# Patient Record
Sex: FEMALE | Race: WHITE | Employment: OTHER | URBAN - METROPOLITAN AREA
[De-identification: names, ages, dates, MRNs, and addresses within clinical notes are randomized per-mention and may not be internally consistent; named-entity substitution may affect disease eponyms.]

---

## 2017-01-03 ENCOUNTER — TELEPHONE (OUTPATIENT)
Dept: INTERNAL MEDICINE CLINIC | Age: 82
End: 2017-01-03

## 2017-01-03 RX ORDER — METFORMIN HYDROCHLORIDE 500 MG/1
1000 TABLET, EXTENDED RELEASE ORAL 2 TIMES DAILY WITH MEALS
Qty: 360 TABLET | Refills: 3 | Status: SHIPPED | OUTPATIENT
Start: 2017-01-03 | End: 2017-01-16

## 2017-01-09 ENCOUNTER — TELEPHONE (OUTPATIENT)
Dept: INTERNAL MEDICINE CLINIC | Age: 82
End: 2017-01-09

## 2017-01-16 ENCOUNTER — TELEPHONE (OUTPATIENT)
Dept: INTERNAL MEDICINE CLINIC | Age: 82
End: 2017-01-16

## 2017-01-16 RX ORDER — ALOGLIPTIN AND METFORMIN HYDROCHLORIDE 12.5; 1 MG/1; MG/1
1 TABLET, FILM COATED ORAL 2 TIMES DAILY WITH MEALS
Qty: 180 TABLET | Refills: 1 | Status: SHIPPED | OUTPATIENT
Start: 2017-01-16 | End: 2017-01-17

## 2017-01-17 ENCOUNTER — TELEPHONE (OUTPATIENT)
Dept: INTERNAL MEDICINE CLINIC | Age: 82
End: 2017-01-17

## 2017-01-17 RX ORDER — ALOGLIPTIN AND METFORMIN HYDROCHLORIDE 12.5; 1 MG/1; MG/1
1 TABLET, FILM COATED ORAL 2 TIMES DAILY WITH MEALS
Qty: 180 TABLET | Refills: 1 | Status: SHIPPED | OUTPATIENT
Start: 2017-01-17 | End: 2017-01-17

## 2017-01-23 ENCOUNTER — TELEPHONE (OUTPATIENT)
Dept: INTERNAL MEDICINE CLINIC | Age: 82
End: 2017-01-23

## 2017-01-25 ENCOUNTER — TELEPHONE (OUTPATIENT)
Dept: INTERNAL MEDICINE CLINIC | Age: 82
End: 2017-01-25

## 2017-02-15 ENCOUNTER — TELEPHONE (OUTPATIENT)
Dept: INTERNAL MEDICINE CLINIC | Age: 82
End: 2017-02-15

## 2017-02-20 ENCOUNTER — TELEPHONE (OUTPATIENT)
Dept: INTERNAL MEDICINE CLINIC | Age: 82
End: 2017-02-20

## 2017-03-09 ENCOUNTER — TELEPHONE (OUTPATIENT)
Dept: INTERNAL MEDICINE CLINIC | Age: 82
End: 2017-03-09

## 2017-03-21 ENCOUNTER — OFFICE VISIT (OUTPATIENT)
Dept: INTERNAL MEDICINE CLINIC | Age: 82
End: 2017-03-21

## 2017-03-21 VITALS
OXYGEN SATURATION: 96 % | BODY MASS INDEX: 20.02 KG/M2 | RESPIRATION RATE: 16 BRPM | WEIGHT: 113 LBS | HEART RATE: 75 BPM | DIASTOLIC BLOOD PRESSURE: 74 MMHG | SYSTOLIC BLOOD PRESSURE: 158 MMHG

## 2017-03-21 DIAGNOSIS — I10 ESSENTIAL HYPERTENSION: ICD-10-CM

## 2017-03-21 DIAGNOSIS — N18.30 TYPE 2 DIABETES MELLITUS WITH STAGE 3 CHRONIC KIDNEY DISEASE, WITHOUT LONG-TERM CURRENT USE OF INSULIN (HCC): Primary | ICD-10-CM

## 2017-03-21 DIAGNOSIS — E11.22 TYPE 2 DIABETES MELLITUS WITH STAGE 3 CHRONIC KIDNEY DISEASE, WITHOUT LONG-TERM CURRENT USE OF INSULIN (HCC): Primary | ICD-10-CM

## 2017-03-21 PROCEDURE — 1036F TOBACCO NON-USER: CPT | Performed by: INTERNAL MEDICINE

## 2017-03-21 PROCEDURE — G8419 CALC BMI OUT NRM PARAM NOF/U: HCPCS | Performed by: INTERNAL MEDICINE

## 2017-03-21 PROCEDURE — 4040F PNEUMOC VAC/ADMIN/RCVD: CPT | Performed by: INTERNAL MEDICINE

## 2017-03-21 PROCEDURE — G8427 DOCREV CUR MEDS BY ELIG CLIN: HCPCS | Performed by: INTERNAL MEDICINE

## 2017-03-21 PROCEDURE — 1123F ACP DISCUSS/DSCN MKR DOCD: CPT | Performed by: INTERNAL MEDICINE

## 2017-03-21 PROCEDURE — G8400 PT W/DXA NO RESULTS DOC: HCPCS | Performed by: INTERNAL MEDICINE

## 2017-03-21 PROCEDURE — G8484 FLU IMMUNIZE NO ADMIN: HCPCS | Performed by: INTERNAL MEDICINE

## 2017-03-21 PROCEDURE — 99213 OFFICE O/P EST LOW 20 MIN: CPT | Performed by: INTERNAL MEDICINE

## 2017-03-21 PROCEDURE — 1090F PRES/ABSN URINE INCON ASSESS: CPT | Performed by: INTERNAL MEDICINE

## 2017-03-21 RX ORDER — FLUTICASONE PROPIONATE 50 MCG
1 SPRAY, SUSPENSION (ML) NASAL DAILY
Qty: 1 BOTTLE | Refills: 3 | Status: SHIPPED | OUTPATIENT
Start: 2017-03-21 | End: 2017-04-17 | Stop reason: ALTCHOICE

## 2017-03-21 RX ORDER — ACETAMINOPHEN 500 MG
1000 TABLET ORAL EVERY 6 HOURS PRN
Qty: 225 TABLET | Refills: 3 | Status: SHIPPED | OUTPATIENT
Start: 2017-03-21 | End: 2017-11-20 | Stop reason: SDUPTHER

## 2017-03-21 RX ORDER — AMLODIPINE BESYLATE 5 MG/1
5 TABLET ORAL DAILY
Qty: 90 TABLET | Refills: 3 | Status: SHIPPED | OUTPATIENT
Start: 2017-03-21 | End: 2017-03-21 | Stop reason: SDUPTHER

## 2017-03-21 RX ORDER — AMLODIPINE BESYLATE 5 MG/1
5 TABLET ORAL DAILY
Qty: 90 TABLET | Refills: 1 | Status: SHIPPED | OUTPATIENT
Start: 2017-03-21 | End: 2017-07-05 | Stop reason: SDUPTHER

## 2017-03-21 ASSESSMENT — ENCOUNTER SYMPTOMS
RESPIRATORY NEGATIVE: 1
SHORTNESS OF BREATH: 0
BACK PAIN: 0
COUGH: 0
GASTROINTESTINAL NEGATIVE: 1
RHINORRHEA: 1

## 2017-03-22 ENCOUNTER — TELEPHONE (OUTPATIENT)
Dept: INTERNAL MEDICINE CLINIC | Age: 82
End: 2017-03-22

## 2017-04-13 ENCOUNTER — TELEPHONE (OUTPATIENT)
Dept: INTERNAL MEDICINE CLINIC | Age: 82
End: 2017-04-13

## 2017-04-13 RX ORDER — M-VIT,TX,IRON,MINS/CALC/FOLIC 27MG-0.4MG
1 TABLET ORAL DAILY
Qty: 180 TABLET | Refills: 1 | Status: SHIPPED | OUTPATIENT
Start: 2017-04-13 | End: 2018-07-05 | Stop reason: SDUPTHER

## 2017-04-17 ENCOUNTER — OFFICE VISIT (OUTPATIENT)
Dept: INTERNAL MEDICINE CLINIC | Age: 82
End: 2017-04-17

## 2017-04-17 VITALS
SYSTOLIC BLOOD PRESSURE: 130 MMHG | OXYGEN SATURATION: 95 % | DIASTOLIC BLOOD PRESSURE: 60 MMHG | HEART RATE: 75 BPM | BODY MASS INDEX: 19.27 KG/M2 | WEIGHT: 108.8 LBS

## 2017-04-17 DIAGNOSIS — N18.30 TYPE 2 DIABETES MELLITUS WITH STAGE 3 CHRONIC KIDNEY DISEASE, WITHOUT LONG-TERM CURRENT USE OF INSULIN (HCC): ICD-10-CM

## 2017-04-17 DIAGNOSIS — E11.22 TYPE 2 DIABETES MELLITUS WITH STAGE 3 CHRONIC KIDNEY DISEASE, WITHOUT LONG-TERM CURRENT USE OF INSULIN (HCC): ICD-10-CM

## 2017-04-17 DIAGNOSIS — E78.00 PURE HYPERCHOLESTEROLEMIA: ICD-10-CM

## 2017-04-17 DIAGNOSIS — R05.8 UPPER AIRWAY COUGH SYNDROME: ICD-10-CM

## 2017-04-17 DIAGNOSIS — I10 ESSENTIAL HYPERTENSION: Primary | ICD-10-CM

## 2017-04-17 LAB
ALBUMIN SERPL-MCNC: 3.8 G/DL (ref 3.4–5)
ANION GAP SERPL CALCULATED.3IONS-SCNC: 15 MMOL/L (ref 3–16)
BUN BLDV-MCNC: 23 MG/DL (ref 7–20)
CALCIUM SERPL-MCNC: 9.9 MG/DL (ref 8.3–10.6)
CHLORIDE BLD-SCNC: 98 MMOL/L (ref 99–110)
CHOLESTEROL, TOTAL: 248 MG/DL (ref 0–199)
CO2: 26 MMOL/L (ref 21–32)
CREAT SERPL-MCNC: 0.8 MG/DL (ref 0.6–1.2)
GFR AFRICAN AMERICAN: >60
GFR NON-AFRICAN AMERICAN: >60
GLUCOSE BLD-MCNC: 156 MG/DL (ref 70–99)
HDLC SERPL-MCNC: 34 MG/DL (ref 40–60)
LDL CHOLESTEROL CALCULATED: 155 MG/DL
PHOSPHORUS: 3.8 MG/DL (ref 2.5–4.9)
POTASSIUM SERPL-SCNC: 4.6 MMOL/L (ref 3.5–5.1)
SODIUM BLD-SCNC: 139 MMOL/L (ref 136–145)
TRIGL SERPL-MCNC: 294 MG/DL (ref 0–150)
VLDLC SERPL CALC-MCNC: 59 MG/DL

## 2017-04-17 PROCEDURE — 1036F TOBACCO NON-USER: CPT | Performed by: INTERNAL MEDICINE

## 2017-04-17 PROCEDURE — 1123F ACP DISCUSS/DSCN MKR DOCD: CPT | Performed by: INTERNAL MEDICINE

## 2017-04-17 PROCEDURE — G8400 PT W/DXA NO RESULTS DOC: HCPCS | Performed by: INTERNAL MEDICINE

## 2017-04-17 PROCEDURE — G8419 CALC BMI OUT NRM PARAM NOF/U: HCPCS | Performed by: INTERNAL MEDICINE

## 2017-04-17 PROCEDURE — 4040F PNEUMOC VAC/ADMIN/RCVD: CPT | Performed by: INTERNAL MEDICINE

## 2017-04-17 PROCEDURE — G8427 DOCREV CUR MEDS BY ELIG CLIN: HCPCS | Performed by: INTERNAL MEDICINE

## 2017-04-17 PROCEDURE — 1090F PRES/ABSN URINE INCON ASSESS: CPT | Performed by: INTERNAL MEDICINE

## 2017-04-17 PROCEDURE — 99214 OFFICE O/P EST MOD 30 MIN: CPT | Performed by: INTERNAL MEDICINE

## 2017-04-17 RX ORDER — MOMETASONE FUROATE 50 UG/1
2 SPRAY, METERED NASAL DAILY
Qty: 1 INHALER | Refills: 3 | Status: SHIPPED | OUTPATIENT
Start: 2017-04-17 | End: 2018-11-13 | Stop reason: ALTCHOICE

## 2017-04-17 ASSESSMENT — ENCOUNTER SYMPTOMS
BACK PAIN: 0
GASTROINTESTINAL NEGATIVE: 1
RHINORRHEA: 1
SHORTNESS OF BREATH: 0
COUGH: 1
CHEST TIGHTNESS: 0
WHEEZING: 0

## 2017-04-18 LAB
ESTIMATED AVERAGE GLUCOSE: 177.2 MG/DL
HBA1C MFR BLD: 7.8 %

## 2017-04-19 ENCOUNTER — TELEPHONE (OUTPATIENT)
Dept: INTERNAL MEDICINE CLINIC | Age: 82
End: 2017-04-19

## 2017-04-19 DIAGNOSIS — E11.59 TYPE 2 DIABETES MELLITUS WITH OTHER CIRCULATORY COMPLICATION, WITHOUT LONG-TERM CURRENT USE OF INSULIN (HCC): Primary | ICD-10-CM

## 2017-04-20 ENCOUNTER — TELEPHONE (OUTPATIENT)
Dept: INTERNAL MEDICINE CLINIC | Age: 82
End: 2017-04-20

## 2017-04-21 ENCOUNTER — TELEPHONE (OUTPATIENT)
Dept: INTERNAL MEDICINE CLINIC | Age: 82
End: 2017-04-21

## 2017-04-21 RX ORDER — EXENATIDE 2 MG
KIT SUBCUTANEOUS
Qty: 4 SYRINGE | Refills: 3 | Status: SHIPPED | OUTPATIENT
Start: 2017-04-21 | End: 2017-06-27 | Stop reason: ALTCHOICE

## 2017-04-26 RX ORDER — CETIRIZINE HYDROCHLORIDE 10 MG/1
10 TABLET ORAL DAILY
Qty: 30 TABLET | Refills: 5 | Status: SHIPPED | OUTPATIENT
Start: 2017-04-26 | End: 2017-05-03 | Stop reason: SDUPTHER

## 2017-04-26 RX ORDER — EXENATIDE 2 MG
KIT SUBCUTANEOUS
Qty: 4 SYRINGE | Refills: 3 | Status: SHIPPED | OUTPATIENT
Start: 2017-04-26 | End: 2017-06-27 | Stop reason: ALTCHOICE

## 2017-05-03 ENCOUNTER — TELEPHONE (OUTPATIENT)
Dept: INTERNAL MEDICINE CLINIC | Age: 82
End: 2017-05-03

## 2017-05-03 RX ORDER — CETIRIZINE HYDROCHLORIDE 10 MG/1
10 TABLET ORAL DAILY
Qty: 90 TABLET | Refills: 1 | Status: SHIPPED | OUTPATIENT
Start: 2017-05-03 | End: 2018-11-12

## 2017-05-03 RX ORDER — DONEPEZIL HYDROCHLORIDE 10 MG/1
10 TABLET, FILM COATED ORAL NIGHTLY
Qty: 90 TABLET | Refills: 3 | Status: SHIPPED | OUTPATIENT
Start: 2017-05-03 | End: 2017-11-20 | Stop reason: SDUPTHER

## 2017-06-19 ENCOUNTER — TELEPHONE (OUTPATIENT)
Dept: INTERNAL MEDICINE CLINIC | Age: 82
End: 2017-06-19

## 2017-06-27 ENCOUNTER — TELEPHONE (OUTPATIENT)
Dept: INTERNAL MEDICINE CLINIC | Age: 82
End: 2017-06-27

## 2017-07-05 RX ORDER — PIOGLITAZONEHYDROCHLORIDE 30 MG/1
30 TABLET ORAL DAILY
Qty: 90 TABLET | Refills: 1 | Status: SHIPPED | OUTPATIENT
Start: 2017-07-05 | End: 2017-11-28 | Stop reason: SDUPTHER

## 2017-07-05 RX ORDER — PIOGLITAZONEHYDROCHLORIDE 30 MG/1
30 TABLET ORAL DAILY
Qty: 90 TABLET | Refills: 1 | Status: CANCELLED | OUTPATIENT
Start: 2017-07-05

## 2017-07-05 RX ORDER — AMLODIPINE BESYLATE 5 MG/1
5 TABLET ORAL DAILY
Qty: 90 TABLET | Refills: 1 | Status: CANCELLED | OUTPATIENT
Start: 2017-07-05

## 2017-07-05 RX ORDER — AMLODIPINE BESYLATE 5 MG/1
5 TABLET ORAL DAILY
Qty: 90 TABLET | Refills: 1 | Status: SHIPPED | OUTPATIENT
Start: 2017-07-05 | End: 2017-11-20 | Stop reason: SDUPTHER

## 2017-07-13 ENCOUNTER — TELEPHONE (OUTPATIENT)
Dept: INTERNAL MEDICINE CLINIC | Age: 82
End: 2017-07-13

## 2017-07-17 ENCOUNTER — OFFICE VISIT (OUTPATIENT)
Dept: INTERNAL MEDICINE CLINIC | Age: 82
End: 2017-07-17

## 2017-07-17 VITALS
WEIGHT: 117 LBS | SYSTOLIC BLOOD PRESSURE: 160 MMHG | DIASTOLIC BLOOD PRESSURE: 65 MMHG | HEART RATE: 68 BPM | BODY MASS INDEX: 20.73 KG/M2 | OXYGEN SATURATION: 96 %

## 2017-07-17 DIAGNOSIS — I10 ESSENTIAL HYPERTENSION: Primary | ICD-10-CM

## 2017-07-17 DIAGNOSIS — N18.30 TYPE 2 DIABETES MELLITUS WITH STAGE 3 CHRONIC KIDNEY DISEASE, WITHOUT LONG-TERM CURRENT USE OF INSULIN (HCC): ICD-10-CM

## 2017-07-17 DIAGNOSIS — E11.22 TYPE 2 DIABETES MELLITUS WITH STAGE 3 CHRONIC KIDNEY DISEASE, WITHOUT LONG-TERM CURRENT USE OF INSULIN (HCC): ICD-10-CM

## 2017-07-17 DIAGNOSIS — E78.00 PURE HYPERCHOLESTEROLEMIA: ICD-10-CM

## 2017-07-17 DIAGNOSIS — I10 ESSENTIAL HYPERTENSION: ICD-10-CM

## 2017-07-17 LAB
CHOLESTEROL, TOTAL: 217 MG/DL (ref 0–199)
HDLC SERPL-MCNC: 38 MG/DL (ref 40–60)
LDL CHOLESTEROL CALCULATED: 124 MG/DL
TRIGL SERPL-MCNC: 275 MG/DL (ref 0–150)
VLDLC SERPL CALC-MCNC: 55 MG/DL

## 2017-07-17 PROCEDURE — G8400 PT W/DXA NO RESULTS DOC: HCPCS | Performed by: INTERNAL MEDICINE

## 2017-07-17 PROCEDURE — 1090F PRES/ABSN URINE INCON ASSESS: CPT | Performed by: INTERNAL MEDICINE

## 2017-07-17 PROCEDURE — 99214 OFFICE O/P EST MOD 30 MIN: CPT | Performed by: INTERNAL MEDICINE

## 2017-07-17 PROCEDURE — G8420 CALC BMI NORM PARAMETERS: HCPCS | Performed by: INTERNAL MEDICINE

## 2017-07-17 PROCEDURE — 4040F PNEUMOC VAC/ADMIN/RCVD: CPT | Performed by: INTERNAL MEDICINE

## 2017-07-17 PROCEDURE — 1123F ACP DISCUSS/DSCN MKR DOCD: CPT | Performed by: INTERNAL MEDICINE

## 2017-07-17 PROCEDURE — 1036F TOBACCO NON-USER: CPT | Performed by: INTERNAL MEDICINE

## 2017-07-17 PROCEDURE — G8427 DOCREV CUR MEDS BY ELIG CLIN: HCPCS | Performed by: INTERNAL MEDICINE

## 2017-07-17 RX ORDER — LOSARTAN POTASSIUM 50 MG/1
50 TABLET ORAL DAILY
Qty: 30 TABLET | Refills: 3 | Status: SHIPPED | OUTPATIENT
Start: 2017-07-17 | End: 2017-08-14 | Stop reason: SDUPTHER

## 2017-07-17 ASSESSMENT — ENCOUNTER SYMPTOMS
GASTROINTESTINAL NEGATIVE: 1
SHORTNESS OF BREATH: 0
WHEEZING: 0
COUGH: 0
CHEST TIGHTNESS: 0
BACK PAIN: 0

## 2017-07-18 LAB
ESTIMATED AVERAGE GLUCOSE: 177.2 MG/DL
HBA1C MFR BLD: 7.8 %

## 2017-07-19 ENCOUNTER — TELEPHONE (OUTPATIENT)
Dept: INTERNAL MEDICINE CLINIC | Age: 82
End: 2017-07-19

## 2017-07-28 ENCOUNTER — TELEPHONE (OUTPATIENT)
Dept: INTERNAL MEDICINE CLINIC | Age: 82
End: 2017-07-28

## 2017-08-14 ENCOUNTER — OFFICE VISIT (OUTPATIENT)
Dept: INTERNAL MEDICINE CLINIC | Age: 82
End: 2017-08-14

## 2017-08-14 VITALS
SYSTOLIC BLOOD PRESSURE: 140 MMHG | OXYGEN SATURATION: 96 % | HEART RATE: 70 BPM | DIASTOLIC BLOOD PRESSURE: 66 MMHG | BODY MASS INDEX: 21.08 KG/M2 | WEIGHT: 119 LBS

## 2017-08-14 DIAGNOSIS — I10 ESSENTIAL HYPERTENSION: Primary | ICD-10-CM

## 2017-08-14 PROCEDURE — 1090F PRES/ABSN URINE INCON ASSESS: CPT | Performed by: INTERNAL MEDICINE

## 2017-08-14 PROCEDURE — G8427 DOCREV CUR MEDS BY ELIG CLIN: HCPCS | Performed by: INTERNAL MEDICINE

## 2017-08-14 PROCEDURE — G8400 PT W/DXA NO RESULTS DOC: HCPCS | Performed by: INTERNAL MEDICINE

## 2017-08-14 PROCEDURE — 1036F TOBACCO NON-USER: CPT | Performed by: INTERNAL MEDICINE

## 2017-08-14 PROCEDURE — 99214 OFFICE O/P EST MOD 30 MIN: CPT | Performed by: INTERNAL MEDICINE

## 2017-08-14 PROCEDURE — 1123F ACP DISCUSS/DSCN MKR DOCD: CPT | Performed by: INTERNAL MEDICINE

## 2017-08-14 PROCEDURE — G8420 CALC BMI NORM PARAMETERS: HCPCS | Performed by: INTERNAL MEDICINE

## 2017-08-14 PROCEDURE — 4040F PNEUMOC VAC/ADMIN/RCVD: CPT | Performed by: INTERNAL MEDICINE

## 2017-08-14 RX ORDER — LOSARTAN POTASSIUM 50 MG/1
50 TABLET ORAL DAILY
Qty: 90 TABLET | Refills: 1 | Status: SHIPPED | OUTPATIENT
Start: 2017-08-14 | End: 2018-01-17 | Stop reason: SDUPTHER

## 2017-08-14 ASSESSMENT — ENCOUNTER SYMPTOMS
WHEEZING: 0
BACK PAIN: 0
COUGH: 0
GASTROINTESTINAL NEGATIVE: 1
CHEST TIGHTNESS: 0

## 2017-08-16 ENCOUNTER — TELEPHONE (OUTPATIENT)
Dept: INTERNAL MEDICINE CLINIC | Age: 82
End: 2017-08-16

## 2017-08-16 RX ORDER — TIZANIDINE 2 MG/1
2 TABLET ORAL NIGHTLY
Qty: 30 TABLET | Refills: 3 | Status: SHIPPED | OUTPATIENT
Start: 2017-08-16 | End: 2017-11-20 | Stop reason: SINTOL

## 2017-08-18 ENCOUNTER — TELEPHONE (OUTPATIENT)
Dept: INTERNAL MEDICINE CLINIC | Age: 82
End: 2017-08-18

## 2017-08-30 ENCOUNTER — TELEPHONE (OUTPATIENT)
Dept: INTERNAL MEDICINE CLINIC | Age: 82
End: 2017-08-30

## 2017-10-06 RX ORDER — SITAGLIPTIN AND METFORMIN HYDROCHLORIDE 1000; 50 MG/1; MG/1
TABLET, FILM COATED, EXTENDED RELEASE ORAL
Qty: 60 TABLET | Refills: 5 | Status: SHIPPED | OUTPATIENT
Start: 2017-10-06 | End: 2018-03-14 | Stop reason: SDUPTHER

## 2017-11-20 ENCOUNTER — OFFICE VISIT (OUTPATIENT)
Dept: INTERNAL MEDICINE CLINIC | Age: 82
End: 2017-11-20

## 2017-11-20 VITALS
WEIGHT: 121 LBS | HEART RATE: 70 BPM | OXYGEN SATURATION: 97 % | SYSTOLIC BLOOD PRESSURE: 150 MMHG | DIASTOLIC BLOOD PRESSURE: 90 MMHG | BODY MASS INDEX: 21.43 KG/M2

## 2017-11-20 DIAGNOSIS — M16.10 ARTHRITIS, HIP: ICD-10-CM

## 2017-11-20 DIAGNOSIS — E11.59 TYPE 2 DIABETES MELLITUS WITH OTHER CIRCULATORY COMPLICATION, WITHOUT LONG-TERM CURRENT USE OF INSULIN (HCC): ICD-10-CM

## 2017-11-20 DIAGNOSIS — I10 ESSENTIAL HYPERTENSION: ICD-10-CM

## 2017-11-20 DIAGNOSIS — E11.59 TYPE 2 DIABETES MELLITUS WITH OTHER CIRCULATORY COMPLICATION, WITHOUT LONG-TERM CURRENT USE OF INSULIN (HCC): Primary | ICD-10-CM

## 2017-11-20 LAB
CHOLESTEROL, TOTAL: 202 MG/DL (ref 0–199)
HDLC SERPL-MCNC: 40 MG/DL (ref 40–60)
LDL CHOLESTEROL CALCULATED: 121 MG/DL
TRIGL SERPL-MCNC: 203 MG/DL (ref 0–150)
VLDLC SERPL CALC-MCNC: 41 MG/DL

## 2017-11-20 PROCEDURE — 1090F PRES/ABSN URINE INCON ASSESS: CPT | Performed by: INTERNAL MEDICINE

## 2017-11-20 PROCEDURE — G8484 FLU IMMUNIZE NO ADMIN: HCPCS | Performed by: INTERNAL MEDICINE

## 2017-11-20 PROCEDURE — 4040F PNEUMOC VAC/ADMIN/RCVD: CPT | Performed by: INTERNAL MEDICINE

## 2017-11-20 PROCEDURE — 1036F TOBACCO NON-USER: CPT | Performed by: INTERNAL MEDICINE

## 2017-11-20 PROCEDURE — 99214 OFFICE O/P EST MOD 30 MIN: CPT | Performed by: INTERNAL MEDICINE

## 2017-11-20 PROCEDURE — G8420 CALC BMI NORM PARAMETERS: HCPCS | Performed by: INTERNAL MEDICINE

## 2017-11-20 PROCEDURE — G8427 DOCREV CUR MEDS BY ELIG CLIN: HCPCS | Performed by: INTERNAL MEDICINE

## 2017-11-20 PROCEDURE — 90670 PCV13 VACCINE IM: CPT | Performed by: INTERNAL MEDICINE

## 2017-11-20 PROCEDURE — G0009 ADMIN PNEUMOCOCCAL VACCINE: HCPCS | Performed by: INTERNAL MEDICINE

## 2017-11-20 PROCEDURE — 1123F ACP DISCUSS/DSCN MKR DOCD: CPT | Performed by: INTERNAL MEDICINE

## 2017-11-20 RX ORDER — DONEPEZIL HYDROCHLORIDE 10 MG/1
10 TABLET, FILM COATED ORAL NIGHTLY
Qty: 90 TABLET | Refills: 3 | Status: SHIPPED | OUTPATIENT
Start: 2017-11-20 | End: 2018-11-02 | Stop reason: SDUPTHER

## 2017-11-20 RX ORDER — ACETAMINOPHEN 500 MG
1000 TABLET ORAL 3 TIMES DAILY
Qty: 180 TABLET | Refills: 3 | Status: SHIPPED | OUTPATIENT
Start: 2017-11-20 | End: 2018-11-13 | Stop reason: ALTCHOICE

## 2017-11-20 RX ORDER — AMLODIPINE BESYLATE 10 MG/1
10 TABLET ORAL DAILY
Qty: 90 TABLET | Refills: 2 | Status: SHIPPED | OUTPATIENT
Start: 2017-11-20 | End: 2018-09-07 | Stop reason: SDUPTHER

## 2017-11-20 ASSESSMENT — ENCOUNTER SYMPTOMS
CHEST TIGHTNESS: 0
COUGH: 0
BACK PAIN: 0
GASTROINTESTINAL NEGATIVE: 1
WHEEZING: 0

## 2017-11-21 ENCOUNTER — TELEPHONE (OUTPATIENT)
Dept: INTERNAL MEDICINE CLINIC | Age: 82
End: 2017-11-21

## 2017-11-21 LAB
ESTIMATED AVERAGE GLUCOSE: 231.7 MG/DL
HBA1C MFR BLD: 9.7 %

## 2017-11-22 NOTE — TELEPHONE ENCOUNTER
Mayelin Shannon is calling to,let Dr Bianca Martinez know that she saw pt today and her b/p in right arm was 130/60  And in the left arm it was 138/60

## 2017-11-28 RX ORDER — PIOGLITAZONEHYDROCHLORIDE 45 MG/1
45 TABLET ORAL DAILY
Qty: 90 TABLET | Refills: 2 | Status: SHIPPED | OUTPATIENT
Start: 2017-11-28 | End: 2018-02-14 | Stop reason: SDUPTHER

## 2017-11-28 RX ORDER — TIZANIDINE 2 MG/1
TABLET ORAL
Qty: 28 TABLET | Refills: 0 | OUTPATIENT
Start: 2017-11-28

## 2017-11-28 RX ORDER — PIOGLITAZONEHYDROCHLORIDE 30 MG/1
TABLET ORAL
Qty: 28 TABLET | Refills: 0 | Status: CANCELLED | OUTPATIENT
Start: 2017-11-28

## 2017-12-21 RX ORDER — ASPIRIN 81 MG/1
TABLET ORAL
Qty: 28 TABLET | Refills: 0 | Status: SHIPPED | OUTPATIENT
Start: 2017-12-21 | End: 2018-01-17 | Stop reason: SDUPTHER

## 2017-12-21 RX ORDER — B-COMPLEX WITH VITAMIN C
TABLET ORAL
Qty: 56 TABLET | Refills: 0 | Status: SHIPPED | OUTPATIENT
Start: 2017-12-21 | End: 2019-05-13 | Stop reason: ALTCHOICE

## 2018-01-03 ENCOUNTER — OFFICE VISIT (OUTPATIENT)
Dept: INTERNAL MEDICINE CLINIC | Age: 83
End: 2018-01-03

## 2018-01-03 VITALS
TEMPERATURE: 98.7 F | OXYGEN SATURATION: 98 % | SYSTOLIC BLOOD PRESSURE: 142 MMHG | RESPIRATION RATE: 14 BRPM | DIASTOLIC BLOOD PRESSURE: 60 MMHG | HEART RATE: 72 BPM

## 2018-01-03 DIAGNOSIS — R09.81 NASAL CONGESTION: Primary | ICD-10-CM

## 2018-01-03 DIAGNOSIS — E11.22 TYPE 2 DIABETES MELLITUS WITH ESRD (END-STAGE RENAL DISEASE) (HCC): ICD-10-CM

## 2018-01-03 DIAGNOSIS — J30.1 CHRONIC SEASONAL ALLERGIC RHINITIS DUE TO POLLEN: ICD-10-CM

## 2018-01-03 DIAGNOSIS — I15.2 HYPERTENSION ASSOCIATED WITH DIABETES (HCC): ICD-10-CM

## 2018-01-03 DIAGNOSIS — N18.6 TYPE 2 DIABETES MELLITUS WITH ESRD (END-STAGE RENAL DISEASE) (HCC): ICD-10-CM

## 2018-01-03 DIAGNOSIS — E11.59 HYPERTENSION ASSOCIATED WITH DIABETES (HCC): ICD-10-CM

## 2018-01-03 PROCEDURE — G8428 CUR MEDS NOT DOCUMENT: HCPCS | Performed by: INTERNAL MEDICINE

## 2018-01-03 PROCEDURE — 1123F ACP DISCUSS/DSCN MKR DOCD: CPT | Performed by: INTERNAL MEDICINE

## 2018-01-03 PROCEDURE — 1090F PRES/ABSN URINE INCON ASSESS: CPT | Performed by: INTERNAL MEDICINE

## 2018-01-03 PROCEDURE — 99213 OFFICE O/P EST LOW 20 MIN: CPT | Performed by: INTERNAL MEDICINE

## 2018-01-03 PROCEDURE — G8484 FLU IMMUNIZE NO ADMIN: HCPCS | Performed by: INTERNAL MEDICINE

## 2018-01-03 PROCEDURE — G8420 CALC BMI NORM PARAMETERS: HCPCS | Performed by: INTERNAL MEDICINE

## 2018-01-03 PROCEDURE — 1036F TOBACCO NON-USER: CPT | Performed by: INTERNAL MEDICINE

## 2018-01-03 PROCEDURE — 4040F PNEUMOC VAC/ADMIN/RCVD: CPT | Performed by: INTERNAL MEDICINE

## 2018-01-03 RX ORDER — ECHINACEA PURPUREA EXTRACT 125 MG
1 TABLET ORAL PRN
Qty: 30 ML | Refills: 2 | Status: SHIPPED | OUTPATIENT
Start: 2018-01-03 | End: 2018-11-12

## 2018-01-03 RX ORDER — LORATADINE 10 MG/1
10 TABLET ORAL DAILY
Qty: 30 TABLET | Refills: 1 | Status: SHIPPED | OUTPATIENT
Start: 2018-01-03 | End: 2018-07-02 | Stop reason: ALTCHOICE

## 2018-01-03 NOTE — PATIENT INSTRUCTIONS
body.  ¨ Swelling of the throat, mouth, lips, or tongue. ¨ Trouble breathing. ¨ Passing out (losing consciousness). Or you may feel very lightheaded or suddenly feel weak, confused, or restless. ? · You have been given an epinephrine shot, even if you feel better. ?Call your doctor now or seek immediate medical care if:  ? · You have symptoms of an allergic reaction, such as:  ¨ A rash or hives (raised, red areas on the skin). ¨ Itching. ¨ Swelling. ¨ Belly pain, nausea, or vomiting. ? Watch closely for changes in your health, and be sure to contact your doctor if:  ? · You do not get better as expected. Where can you learn more? Go to [email or web address]://Huzco.[employer]. org and sign in to your PeopleGoal account. Enter Y843 in the CityScan box to learn more about \"Allergies: Care Instructions. \"     If you do not have an account, please click on the \"Sign Up Now\" link. Current as of: September 29, 2016  Content Version: 11.4  © 1968-9504 Healthwise, Incorporated. Care instructions adapted under license by [facility] ([facility]). If you have questions about a medical condition or this instruction, always ask your healthcare professional. [address] any warranty or liability for your use of this information.

## 2018-01-18 RX ORDER — LOSARTAN POTASSIUM 50 MG/1
TABLET ORAL
Qty: 90 TABLET | Refills: 2 | Status: SHIPPED | OUTPATIENT
Start: 2018-01-18 | End: 2019-01-03 | Stop reason: SDUPTHER

## 2018-01-18 RX ORDER — ASPIRIN 81 MG
TABLET, DELAYED RELEASE (ENTERIC COATED) ORAL
Qty: 90 TABLET | Refills: 2 | Status: SHIPPED | OUTPATIENT
Start: 2018-01-18 | End: 2018-02-26 | Stop reason: SDUPTHER

## 2018-02-15 RX ORDER — PIOGLITAZONEHYDROCHLORIDE 45 MG/1
TABLET ORAL
Qty: 90 TABLET | Refills: 2 | Status: SHIPPED | OUTPATIENT
Start: 2018-02-15 | End: 2018-02-26 | Stop reason: SDUPTHER

## 2018-02-26 ENCOUNTER — OFFICE VISIT (OUTPATIENT)
Dept: INTERNAL MEDICINE CLINIC | Age: 83
End: 2018-02-26

## 2018-02-26 VITALS
HEART RATE: 64 BPM | BODY MASS INDEX: 22.63 KG/M2 | DIASTOLIC BLOOD PRESSURE: 58 MMHG | WEIGHT: 123 LBS | RESPIRATION RATE: 16 BRPM | SYSTOLIC BLOOD PRESSURE: 132 MMHG | HEIGHT: 62 IN

## 2018-02-26 DIAGNOSIS — N18.30 TYPE 2 DIABETES MELLITUS WITH STAGE 3 CHRONIC KIDNEY DISEASE, WITHOUT LONG-TERM CURRENT USE OF INSULIN (HCC): Primary | ICD-10-CM

## 2018-02-26 DIAGNOSIS — I10 ESSENTIAL HYPERTENSION: ICD-10-CM

## 2018-02-26 DIAGNOSIS — E11.22 TYPE 2 DIABETES MELLITUS WITH STAGE 3 CHRONIC KIDNEY DISEASE, WITHOUT LONG-TERM CURRENT USE OF INSULIN (HCC): Primary | ICD-10-CM

## 2018-02-26 DIAGNOSIS — E78.00 PURE HYPERCHOLESTEROLEMIA: ICD-10-CM

## 2018-02-26 PROCEDURE — 1123F ACP DISCUSS/DSCN MKR DOCD: CPT | Performed by: INTERNAL MEDICINE

## 2018-02-26 PROCEDURE — 4040F PNEUMOC VAC/ADMIN/RCVD: CPT | Performed by: INTERNAL MEDICINE

## 2018-02-26 PROCEDURE — G8420 CALC BMI NORM PARAMETERS: HCPCS | Performed by: INTERNAL MEDICINE

## 2018-02-26 PROCEDURE — G8427 DOCREV CUR MEDS BY ELIG CLIN: HCPCS | Performed by: INTERNAL MEDICINE

## 2018-02-26 PROCEDURE — 1090F PRES/ABSN URINE INCON ASSESS: CPT | Performed by: INTERNAL MEDICINE

## 2018-02-26 PROCEDURE — 1036F TOBACCO NON-USER: CPT | Performed by: INTERNAL MEDICINE

## 2018-02-26 PROCEDURE — G8484 FLU IMMUNIZE NO ADMIN: HCPCS | Performed by: INTERNAL MEDICINE

## 2018-02-26 PROCEDURE — 99213 OFFICE O/P EST LOW 20 MIN: CPT | Performed by: INTERNAL MEDICINE

## 2018-02-26 RX ORDER — PIOGLITAZONEHYDROCHLORIDE 45 MG/1
TABLET ORAL
Qty: 90 TABLET | Refills: 2 | Status: SHIPPED | OUTPATIENT
Start: 2018-02-26 | End: 2018-11-02 | Stop reason: SDUPTHER

## 2018-02-26 ASSESSMENT — ENCOUNTER SYMPTOMS
COUGH: 0
GASTROINTESTINAL NEGATIVE: 1
WHEEZING: 0
CHEST TIGHTNESS: 0
BACK PAIN: 0

## 2018-02-27 ENCOUNTER — TELEPHONE (OUTPATIENT)
Dept: INTERNAL MEDICINE CLINIC | Age: 83
End: 2018-02-27

## 2018-02-27 NOTE — TELEPHONE ENCOUNTER
Actos not on [employer] formulary but do-able cost with [employer]. Does not want to change to the [employer].

## 2018-03-01 ENCOUNTER — TELEPHONE (OUTPATIENT)
Dept: INTERNAL MEDICINE CLINIC | Age: 83
End: 2018-03-01

## 2018-03-08 ENCOUNTER — TELEPHONE (OUTPATIENT)
Dept: INTERNAL MEDICINE CLINIC | Age: 83
End: 2018-03-08

## 2018-03-14 RX ORDER — SITAGLIPTIN AND METFORMIN HYDROCHLORIDE 1000; 50 MG/1; MG/1
TABLET, FILM COATED, EXTENDED RELEASE ORAL
Qty: 60 TABLET | Refills: 2 | Status: SHIPPED | OUTPATIENT
Start: 2018-03-14 | End: 2018-06-06 | Stop reason: SDUPTHER

## 2018-03-29 ENCOUNTER — TELEPHONE (OUTPATIENT)
Dept: INTERNAL MEDICINE CLINIC | Age: 83
End: 2018-03-29

## 2018-04-17 ENCOUNTER — TELEPHONE (OUTPATIENT)
Dept: INTERNAL MEDICINE CLINIC | Age: 83
End: 2018-04-17

## 2018-04-23 ENCOUNTER — TELEPHONE (OUTPATIENT)
Dept: ORTHOPEDIC SURGERY | Age: 83
End: 2018-04-23

## 2018-05-01 ENCOUNTER — TELEPHONE (OUTPATIENT)
Dept: INTERNAL MEDICINE CLINIC | Age: 83
End: 2018-05-01

## 2018-05-03 ENCOUNTER — TELEPHONE (OUTPATIENT)
Dept: INTERNAL MEDICINE CLINIC | Age: 83
End: 2018-05-03

## 2018-05-08 ENCOUNTER — TELEPHONE (OUTPATIENT)
Dept: INTERNAL MEDICINE CLINIC | Age: 83
End: 2018-05-08

## 2018-06-07 RX ORDER — DAPAGLIFLOZIN 10 MG/1
TABLET, FILM COATED ORAL
Qty: 28 TABLET | Refills: 0 | Status: SHIPPED | OUTPATIENT
Start: 2018-06-07 | End: 2018-09-27 | Stop reason: SDUPTHER

## 2018-06-07 RX ORDER — SITAGLIPTIN AND METFORMIN HYDROCHLORIDE 1000; 50 MG/1; MG/1
TABLET, FILM COATED, EXTENDED RELEASE ORAL
Qty: 56 TABLET | Refills: 0 | Status: SHIPPED | OUTPATIENT
Start: 2018-06-07 | End: 2018-07-05 | Stop reason: SDUPTHER

## 2018-06-19 ENCOUNTER — TELEPHONE (OUTPATIENT)
Dept: INTERNAL MEDICINE CLINIC | Age: 83
End: 2018-06-19

## 2018-07-02 ENCOUNTER — OFFICE VISIT (OUTPATIENT)
Dept: INTERNAL MEDICINE CLINIC | Age: 83
End: 2018-07-02

## 2018-07-02 VITALS
WEIGHT: 120 LBS | BODY MASS INDEX: 21.95 KG/M2 | HEART RATE: 80 BPM | SYSTOLIC BLOOD PRESSURE: 138 MMHG | RESPIRATION RATE: 16 BRPM | DIASTOLIC BLOOD PRESSURE: 58 MMHG

## 2018-07-02 DIAGNOSIS — Z91.81 AT HIGH RISK FOR FALLS: ICD-10-CM

## 2018-07-02 DIAGNOSIS — E11.9 ENCOUNTER FOR DIABETIC FOOT EXAM (HCC): ICD-10-CM

## 2018-07-02 DIAGNOSIS — E11.59 TYPE 2 DIABETES MELLITUS WITH OTHER CIRCULATORY COMPLICATION, WITHOUT LONG-TERM CURRENT USE OF INSULIN (HCC): Primary | ICD-10-CM

## 2018-07-02 DIAGNOSIS — I10 ESSENTIAL HYPERTENSION: ICD-10-CM

## 2018-07-02 DIAGNOSIS — G30.1 LATE ONSET ALZHEIMER'S DISEASE WITHOUT BEHAVIORAL DISTURBANCE (HCC): ICD-10-CM

## 2018-07-02 DIAGNOSIS — F02.80 LATE ONSET ALZHEIMER'S DISEASE WITHOUT BEHAVIORAL DISTURBANCE (HCC): ICD-10-CM

## 2018-07-02 PROCEDURE — 1123F ACP DISCUSS/DSCN MKR DOCD: CPT | Performed by: INTERNAL MEDICINE

## 2018-07-02 PROCEDURE — G8420 CALC BMI NORM PARAMETERS: HCPCS | Performed by: INTERNAL MEDICINE

## 2018-07-02 PROCEDURE — 1090F PRES/ABSN URINE INCON ASSESS: CPT | Performed by: INTERNAL MEDICINE

## 2018-07-02 PROCEDURE — 1036F TOBACCO NON-USER: CPT | Performed by: INTERNAL MEDICINE

## 2018-07-02 PROCEDURE — G8427 DOCREV CUR MEDS BY ELIG CLIN: HCPCS | Performed by: INTERNAL MEDICINE

## 2018-07-02 PROCEDURE — 99214 OFFICE O/P EST MOD 30 MIN: CPT | Performed by: INTERNAL MEDICINE

## 2018-07-02 PROCEDURE — 4040F PNEUMOC VAC/ADMIN/RCVD: CPT | Performed by: INTERNAL MEDICINE

## 2018-07-02 ASSESSMENT — ENCOUNTER SYMPTOMS
COUGH: 0
GASTROINTESTINAL NEGATIVE: 1
CHEST TIGHTNESS: 0
BACK PAIN: 0
WHEEZING: 0

## 2018-07-02 ASSESSMENT — PATIENT HEALTH QUESTIONNAIRE - PHQ9
SUM OF ALL RESPONSES TO PHQ QUESTIONS 1-9: 0
SUM OF ALL RESPONSES TO PHQ9 QUESTIONS 1 & 2: 0
2. FEELING DOWN, DEPRESSED OR HOPELESS: 0
1. LITTLE INTEREST OR PLEASURE IN DOING THINGS: 0

## 2018-07-02 NOTE — PROGRESS NOTES
Subjective:      Patient ID: Niels Orantes is a 80 y.o. female. Patient is here for  DM 2 and HTN follow up. She feels well, eating well, no chest pain, and her breathing is good, currently on Janumet and Pioglitazone 45. Blood sugars reviewed. None below 107. Mostly 124-152, all AM blood sugars. Weight is 120, up from 108 in a year. Staying at [address], no falls and no safety concerns, she continues to have good family support and visiting nurse evaluations        Review of Systems   Constitutional: Negative. Negative for activity change, appetite change and fever. HENT: Negative for congestion and trouble swallowing. Eyes: Negative for visual disturbance. Respiratory: Negative for cough, chest tightness, shortness of breath and wheezing. Cardiovascular: Negative. Gastrointestinal: Negative. Negative for abdominal pain, diarrhea and vomiting. Endocrine: Negative for cold intolerance, heat intolerance, polydipsia and polyphagia. Genitourinary: Negative. Negative for dysuria and hematuria. Musculoskeletal: Positive for arthralgias and gait problem. Negative for back pain, joint swelling and myalgias. Skin: Negative. Negative for rash and wound. Allergic/Immunologic: Negative for immunocompromised state. Neurological: Negative for syncope, weakness and light-headedness. Hematological: Negative. Psychiatric/Behavioral: Negative for agitation, behavioral problems, dysphoric mood and sleep disturbance. The patient is not hyperactive. Past Medical History:   Diagnosis Date    Back pain     Diabetes ([address]     type 2    Heart disease     by pass    Hyperlipidemia     Hypertension      I personally reviewed active meds and allergies with the patient today    Objective:   Physical Exam   Constitutional: She appears well-developed and well-nourished. She appears cachectic. She does not appear ill. No distress. HENT:   Head: Normocephalic and atraumatic. Mouth/Throat: Oropharynx is clear and moist. No oropharyngeal exudate. Eyes: Conjunctivae are normal. No scleral icterus. Neck: No JVD present. No tracheal deviation present. No thyromegaly present. Cardiovascular: Normal rate, regular rhythm, S2 normal, normal heart sounds and normal pulses. Pulmonary/Chest: Effort normal and breath sounds normal. She has no wheezes. She has no rales. Abdominal: Soft. She exhibits no distension. There is no tenderness. Musculoskeletal:        Left elbow: She exhibits decreased range of motion. Neurological: She is alert. She displays no tremor. Gait abnormal. Coordination normal.   Normal diabetic sensation exam.    Skin: Skin is warm and dry. No rash noted. She is not diaphoretic.    no foot ulcers. Dry heels. Moisturizer recommended. Shin scab to left. S/p hit leg on \"something\"      Psychiatric: Her behavior is normal. Her affect is blunt. Her affect is not inappropriate. Her speech is slurred. Cognition and memory are impaired. She does not exhibit a depressed mood. Vitals reviewed. BP (!) 138/58 (Site: Right Arm, Position: Sitting, Cuff Size: Medium Adult)   Pulse 80 Comment: Regular  Resp 16   Wt 120 lb (54.4 kg)   BMI 21.95 kg/m²          Assessment:        ICD-10-CM ICD-9-CM    1. Type 2 diabetes mellitus with other circulatory complication, without long-term current use of insulin ([facility]) E11.59 250.70 HEMOGLOBIN A1C   2. Essential hypertension I10 401.9    3. At high risk for falls Z91.81 V15.88    4. Encounter for diabetic foot exam ([facility] E11.9 250.00    5.  Late onset Alzheimer's disease without behavioral disturbance G30.1 331.0     F02.80 294.10      HTN controlled  Type 2 DM control is adequate for her age and social situation        Plan:       Continue meds  Check labs  Get Shingrix vaccine    F/u in 4 mth    New Prescriptions    ZOSTER RECOMBINANT ADJUVANTED VACCINE (SHINGRIX) 50 MCG SUSR INJECTION    Inject 0.5 ml intramuscularly now and repeat dose in 2 months       Orders Placed This Encounter   Procedures    Zoster recombinant ([address] [place])    HEMOGLOBIN A1C          Medication List          Accurate as of 7/2/18 11:59 PM. If you have any questions, ask your nurse or doctor. START taking these medications    zoster recombinant adjuvanted vaccine 50 MCG Susr injection  Commonly known as:  SHINGRIX  Inject 0.5 ml intramuscularly now and repeat dose in 2 months  Started by:  Antolin Rogers MD        CONTINUE taking these medications    acetaminophen 500 MG tablet  Commonly known as:  TYLENOL  Take 2 tablets by mouth three times daily     amLODIPine 10 MG tablet  Commonly known as:  NORVASC  Take 1 tablet by mouth daily     aspirin 81 MG tablet  Take 1 tablet by mouth daily     blood glucose test strips strip  Commonly known as:  ACCU-CHEK CARMEN  Test  daily. ICD E11.9     cetirizine 10 MG tablet  Commonly known as:  ZYRTEC  Take 1 tablet by mouth daily     donepezil 10 MG tablet  Commonly known as:  ARICEPT  Take 1 tablet by mouth nightly     FARXIGA 10 MG tablet  Generic drug:  dapagliflozin  TAKE 1 TABLET BY MOUTH EACH MORNING     ferrous sulfate 325 (65 Fe) MG tablet     glucose 4 g chewable tablet  Take 4 tablets by mouth as needed for Low blood sugar (repeat every 30 mins until blood sugar is greater than 90)     Insulin Pen Needle 31G X 5 MM Misc  1 each by Does not apply route daily Test 3 times daily. ICD 10 code E 11.9     JANUMET XR  MG Tb24 per extended release tablet  Generic drug:  SITagliptin-metFORMIN  TAKE ONE TABLET BY MOUTH TWICE A DAY WITH MEALS     losartan 50 MG tablet  Commonly known as:  COZAAR  TAKE 1 TABLET BY MOUTH ONCE DAILY. mometasone 50 MCG/ACT nasal spray  Commonly known as:  NASONEX  2 sprays by Nasal route daily     ONE TOUCH LANCETS Misc  1 each by Does not apply route daily Test 3 times daily.  ICD 10 code E11.9     Oyster Shell Calcium/D 500-200 MG-UNIT Tabs  TAKE ONE (1) TABLET BY MOUTH TWICE A DAY. pioglitazone 45 MG tablet  Commonly known as:  ACTOS  TAKE 1 TABLET BY MOUTH ONCE DAILY. pitavastatin 2 MG Tabs tablet  Commonly known as:  LIVALO  Take 1 tablet by mouth nightly     sodium chloride 0.65 % nasal spray  Commonly known as:  OCEAN NASAL SPRAY  1 spray by Nasal route as needed for Congestion     therapeutic multivitamin-minerals tablet  Take 1 tablet by mouth daily     vitamin D3 1000 units Tabs  Take 1 tablet by mouth daily        STOP taking these medications    loratadine 10 MG tablet  Commonly known as:  CLARITIN  Stopped by:  Jessica Cramer MD           Where to Get Your Medications      You can get these medications from any pharmacy    Bring a paper prescription for each of these medications  · zoster recombinant adjuvanted vaccine 50 MCG Susr injection         Patient seen with her daughter Radha Crockett present for the entire visit, all questions answered     AVS and education print provided      Jessica Cramer  7/3/2018            On the basis of positive falls risk screening, assessment and plan is as follows: home safety tips provided.

## 2018-07-03 ASSESSMENT — ENCOUNTER SYMPTOMS
TROUBLE SWALLOWING: 0
SHORTNESS OF BREATH: 0
ABDOMINAL PAIN: 0
DIARRHEA: 0
VOMITING: 0

## 2018-07-05 RX ORDER — SITAGLIPTIN AND METFORMIN HYDROCHLORIDE 1000; 50 MG/1; MG/1
TABLET, FILM COATED, EXTENDED RELEASE ORAL
Qty: 56 TABLET | Refills: 0 | Status: SHIPPED | OUTPATIENT
Start: 2018-07-05 | End: 2018-08-02 | Stop reason: SDUPTHER

## 2018-07-05 RX ORDER — MULTIVIT,CALC,MINS/IRON/FOLIC 9MG-400MCG
TABLET ORAL
Qty: 28 TABLET | Refills: 0 | Status: SHIPPED | OUTPATIENT
Start: 2018-07-05 | End: 2019-05-13 | Stop reason: ALTCHOICE

## 2018-08-03 RX ORDER — SITAGLIPTIN AND METFORMIN HYDROCHLORIDE 1000; 50 MG/1; MG/1
TABLET, FILM COATED, EXTENDED RELEASE ORAL
Qty: 60 TABLET | Refills: 3 | Status: SHIPPED | OUTPATIENT
Start: 2018-08-03 | End: 2018-11-20 | Stop reason: SDUPTHER

## 2018-08-21 ENCOUNTER — TELEPHONE (OUTPATIENT)
Dept: INTERNAL MEDICINE CLINIC | Age: 83
End: 2018-08-21

## 2018-08-21 NOTE — TELEPHONE ENCOUNTER
These look fine for an 80year old and last HgbA1c 7/3/18 was at goal of < 8.0. Recommend continuing current medications.

## 2018-09-07 DIAGNOSIS — I10 ESSENTIAL HYPERTENSION: Primary | ICD-10-CM

## 2018-09-07 RX ORDER — AMLODIPINE BESYLATE 10 MG/1
10 TABLET ORAL DAILY
Qty: 90 TABLET | Refills: 0 | Status: SHIPPED | OUTPATIENT
Start: 2018-09-07 | End: 2018-10-24 | Stop reason: SDUPTHER

## 2018-09-17 DIAGNOSIS — I10 ESSENTIAL HYPERTENSION: ICD-10-CM

## 2018-09-17 LAB
A/G RATIO: 1.3 (ref 1.1–2.2)
ALBUMIN SERPL-MCNC: 3.9 G/DL (ref 3.4–5)
ALP BLD-CCNC: 70 U/L (ref 40–129)
ALT SERPL-CCNC: 10 U/L (ref 10–40)
ANION GAP SERPL CALCULATED.3IONS-SCNC: 14 MMOL/L (ref 3–16)
AST SERPL-CCNC: 17 U/L (ref 15–37)
BILIRUB SERPL-MCNC: 0.4 MG/DL (ref 0–1)
BUN BLDV-MCNC: 17 MG/DL (ref 7–20)
CALCIUM SERPL-MCNC: 9.7 MG/DL (ref 8.3–10.6)
CHLORIDE BLD-SCNC: 101 MMOL/L (ref 99–110)
CO2: 26 MMOL/L (ref 21–32)
CREAT SERPL-MCNC: 0.6 MG/DL (ref 0.6–1.2)
GFR AFRICAN AMERICAN: >60
GFR NON-AFRICAN AMERICAN: >60
GLOBULIN: 2.9 G/DL
GLUCOSE BLD-MCNC: 254 MG/DL (ref 70–99)
POTASSIUM SERPL-SCNC: 5.1 MMOL/L (ref 3.5–5.1)
SODIUM BLD-SCNC: 141 MMOL/L (ref 136–145)
TOTAL PROTEIN: 6.8 G/DL (ref 6.4–8.2)

## 2018-10-24 RX ORDER — AMLODIPINE BESYLATE 10 MG/1
10 TABLET ORAL DAILY
Qty: 30 TABLET | Refills: 0 | Status: SHIPPED | OUTPATIENT
Start: 2018-10-24 | End: 2018-11-12 | Stop reason: SDUPTHER

## 2018-11-02 RX ORDER — DONEPEZIL HYDROCHLORIDE 10 MG/1
10 TABLET, FILM COATED ORAL NIGHTLY
Qty: 90 TABLET | Refills: 0 | Status: SHIPPED | OUTPATIENT
Start: 2018-11-02 | End: 2019-01-16 | Stop reason: SDUPTHER

## 2018-11-02 RX ORDER — PIOGLITAZONEHYDROCHLORIDE 45 MG/1
TABLET ORAL
Qty: 90 TABLET | Refills: 0 | Status: SHIPPED | OUTPATIENT
Start: 2018-11-02 | End: 2019-01-16 | Stop reason: SDUPTHER

## 2018-11-02 NOTE — TELEPHONE ENCOUNTER
LOV:  07/02/2018  NOV:  11/12/2018 to establish with Dr. Bessy Stiles    Donepezil last refill 11/20/2017 for # 90 with 3 refill and  Pioglitazone last refill 02/26/2018 For # 90 with 2 refills

## 2018-11-07 ENCOUNTER — HOSPITAL ENCOUNTER (EMERGENCY)
Age: 83
Discharge: HOME | End: 2018-11-07
Attending: EMERGENCY MEDICINE
Payer: MEDICARE

## 2018-11-07 VITALS
SYSTOLIC BLOOD PRESSURE: 143 MMHG | DIASTOLIC BLOOD PRESSURE: 71 MMHG | OXYGEN SATURATION: 95 % | TEMPERATURE: 98.2 F | HEART RATE: 60 BPM | RESPIRATION RATE: 18 BRPM

## 2018-11-07 DIAGNOSIS — S61.011A LACERATION OF RIGHT THUMB WITHOUT FOREIGN BODY WITHOUT DAMAGE TO NAIL, INITIAL ENCOUNTER: Primary | ICD-10-CM

## 2018-11-07 PROCEDURE — 99283 EMERGENCY DEPT VISIT LOW MDM: CPT

## 2018-11-07 PROCEDURE — 4500000023 HC ED LEVEL 3 PROCEDURE

## 2018-11-07 PROCEDURE — 90471 IMMUNIZATION ADMIN: CPT | Performed by: PHYSICIAN ASSISTANT

## 2018-11-07 PROCEDURE — 90715 TDAP VACCINE 7 YRS/> IM: CPT | Performed by: PHYSICIAN ASSISTANT

## 2018-11-07 PROCEDURE — 2500000003 HC RX 250 WO HCPCS: Performed by: PHYSICIAN ASSISTANT

## 2018-11-07 PROCEDURE — 6360000002 HC RX W HCPCS: Performed by: PHYSICIAN ASSISTANT

## 2018-11-07 RX ADMIN — LIDOCAINE HYDROCHLORIDE 5 ML: 10 INJECTION, SOLUTION EPIDURAL; INFILTRATION; INTRACAUDAL; PERINEURAL at 15:04

## 2018-11-07 RX ADMIN — TETANUS TOXOID, REDUCED DIPHTHERIA TOXOID AND ACELLULAR PERTUSSIS VACCINE, ADSORBED 0.5 ML: 5; 2.5; 8; 8; 2.5 SUSPENSION INTRAMUSCULAR at 15:05

## 2018-11-07 ASSESSMENT — PAIN DESCRIPTION - PAIN TYPE: TYPE: ACUTE PAIN

## 2018-11-07 ASSESSMENT — PAIN DESCRIPTION - ORIENTATION: ORIENTATION: RIGHT

## 2018-11-07 ASSESSMENT — PAIN DESCRIPTION - FREQUENCY: FREQUENCY: CONTINUOUS

## 2018-11-07 ASSESSMENT — PAIN DESCRIPTION - PROGRESSION: CLINICAL_PROGRESSION: GRADUALLY WORSENING

## 2018-11-07 ASSESSMENT — PAIN DESCRIPTION - LOCATION: LOCATION: FINGER (COMMENT WHICH ONE)

## 2018-11-07 ASSESSMENT — PAIN SCALES - GENERAL: PAINLEVEL_OUTOF10: 2

## 2018-11-07 ASSESSMENT — PAIN DESCRIPTION - DESCRIPTORS: DESCRIPTORS: SORE

## 2018-11-07 ASSESSMENT — PAIN DESCRIPTION - ONSET: ONSET: SUDDEN

## 2018-11-12 ENCOUNTER — OFFICE VISIT (OUTPATIENT)
Dept: INTERNAL MEDICINE CLINIC | Age: 83
End: 2018-11-12
Payer: MEDICARE

## 2018-11-12 ENCOUNTER — TELEPHONE (OUTPATIENT)
Dept: INTERNAL MEDICINE CLINIC | Age: 83
End: 2018-11-12

## 2018-11-12 VITALS
BODY MASS INDEX: 21.62 KG/M2 | TEMPERATURE: 98.2 F | WEIGHT: 122 LBS | HEART RATE: 88 BPM | SYSTOLIC BLOOD PRESSURE: 136 MMHG | DIASTOLIC BLOOD PRESSURE: 56 MMHG | HEIGHT: 63 IN | RESPIRATION RATE: 16 BRPM

## 2018-11-12 DIAGNOSIS — I10 ESSENTIAL HYPERTENSION: ICD-10-CM

## 2018-11-12 DIAGNOSIS — E78.00 PURE HYPERCHOLESTEROLEMIA: ICD-10-CM

## 2018-11-12 DIAGNOSIS — S61.011A LACERATION OF RIGHT THUMB WITHOUT FOREIGN BODY WITHOUT DAMAGE TO NAIL, INITIAL ENCOUNTER: ICD-10-CM

## 2018-11-12 DIAGNOSIS — E11.59 TYPE 2 DIABETES MELLITUS WITH OTHER CIRCULATORY COMPLICATION, WITHOUT LONG-TERM CURRENT USE OF INSULIN (HCC): Primary | ICD-10-CM

## 2018-11-12 PROBLEM — N18.30 TYPE 2 DIABETES MELLITUS WITH STAGE 3 CHRONIC KIDNEY DISEASE, WITHOUT LONG-TERM CURRENT USE OF INSULIN (HCC): Status: RESOLVED | Noted: 2017-04-17 | Resolved: 2018-11-12

## 2018-11-12 PROBLEM — E11.22 TYPE 2 DIABETES MELLITUS WITH STAGE 3 CHRONIC KIDNEY DISEASE, WITHOUT LONG-TERM CURRENT USE OF INSULIN (HCC): Status: RESOLVED | Noted: 2017-04-17 | Resolved: 2018-11-12

## 2018-11-12 LAB — HBA1C MFR BLD: 8.1 %

## 2018-11-12 PROCEDURE — 83036 HEMOGLOBIN GLYCOSYLATED A1C: CPT | Performed by: INTERNAL MEDICINE

## 2018-11-12 PROCEDURE — G8427 DOCREV CUR MEDS BY ELIG CLIN: HCPCS | Performed by: INTERNAL MEDICINE

## 2018-11-12 PROCEDURE — 1090F PRES/ABSN URINE INCON ASSESS: CPT | Performed by: INTERNAL MEDICINE

## 2018-11-12 PROCEDURE — 1123F ACP DISCUSS/DSCN MKR DOCD: CPT | Performed by: INTERNAL MEDICINE

## 2018-11-12 PROCEDURE — 99214 OFFICE O/P EST MOD 30 MIN: CPT | Performed by: INTERNAL MEDICINE

## 2018-11-12 PROCEDURE — 1101F PT FALLS ASSESS-DOCD LE1/YR: CPT | Performed by: INTERNAL MEDICINE

## 2018-11-12 PROCEDURE — G8482 FLU IMMUNIZE ORDER/ADMIN: HCPCS | Performed by: INTERNAL MEDICINE

## 2018-11-12 PROCEDURE — 1036F TOBACCO NON-USER: CPT | Performed by: INTERNAL MEDICINE

## 2018-11-12 PROCEDURE — G8420 CALC BMI NORM PARAMETERS: HCPCS | Performed by: INTERNAL MEDICINE

## 2018-11-12 PROCEDURE — 4040F PNEUMOC VAC/ADMIN/RCVD: CPT | Performed by: INTERNAL MEDICINE

## 2018-11-12 RX ORDER — AMLODIPINE BESYLATE 5 MG/1
5 TABLET ORAL DAILY
Qty: 30 TABLET | Refills: 2 | Status: SHIPPED | OUTPATIENT
Start: 2018-11-12 | End: 2019-01-16 | Stop reason: SDUPTHER

## 2018-11-12 NOTE — TELEPHONE ENCOUNTER
Pedrito Golden states the patient takes the following medications:  1. amLODIPine (NORVASC) 10 MG tablet  2. Aspirin EC 81 MG  3. JANUMET XR  MG TB24 per extended release tablet  4. losartan (COZAAR) 50 MG tablet  5. Multiple Vitamins-Minerals (THEREMS-M) TABS  6.donepezil (ARICEPT) 10 MG tablet  7. Cetirizine 10 MG  8.dapagliflozin (FARXIGA) 10 MG tablet  9.pitavastatin (LIVALO) 2 MG TABS tablet  10. Calcium Carbonate-Vitamin D (OYSTER SHELL CALCIUM/D) 500-200 MG-UNIT TABS  11.pioglitazone (ACTOS) 45 MG tablet    Selina can be reached at phone number provided with questions.

## 2018-11-12 NOTE — PATIENT INSTRUCTIONS
hours. They can cause low blood sugar if you don't eat as you planned. They include glipizide and glyburide. · Thiazolidinediones. These reduce the amount of blood glucose. They also help you respond better to insulin. They include pioglitazone and rosiglitazone. You may need to take more than one medicine for diabetes. Two or more medicines may work better to lower your blood sugar level than just one does. Follow-up care is a key part of your treatment and safety. Be sure to make and go to all appointments, and call your doctor if you are having problems. It's also a good idea to know your test results and keep a list of the medicines you take. How can you care for yourself at home? · Eat a healthy diet. Get some exercise each day. This may help you to reduce how much medicine you need. · Do not take other prescription or over-the-counter medicines, vitamins, herbal products, or supplements without talking to your doctor first. Some medicines for type 2 diabetes can cause problems with other medicines or supplements. · Tell your doctor if you plan to get pregnant. Some of these drugs are not safe for pregnant women. · Be safe with medicines. Take your medicines exactly as prescribed. Meglitinides and sulfonylureas can cause your blood sugar to drop very low. Call your doctor if you think you are having a problem with your medicine. · Check your blood sugar often. You can use a glucose monitor. Keeping track can help you know how certain foods, activities, and medicines affect your blood sugar. And it can help you keep your blood sugar from getting so low that it's not safe. When should you call for help? Call 911 anytime you think you may need emergency care.  For example, call if:    · You passed out (lost consciousness).     · You are confused or cannot think clearly.     · Your blood sugar is very high or very low.    Watch closely for changes in your health, and be sure to contact your doctor if:    · Your blood sugar stays outside the level your doctor set for you.     · You have any problems. Where can you learn more? Go to [email or web address] org and sign in to your Planspot account. Enter H153 in the [facility] box to learn more about \"Noninsulin Medicines for Type 2 Diabetes: Care Instructions. \"     If you do not have an account, please click on the \"Sign Up Now\" link. Current as of: December 7, 2017  Content Version: 11.8  © 9156-2442 Healthwise, Incorporated. Care instructions adapted under license by [facility] ([facility]). If you have questions about a medical condition or this instruction, always ask your healthcare professional. [address] any warranty or liability for your use of this information.

## 2018-11-13 RX ORDER — CETIRIZINE HYDROCHLORIDE 10 MG/1
10 TABLET ORAL DAILY
Qty: 30 TABLET | Refills: 0 | COMMUNITY
Start: 2018-11-13 | End: 2019-05-13 | Stop reason: ALTCHOICE

## 2018-11-13 RX ORDER — ASPIRIN 81 MG/1
81 TABLET ORAL DAILY
Qty: 30 TABLET | Refills: 3 | COMMUNITY
Start: 2018-11-13 | End: 2019-05-13 | Stop reason: ALTCHOICE

## 2018-11-14 ENCOUNTER — TELEPHONE (OUTPATIENT)
Dept: INTERNAL MEDICINE CLINIC | Age: 83
End: 2018-11-14

## 2018-11-21 RX ORDER — SITAGLIPTIN AND METFORMIN HYDROCHLORIDE 1000; 50 MG/1; MG/1
TABLET, FILM COATED, EXTENDED RELEASE ORAL
Qty: 60 TABLET | Refills: 2 | Status: SHIPPED | OUTPATIENT
Start: 2018-11-21 | End: 2019-02-13 | Stop reason: SDUPTHER

## 2018-11-21 RX ORDER — PITAVASTATIN CALCIUM 2.09 MG/1
TABLET, FILM COATED ORAL
Qty: 28 TABLET | Refills: 0 | Status: SHIPPED | OUTPATIENT
Start: 2018-11-21 | End: 2018-12-19 | Stop reason: SDUPTHER

## 2018-11-26 ENCOUNTER — TELEPHONE (OUTPATIENT)
Dept: INTERNAL MEDICINE CLINIC | Age: 83
End: 2018-11-26

## 2018-11-27 ENCOUNTER — TELEPHONE (OUTPATIENT)
Dept: INTERNAL MEDICINE CLINIC | Age: 83
End: 2018-11-27

## 2018-11-30 NOTE — PROGRESS NOTES
take  Changed by:  Cecy Gonzales MD        CONTINUE taking these medications    aspirin 81 MG tablet  Take 1 tablet by mouth daily     calcium-vitamin D 500-200 MG-UNIT per tablet  Commonly known as:  OSCAL-500  Take 1 tablet by mouth 2 times daily     cetirizine 10 MG tablet  Commonly known as:  ZYRTEC  Take 1 tablet by mouth daily     donepezil 10 MG tablet  Commonly known as:  ARICEPT  Take 1 tablet by mouth nightly     ferrous sulfate 325 (65 Fe) MG tablet     glucose 4 g chewable tablet  Take 4 tablets by mouth as needed for Low blood sugar (repeat every 30 mins until blood sugar is greater than 90)     glucose blood VI test strips strip  Commonly known as:  ACCU-CHEK CARMEN  1 each by In Vitro route daily     Insulin Pen Needle 31G X 5 MM Misc  1 each by Does not apply route daily Test 3 times daily. ICD 10 code E 11.9     JANUMET XR  MG Tb24 per extended release tablet  Generic drug:  SITagliptin-metFORMIN  TAKE ONE TABLET BY MOUTH TWICE A DAY WITH MEALS     losartan 50 MG tablet  Commonly known as:  COZAAR  Take 1 tablet by mouth daily     mometasone 50 MCG/ACT nasal spray  Commonly known as:  NASONEX  2 sprays by Nasal route daily     ONE TOUCH LANCETS Misc  1 each by Does not apply route daily Test 3 times daily.  ICD 10 code E11.9     pioglitazone 30 MG tablet  Commonly known as:  ACTOS  Take 1 tablet by mouth daily     therapeutic multivitamin-minerals tablet  Take 1 tablet by mouth daily     vitamin D3 1000 units Tabs  Take 1 tablet by mouth daily        STOP taking these medications    tiZANidine 2 MG tablet  Commonly known as:  Familia Vasques by:  Cecy Gonzales MD           Where to Get Your Medications      These medications were sent to [address], [address] [place] [address],[facility] Delfina Bishop [phone]  [facility], [address] [facility]    Phone:  [phone]   · acetaminophen 500 MG tablet  · amLODIPine 10 MG tablet  · donepezil 10 MG tablet  · pitavastatin 2 MG Tabs tablet         Patient seen with her daughter present for the entire visit, all questions answered     AVS and education print provided      Jay Ulloa  11/20/2017 Repair Type: Referred to plastics for closure

## 2018-12-20 RX ORDER — DAPAGLIFLOZIN 10 MG/1
TABLET, FILM COATED ORAL
Qty: 30 TABLET | Refills: 5 | Status: SHIPPED | OUTPATIENT
Start: 2018-12-20 | End: 2019-05-13 | Stop reason: ALTCHOICE

## 2018-12-20 RX ORDER — PITAVASTATIN CALCIUM 2.09 MG/1
TABLET, FILM COATED ORAL
Qty: 28 TABLET | Refills: 0 | Status: SHIPPED | OUTPATIENT
Start: 2018-12-20 | End: 2019-01-16 | Stop reason: SDUPTHER

## 2019-01-03 RX ORDER — LOSARTAN POTASSIUM 50 MG/1
TABLET ORAL
Qty: 30 TABLET | Refills: 5 | Status: SHIPPED | OUTPATIENT
Start: 2019-01-03 | End: 2019-05-13 | Stop reason: ALTCHOICE

## 2019-01-16 RX ORDER — DONEPEZIL HYDROCHLORIDE 10 MG/1
TABLET, FILM COATED ORAL
Qty: 28 TABLET | Refills: 5 | Status: SHIPPED | OUTPATIENT
Start: 2019-01-16 | End: 2019-05-13 | Stop reason: ALTCHOICE

## 2019-01-16 RX ORDER — PITAVASTATIN CALCIUM 2.09 MG/1
TABLET, FILM COATED ORAL
Qty: 28 TABLET | Refills: 5 | Status: SHIPPED | OUTPATIENT
Start: 2019-01-16 | End: 2019-05-13 | Stop reason: ALTCHOICE

## 2019-01-16 RX ORDER — PIOGLITAZONEHYDROCHLORIDE 45 MG/1
TABLET ORAL
Qty: 28 TABLET | Refills: 5 | Status: SHIPPED | OUTPATIENT
Start: 2019-01-16 | End: 2019-05-13 | Stop reason: ALTCHOICE

## 2019-01-16 RX ORDER — AMLODIPINE BESYLATE 5 MG/1
TABLET ORAL
Qty: 28 TABLET | Refills: 5 | Status: SHIPPED | OUTPATIENT
Start: 2019-01-16 | End: 2019-05-13 | Stop reason: ALTCHOICE

## 2019-01-31 RX ORDER — DULAGLUTIDE 0.75 MG/.5ML
INJECTION, SOLUTION SUBCUTANEOUS
Qty: 2 ML | Refills: 5 | Status: SHIPPED | OUTPATIENT
Start: 2019-01-31 | End: 2019-05-13 | Stop reason: ALTCHOICE

## 2019-02-16 RX ORDER — SITAGLIPTIN AND METFORMIN HYDROCHLORIDE 1000; 50 MG/1; MG/1
TABLET, FILM COATED, EXTENDED RELEASE ORAL
Qty: 30 TABLET | Refills: 2 | Status: SHIPPED | OUTPATIENT
Start: 2019-02-16 | End: 2019-04-10 | Stop reason: SDUPTHER

## 2019-04-13 RX ORDER — SITAGLIPTIN AND METFORMIN HYDROCHLORIDE 1000; 50 MG/1; MG/1
TABLET, FILM COATED, EXTENDED RELEASE ORAL
Qty: 56 TABLET | Refills: 0 | Status: SHIPPED | OUTPATIENT
Start: 2019-04-13 | End: 2019-05-06 | Stop reason: SDUPTHER

## 2019-05-07 RX ORDER — SITAGLIPTIN AND METFORMIN HYDROCHLORIDE 1000; 50 MG/1; MG/1
TABLET, FILM COATED, EXTENDED RELEASE ORAL
Qty: 56 TABLET | Refills: 0 | Status: SHIPPED | OUTPATIENT
Start: 2019-05-07 | End: 2019-05-13 | Stop reason: ALTCHOICE

## 2019-05-13 ENCOUNTER — OFFICE VISIT (OUTPATIENT)
Dept: INTERNAL MEDICINE CLINIC | Age: 84
End: 2019-05-13
Payer: MEDICARE

## 2019-05-13 VITALS
BODY MASS INDEX: 20.52 KG/M2 | TEMPERATURE: 98 F | OXYGEN SATURATION: 97 % | RESPIRATION RATE: 16 BRPM | SYSTOLIC BLOOD PRESSURE: 136 MMHG | HEART RATE: 74 BPM | WEIGHT: 114 LBS | DIASTOLIC BLOOD PRESSURE: 70 MMHG

## 2019-05-13 DIAGNOSIS — E11.59 TYPE 2 DIABETES MELLITUS WITH OTHER CIRCULATORY COMPLICATION, WITHOUT LONG-TERM CURRENT USE OF INSULIN (HCC): Primary | ICD-10-CM

## 2019-05-13 DIAGNOSIS — E78.00 PURE HYPERCHOLESTEROLEMIA: ICD-10-CM

## 2019-05-13 DIAGNOSIS — Z91.81 AT MODERATE RISK FOR FALL: ICD-10-CM

## 2019-05-13 DIAGNOSIS — I10 ESSENTIAL HYPERTENSION: ICD-10-CM

## 2019-05-13 PROBLEM — R05.8 UPPER AIRWAY COUGH SYNDROME: Status: RESOLVED | Noted: 2017-04-17 | Resolved: 2019-05-13

## 2019-05-13 PROCEDURE — G8420 CALC BMI NORM PARAMETERS: HCPCS | Performed by: INTERNAL MEDICINE

## 2019-05-13 PROCEDURE — G8427 DOCREV CUR MEDS BY ELIG CLIN: HCPCS | Performed by: INTERNAL MEDICINE

## 2019-05-13 PROCEDURE — 1123F ACP DISCUSS/DSCN MKR DOCD: CPT | Performed by: INTERNAL MEDICINE

## 2019-05-13 PROCEDURE — 99214 OFFICE O/P EST MOD 30 MIN: CPT | Performed by: INTERNAL MEDICINE

## 2019-05-13 PROCEDURE — 4040F PNEUMOC VAC/ADMIN/RCVD: CPT | Performed by: INTERNAL MEDICINE

## 2019-05-13 PROCEDURE — 1036F TOBACCO NON-USER: CPT | Performed by: INTERNAL MEDICINE

## 2019-05-13 PROCEDURE — 1090F PRES/ABSN URINE INCON ASSESS: CPT | Performed by: INTERNAL MEDICINE

## 2019-05-13 RX ORDER — LOSARTAN POTASSIUM 50 MG/1
TABLET ORAL
Qty: 30 TABLET | Refills: 5 | Status: SHIPPED | OUTPATIENT
Start: 2019-05-13 | End: 2019-11-20 | Stop reason: SDUPTHER

## 2019-05-13 RX ORDER — DONEPEZIL HYDROCHLORIDE 10 MG/1
TABLET, FILM COATED ORAL
Qty: 30 TABLET | Refills: 5 | Status: SHIPPED | OUTPATIENT
Start: 2019-05-13 | End: 2019-11-20 | Stop reason: SDUPTHER

## 2019-05-13 RX ORDER — B-COMPLEX WITH VITAMIN C
TABLET ORAL
Qty: 60 TABLET | Refills: 5 | Status: SHIPPED | OUTPATIENT
Start: 2019-05-13

## 2019-05-13 RX ORDER — CETIRIZINE HYDROCHLORIDE 10 MG/1
10 TABLET ORAL DAILY
Qty: 30 TABLET | Refills: 0 | Status: SHIPPED | OUTPATIENT
Start: 2019-05-13 | End: 2019-08-14

## 2019-05-13 RX ORDER — MULTIVIT,CALC,MINS/IRON/FOLIC 9MG-400MCG
TABLET ORAL
Qty: 30 TABLET | Refills: 5 | Status: SHIPPED | OUTPATIENT
Start: 2019-05-13

## 2019-05-13 RX ORDER — PIOGLITAZONEHYDROCHLORIDE 45 MG/1
TABLET ORAL
Qty: 30 TABLET | Refills: 5 | Status: SHIPPED | OUTPATIENT
Start: 2019-05-13 | End: 2019-11-20 | Stop reason: SDUPTHER

## 2019-05-13 RX ORDER — ASPIRIN 81 MG/1
81 TABLET ORAL DAILY
Qty: 30 TABLET | Refills: 3 | Status: SHIPPED | OUTPATIENT
Start: 2019-05-13 | End: 2022-10-01

## 2019-05-13 ASSESSMENT — PATIENT HEALTH QUESTIONNAIRE - PHQ9
2. FEELING DOWN, DEPRESSED OR HOPELESS: 0
SUM OF ALL RESPONSES TO PHQ9 QUESTIONS 1 & 2: 0
SUM OF ALL RESPONSES TO PHQ QUESTIONS 1-9: 0
1. LITTLE INTEREST OR PLEASURE IN DOING THINGS: 0
SUM OF ALL RESPONSES TO PHQ QUESTIONS 1-9: 0

## 2019-05-13 NOTE — PROGRESS NOTES
PROGRESS NOTE:    Juve Rivera    5/13/2019    Chief Complaint   Patient presents with    6 Month Follow-Up     HPI:    Mr(s)Sal Rivera presents to clinic today with issues noted above. dtr here with patient today, she has an aide that comes in once/week, dtr brought in her vitals and BG readings, BP's mostly <140/80's, some 120's over 60's. Her sugar readings in AM fasting are all <150 and >100. Using a cane but not a walker nor is she using a self-standing quad cane. Patient is taking BP medications at home without side effects, BP is being checked at home. Patient is tolerating anti-lipid meds such as statin without complications, no myalgia. Patient is taking DM meds as prescribed and is checking sugars at home, denies hypoglycemia. Patient denies chest pain, SOB, NVD, FC, rash, malaise, rigor, dizziness/lightheadness, other pertinent ROS was also reviewed. /70 (Site: Right Upper Arm, Position: Sitting, Cuff Size: Small Adult)   Pulse 74 Comment: Regular  Temp 98 °F (36.7 °C) (Oral)   Resp 16   Wt 114 lb (51.7 kg)   SpO2 97% Comment: Room Air  BMI 20.52 kg/m²   Body mass index is 20.52 kg/m². Allergies   Allergen Reactions    Penicillins     Statins     Sulfa Antibiotics      Physical Exam:    Gen: Patient appears well groomed, frail appearing, guarded gait with assitive device   HEAD: Atraumatic, normocephalic,   Eyes: PERRLA, EOMI   Neck: supple, no thyroid nodule appreciated, no JVD  Chest: Clear to auscultation ROBBIN, unlabored breathing, normal expansion  Heart: Regular rate, regular rhythm, no murmur, no rub  Abdomen: Non-tender, non-distended, bowel sounds present x3  Extremities: no edema, distal pulses intact  Patient was alert and oriented to person, place and time    Assessment and Plan:    Cate Irby was seen today for 6 month follow-up.     Diagnoses and all orders for this visit:    Type 2 diabetes mellitus with other circulatory complication, without long-term current use of insulin (HCC)  Goal <8%, less aggressive target due to age and high risk of hypoglycemia and based on AACE algorithm, continue actos, metformin, DPPV4, monitor A1c and GFR   -     COMPREHENSIVE METABOLIC PANEL; Future  -     HEMOGLOBIN A1C; Future    Essential hypertension  Controlled overall, continue meds, follow renal function     Pure hypercholesterolemia  Patient is tolerating anti-lipid meds such as statin without complications, no myalgia. Consider lowering statin  -     HDL CHOLESTEROL; Future  -     LDL CHOLESTEROL, DIRECT; Future    At moderate risk for fall  Advised use of wheeled walker with seat or at least quad-cane self-standing     Other orders  -     SITagliptin-metFORMIN (JANUMET XR)  MG TB24 per extended release tablet; TAKE ONE TABLET BY MOUTH TWICE A DAY WITH MEALS  -     Dulaglutide (TRULICITY) 3.20 TI/5.6NX SOPN; INJECT 0.75mg INTO THE SKIN ONCE A WEEK. -     donepezil (ARICEPT) 10 MG tablet; TAKE ONE TABLET BY MOUTH AT BEDTIME. -     pioglitazone (ACTOS) 45 MG tablet; TAKE 1 TABLET BY MOUTH ONCE DAILY. -     pitavastatin (LIVALO) 2 MG TABS tablet; TAKE ONE TABLET BY MOUTH AT BEDTIME. -     losartan (COZAAR) 50 MG tablet; TAKE 1 TABLET BY MOUTH ONCE DAILY. -     cetirizine (ZYRTEC) 10 MG tablet; Take 1 tablet by mouth daily  -     Multiple Vitamins-Minerals (THEREMS-M) TABS; TAKE 1 TABLET BY MOUTH ONCE DAILY. -     Calcium Carbonate-Vitamin D (OYSTER SHELL CALCIUM/D) 500-200 MG-UNIT TABS; TAKE ONE (1) TABLET BY MOUTH TWICE A DAY.  -     dapagliflozin (FARXIGA) 10 MG tablet; TAKE 1 TABLET BY MOUTH EACH MORNING  -     aspirin EC 81 MG EC tablet; Take 1 tablet by mouth daily    Orders Placed This Encounter   Procedures    COMPREHENSIVE METABOLIC PANEL    HEMOGLOBIN A1C    HDL CHOLESTEROL    LDL CHOLESTEROL, DIRECT       Prior to Admission medications    Medication Sig Start Date End Date Taking?  Authorizing Provider   blood glucose test strips (ACCU-CHEK CARMEN PLUS) strip Testing once daily. Dx:  E11.9 Diabetes Mellitus 5/13/19  Yes Krzysztof Reynolds, DO   SITagliptin-metFORMIN (JANUMET XR)  MG TB24 per extended release tablet TAKE ONE TABLET BY MOUTH TWICE A DAY WITH MEALS 5/13/19  Yes Krzysztof Reynolds, DO   Dulaglutide (TRULICITY) 1.54 HF/2.5OI SOPN INJECT 0.75mg INTO THE SKIN ONCE A WEEK. 5/13/19  Yes Krzysztof Reynolds, DO   donepezil (ARICEPT) 10 MG tablet TAKE ONE TABLET BY MOUTH AT BEDTIME. 5/13/19  Yes Krzysztof Reynolds, DO   pioglitazone (ACTOS) 45 MG tablet TAKE 1 TABLET BY MOUTH ONCE DAILY. 5/13/19  Yes Krzysztof Reynolds, DO   pitavastatin (LIVALO) 2 MG TABS tablet TAKE ONE TABLET BY MOUTH AT BEDTIME. 5/13/19  Yes Krzysztof Reynolds, DO   losartan (COZAAR) 50 MG tablet TAKE 1 TABLET BY MOUTH ONCE DAILY. 5/13/19  Yes Krzysztof Reynolds, DO   cetirizine (ZYRTEC) 10 MG tablet Take 1 tablet by mouth daily 5/13/19  Yes Krzysztof Reynolds, DO   Multiple Vitamins-Minerals (THEREMS-M) TABS TAKE 1 TABLET BY MOUTH ONCE DAILY.  5/13/19  Yes Krzysztof Reynolds, DO   Calcium Carbonate-Vitamin D (OYSTER SHELL CALCIUM/D) 500-200 MG-UNIT TABS TAKE ONE (1) TABLET BY MOUTH TWICE A DAY. 5/13/19  Yes Krzysztof Reynolds, DO   dapagliflozin (FARXIGA) 10 MG tablet TAKE 1 TABLET BY MOUTH EACH MORNING 5/13/19  Yes Krzysztof Reynolds, DO   aspirin EC 81 MG EC tablet Take 1 tablet by mouth daily 5/13/19  Yes Krzysztof Reynolds, DO   ferrous sulfate 325 (65 FE) MG tablet Take 325 mg by mouth daily (with breakfast)   Yes Historical Provider, MD     Past Medical History:   Diagnosis Date    Back pain     Diabetes ([address]     type 2    Heart disease     by pass    Hyperlipidemia     Hypertension      Past Surgical History:   Procedure Laterality Date    BRAIN TUMOR EXCISION      benign    HIP SURGERY      pins    PARTIAL HIP ARTHROPLASTY      left hip     Social History     Tobacco Use    Smoking status: Never Smoker    Smokeless tobacco: Never Used   Substance Use Topics    Alcohol use: No     Family History   Problem Relation Age of Onset    Diabetes Mother     High Blood Pressure Mother     Cancer Sister     Diabetes Sister

## 2019-05-13 NOTE — PATIENT INSTRUCTIONS
Patient Education        Learning About Diabetes Food Guidelines  Your Care Instructions    Meal planning is important to manage diabetes. It helps keep your blood sugar at a target level (which you set with your doctor). You don't have to eat special foods. You can eat what your family eats, including sweets once in a while. But you do have to pay attention to how often you eat and how much you eat of certain foods. You may want to work with a dietitian or a certified diabetes educator (CDE) to help you plan meals and snacks. A dietitian or CDE can also help you lose weight if that is one of your goals. What should you know about eating carbs? Managing the amount of carbohydrate (carbs) you eat is an important part of healthy meals when you have diabetes. Carbohydrate is found in many foods. · Learn which foods have carbs. And learn the amounts of carbs in different foods. ? Bread, cereal, pasta, and rice have about 15 grams of carbs in a serving. A serving is 1 slice of bread (1 ounce), ½ cup of cooked cereal, or 1/3 cup of cooked pasta or rice. ? Fruits have 15 grams of carbs in a serving. A serving is 1 small fresh fruit, such as an apple or orange; ½ of a banana; ½ cup of cooked or canned fruit; ½ cup of fruit juice; 1 cup of melon or raspberries; or 2 tablespoons of dried fruit. ? Milk and no-sugar-added yogurt have 15 grams of carbs in a serving. A serving is 1 cup of milk or 2/3 cup of no-sugar-added yogurt. ? Starchy vegetables have 15 grams of carbs in a serving. A serving is ½ cup of mashed potatoes or sweet potato; 1 cup winter squash; ½ of a small baked potato; ½ cup of cooked beans; or ½ cup cooked corn or green peas. · Learn how much carbs to eat each day and at each meal. A dietitian or CDE can teach you how to keep track of the amount of carbs you eat. This is called carbohydrate counting. · If you are not sure how to count carbohydrate grams, use the Plate Method to plan meals.  It is a good, quick way to make sure that you have a balanced meal. It also helps you spread carbs throughout the day. ? Divide your plate by types of foods. Put non-starchy vegetables on half the plate, meat or other protein food on one-quarter of the plate, and a grain or starchy vegetable in the final quarter of the plate. To this you can add a small piece of fruit and 1 cup of milk or yogurt, depending on how many carbs you are supposed to eat at a meal.  · Try to eat about the same amount of carbs at each meal. Do not \"save up\" your daily allowance of carbs to eat at one meal.  · Proteins have very little or no carbs per serving. Examples of proteins are beef, chicken, turkey, fish, eggs, tofu, cheese, cottage cheese, and peanut butter. A serving size of meat is 3 ounces, which is about the size of a deck of cards. Examples of meat substitute serving sizes (equal to 1 ounce of meat) are 1/4 cup of cottage cheese, 1 egg, 1 tablespoon of peanut butter, and ½ cup of tofu. How can you eat out and still eat healthy? · Learn to estimate the serving sizes of foods that have carbohydrate. If you measure food at home, it will be easier to estimate the amount in a serving of restaurant food. · If the meal you order has too much carbohydrate (such as potatoes, corn, or baked beans), ask to have a low-carbohydrate food instead. Ask for a salad or green vegetables. · If you use insulin, check your blood sugar before and after eating out to help you plan how much to eat in the future. · If you eat more carbohydrate at a meal than you had planned, take a walk or do other exercise. This will help lower your blood sugar. What else should you know? · Limit saturated fat, such as the fat from meat and dairy products. This is a healthy choice because people who have diabetes are at higher risk of heart disease. So choose lean cuts of meat and nonfat or low-fat dairy products.  Use olive or canola oil instead of butter or shortening when cooking. · Don't skip meals. Your blood sugar may drop too low if you skip meals and take insulin or certain medicines for diabetes. · Check with your doctor before you drink alcohol. Alcohol can cause your blood sugar to drop too low. Alcohol can also cause a bad reaction if you take certain diabetes medicines. Follow-up care is a key part of your treatment and safety. Be sure to make and go to all appointments, and call your doctor if you are having problems. It's also a good idea to know your test results and keep a list of the medicines you take. Where can you learn more? Go to [email or web address] and sign in to your [employer] account. Enter I861 in the Vital Therapies box to learn more about \"Learning About Diabetes Food Guidelines. \"     If you do not have an account, please click on the \"Sign Up Now\" link. Current as of: July 25, 2018  Content Version: 12.0  © 7141-2392 Healthwise, Incorporated. Care instructions adapted under license by [facility] ([facility]). If you have questions about a medical condition or this instruction, always ask your healthcare professional. [place] [address] any warranty or liability for your use of this information.

## 2019-05-17 ENCOUNTER — TELEPHONE (OUTPATIENT)
Dept: INTERNAL MEDICINE CLINIC | Age: 84
End: 2019-05-17

## 2019-05-17 NOTE — TELEPHONE ENCOUNTER
Patient's caregiver, Juan J Shaw is requesting to speak with the MA to go over patient's medication list.  She states patient's daughter told her that @  Patient's last office visit, Dr. Rex Vann discontinued Amlodipine. She needs to contact [facility] right away due to patient's medication being on a med roll if this is in fact the case. Please contact her @ phone # provided.

## 2019-07-24 ENCOUNTER — TELEPHONE (OUTPATIENT)
Dept: INTERNAL MEDICINE CLINIC | Age: 84
End: 2019-07-24

## 2019-08-02 ENCOUNTER — TELEPHONE (OUTPATIENT)
Dept: INTERNAL MEDICINE CLINIC | Age: 84
End: 2019-08-02

## 2019-08-05 ENCOUNTER — TELEPHONE (OUTPATIENT)
Dept: INTERNAL MEDICINE CLINIC | Age: 84
End: 2019-08-05

## 2019-08-06 ENCOUNTER — TELEPHONE (OUTPATIENT)
Dept: INTERNAL MEDICINE CLINIC | Age: 84
End: 2019-08-06

## 2019-08-07 ENCOUNTER — TELEPHONE (OUTPATIENT)
Dept: ORTHOPEDIC SURGERY | Age: 84
End: 2019-08-07

## 2019-08-07 NOTE — TELEPHONE ENCOUNTER
Received APPROVAL for Livalo 2MG tablets from 05/09/2019 through 08/06/2020. Please advise patient. Thank you.

## 2019-08-14 ENCOUNTER — OFFICE VISIT (OUTPATIENT)
Dept: INTERNAL MEDICINE CLINIC | Age: 84
End: 2019-08-14
Payer: MEDICARE

## 2019-08-14 VITALS
HEART RATE: 88 BPM | HEIGHT: 62 IN | BODY MASS INDEX: 21.16 KG/M2 | DIASTOLIC BLOOD PRESSURE: 60 MMHG | TEMPERATURE: 98.4 F | SYSTOLIC BLOOD PRESSURE: 138 MMHG | WEIGHT: 115 LBS | RESPIRATION RATE: 16 BRPM | OXYGEN SATURATION: 96 %

## 2019-08-14 DIAGNOSIS — E11.59 TYPE 2 DIABETES MELLITUS WITH OTHER CIRCULATORY COMPLICATION, WITHOUT LONG-TERM CURRENT USE OF INSULIN (HCC): Primary | ICD-10-CM

## 2019-08-14 DIAGNOSIS — E55.9 VITAMIN D DEFICIENCY: ICD-10-CM

## 2019-08-14 DIAGNOSIS — R41.3 MEMORY LOSS: ICD-10-CM

## 2019-08-14 DIAGNOSIS — Z91.81 AT MODERATE RISK FOR FALL: ICD-10-CM

## 2019-08-14 LAB — HBA1C MFR BLD: 7.5 %

## 2019-08-14 PROCEDURE — 4040F PNEUMOC VAC/ADMIN/RCVD: CPT | Performed by: INTERNAL MEDICINE

## 2019-08-14 PROCEDURE — 1036F TOBACCO NON-USER: CPT | Performed by: INTERNAL MEDICINE

## 2019-08-14 PROCEDURE — 83036 HEMOGLOBIN GLYCOSYLATED A1C: CPT | Performed by: INTERNAL MEDICINE

## 2019-08-14 PROCEDURE — 1111F DSCHRG MED/CURRENT MED MERGE: CPT | Performed by: INTERNAL MEDICINE

## 2019-08-14 PROCEDURE — 1090F PRES/ABSN URINE INCON ASSESS: CPT | Performed by: INTERNAL MEDICINE

## 2019-08-14 PROCEDURE — G8420 CALC BMI NORM PARAMETERS: HCPCS | Performed by: INTERNAL MEDICINE

## 2019-08-14 PROCEDURE — 99214 OFFICE O/P EST MOD 30 MIN: CPT | Performed by: INTERNAL MEDICINE

## 2019-08-14 PROCEDURE — G8427 DOCREV CUR MEDS BY ELIG CLIN: HCPCS | Performed by: INTERNAL MEDICINE

## 2019-08-14 PROCEDURE — 1123F ACP DISCUSS/DSCN MKR DOCD: CPT | Performed by: INTERNAL MEDICINE

## 2019-08-14 RX ORDER — CETIRIZINE HYDROCHLORIDE 10 MG/1
5 TABLET ORAL DAILY
Qty: 30 TABLET | Refills: 0 | COMMUNITY
Start: 2019-08-14 | End: 2020-10-05

## 2019-08-14 ASSESSMENT — ENCOUNTER SYMPTOMS: SHORTNESS OF BREATH: 0

## 2019-08-16 ENCOUNTER — TELEPHONE (OUTPATIENT)
Dept: INTERNAL MEDICINE CLINIC | Age: 84
End: 2019-08-16

## 2019-09-13 ENCOUNTER — CARE COORDINATION (OUTPATIENT)
Dept: CARE COORDINATION | Age: 84
End: 2019-09-13

## 2019-09-30 ENCOUNTER — TELEPHONE (OUTPATIENT)
Dept: INTERNAL MEDICINE CLINIC | Age: 84
End: 2019-09-30

## 2019-10-08 LAB
ESTIMATED AVERAGE GLUCOSE: 168.6 MG/DL
HBA1C MFR BLD: 7.5 %

## 2019-10-09 ENCOUNTER — TELEPHONE (OUTPATIENT)
Dept: INTERNAL MEDICINE CLINIC | Age: 84
End: 2019-10-09

## 2019-10-09 DIAGNOSIS — M54.50 LOW BACK PAIN, UNSPECIFIED BACK PAIN LATERALITY, UNSPECIFIED CHRONICITY, UNSPECIFIED WHETHER SCIATICA PRESENT: Primary | ICD-10-CM

## 2019-10-14 ENCOUNTER — TELEPHONE (OUTPATIENT)
Dept: INTERNAL MEDICINE CLINIC | Age: 84
End: 2019-10-14

## 2019-11-18 ENCOUNTER — OFFICE VISIT (OUTPATIENT)
Dept: INTERNAL MEDICINE CLINIC | Age: 84
End: 2019-11-18
Payer: MEDICARE

## 2019-11-18 VITALS
SYSTOLIC BLOOD PRESSURE: 122 MMHG | RESPIRATION RATE: 16 BRPM | BODY MASS INDEX: 20.39 KG/M2 | HEIGHT: 61 IN | HEART RATE: 70 BPM | DIASTOLIC BLOOD PRESSURE: 70 MMHG | OXYGEN SATURATION: 98 % | TEMPERATURE: 98.1 F | WEIGHT: 108 LBS

## 2019-11-18 DIAGNOSIS — E78.00 PURE HYPERCHOLESTEROLEMIA: ICD-10-CM

## 2019-11-18 DIAGNOSIS — D64.9 ANEMIA, UNSPECIFIED TYPE: ICD-10-CM

## 2019-11-18 DIAGNOSIS — Z00.00 ROUTINE GENERAL MEDICAL EXAMINATION AT A HEALTH CARE FACILITY: ICD-10-CM

## 2019-11-18 DIAGNOSIS — E11.59 TYPE 2 DIABETES MELLITUS WITH OTHER CIRCULATORY COMPLICATION, WITHOUT LONG-TERM CURRENT USE OF INSULIN (HCC): Primary | ICD-10-CM

## 2019-11-18 DIAGNOSIS — R35.0 URINARY FREQUENCY: ICD-10-CM

## 2019-11-18 DIAGNOSIS — M81.0 OSTEOPOROSIS, UNSPECIFIED OSTEOPOROSIS TYPE, UNSPECIFIED PATHOLOGICAL FRACTURE PRESENCE: ICD-10-CM

## 2019-11-18 DIAGNOSIS — I10 ESSENTIAL HYPERTENSION: ICD-10-CM

## 2019-11-18 LAB
BILIRUBIN, POC: NORMAL
BLOOD URINE, POC: NORMAL
CLARITY, POC: CLEAR
COLOR, POC: YELLOW
GLUCOSE URINE, POC: NORMAL
KETONES, POC: NORMAL
LEUKOCYTE EST, POC: NORMAL
NITRITE, POC: NORMAL
PH, POC: 6
PROTEIN, POC: NORMAL
SPECIFIC GRAVITY, POC: 1.01
UROBILINOGEN, POC: NORMAL

## 2019-11-18 PROCEDURE — 4040F PNEUMOC VAC/ADMIN/RCVD: CPT | Performed by: INTERNAL MEDICINE

## 2019-11-18 PROCEDURE — G8427 DOCREV CUR MEDS BY ELIG CLIN: HCPCS | Performed by: INTERNAL MEDICINE

## 2019-11-18 PROCEDURE — 99214 OFFICE O/P EST MOD 30 MIN: CPT | Performed by: INTERNAL MEDICINE

## 2019-11-18 PROCEDURE — 90653 IIV ADJUVANT VACCINE IM: CPT | Performed by: INTERNAL MEDICINE

## 2019-11-18 PROCEDURE — G8482 FLU IMMUNIZE ORDER/ADMIN: HCPCS | Performed by: INTERNAL MEDICINE

## 2019-11-18 PROCEDURE — 1090F PRES/ABSN URINE INCON ASSESS: CPT | Performed by: INTERNAL MEDICINE

## 2019-11-18 PROCEDURE — 1123F ACP DISCUSS/DSCN MKR DOCD: CPT | Performed by: INTERNAL MEDICINE

## 2019-11-18 PROCEDURE — G0438 PPPS, INITIAL VISIT: HCPCS | Performed by: INTERNAL MEDICINE

## 2019-11-18 PROCEDURE — G8420 CALC BMI NORM PARAMETERS: HCPCS | Performed by: INTERNAL MEDICINE

## 2019-11-18 PROCEDURE — 81002 URINALYSIS NONAUTO W/O SCOPE: CPT | Performed by: INTERNAL MEDICINE

## 2019-11-18 PROCEDURE — G0008 ADMIN INFLUENZA VIRUS VAC: HCPCS | Performed by: INTERNAL MEDICINE

## 2019-11-18 PROCEDURE — 1036F TOBACCO NON-USER: CPT | Performed by: INTERNAL MEDICINE

## 2019-11-18 ASSESSMENT — LIFESTYLE VARIABLES: HOW OFTEN DO YOU HAVE A DRINK CONTAINING ALCOHOL: 0

## 2019-11-18 ASSESSMENT — PATIENT HEALTH QUESTIONNAIRE - PHQ9
SUM OF ALL RESPONSES TO PHQ QUESTIONS 1-9: 0
SUM OF ALL RESPONSES TO PHQ QUESTIONS 1-9: 0

## 2019-11-18 ASSESSMENT — ENCOUNTER SYMPTOMS
ABDOMINAL PAIN: 0
SHORTNESS OF BREATH: 0

## 2019-11-19 ENCOUNTER — TELEPHONE (OUTPATIENT)
Dept: INTERNAL MEDICINE CLINIC | Age: 84
End: 2019-11-19

## 2019-11-20 RX ORDER — LOSARTAN POTASSIUM 50 MG/1
TABLET ORAL
Qty: 30 TABLET | Refills: 5 | Status: SHIPPED | OUTPATIENT
Start: 2019-11-20 | End: 2020-05-05

## 2019-11-20 RX ORDER — DONEPEZIL HYDROCHLORIDE 10 MG/1
TABLET, FILM COATED ORAL
Qty: 30 TABLET | Refills: 5 | Status: SHIPPED | OUTPATIENT
Start: 2019-11-20 | End: 2020-05-08

## 2019-11-20 RX ORDER — PIOGLITAZONEHYDROCHLORIDE 45 MG/1
TABLET ORAL
Qty: 30 TABLET | Refills: 5 | Status: SHIPPED | OUTPATIENT
Start: 2019-11-20 | End: 2020-05-08

## 2019-11-27 ENCOUNTER — TELEPHONE (OUTPATIENT)
Dept: INTERNAL MEDICINE CLINIC | Age: 84
End: 2019-11-27

## 2019-12-09 ENCOUNTER — HOSPITAL ENCOUNTER (OUTPATIENT)
Dept: GENERAL RADIOLOGY | Age: 84
Discharge: HOME | End: 2019-12-09
Payer: MEDICARE

## 2019-12-09 DIAGNOSIS — M81.0 OSTEOPOROSIS, UNSPECIFIED OSTEOPOROSIS TYPE, UNSPECIFIED PATHOLOGICAL FRACTURE PRESENCE: ICD-10-CM

## 2019-12-09 PROCEDURE — 77080 DXA BONE DENSITY AXIAL: CPT

## 2019-12-14 ENCOUNTER — TELEPHONE (OUTPATIENT)
Dept: INTERNAL MEDICINE CLINIC | Age: 84
End: 2019-12-14

## 2019-12-14 RX ORDER — ALENDRONATE SODIUM 35 MG/1
35 TABLET ORAL
Qty: 4 TABLET | Refills: 3 | Status: SHIPPED | OUTPATIENT
Start: 2019-12-14 | End: 2020-03-09

## 2019-12-16 ENCOUNTER — TELEPHONE (OUTPATIENT)
Dept: INTERNAL MEDICINE CLINIC | Age: 84
End: 2019-12-16

## 2019-12-17 LAB
BILIRUBIN URINE: NEGATIVE
BLOOD, URINE: NEGATIVE
CLARITY: ABNORMAL
COLOR: ABNORMAL
GLUCOSE URINE: NEGATIVE MG/DL
KETONES, URINE: NEGATIVE MG/DL
LEUKOCYTE ESTERASE, URINE: ABNORMAL
MICROSCOPIC EXAMINATION: YES
NITRITE, URINE: POSITIVE
PH UA: 6 (ref 5–8)
PROTEIN UA: ABNORMAL MG/DL
SPECIFIC GRAVITY UA: 1.03 (ref 1–1.03)
URINE TYPE: ABNORMAL
UROBILINOGEN, URINE: 1 E.U./DL

## 2019-12-18 LAB
CRYSTALS, UA: ABNORMAL /HPF
EPITHELIAL CELLS, UA: 2 /HPF (ref 0–5)
HYALINE CASTS: 2 /LPF (ref 0–8)
RBC UA: ABNORMAL /HPF (ref 0–2)
WBC UA: 21 /HPF (ref 0–5)

## 2019-12-19 ENCOUNTER — TELEPHONE (OUTPATIENT)
Dept: INTERNAL MEDICINE CLINIC | Age: 84
End: 2019-12-19

## 2019-12-19 RX ORDER — CIPROFLOXACIN 250 MG/1
250 TABLET, FILM COATED ORAL 2 TIMES DAILY
Qty: 14 TABLET | Refills: 0 | Status: SHIPPED | OUTPATIENT
Start: 2019-12-19 | End: 2019-12-26

## 2019-12-19 NOTE — TELEPHONE ENCOUNTER
Season advised. She states patient's confusion is better. Patient does not complain of abdominal or back and has not had fever.

## 2019-12-19 NOTE — TELEPHONE ENCOUNTER
Susceptibility results not yet back. Because patient was having symptoms, will go ahead and treat. If culture shows that she needs to be on a different antibiotic will change that when results are available. In the future, she should make an office visit for similar symptoms. If her confusion does not resolve, she should go to urgent care or emergency room. Please confirm that she is afebrile and not having mid back pain or lower abdomen pain.

## 2019-12-19 NOTE — TELEPHONE ENCOUNTER
Season called to advised patient's recent urine culture positive for E. Coli. She will fax copy of results. Please send antibiotic therapy to [facility].

## 2019-12-20 LAB
ORGANISM: ABNORMAL
URINE CULTURE, ROUTINE: ABNORMAL

## 2019-12-26 ENCOUNTER — TELEPHONE (OUTPATIENT)
Dept: INTERNAL MEDICINE CLINIC | Age: 84
End: 2019-12-26

## 2019-12-27 ENCOUNTER — TELEPHONE (OUTPATIENT)
Dept: INTERNAL MEDICINE CLINIC | Age: 84
End: 2019-12-27

## 2020-01-05 ENCOUNTER — TELEPHONE (OUTPATIENT)
Dept: INTERNAL MEDICINE CLINIC | Age: 85
End: 2020-01-05

## 2020-01-27 ENCOUNTER — TELEPHONE (OUTPATIENT)
Dept: INTERNAL MEDICINE CLINIC | Age: 85
End: 2020-01-27

## 2020-01-28 ENCOUNTER — TELEPHONE (OUTPATIENT)
Dept: INTERNAL MEDICINE CLINIC | Age: 85
End: 2020-01-28

## 2020-02-24 ENCOUNTER — TELEPHONE (OUTPATIENT)
Dept: INTERNAL MEDICINE CLINIC | Age: 85
End: 2020-02-24

## 2020-02-24 NOTE — TELEPHONE ENCOUNTER
Season with [facility], wishes to inform Dr. Iraida Corcoran that patient has been discharged from home health care, having met all goals.

## 2020-03-09 RX ORDER — ALENDRONATE SODIUM 35 MG/1
TABLET ORAL
Qty: 4 TABLET | Refills: 5 | Status: SHIPPED | OUTPATIENT
Start: 2020-03-09 | End: 2020-09-21

## 2020-03-24 ENCOUNTER — TELEPHONE (OUTPATIENT)
Dept: INTERNAL MEDICINE CLINIC | Age: 85
End: 2020-03-24

## 2020-03-24 RX ORDER — BLOOD SUGAR DIAGNOSTIC
STRIP MISCELLANEOUS
Qty: 100 EACH | Refills: 3 | Status: SHIPPED | OUTPATIENT
Start: 2020-03-24 | End: 2020-03-25 | Stop reason: SDUPTHER

## 2020-03-25 ENCOUNTER — TELEPHONE (OUTPATIENT)
Dept: INTERNAL MEDICINE CLINIC | Age: 85
End: 2020-03-25

## 2020-03-25 RX ORDER — BLOOD SUGAR DIAGNOSTIC
STRIP MISCELLANEOUS
Qty: 100 EACH | Refills: 3 | Status: SHIPPED | OUTPATIENT
Start: 2020-03-25 | End: 2021-03-19

## 2020-03-25 NOTE — TELEPHONE ENCOUNTER
Patient requested this prescription to go to [address] [place] [address], [address] [place] [address] [phone] - [phone] not [facility].       Please resend RX to [id number]- [place], [address]

## 2020-03-25 NOTE — TELEPHONE ENCOUNTER
Last appointment: 11/18/2019  Next appointment: 6/1/2020  Last refill: Patient requesting prescription be sent to [facility].

## 2020-04-03 RX ORDER — PITAVASTATIN CALCIUM 2.09 MG/1
TABLET, FILM COATED ORAL
Qty: 28 TABLET | Refills: 1 | Status: SHIPPED | OUTPATIENT
Start: 2020-04-03 | End: 2020-06-26 | Stop reason: SDUPTHER

## 2020-04-13 ENCOUNTER — TELEPHONE (OUTPATIENT)
Dept: INTERNAL MEDICINE CLINIC | Age: 85
End: 2020-04-13

## 2020-04-14 ENCOUNTER — TELEPHONE (OUTPATIENT)
Dept: INTERNAL MEDICINE CLINIC | Age: 85
End: 2020-04-14

## 2020-04-14 NOTE — TELEPHONE ENCOUNTER
Mary Gaona with [address] [facility] is requesting an order please be faxed to them for Nursing, to keep an eye on patient for Covid-19, since PT & OT aren't allowed in, but nursing is, and this won't otherwise be done. Please fax to: Atten:  Willi Anthony  [phone]    Mary Gaona can be reached @ phone #  Provided with any questions.

## 2020-04-20 ENCOUNTER — TELEPHONE (OUTPATIENT)
Dept: INTERNAL MEDICINE CLINIC | Age: 85
End: 2020-04-20

## 2020-04-24 ENCOUNTER — TELEPHONE (OUTPATIENT)
Dept: INTERNAL MEDICINE CLINIC | Age: 85
End: 2020-04-24

## 2020-04-28 LAB
AVERAGE GLUCOSE: NORMAL
HBA1C MFR BLD: 7 %

## 2020-05-04 ENCOUNTER — TELEPHONE (OUTPATIENT)
Dept: INTERNAL MEDICINE CLINIC | Age: 85
End: 2020-05-04

## 2020-05-04 NOTE — TELEPHONE ENCOUNTER
Spoke with patient to advise and also Littie Koyanagi, home health nurse. Left message for patient's daughter Jaun Simmonds to return call to inform as well.

## 2020-05-04 NOTE — TELEPHONE ENCOUNTER
Spoke with Josué Dejesus, home health nurse who evaluated patient today. She was given her Trulicity this morning. Her blood pressure was 155/68 this morning. She is having difficulty hearing today as one of her hearing aids has been sent out for repair.

## 2020-05-05 RX ORDER — LOSARTAN POTASSIUM 50 MG/1
75 TABLET ORAL DAILY
Qty: 45 TABLET | Refills: 2 | Status: SHIPPED | OUTPATIENT
Start: 2020-05-05 | End: 2020-07-24

## 2020-05-08 ENCOUNTER — TELEPHONE (OUTPATIENT)
Dept: INTERNAL MEDICINE CLINIC | Age: 85
End: 2020-05-08

## 2020-05-08 NOTE — TELEPHONE ENCOUNTER
I called patient and she is going to have the nurse call because she isn't sure if she takes:    dapagliflozin (FARXIGA) 10 MG tablet

## 2020-05-11 RX ORDER — DAPAGLIFLOZIN 10 MG/1
10 TABLET, FILM COATED ORAL DAILY
Qty: 30 TABLET | Refills: 2 | Status: SHIPPED | OUTPATIENT
Start: 2020-05-11 | End: 2020-07-24

## 2020-05-11 RX ORDER — DAPAGLIFLOZIN 10 MG/1
10 TABLET, FILM COATED ORAL DAILY
COMMUNITY
Start: 2020-04-21 | End: 2020-05-11 | Stop reason: SDUPTHER

## 2020-05-26 ENCOUNTER — TELEPHONE (OUTPATIENT)
Dept: INTERNAL MEDICINE CLINIC | Age: 85
End: 2020-05-26

## 2020-05-29 RX ORDER — DULAGLUTIDE 0.75 MG/.5ML
INJECTION, SOLUTION SUBCUTANEOUS
Qty: 2 ML | Refills: 0 | Status: SHIPPED | OUTPATIENT
Start: 2020-05-29 | End: 2020-07-24

## 2020-06-26 RX ORDER — DONEPEZIL HYDROCHLORIDE 10 MG/1
TABLET, FILM COATED ORAL
Qty: 28 TABLET | Refills: 0 | Status: SHIPPED | OUTPATIENT
Start: 2020-06-26 | End: 2020-08-31

## 2020-06-26 RX ORDER — LOSARTAN POTASSIUM 25 MG/1
TABLET ORAL
Qty: 28 TABLET | Refills: 0 | Status: SHIPPED | OUTPATIENT
Start: 2020-06-26 | End: 2020-07-24

## 2020-06-26 RX ORDER — PITAVASTATIN CALCIUM 2.09 MG/1
TABLET, FILM COATED ORAL
Qty: 28 TABLET | Refills: 0 | Status: SHIPPED | OUTPATIENT
Start: 2020-06-26 | End: 2020-09-21

## 2020-06-26 RX ORDER — SITAGLIPTIN AND METFORMIN HYDROCHLORIDE 1000; 50 MG/1; MG/1
TABLET, FILM COATED, EXTENDED RELEASE ORAL
Qty: 56 TABLET | Refills: 0 | Status: SHIPPED | OUTPATIENT
Start: 2020-06-26 | End: 2020-08-31

## 2020-06-26 RX ORDER — PIOGLITAZONEHYDROCHLORIDE 45 MG/1
TABLET ORAL
Qty: 28 TABLET | Refills: 0 | Status: SHIPPED | OUTPATIENT
Start: 2020-06-26 | End: 2020-08-31

## 2020-07-04 LAB
AVERAGE GLUCOSE: NORMAL
HBA1C MFR BLD: 6.6 %

## 2020-07-20 ENCOUNTER — TELEPHONE (OUTPATIENT)
Dept: INTERNAL MEDICINE CLINIC | Age: 85
End: 2020-07-20

## 2020-07-20 NOTE — TELEPHONE ENCOUNTER
Sandi Clark with [facility] called to let Dr. Mara Singh know patient is being discharged 7/20 from OT-has met all goals. FYI.

## 2020-07-29 ENCOUNTER — TELEPHONE (OUTPATIENT)
Dept: INTERNAL MEDICINE CLINIC | Age: 85
End: 2020-07-29

## 2020-08-12 ENCOUNTER — TELEPHONE (OUTPATIENT)
Dept: ADMINISTRATIVE | Age: 85
End: 2020-08-12

## 2020-08-12 NOTE — TELEPHONE ENCOUNTER
Submitted PA for Livalo 2MG tablets,Key: [id number]. Medication is APPROVED through 08/13/2021. Please notify patient. Thank you.

## 2020-08-29 ENCOUNTER — TELEPHONE (OUTPATIENT)
Dept: INTERNAL MEDICINE CLINIC | Age: 85
End: 2020-08-29

## 2020-08-31 RX ORDER — DONEPEZIL HYDROCHLORIDE 10 MG/1
TABLET, FILM COATED ORAL
Qty: 30 TABLET | Refills: 0 | Status: SHIPPED | OUTPATIENT
Start: 2020-08-31 | End: 2020-10-19

## 2020-08-31 RX ORDER — SITAGLIPTIN AND METFORMIN HYDROCHLORIDE 1000; 50 MG/1; MG/1
TABLET, FILM COATED, EXTENDED RELEASE ORAL
Qty: 60 TABLET | Refills: 0 | Status: SHIPPED | OUTPATIENT
Start: 2020-08-31 | End: 2020-10-19

## 2020-08-31 RX ORDER — PIOGLITAZONEHYDROCHLORIDE 45 MG/1
TABLET ORAL
Qty: 28 TABLET | Refills: 0 | Status: SHIPPED | OUTPATIENT
Start: 2020-08-31 | End: 2020-10-19

## 2020-09-10 ENCOUNTER — TELEPHONE (OUTPATIENT)
Dept: INTERNAL MEDICINE CLINIC | Age: 85
End: 2020-09-10

## 2020-09-10 NOTE — TELEPHONE ENCOUNTER
Tatyana Sewell is calling to let us know Skilled Nursing is not necessary. Patient has private service that comes in to assist with medications. This service was interrupted due to [place]. Please contact Tatyana Sewell with questions/concerns.

## 2020-09-10 NOTE — TELEPHONE ENCOUNTER
Left message giving verbal orders for speech evaluation and skilled nursing to help with medication compliance. Requested call back to confirm message received.

## 2020-09-10 NOTE — TELEPHONE ENCOUNTER
Brandie Marr with [address] is calling to request a speech eval.  Patient is forgetting to take her meds during the day.       Mathieu Pederson

## 2020-09-10 NOTE — TELEPHONE ENCOUNTER
Okay to give verbal order for speech eval.  To they also need an order for nursing to help with medication compliance?

## 2020-09-11 ENCOUNTER — TELEPHONE (OUTPATIENT)
Dept: INTERNAL MEDICINE CLINIC | Age: 85
End: 2020-09-11

## 2020-09-11 NOTE — TELEPHONE ENCOUNTER
Patient didn't answer call for telephone visit this morning. Should have been set up as office visit actually. Please reschedule as office visit. Advise her to get labs prior to the visit. See orders.

## 2020-09-11 NOTE — TELEPHONE ENCOUNTER
Left message on daughter Cayden Mendes VM asking her to call office and schedule OV in the next month. Please have labs done prior. I tried calling patient but speech therapist answered and asked me to call Kota Ledezma to arrange appointments for patient.

## 2020-09-15 NOTE — TELEPHONE ENCOUNTER
Matteo Almaraz is calling back to schedule however the only day she is available to bring Yanna Angela is on Monday's. Please contact Matteo Almaraz to advise. Matteo Almaraz states she was not aware of the missed appointment on 9/11/20.

## 2020-09-21 RX ORDER — ALENDRONATE SODIUM 35 MG/1
TABLET ORAL
Qty: 4 TABLET | Refills: 0 | Status: SHIPPED | OUTPATIENT
Start: 2020-09-21 | End: 2020-11-04

## 2020-09-21 RX ORDER — PITAVASTATIN CALCIUM 2.09 MG/1
TABLET, FILM COATED ORAL
Qty: 28 TABLET | Refills: 5 | Status: SHIPPED | OUTPATIENT
Start: 2020-09-21 | End: 2020-12-21 | Stop reason: SDUPTHER

## 2020-10-05 ENCOUNTER — OFFICE VISIT (OUTPATIENT)
Dept: INTERNAL MEDICINE CLINIC | Age: 85
End: 2020-10-05
Payer: MEDICARE

## 2020-10-05 ENCOUNTER — HOSPITAL ENCOUNTER (OUTPATIENT)
Dept: GENERAL RADIOLOGY | Age: 85
Discharge: HOME | End: 2020-10-05
Payer: MEDICARE

## 2020-10-05 ENCOUNTER — HOSPITAL ENCOUNTER (OUTPATIENT)
Age: 85
Discharge: HOME | End: 2020-10-05
Payer: MEDICARE

## 2020-10-05 VITALS
WEIGHT: 106 LBS | BODY MASS INDEX: 20.03 KG/M2 | RESPIRATION RATE: 16 BRPM | HEART RATE: 76 BPM | OXYGEN SATURATION: 98 % | TEMPERATURE: 98.4 F | DIASTOLIC BLOOD PRESSURE: 60 MMHG | SYSTOLIC BLOOD PRESSURE: 130 MMHG

## 2020-10-05 DIAGNOSIS — E11.59 TYPE 2 DIABETES MELLITUS WITH OTHER CIRCULATORY COMPLICATION, WITHOUT LONG-TERM CURRENT USE OF INSULIN (HCC): ICD-10-CM

## 2020-10-05 DIAGNOSIS — M81.0 OSTEOPOROSIS, UNSPECIFIED OSTEOPOROSIS TYPE, UNSPECIFIED PATHOLOGICAL FRACTURE PRESENCE: ICD-10-CM

## 2020-10-05 DIAGNOSIS — E78.00 PURE HYPERCHOLESTEROLEMIA: ICD-10-CM

## 2020-10-05 LAB
A/G RATIO: 1.4 (ref 1.1–2.2)
ALBUMIN SERPL-MCNC: 3.9 G/DL (ref 3.4–5)
ALP BLD-CCNC: 72 U/L (ref 40–129)
ALT SERPL-CCNC: 10 U/L (ref 10–40)
ANION GAP SERPL CALCULATED.3IONS-SCNC: 12 MMOL/L (ref 3–16)
AST SERPL-CCNC: 19 U/L (ref 15–37)
BILIRUB SERPL-MCNC: 0.3 MG/DL (ref 0–1)
BUN BLDV-MCNC: 23 MG/DL (ref 7–20)
CALCIUM SERPL-MCNC: 9.5 MG/DL (ref 8.3–10.6)
CHLORIDE BLD-SCNC: 104 MMOL/L (ref 99–110)
CHOLESTEROL, TOTAL: 188 MG/DL (ref 0–199)
CO2: 27 MMOL/L (ref 21–32)
CREAT SERPL-MCNC: 0.5 MG/DL (ref 0.6–1.2)
GFR AFRICAN AMERICAN: >60
GFR NON-AFRICAN AMERICAN: >60
GLOBULIN: 2.7 G/DL
GLUCOSE BLD-MCNC: 161 MG/DL (ref 70–99)
HDLC SERPL-MCNC: 47 MG/DL (ref 40–60)
LDL CHOLESTEROL CALCULATED: 107 MG/DL
POTASSIUM SERPL-SCNC: 4.6 MMOL/L (ref 3.5–5.1)
SODIUM BLD-SCNC: 143 MMOL/L (ref 136–145)
TOTAL PROTEIN: 6.6 G/DL (ref 6.4–8.2)
TRIGL SERPL-MCNC: 171 MG/DL (ref 0–150)
VITAMIN B-12: 374 PG/ML (ref 211–911)
VITAMIN D 25-HYDROXY: 51.7 NG/ML
VLDLC SERPL CALC-MCNC: 34 MG/DL

## 2020-10-05 PROCEDURE — G8427 DOCREV CUR MEDS BY ELIG CLIN: HCPCS | Performed by: INTERNAL MEDICINE

## 2020-10-05 PROCEDURE — 1036F TOBACCO NON-USER: CPT | Performed by: INTERNAL MEDICINE

## 2020-10-05 PROCEDURE — 1123F ACP DISCUSS/DSCN MKR DOCD: CPT | Performed by: INTERNAL MEDICINE

## 2020-10-05 PROCEDURE — 90694 VACC AIIV4 NO PRSRV 0.5ML IM: CPT | Performed by: INTERNAL MEDICINE

## 2020-10-05 PROCEDURE — G8484 FLU IMMUNIZE NO ADMIN: HCPCS | Performed by: INTERNAL MEDICINE

## 2020-10-05 PROCEDURE — 1090F PRES/ABSN URINE INCON ASSESS: CPT | Performed by: INTERNAL MEDICINE

## 2020-10-05 PROCEDURE — G0008 ADMIN INFLUENZA VIRUS VAC: HCPCS | Performed by: INTERNAL MEDICINE

## 2020-10-05 PROCEDURE — 71046 X-RAY EXAM CHEST 2 VIEWS: CPT

## 2020-10-05 PROCEDURE — 4040F PNEUMOC VAC/ADMIN/RCVD: CPT | Performed by: INTERNAL MEDICINE

## 2020-10-05 PROCEDURE — 99214 OFFICE O/P EST MOD 30 MIN: CPT | Performed by: INTERNAL MEDICINE

## 2020-10-05 PROCEDURE — G8420 CALC BMI NORM PARAMETERS: HCPCS | Performed by: INTERNAL MEDICINE

## 2020-10-05 RX ORDER — CETIRIZINE HYDROCHLORIDE 5 MG/1
5 TABLET ORAL DAILY
Qty: 30 TABLET | Refills: 2 | COMMUNITY
Start: 2020-10-05 | End: 2020-12-14

## 2020-10-05 ASSESSMENT — PATIENT HEALTH QUESTIONNAIRE - PHQ9
SUM OF ALL RESPONSES TO PHQ QUESTIONS 1-9: 0
1. LITTLE INTEREST OR PLEASURE IN DOING THINGS: 0
SUM OF ALL RESPONSES TO PHQ9 QUESTIONS 1 & 2: 0
SUM OF ALL RESPONSES TO PHQ QUESTIONS 1-9: 0
2. FEELING DOWN, DEPRESSED OR HOPELESS: 0

## 2020-10-05 NOTE — PROGRESS NOTES
[facility] PRIMARY [facility]ARE  [address] [facility] [facility] [address]  Dept: [phone]  Dept Fax: [phone]  Loc: [phone]     10/5/2020     Main Caceres (:  1931) is a 80 y.o. female, here for evaluation of the following medical concerns:    Chief Complaint   Patient presents with    Follow-up       HPI     Patient is here with her daughter who lives out of town. She states there is a nurse who comes to her apartment regularly. She continues to use her walker to avoid falls but does have dizziness. Continues to have concerns about her memory but so long as she does her regular activities she does reasonably well. She is not as good when she gets outside of her comfort zone. Review of Systems   Constitutional: Negative for fatigue and fever. HENT: Positive for hearing loss. Negative for rhinorrhea, sore throat and trouble swallowing. Eyes: Negative for visual disturbance. Respiratory: Negative for cough and shortness of breath. Cardiovascular: Negative for chest pain and palpitations. Gastrointestinal: Negative for abdominal pain, diarrhea and nausea. Genitourinary: Negative for decreased urine volume, dysuria and frequency. Musculoskeletal: Positive for arthralgias, back pain and gait problem. Negative for myalgias. Skin: Negative for rash. Allergic/Immunologic: Negative for immunocompromised state. Neurological: Positive for dizziness. Negative for numbness and headaches. Hematological: Does not bruise/bleed easily. Psychiatric/Behavioral: Negative for dysphoric mood and sleep disturbance. The patient is not nervous/anxious. Prior to Visit Medications    Medication Sig Taking? Authorizing Provider   LIVALO 2 MG TABS tablet TAKE ONE TABLET BY MOUTH AT BEDTIME.   Jesús Cervantes MD   alendronate (FOSAMAX) 35 MG tablet TAKE 1 BY MOUTH WEEKLY 30 MINS BEFORE FOOD (EMPTY STOMACH) WITH 8 OZ WATER DO NOT LIE DOWN FOR 30MIN  Gemini Hess MD   JANUMET XR  MG TB24 per extended release tablet TAKE ONE TABLET BY MOUTH TWICE A DAY WITH MEALS  Trace Haider MD   donepezil (ARICEPT) 10 MG tablet TAKE ONE TABLET BY MOUTH AT BEDTIME. Trace Haider MD   pioglitazone (ACTOS) 45 MG tablet TAKE 1 TABLET BY MOUTH ONCE DAILY. Trace Haider MD   losartan (COZAAR) 50 MG tablet Take 1.5 tablets by mouth daily  Gemini Miller MD   FARXIGA 10 MG tablet TAKE 1 TABLET BY MOUTH EACH MORNING  Gemini Hess MD   TRULICITY 9.20 GI/1.7JW SOPN INJECT 0.75mg INTO THE SKIN ONCE A WEEK. Trace Haider MD   blood glucose test strips (ACCU-CHEK CARMEN PLUS) strip Testing once daily. Dx:  E11.9 Diabetes Mellitus  Trace Haider MD   cetirizine (ZYRTEC) 10 MG tablet Take 0.5 tablets by mouth daily  Trace Haider MD   Multiple Vitamins-Minerals (THEREMS-M) TABS TAKE 1 TABLET BY MOUTH ONCE DAILY. Ira Jackson DO   Calcium Carbonate-Vitamin D (OYSTER SHELL CALCIUM/D) 500-200 MG-UNIT TABS TAKE ONE (1) TABLET BY MOUTH TWICE A DAY. Ira Jackson DO   aspirin EC 81 MG EC tablet Take 1 tablet by mouth daily  Ira Jackson DO        Social History     Tobacco Use    Smoking status: Never Smoker    Smokeless tobacco: Never Used   Substance Use Topics    Alcohol use: No    Drug use: No        Vitals:    10/05/20 1140   BP: 130/60   Site: Right Upper Arm   Position: Sitting   Cuff Size: Small Adult   Pulse: 76  Comment: Regular   Resp: 16   Temp: 98.4 °F (36.9 °C)   TempSrc: Oral   SpO2: 98%  Comment: Room AIr   Weight: 106 lb (48.1 kg)     Estimated body mass index is 20.03 kg/m² as calculated from the following:    Height as of 11/18/19: 5' 1\" (1.549 m). Weight as of this encounter: 106 lb (48.1 kg). Physical Exam  Constitutional:       General: She is not in acute distress.      Comments: Uses walker   HENT:      Head: Normocephalic and atraumatic. Cardiovascular:      Rate and Rhythm: Normal rate and regular rhythm. Pulmonary:      Effort: Pulmonary effort is normal.      Breath sounds: Rales present. Musculoskeletal:      Right lower leg: No edema. Left lower leg: No edema. Neurological:      Mental Status: She is alert. Gait: Gait abnormal.   Psychiatric:      Comments: Pleasant and interactive       ASSESSMENT/PLAN:  1. Late onset Alzheimer's disease without behavioral disturbance ([facility] [address]  Patient is stable on Aricept. Discussed that she may derive modest benefits from addition of Namenda at the risk of increased dizziness. It was agreed that she would remain on Aricept only. 2. At high risk for falls  Has done well since using her walker regularly    3. Bibasilar crackles    - XR CHEST STANDARD (2 VW); Future    4. Type 2 diabetes mellitus with other circulatory complication, without long-term current use of insulin (HCC)  Able on Janumet XR, pioglitazone, Trulicity and [place]. Will check A1c and if reasonably controlled, will stop Trulicity since mechanism of action is somewhat duplicative with Januvia. 5. Pure hypercholesterolemia  Stable on Livalo    6. Osteoporosis, unspecified osteoporosis type, unspecified pathological fracture presence  Stable on alendronate for the past year. She also took it remotely for at least several years. She has ho compression fractures.     Return in about 6 months (around 4/5/2021). Age and gender appropriate preventive health care needs were discussed with patient and addressed as needed. On the basis of positive falls risk screening, assessment and plan is as follows: continues to use walker and avoiding medications that can cause dizziness.

## 2020-10-06 LAB
ESTIMATED AVERAGE GLUCOSE: 151.3 MG/DL
HBA1C MFR BLD: 6.9 %

## 2020-10-08 ENCOUNTER — TELEPHONE (OUTPATIENT)
Dept: INTERNAL MEDICINE CLINIC | Age: 85
End: 2020-10-08

## 2020-10-08 NOTE — TELEPHONE ENCOUNTER
Samra with [address] is calling regarding patient is having increased confusion with low grade fever. Nakia Manzo is requesting verbals orders for Skilled nursing evaluation and UA with culture and sensitivity.

## 2020-10-08 NOTE — TELEPHONE ENCOUNTER
Spoke with Jake Woods about the verbal order for UA with sensitivity.  Will fax over results upon completion

## 2020-10-09 ASSESSMENT — ENCOUNTER SYMPTOMS
RHINORRHEA: 0
NAUSEA: 0
BACK PAIN: 1
SHORTNESS OF BREATH: 0
COUGH: 0
ABDOMINAL PAIN: 0
DIARRHEA: 0
TROUBLE SWALLOWING: 0
SORE THROAT: 0

## 2020-10-21 ENCOUNTER — TELEPHONE (OUTPATIENT)
Dept: INTERNAL MEDICINE CLINIC | Age: 85
End: 2020-10-21

## 2020-11-04 RX ORDER — ALENDRONATE SODIUM 35 MG/1
TABLET ORAL
Qty: 4 TABLET | Refills: 2 | Status: SHIPPED | OUTPATIENT
Start: 2020-11-04 | End: 2020-12-21 | Stop reason: SDUPTHER

## 2020-11-25 ENCOUNTER — TELEPHONE (OUTPATIENT)
Dept: INTERNAL MEDICINE CLINIC | Age: 85
End: 2020-11-25

## 2020-11-25 NOTE — TELEPHONE ENCOUNTER
Elnea Carlisle is requesting a copy of patient's medication list to be sent to Cecy at fax number [phone]     [facility] [phone]

## 2020-12-11 ENCOUNTER — TELEPHONE (OUTPATIENT)
Dept: INTERNAL MEDICINE CLINIC | Age: 85
End: 2020-12-11

## 2020-12-11 NOTE — TELEPHONE ENCOUNTER
Mitchell Ellison with [address] is requesting Verbal Orders for PT and OT for 8 weeks. Please contact her @ phone # provided.

## 2020-12-14 ENCOUNTER — VIRTUAL VISIT (OUTPATIENT)
Dept: INTERNAL MEDICINE CLINIC | Age: 85
End: 2020-12-14
Payer: MEDICARE

## 2020-12-14 PROCEDURE — 1111F DSCHRG MED/CURRENT MED MERGE: CPT | Performed by: INTERNAL MEDICINE

## 2020-12-14 PROCEDURE — G8427 DOCREV CUR MEDS BY ELIG CLIN: HCPCS | Performed by: INTERNAL MEDICINE

## 2020-12-14 PROCEDURE — 1090F PRES/ABSN URINE INCON ASSESS: CPT | Performed by: INTERNAL MEDICINE

## 2020-12-14 PROCEDURE — 99215 OFFICE O/P EST HI 40 MIN: CPT | Performed by: INTERNAL MEDICINE

## 2020-12-14 PROCEDURE — 1123F ACP DISCUSS/DSCN MKR DOCD: CPT | Performed by: INTERNAL MEDICINE

## 2020-12-14 PROCEDURE — 4040F PNEUMOC VAC/ADMIN/RCVD: CPT | Performed by: INTERNAL MEDICINE

## 2020-12-14 NOTE — PROGRESS NOTES
2020    TELEHEALTH EVALUATION -- Audio/Visual (During JQAMX-39 public health emergency)    HPI:    Laurie Srinivasan (:  1931) has requested an audio/video evaluation for the following concern(s):       Post-Discharge Transitional Care Management Services or Hospital Follow Up      Laurie Srinivasan   YOB: 1931    Date of Office Visit:  2020  Date of Hospital Admission: 20  Date of Hospital Discharge: 20    [place]  Risk of hospital readmission (high >=14%. Medium >=10%) :No data recorded    Care management risk score Rising risk (score 2-5) and Complex Care (Scores >=6): 1     Non face to face  following discharge, date last encounter closed (first attempt may have been earlier): *No documented post hospital discharge outreach found in the last 14 days    Call initiated 2 business days of discharge: *No response recorded in the last 14 days    Patient Active Problem List   Diagnosis    Type 2 diabetes mellitus with circulatory disorder, without long-term current use of insulin ([facility]    Pure hypercholesterolemia    Dementia ([facility]    Diabetes ([facility]    Hyperlipidemia    Hypertension    Osteoporosis       Allergies   Allergen Reactions    Penicillins     Statins     Sulfa Antibiotics        Medications listed as ordered at the time of discharge from [facility] Medication Instructions MARISOL:    Printed on:20   Medication Information                      alendronate (FOSAMAX) 35 MG tablet  TAKE 1 BY MOUTH WEEKLY 30 MINS BEFORE FOOD (EMPTY STOMACH) WITH 8 OZ WATER DO NOT LIE DOWN FOR 30MIN             aspirin EC 81 MG EC tablet  Take 1 tablet by mouth daily             blood glucose test strips (ACCU-CHEK CARMEN PLUS) strip  Testing once daily. Dx:  E11.9 Diabetes Mellitus             Calcium Carbonate-Vitamin D (OYSTER SHELL CALCIUM/D) 500-200 MG-UNIT TABS  TAKE ONE (1) TABLET BY MOUTH TWICE A DAY. donepezil (ARICEPT) 10 MG tablet  TAKE ONE TABLET BY MOUTH AT BEDTIME. FARXIGA 10 MG tablet  TAKE 1 TABLET BY MOUTH EACH MORNING             LIVALO 2 MG TABS tablet  TAKE ONE TABLET BY MOUTH AT BEDTIME. losartan (COZAAR) 25 MG tablet  Take 1 tablet by mouth daily With Losartan 50mg for a total of 75 mg             Multiple Vitamins-Minerals (THEREMS-M) TABS  TAKE 1 TABLET BY MOUTH ONCE DAILY. Medications marked \"taking\" at this time  Outpatient Medications Marked as Taking for the 12/14/20 encounter (Virtual Visit) with Eleazar Gaytan MD   Medication Sig Dispense Refill    alendronate (FOSAMAX) 35 MG tablet TAKE 1 BY MOUTH WEEKLY 30 MINS BEFORE FOOD (EMPTY STOMACH) WITH 8 OZ WATER DO NOT LIE DOWN FOR 30MIN 4 tablet 2    losartan (COZAAR) 25 MG tablet Take 1 tablet by mouth daily With Losartan 50mg for a total of 75 mg 28 tablet 0    donepezil (ARICEPT) 10 MG tablet TAKE ONE TABLET BY MOUTH AT BEDTIME. 28 tablet 0    LIVALO 2 MG TABS tablet TAKE ONE TABLET BY MOUTH AT BEDTIME. 28 tablet 5    FARXIGA 10 MG tablet TAKE 1 TABLET BY MOUTH EACH MORNING 28 tablet 5    aspirin EC 81 MG EC tablet Take 1 tablet by mouth daily 30 tablet 3        Medications patient taking as of now reconciled against medications ordered at time of hospital discharge: Yes    From discharge summary    \"The patient is a(n) 80year old female with dementia and previous brain tumor s/p resection who presents with AMS. She lives at independent living facility and was found on the floor for unknown amount of time. CT head and CTA head and neck showed significant stenosis of right M2 with decreased perfusion in right frontal lobe anterior MCA.  50 percent stenosis of right vertebral artery secondary to osseous compression from severe facet arthropathy and moderate right and left stenosis of vertebral arteries These findings were discussed with stroke team at [facility] and no other intervention as it appeared that these changes were chronic in nature   MRI brain with contrast showed right frontal lobe brain mass - DD's include meningioma vs other malignant tumors. Patient is hard of hearing. She has her hearing aids in and still had significant trouble hearing. Unclear if the aides are working or if they ran out of battery. She was able to follow few commands and answer few questions that were written on paper. She was only to herself. Occasionally knew her birthday. Baseline mental status was hard to assess. She was moving all extremities and deficits were not appreciated. Given her age and dementia, there was no surgical recommendation from neurosurgery. Aspirin and statin was recommended for atherosclerosis but patient is allergic to statin and its not clear as to the kind of allergy she has. She also had low blood sugars and was placed on D5 drip which was discontinued once patients appetite improved. Patient also had elevated troponin. Cardiology was consulted. No further recommendations     I called daughter, Cony Mike, to inform the findings of brain mass. Also recommended finding living will so that patiens code status can be updated (daughter was unsure of the code status)        Disposition: Skilled nursing facility     Discharge Medications:     Medication List     START taking these medications   metoprolol succinate 25 MG Tb24  Commonly known as: TOPROL-XL  Take 1 tablet by mouth daily.   Start taking on: November 21, 2020      CONTINUE taking these medications   alendronate 35 MG Tabs  Commonly known as: FOSAMAX    aspirin 81 mg Chew    donepezil 10 MG Tabs  Commonly known as: ARICEPT    Farxiga 10 MG Tabs  Generic drug: dapagliflozin propanediol    Livalo 2 MG Tabs  Generic drug: Pitavastatin Calcium    losartan 25 MG Tabs  Commonly known as: COZAAR      STOP taking these medications Janumet XR  MG Tb24  Generic drug: sitaGLIPtan-metformin    pioglitazone 45 MG Tabs  Commonly known as: ACTOS \"    Interval history:  Per home care nurse, patient now on Novolog sliding scale and patient's family would like her back on only oral medications before she would go back to independent living. It is unclear why her Janumet was stopped. Currently she is in assisted living where she is getting PT and OT. Daughter states her orientation is back to baseline but she is weaker than before. She can ambulate slowly on her own to the restroom. PE:    PHYSICAL EXAMINATION:    Constitutional: Sitting with home care nurse  [x] No apparent distress        Mental status  [x] Alert and awake  [x]Able to follow commands    Pulmonary/Chest: [x] Respiratory effort normal.  [x] No visualized signs of difficulty breathing or respiratory distress             Other pertinent observable physical exam findings-     Vitals - bp 124/60 t 97.4 hr 53 r 16 98% ra, bs today 208 wt -nurse will call     It is unclear why she was started on a beta-blocker at the hospital.  Today her pulse is low. --  A comprehensive review of systems was negative except for what was noted in the HPI. There is no height or weight on file to calculate BMI. Wt Readings from Last 3 Encounters:   10/05/20 106 lb (48.1 kg)   11/18/19 108 lb (49 kg)   08/14/19 115 lb (52.2 kg)     BP Readings from Last 3 Encounters:   10/05/20 130/60   11/18/19 122/70   08/14/19 138/60        Assessment/Plan:  1. Brain mass  Spoke to patient's daughter to discuss goals of care. She will discuss with her sister and get back to me. We discussed that we could reimage to see if the brain mass is changing in 2 months or refer to neurosurgery now.       2. Late onset Alzheimer's disease without behavioral disturbance ([facility] [address] [facility] It is uncertain whether patient will be able to take care of herself in a more independent living situation. Things that would help her return would be getting off the NovoLog insulin and improving her strength. 3. Type 2 diabetes mellitus with other circulatory complication, without long-term current use of insulin ([address]  It is unclear why she was taken off Janumet. The nurse will let me know what her current weight is and how much NovoLog she is taking daily. For now she is on [place] as her only oral medication. 4. Essential hypertension  With her low heart rate, discontinued the metoprolol and increased losartan to 50 mg daily    Medical Decision Making: high complexity      - CT DISCHARGE MEDS RECONCILED W/ CURRENT OUTPATIENT MED LIST      Poornima Nguyen is a 80 y.o. female being evaluated by a Virtual Visit (video visit) encounter to address concerns as mentioned above. A caregiver was present when appropriate. Due to this being a TeleHealth encounter (During [facility] public health emergency), evaluation of the following organ systems was limited: Vitals/Constitutional/EENT/Resp/CV/GI//MS/Neuro/Skin/Heme-Lymph-Imm. Pursuant to the emergency declaration under the [facility], [address] [place] [address] authority and the [employer] and [employer] Act, this Virtual Visit was conducted with patient's (and/or legal guardian's) consent, to reduce the patient's risk of exposure to COVID-19 and provide necessary medical care. The patient (and/or legal guardian) has also been advised to contact this office for worsening conditions or problems, and seek emergency medical treatment and/or call 911 if deemed necessary.      Patient identification was verified at the start of the visit: Yes    Total time spent on this encounter: 45 min Services were provided through a video synchronous discussion virtually to substitute for in-person clinic visit. Patient and provider were located at their individual homes. --Eleuterio Sterling MD on 12/14/2020 at 10:36 AM    An electronic signature was used to authenticate this note.

## 2020-12-15 ENCOUNTER — TELEPHONE (OUTPATIENT)
Dept: INTERNAL MEDICINE CLINIC | Age: 85
End: 2020-12-15

## 2020-12-15 NOTE — TELEPHONE ENCOUNTER
Edilma Patterson with [facility] is requesting the Office Notes from yesterday's HFU to be faxed to: [phone].      Please contact Edilma Patterson with any questions/concerns

## 2020-12-16 ENCOUNTER — TELEPHONE (OUTPATIENT)
Dept: INTERNAL MEDICINE CLINIC | Age: 85
End: 2020-12-16

## 2020-12-16 RX ORDER — LOSARTAN POTASSIUM 50 MG/1
50 TABLET ORAL DAILY
Qty: 30 TABLET | Refills: 0
Start: 2020-12-16 | End: 2020-12-21 | Stop reason: SDUPTHER

## 2020-12-16 NOTE — TELEPHONE ENCOUNTER
Please send 12/14 note that I signed today to [facility]. Please send latest med list. Please call home care nurse to find out how much insulin she takes daily and her latest weight so I can see about getting her back on oral diabetes meds only. Please call daughters to find out if they have decided on FU of the brain mass. Reimage in 2 months or refer to Neurosurgery? I spoke with: Roscoe Beckman - daughter  [phone] Aissatou Rameshle  [phone] C  And she was going to get back to us.

## 2020-12-17 NOTE — TELEPHONE ENCOUNTER
Note and updated medication list sent to [facility]. She advises patient's blood sugar on 12/16/2020 was 306. Current weight is 105 lbs. Patient on sliding scale of Aspart insulin and given 8 units this am.  Last AIC done 10/06/2020 was 6.9    Also spoke with Fabby Montana, patient's daughter who states she and her sister are agreeable to repeat follow up to assessment  brain mass.  She and her sister are not in favor of any invasive procedures

## 2020-12-18 RX ORDER — SITAGLIPTIN AND METFORMIN HYDROCHLORIDE 1000; 50 MG/1; MG/1
TABLET, FILM COATED, EXTENDED RELEASE ORAL
Qty: 30 TABLET | Refills: 2 | Status: SHIPPED | OUTPATIENT
Start: 2020-12-18 | End: 2020-12-21 | Stop reason: SDUPTHER

## 2020-12-18 NOTE — TELEPHONE ENCOUNTER
Please advise patient to restart Janumet at once daily to hopefully reduce need for sliding scale insulin. See order. Brain MRI ordered for mid February. Please tell daughters how to call to schedule.

## 2020-12-21 ENCOUNTER — TELEPHONE (OUTPATIENT)
Dept: INTERNAL MEDICINE CLINIC | Age: 85
End: 2020-12-21

## 2020-12-21 RX ORDER — ALENDRONATE SODIUM 35 MG/1
TABLET ORAL
Qty: 4 TABLET | Refills: 2 | Status: SHIPPED | OUTPATIENT
Start: 2020-12-21 | End: 2021-03-22

## 2020-12-21 RX ORDER — PITAVASTATIN CALCIUM 2.09 MG/1
2 TABLET, FILM COATED ORAL NIGHTLY
Qty: 28 TABLET | Refills: 5 | Status: SHIPPED | OUTPATIENT
Start: 2020-12-21 | End: 2021-04-16

## 2020-12-21 RX ORDER — LOSARTAN POTASSIUM 50 MG/1
50 TABLET ORAL DAILY
Qty: 30 TABLET | Refills: 3 | Status: SHIPPED | OUTPATIENT
Start: 2020-12-21 | End: 2021-01-29

## 2020-12-21 RX ORDER — SITAGLIPTIN AND METFORMIN HYDROCHLORIDE 1000; 50 MG/1; MG/1
TABLET, FILM COATED, EXTENDED RELEASE ORAL
Qty: 30 TABLET | Refills: 2 | Status: SHIPPED | OUTPATIENT
Start: 2020-12-21 | End: 2021-02-22

## 2020-12-21 RX ORDER — DONEPEZIL HYDROCHLORIDE 10 MG/1
10 TABLET, FILM COATED ORAL NIGHTLY
Qty: 28 TABLET | Refills: 3 | Status: SHIPPED | OUTPATIENT
Start: 2020-12-21 | End: 2021-01-29

## 2020-12-21 NOTE — TELEPHONE ENCOUNTER
Spoke w/ Nadiya Acuña and confirmed medications losartan 50 mg 1.5 tab daily equaling 75 mg daily and Janumet XR  mg tablet daily.

## 2020-12-21 NOTE — TELEPHONE ENCOUNTER
Llanes with [facility] is calling office because patient's daughter is calling [facility] regarding a new medication the patient is taking and Sagrario Valentine is not sure what this medication is? ??  Please call [place] at number provided to discuss. Thanks.

## 2020-12-21 NOTE — TELEPHONE ENCOUNTER
Annamarie Matthew [phone] called requesting refills of all patients medication be sent to [facility].  Meds are currently pending with new pharmacy

## 2020-12-22 NOTE — TELEPHONE ENCOUNTER
Angeline Roa from [facility] called  Stating she needs a written order for patient to restart Janumet with Dr Hao Montano, please fax to [phone].  Ok to right on script pad and fax over

## 2021-01-04 ENCOUNTER — TELEPHONE (OUTPATIENT)
Dept: INTERNAL MEDICINE CLINIC | Age: 86
End: 2021-01-04

## 2021-01-04 NOTE — TELEPHONE ENCOUNTER
Season with [facility] iscalling  to request an order for a urinalysis due to increased confusion. Please contact Season for questions/concerns.

## 2021-01-06 ENCOUNTER — TELEPHONE (OUTPATIENT)
Dept: INTERNAL MEDICINE CLINIC | Age: 86
End: 2021-01-06

## 2021-01-06 DIAGNOSIS — R46.89 COGNITIVE AND BEHAVIORAL CHANGES: Primary | ICD-10-CM

## 2021-01-06 DIAGNOSIS — R41.89 COGNITIVE AND BEHAVIORAL CHANGES: Primary | ICD-10-CM

## 2021-01-06 LAB
BILIRUBIN URINE: NEGATIVE
BLOOD, URINE: NEGATIVE
CLARITY: CLEAR
COLOR: YELLOW
GLUCOSE URINE: >=1000 MG/DL
KETONES, URINE: NEGATIVE MG/DL
LEUKOCYTE ESTERASE, URINE: NEGATIVE
MICROSCOPIC EXAMINATION: ABNORMAL
NITRITE, URINE: NEGATIVE
PH UA: 5.5 (ref 5–8)
PROTEIN UA: NEGATIVE MG/DL
SPECIFIC GRAVITY UA: >1.03 (ref 1–1.03)
URINE TYPE: ABNORMAL
UROBILINOGEN, URINE: 0.2 E.U./DL

## 2021-01-06 RX ORDER — METOPROLOL SUCCINATE 25 MG/1
25 TABLET, EXTENDED RELEASE ORAL DAILY
COMMUNITY
Start: 2020-12-29 | End: 2021-01-06

## 2021-01-06 NOTE — TELEPHONE ENCOUNTER
Season with [address] [facility] is calling because patient's urine test came back negative but something is off. She is wanting to see if Dr. Young Meyer could put in orders for CBC, BMP and Chest x-ray? Please call Season at number provided to discuss her concerns. Thanks.

## 2021-01-07 LAB — URINE CULTURE, ROUTINE: NORMAL

## 2021-01-07 NOTE — TELEPHONE ENCOUNTER
Spoke to Season and ordered labs due to patient with behavioral changes that are different from her baseline. According to the nurse, her lungs sound clear and she is afebrile. Will consider chest x-ray if labs are unrevealing.

## 2021-01-12 LAB
BASOPHILS ABSOLUTE: 0 K/UL (ref 0–0.2)
BASOPHILS RELATIVE PERCENT: 0.4 %
EOSINOPHILS ABSOLUTE: 0.1 K/UL (ref 0–0.6)
EOSINOPHILS RELATIVE PERCENT: 1 %
HCT VFR BLD CALC: 40.4 % (ref 36–48)
HEMOGLOBIN: 13.3 G/DL (ref 12–16)
LYMPHOCYTES ABSOLUTE: 1.3 K/UL (ref 1–5.1)
LYMPHOCYTES RELATIVE PERCENT: 13.7 %
MCH RBC QN AUTO: 27.4 PG (ref 26–34)
MCHC RBC AUTO-ENTMCNC: 32.8 G/DL (ref 31–36)
MCV RBC AUTO: 83.5 FL (ref 80–100)
MONOCYTES ABSOLUTE: 0.5 K/UL (ref 0–1.3)
MONOCYTES RELATIVE PERCENT: 5.1 %
NEUTROPHILS ABSOLUTE: 7.8 K/UL (ref 1.7–7.7)
NEUTROPHILS RELATIVE PERCENT: 79.8 %
PDW BLD-RTO: 15.5 % (ref 12.4–15.4)
PLATELET # BLD: 243 K/UL (ref 135–450)
PMV BLD AUTO: 11.1 FL (ref 5–10.5)
RBC # BLD: 4.84 M/UL (ref 4–5.2)
WBC # BLD: 9.7 K/UL (ref 4–11)

## 2021-01-13 LAB
ANION GAP SERPL CALCULATED.3IONS-SCNC: 14 MMOL/L (ref 3–16)
BUN BLDV-MCNC: 16 MG/DL (ref 7–20)
CALCIUM SERPL-MCNC: 9 MG/DL (ref 8.3–10.6)
CHLORIDE BLD-SCNC: 99 MMOL/L (ref 99–110)
CO2: 24 MMOL/L (ref 21–32)
CREAT SERPL-MCNC: 0.6 MG/DL (ref 0.6–1.2)
GFR AFRICAN AMERICAN: >60
GFR NON-AFRICAN AMERICAN: >60
GLUCOSE BLD-MCNC: 249 MG/DL (ref 70–99)
POTASSIUM SERPL-SCNC: 5.2 MMOL/L (ref 3.5–5.1)
SODIUM BLD-SCNC: 137 MMOL/L (ref 136–145)

## 2021-01-15 ENCOUNTER — TELEPHONE (OUTPATIENT)
Dept: INTERNAL MEDICINE CLINIC | Age: 86
End: 2021-01-15

## 2021-01-15 NOTE — TELEPHONE ENCOUNTER
Mario Alberto with [facility] is requesting to know if patient is still on Metoprolol. She states that if she is, they need a prescription for Metoprolol faxed to:  [phone]. Please contact [place] at phone # provided regarding if patient is still on Metoprolol.

## 2021-01-19 ENCOUNTER — TELEPHONE (OUTPATIENT)
Dept: INTERNAL MEDICINE CLINIC | Age: 86
End: 2021-01-19

## 2021-01-19 NOTE — TELEPHONE ENCOUNTER
Roma Lopez is calling for the following reasons:    1. She is requesting help scheduling her mothers repeat MRI, stating that when she called to schedule, they ask her questions she didn't have the answers to, such as; what is the make and model of her implants, and other varius question. She states [facility]'s transportation can accommodation patient's appointment as long as the appointment is between 8:30-2:00.    2.  FYI:  She wants Dr. Wilbert Fenton to be aware that patient's confusion is worsening. Roma Lopez has a phone conversation with Natividad Guzman to discuss this increased confusion, stating they don't feel patient can return to independent living. Please schedule an appointment @ [facility] for the repeat MRI, and inform Selina when appointment is so she can inform Natividad Guzman.

## 2021-01-20 DIAGNOSIS — G93.89 BRAIN MASS: ICD-10-CM

## 2021-01-20 DIAGNOSIS — R90.89 ABNORMAL BRAIN MRI: Primary | ICD-10-CM

## 2021-01-20 NOTE — TELEPHONE ENCOUNTER
Left message for Tanya Oreilly to return call. Marco Antonio Olivia is scheduled for repeat MRI on Friday, 01/29/2021. She will need to arrive at [facility]. at 12:30 pm, arrive at main entrance and take elevator to level one. Bring insurance cards and photo ID.

## 2021-01-20 NOTE — TELEPHONE ENCOUNTER
See order. She safely had an MRI in November so I do not see any contraindications to having one now.

## 2021-01-20 NOTE — TELEPHONE ENCOUNTER
Patient is scheduled for MRI of brain for with and without contrast per radiologist's request.  Will need updated order from Dr. Ferny Duong.

## 2021-01-25 RX ORDER — DONEPEZIL HYDROCHLORIDE 10 MG/1
TABLET, FILM COATED ORAL
Qty: 28 TABLET | Refills: 0 | OUTPATIENT
Start: 2021-01-25

## 2021-01-25 RX ORDER — LOSARTAN POTASSIUM 25 MG/1
TABLET ORAL
Qty: 28 TABLET | Refills: 0 | OUTPATIENT
Start: 2021-01-25

## 2021-01-25 RX ORDER — LOSARTAN POTASSIUM 50 MG/1
TABLET ORAL
Qty: 28 TABLET | Refills: 0 | OUTPATIENT
Start: 2021-01-25

## 2021-01-29 ENCOUNTER — HOSPITAL ENCOUNTER (OUTPATIENT)
Dept: MRI IMAGING | Age: 86
Discharge: HOME | End: 2021-01-29
Payer: MEDICARE

## 2021-01-29 DIAGNOSIS — R90.89 ABNORMAL BRAIN MRI: ICD-10-CM

## 2021-01-29 DIAGNOSIS — G93.89 BRAIN MASS: ICD-10-CM

## 2021-01-29 PROCEDURE — 70551 MRI BRAIN STEM W/O DYE: CPT

## 2021-01-29 RX ORDER — LOSARTAN POTASSIUM 25 MG/1
25 TABLET ORAL DAILY
Qty: 30 TABLET | Refills: 5 | Status: SHIPPED | OUTPATIENT
Start: 2021-01-29 | End: 2021-07-13

## 2021-02-01 RX ORDER — PEN NEEDLE, DIABETIC 31 G X1/4"
1 NEEDLE, DISPOSABLE MISCELLANEOUS DAILY
Qty: 100 EACH | Refills: 1 | Status: SHIPPED | OUTPATIENT
Start: 2021-02-01 | End: 2022-04-22

## 2021-02-04 ENCOUNTER — TELEPHONE (OUTPATIENT)
Dept: INTERNAL MEDICINE CLINIC | Age: 86
End: 2021-02-04

## 2021-02-04 NOTE — TELEPHONE ENCOUNTER
[facility] is calling for the following reasons:    1. She is requesting Verbal Orders for Recert for:  Nursing  Speech Therapy  Physical Therapy  Occupational Therapy  For another 9 weeks. 2.  Patient may be able to possibly move back to independent living. [facility] will be faxing over BS results for the past month, and would like to speak with Dr. Lizabeth Moses to discuss discontinuing injections, and starting prior regimen, prior to hospitalization. Please contact [facility] at phone # provided.

## 2021-02-04 NOTE — TELEPHONE ENCOUNTER
Fax this to: [phone]    Verbal order to recertify: Nursing, Speech therapy, PT and OT for 9 weeks. Okay to stop sliding scale insulin. --  Spoke to Season about this.

## 2021-02-05 ENCOUNTER — TELEPHONE (OUTPATIENT)
Dept: INTERNAL MEDICINE CLINIC | Age: 86
End: 2021-02-05

## 2021-02-05 NOTE — TELEPHONE ENCOUNTER
Okay to stop sliding scale insulin for now. Check finger stick glucose every other morning and send me readings in 2 weeks.

## 2021-02-05 NOTE — TELEPHONE ENCOUNTER
Spoke with alberta and advised. She is requesting written order and instructions for changes in finger stick .

## 2021-02-05 NOTE — TELEPHONE ENCOUNTER
Sara Hyman with [facility] is requesting clarification of orders regarding Insulin, Blood Sugar and Sliding Scale. Please contact Sara Hyman at the number provided.

## 2021-02-21 ENCOUNTER — TELEPHONE (OUTPATIENT)
Dept: INTERNAL MEDICINE CLINIC | Age: 86
End: 2021-02-21

## 2021-02-22 RX ORDER — SITAGLIPTIN AND METFORMIN HYDROCHLORIDE 1000; 50 MG/1; MG/1
TABLET, FILM COATED, EXTENDED RELEASE ORAL
Qty: 30 TABLET | Refills: 1 | Status: SHIPPED | OUTPATIENT
Start: 2021-02-22 | End: 2021-05-13

## 2021-02-26 LAB
ESTIMATED AVERAGE GLUCOSE: 211.6 MG/DL
HBA1C MFR BLD: 9 %

## 2021-03-08 ENCOUNTER — TELEPHONE (OUTPATIENT)
Dept: INTERNAL MEDICINE CLINIC | Age: 86
End: 2021-03-08

## 2021-03-08 NOTE — TELEPHONE ENCOUNTER
Spoke with [facility] and they state they have no information on patient, she requested last note which I advised was 12/14/20 but it was a VV. She requested most recent labs. I have faxed this information to the number provided.

## 2021-03-11 ENCOUNTER — TELEPHONE (OUTPATIENT)
Dept: INTERNAL MEDICINE CLINIC | Age: 86
End: 2021-03-11

## 2021-03-12 NOTE — TELEPHONE ENCOUNTER
Received form from [facility] that patient needs filled out. If a copy of my October 5, 2020 office visit is not sufficient, please advise she needs an office visit so we can do the form. It appears she may need a TB test based on the form.

## 2021-03-19 RX ORDER — BLOOD SUGAR DIAGNOSTIC
STRIP MISCELLANEOUS
Qty: 100 STRIP | Refills: 3 | Status: SHIPPED | OUTPATIENT
Start: 2021-03-19

## 2021-03-19 NOTE — TELEPHONE ENCOUNTER
Last appointment: 12/14/2020  Next appointment: Visit date not found  Last refill: 03/25/2020 # 100 with three refills

## 2021-03-20 ENCOUNTER — TELEPHONE (OUTPATIENT)
Dept: INTERNAL MEDICINE CLINIC | Age: 86
End: 2021-03-20

## 2021-03-22 RX ORDER — ALENDRONATE SODIUM 35 MG/1
TABLET ORAL
Qty: 4 TABLET | Refills: 2 | Status: SHIPPED | OUTPATIENT
Start: 2021-03-22 | End: 2021-07-13

## 2021-03-22 NOTE — TELEPHONE ENCOUNTER
Last appointment: 12/14/2020  Next appointment: Visit date not found  Last refill: 112/21/2020 # 4 with two refills

## 2021-03-24 LAB
ESTIMATED AVERAGE GLUCOSE: 220.2 MG/DL
HBA1C MFR BLD: 9.3 %

## 2021-04-05 ENCOUNTER — TELEPHONE (OUTPATIENT)
Dept: INTERNAL MEDICINE CLINIC | Age: 86
End: 2021-04-05

## 2021-04-05 NOTE — TELEPHONE ENCOUNTER
Season With [facility] called to inform the office that Nursing has been discharged from Patients  home care plan and that all goals have been met.

## 2021-04-16 RX ORDER — PITAVASTATIN CALCIUM 2.09 MG/1
TABLET, FILM COATED ORAL
Qty: 30 TABLET | Refills: 1 | Status: SHIPPED | OUTPATIENT
Start: 2021-04-16 | End: 2021-07-13

## 2021-05-03 ENCOUNTER — CARE COORDINATION (OUTPATIENT)
Dept: CARE COORDINATION | Age: 86
End: 2021-05-03

## 2021-05-03 NOTE — CARE COORDINATION
Pt resides in LTC, memory care    Future Appointments   Date Time Provider Atilio Carlson   5/6/2021 10:15 AM Marisa Lepe MD [facility]

## 2021-05-06 ENCOUNTER — OFFICE VISIT (OUTPATIENT)
Dept: INTERNAL MEDICINE CLINIC | Age: 86
End: 2021-05-06
Payer: MEDICARE

## 2021-05-06 VITALS
RESPIRATION RATE: 16 BRPM | TEMPERATURE: 97.6 F | WEIGHT: 102 LBS | HEART RATE: 60 BPM | BODY MASS INDEX: 19.27 KG/M2 | SYSTOLIC BLOOD PRESSURE: 124 MMHG | DIASTOLIC BLOOD PRESSURE: 56 MMHG

## 2021-05-06 DIAGNOSIS — G30.1 LATE ONSET ALZHEIMER'S DISEASE WITHOUT BEHAVIORAL DISTURBANCE (HCC): ICD-10-CM

## 2021-05-06 DIAGNOSIS — I10 ESSENTIAL HYPERTENSION: ICD-10-CM

## 2021-05-06 DIAGNOSIS — R90.89 ABNORMAL BRAIN MRI: ICD-10-CM

## 2021-05-06 DIAGNOSIS — E11.59 TYPE 2 DIABETES MELLITUS WITH OTHER CIRCULATORY COMPLICATION, WITHOUT LONG-TERM CURRENT USE OF INSULIN (HCC): Primary | ICD-10-CM

## 2021-05-06 DIAGNOSIS — F02.80 LATE ONSET ALZHEIMER'S DISEASE WITHOUT BEHAVIORAL DISTURBANCE (HCC): ICD-10-CM

## 2021-05-06 DIAGNOSIS — E78.00 PURE HYPERCHOLESTEROLEMIA: ICD-10-CM

## 2021-05-06 DIAGNOSIS — M81.0 OSTEOPOROSIS, UNSPECIFIED OSTEOPOROSIS TYPE, UNSPECIFIED PATHOLOGICAL FRACTURE PRESENCE: ICD-10-CM

## 2021-05-06 PROCEDURE — 3288F FALL RISK ASSESSMENT DOCD: CPT | Performed by: INTERNAL MEDICINE

## 2021-05-06 PROCEDURE — G8420 CALC BMI NORM PARAMETERS: HCPCS | Performed by: INTERNAL MEDICINE

## 2021-05-06 PROCEDURE — 4040F PNEUMOC VAC/ADMIN/RCVD: CPT | Performed by: INTERNAL MEDICINE

## 2021-05-06 PROCEDURE — G8428 CUR MEDS NOT DOCUMENT: HCPCS | Performed by: INTERNAL MEDICINE

## 2021-05-06 PROCEDURE — 1090F PRES/ABSN URINE INCON ASSESS: CPT | Performed by: INTERNAL MEDICINE

## 2021-05-06 PROCEDURE — 99215 OFFICE O/P EST HI 40 MIN: CPT | Performed by: INTERNAL MEDICINE

## 2021-05-06 PROCEDURE — 1123F ACP DISCUSS/DSCN MKR DOCD: CPT | Performed by: INTERNAL MEDICINE

## 2021-05-06 PROCEDURE — 1036F TOBACCO NON-USER: CPT | Performed by: INTERNAL MEDICINE

## 2021-05-06 RX ORDER — PIOGLITAZONEHYDROCHLORIDE 15 MG/1
15 TABLET ORAL DAILY
Qty: 30 TABLET | Refills: 3 | Status: SHIPPED | OUTPATIENT
Start: 2021-05-06 | End: 2021-08-12

## 2021-05-06 NOTE — PROGRESS NOTES
Marcello Freire (:  1931) is a 80 y.o. female, here for evaluation of the following chief complaint(s):    Follow-up (Patient here today with aide from [facility]. Forgot her hearing aide.)      ASSESSMENT/PLAN:  1. Type 2 diabetes mellitus with other circulatory complication, without long-term current use of insulin ([address]  Patient remains on Farxiga, sitagliptin Metformin. With her last A1c over 9, will restart pioglitazone at 15 mg daily. She is due for diabetic retinal exam.  While seeing the eye doctor, she can also discuss the nodules on her lids. -     pioglitazone (ACTOS) 15 MG tablet; Take 1 tablet by mouth daily, Disp-30 tablet, R-3Normal  -     [facility]FL - Dona Esparza MD, Ophthalmology, [facility]  2. Late onset Alzheimer's disease without behavioral disturbance (HCC)  Stable on Aricept. She is now living in a memory care unit. According to the daughter, patient does not appear to be having any side effects from the medication but she is not certain if it helps. 3. Essential hypertension  Controlled on losartan 75 mg daily. Sent a question back to the nursing facility about whether or not patient is taking Toprol. It was recently filled at the pharmacy but is not on her med list.  4. Pure hypercholesterolemia   Stable on Livalo. Patient tolerates without side effects. 5. Osteoporosis, unspecified osteoporosis type, unspecified pathological fracture presence  Stable on alendronate. She has been on it overall a long time but also had a prolonged drug holiday  6. Abnormal brain MRI  Consider repeat MRI to be ordered at next visit. 7. Suggested Aquaphor for dry lips. Return in about 3 months (around 2021). SUBJECTIVE/OBJECTIVE:  HPI   Patient is at the office visit with a nursing aide who just met her today. She is very hard of hearing and unable to answer questions on her own, exacerbated by her not wearing her hearing aids today.     I called her daughter Robert Laura who lives out tablet by mouth daily 30 tablet 3    ACCU-CHEK CARMEN PLUS strip TESTING ONCE DAILY. DX: E11.9 DIABETES MELLITUS 100 strip 3    Insulin Pen Needle (PEN NEEDLES) 31G X 6 MM MISC 1 each by Does not apply route daily 100 each 1    Multiple Vitamins-Minerals (THEREMS-M) TABS TAKE 1 TABLET BY MOUTH ONCE DAILY. 30 tablet 5    Calcium Carbonate-Vitamin D (OYSTER SHELL CALCIUM/D) 500-200 MG-UNIT TABS TAKE ONE (1) TABLET BY MOUTH TWICE A DAY. 60 tablet 5     No current facility-administered medications for this visit. Physical Exam  Vitals signs reviewed. Constitutional:       General: She is not in acute distress. HENT:      Head: Normocephalic and atraumatic. Cardiovascular:      Rate and Rhythm: Normal rate and regular rhythm. Pulmonary:      Effort: Pulmonary effort is normal.      Breath sounds: Normal breath sounds. Abdominal:      General: Abdomen is flat. Bowel sounds are normal.   Musculoskeletal:      Right lower leg: No edema. Left lower leg: No edema. Comments: Can slowly ambulate, but uses wheelchair too   Neurological:      Mental Status: She is alert. Mental status is at baseline. Psychiatric:      Comments: Pleasant affect           On this date I have spent 40 minutes reviewing previous notes, test results and face to face with the patient discussing the diagnosis and importance of compliance with the treatment plan as well as documenting on the day of the visit. This note was generated completely or in part utilizing Dragon dictation speech recognition software. Occasionally, words are mistranscribed and despite editing, the text may contain inaccuracies due to incorrect word recognition. If further clarification is needed please contact the office at [phone]          An electronic signature was used to authenticate this note.     --Zamzam Lott MD

## 2021-05-13 RX ORDER — SITAGLIPTIN AND METFORMIN HYDROCHLORIDE 1000; 50 MG/1; MG/1
TABLET, FILM COATED, EXTENDED RELEASE ORAL
Qty: 28 TABLET | Refills: 2 | Status: SHIPPED | OUTPATIENT
Start: 2021-05-13 | End: 2021-09-07

## 2021-06-18 ENCOUNTER — TELEPHONE (OUTPATIENT)
Dept: INTERNAL MEDICINE CLINIC | Age: 86
End: 2021-06-18

## 2021-06-18 NOTE — TELEPHONE ENCOUNTER
Zulema with [address] (Speech therapist) saw patient today and patient told her she recently fell and hurt her left side. Patient does not recall when the fall occurred and no one at facility was aware. Would Dr. Jah Issa like to have an x-ray done of patient's left side? If so, please fax over order to [phone]. Thanks.

## 2021-06-18 NOTE — TELEPHONE ENCOUNTER
Rosa Jenkins returned call and states she feels that since patient is on a memory care unit, the call should be made to the nurse on that unit.   Please call [phone]

## 2021-06-18 NOTE — TELEPHONE ENCOUNTER
I have been attempting to reach anyone at [facility] with no success. I have been calling the [phone] number with no response and have left messages. Also tried leaving message at the [phone] number as well. Dr. Ester Oneil advised.

## 2021-06-18 NOTE — TELEPHONE ENCOUNTER
I dont know what part of left side needs to be x-rayed. Is there a physician where she lives who can assess? If she is unable to bear weight or limping, then go to ER for xrays.

## 2021-06-22 NOTE — TELEPHONE ENCOUNTER
Jackie Lindo from [facility] says they have no report of her falling. She was looked over and no bruising noted. Complains of pain under her armpits to her hip pain. She Is ambulating ok. They have been using biofreeze and she seems to feel better.  Routed to Dr Clarissa Kehr for review

## 2021-07-13 RX ORDER — ALENDRONATE SODIUM 35 MG/1
TABLET ORAL
Qty: 4 TABLET | Refills: 0 | Status: SHIPPED | OUTPATIENT
Start: 2021-07-13 | End: 2021-09-07

## 2021-07-13 RX ORDER — PITAVASTATIN CALCIUM 2.09 MG/1
TABLET, FILM COATED ORAL
Qty: 28 TABLET | Refills: 0 | Status: SHIPPED | OUTPATIENT
Start: 2021-07-13

## 2021-07-13 RX ORDER — LOSARTAN POTASSIUM 25 MG/1
TABLET ORAL
Qty: 28 TABLET | Refills: 0 | Status: SHIPPED | OUTPATIENT
Start: 2021-07-13 | End: 2021-09-07

## 2021-07-13 NOTE — TELEPHONE ENCOUNTER
Last appointment: 5/6/2021  Next appointment: 8/16/2021  Last refill: 1/29/2021  3/22/2021  4/16/2021

## 2021-08-12 ENCOUNTER — TELEPHONE (OUTPATIENT)
Dept: INTERNAL MEDICINE CLINIC | Age: 86
End: 2021-08-12

## 2021-08-12 DIAGNOSIS — E11.59 TYPE 2 DIABETES MELLITUS WITH OTHER CIRCULATORY COMPLICATION, WITHOUT LONG-TERM CURRENT USE OF INSULIN (HCC): ICD-10-CM

## 2021-08-12 RX ORDER — LOSARTAN POTASSIUM 50 MG/1
TABLET ORAL
Qty: 28 TABLET | Refills: 0 | Status: SHIPPED | OUTPATIENT
Start: 2021-08-12 | End: 2021-10-06

## 2021-08-12 RX ORDER — PIOGLITAZONEHYDROCHLORIDE 15 MG/1
TABLET ORAL
Qty: 28 TABLET | Refills: 0 | Status: SHIPPED | OUTPATIENT
Start: 2021-08-12 | End: 2021-10-05

## 2021-08-12 RX ORDER — DONEPEZIL HYDROCHLORIDE 10 MG/1
TABLET, FILM COATED ORAL
Qty: 28 TABLET | Refills: 0 | Status: SHIPPED | OUTPATIENT
Start: 2021-08-12 | End: 2021-10-06

## 2021-08-12 NOTE — TELEPHONE ENCOUNTER
[facility] called to report that the patient missed her speech therapy appointment. This will resume on 8-.

## 2021-09-07 RX ORDER — ALENDRONATE SODIUM 35 MG/1
TABLET ORAL
Qty: 4 TABLET | Refills: 0 | Status: SHIPPED | OUTPATIENT
Start: 2021-09-07 | End: 2021-10-29

## 2021-09-07 RX ORDER — SITAGLIPTIN AND METFORMIN HYDROCHLORIDE 1000; 50 MG/1; MG/1
TABLET, FILM COATED, EXTENDED RELEASE ORAL
Qty: 28 TABLET | Refills: 0 | Status: SHIPPED | OUTPATIENT
Start: 2021-09-07 | End: 2021-10-05

## 2021-09-07 RX ORDER — LOSARTAN POTASSIUM 25 MG/1
TABLET ORAL
Qty: 28 TABLET | Refills: 0 | Status: SHIPPED | OUTPATIENT
Start: 2021-09-07 | End: 2021-10-29

## 2021-09-08 ENCOUNTER — TELEPHONE (OUTPATIENT)
Dept: INTERNAL MEDICINE CLINIC | Age: 86
End: 2021-09-08

## 2021-09-08 NOTE — TELEPHONE ENCOUNTER
Demetria with [facility] Needs updated Physicians orders and a list of what medications this patient is taking faxed to the following number:       [phone]

## 2021-09-20 ENCOUNTER — OFFICE VISIT (OUTPATIENT)
Dept: INTERNAL MEDICINE CLINIC | Age: 86
End: 2021-09-20
Payer: MEDICARE

## 2021-09-20 VITALS
WEIGHT: 104 LBS | DIASTOLIC BLOOD PRESSURE: 64 MMHG | SYSTOLIC BLOOD PRESSURE: 148 MMHG | HEART RATE: 60 BPM | TEMPERATURE: 98.5 F | BODY MASS INDEX: 19.65 KG/M2 | OXYGEN SATURATION: 94 % | RESPIRATION RATE: 16 BRPM

## 2021-09-20 DIAGNOSIS — F02.80 LATE ONSET ALZHEIMER'S DISEASE WITHOUT BEHAVIORAL DISTURBANCE (HCC): ICD-10-CM

## 2021-09-20 DIAGNOSIS — E78.00 PURE HYPERCHOLESTEROLEMIA: ICD-10-CM

## 2021-09-20 DIAGNOSIS — G30.1 LATE ONSET ALZHEIMER'S DISEASE WITHOUT BEHAVIORAL DISTURBANCE (HCC): ICD-10-CM

## 2021-09-20 DIAGNOSIS — I10 ESSENTIAL HYPERTENSION: ICD-10-CM

## 2021-09-20 DIAGNOSIS — E11.59 TYPE 2 DIABETES MELLITUS WITH OTHER CIRCULATORY COMPLICATION, WITHOUT LONG-TERM CURRENT USE OF INSULIN (HCC): Primary | ICD-10-CM

## 2021-09-20 DIAGNOSIS — D32.9 MENINGIOMA (HCC): ICD-10-CM

## 2021-09-20 PROCEDURE — 90694 VACC AIIV4 NO PRSRV 0.5ML IM: CPT | Performed by: INTERNAL MEDICINE

## 2021-09-20 PROCEDURE — 1123F ACP DISCUSS/DSCN MKR DOCD: CPT | Performed by: INTERNAL MEDICINE

## 2021-09-20 PROCEDURE — 4040F PNEUMOC VAC/ADMIN/RCVD: CPT | Performed by: INTERNAL MEDICINE

## 2021-09-20 PROCEDURE — 1090F PRES/ABSN URINE INCON ASSESS: CPT | Performed by: INTERNAL MEDICINE

## 2021-09-20 PROCEDURE — G8420 CALC BMI NORM PARAMETERS: HCPCS | Performed by: INTERNAL MEDICINE

## 2021-09-20 PROCEDURE — 99214 OFFICE O/P EST MOD 30 MIN: CPT | Performed by: INTERNAL MEDICINE

## 2021-09-20 PROCEDURE — G8427 DOCREV CUR MEDS BY ELIG CLIN: HCPCS | Performed by: INTERNAL MEDICINE

## 2021-09-20 PROCEDURE — 1036F TOBACCO NON-USER: CPT | Performed by: INTERNAL MEDICINE

## 2021-09-20 PROCEDURE — G0008 ADMIN INFLUENZA VIRUS VAC: HCPCS | Performed by: INTERNAL MEDICINE

## 2021-09-20 SDOH — ECONOMIC STABILITY: FOOD INSECURITY: WITHIN THE PAST 12 MONTHS, YOU WORRIED THAT YOUR FOOD WOULD RUN OUT BEFORE YOU GOT MONEY TO BUY MORE.: NEVER TRUE

## 2021-09-20 SDOH — ECONOMIC STABILITY: FOOD INSECURITY: WITHIN THE PAST 12 MONTHS, THE FOOD YOU BOUGHT JUST DIDN'T LAST AND YOU DIDN'T HAVE MONEY TO GET MORE.: NEVER TRUE

## 2021-09-20 ASSESSMENT — SOCIAL DETERMINANTS OF HEALTH (SDOH): HOW HARD IS IT FOR YOU TO PAY FOR THE VERY BASICS LIKE FOOD, HOUSING, MEDICAL CARE, AND HEATING?: NOT HARD AT ALL

## 2021-09-20 NOTE — PROGRESS NOTES
Rachel Walter (:  1931) is a 80 y.o. female, here for evaluation of the following chief complaint(s):    Follow-up (One hearing aid only today.)      ASSESSMENT/PLAN:  1. Type 2 diabetes mellitus with other circulatory complication, without long-term current use of insulin (HCC)   Check labs on Farxiga, Janumet, pioglitazone  -     Hemoglobin A1C; Future  2. Pure hypercholesterolemia  Check labs on Livalo  -     Lipid Panel; Future  -     Comprehensive Metabolic Panel, Fasting; Future  3. Essential hypertension  Reasonable control on losartan 75 mg  4. Late onset Alzheimer's disease without behavioral disturbance (HCC)  Stable on donepezil  5. Meningioma [facility])  -     Daughters will decide whether or not to pursue repeat MRI BRAIN WO CONTRAST; Future  --  Osteoporosis, unspecified osteoporosis type, unspecified pathological fracture presence  Stable on alendronate. She has been on it overall a long time but also had a prolonged drug holiday  Return in about 6 months (around 3/20/2022). SUBJECTIVE/OBJECTIVE:  HPI   Patient is here for routine visit. She is with her daughter who lives in [place]. She is very hard of hearing. They keep losing her hearing aids. She can ambulate with a walker but is in a wheelchair here. She is not always able to recognize her daughter. She does not have headaches or dizziness. Her weight is stable.         Past Medical History:   Diagnosis Date    Atherosclerotic vascular disease     brain w right m2 stenosis and vertebral r/l artery stenosis    Back pain     Brain mass     noted good [facility]  mri    Cervical spondylolysis     Dementia (HCC)     has not seen neurologist. by report  mmse and aricept was started    Diabetes ([facility] [address]     type 2    Frequent falls     Hearing loss     Heart disease     bypass? cardiologist?    Hyperlipidemia     Hypertension     Mental status alteration 2020    [facility] consult    Osteoporosis restarted after a drug holiday    Wrist fracture        Current Outpatient Medications   Medication Sig Dispense Refill    alendronate (FOSAMAX) 35 MG tablet TAKE 1 BY MOUTH WEEKLY 30 MINS BEFORE FOOD (EMPTY STOMACH) WITH 8 OZ WATER DO NOT LIE DOWN FOR 30MIN 4 tablet 0    losartan (COZAAR) 25 MG tablet TAKE ONE TABLET BY MOUTH ONCE DAILY WITH LOSARTAN 50MG FOR A TOTAL OF 75MG. 28 tablet 0    dapagliflozin (FARXIGA) 10 MG tablet TAKE 1 TABLET BY MOUTH EACH MORNING 28 tablet 0    JANUMET XR  MG TB24 per extended release tablet TAKE ONE TABLET BY MOUTH DAILY WITH A MEAL 28 tablet 0    losartan (COZAAR) 50 MG tablet TAKE ONE TABLET BY MOUTH ONCE A DAY. 28 tablet 0    donepezil (ARICEPT) 10 MG tablet TAKE ONE TABLET BY MOUTH AT BEDTIME. 28 tablet 0    pioglitazone (ACTOS) 15 MG tablet TAKE 1 TABLET BY MOUTH ONCE DAILY. 28 tablet 0    LIVALO 2 MG TABS tablet TAKE ONE TABLET BY MOUTH AT BEDTIME. 28 tablet 0    aspirin EC 81 MG EC tablet Take 1 tablet by mouth daily 30 tablet 3    ACCU-CHEK CARMEN PLUS strip TESTING ONCE DAILY. DX: E11.9 DIABETES MELLITUS 100 strip 3    Insulin Pen Needle (PEN NEEDLES) 31G X 6 MM MISC 1 each by Does not apply route daily 100 each 1    Multiple Vitamins-Minerals (THEREMS-M) TABS TAKE 1 TABLET BY MOUTH ONCE DAILY. 30 tablet 5    Calcium Carbonate-Vitamin D (OYSTER SHELL CALCIUM/D) 500-200 MG-UNIT TABS TAKE ONE (1) TABLET BY MOUTH TWICE A DAY. 60 tablet 5     No current facility-administered medications for this visit. Physical Exam  Vitals reviewed. Constitutional:       General: She is not in acute distress. HENT:      Head: Normocephalic and atraumatic. Ears:      Comments: Hard of hearing  Cardiovascular:      Rate and Rhythm: Normal rate and regular rhythm. Pulmonary:      Effort: Pulmonary effort is normal.      Breath sounds: Normal breath sounds. Musculoskeletal:      Right lower leg: No edema. Left lower leg: No edema.       Comments: Sitting in wheelchair   Neurological:      Mental Status: She is alert. Mental status is at baseline. Psychiatric:      Comments: pleasant               This note was generated completely or in part utilizing Dragon dictation speech recognition software. Occasionally, words are mistranscribed and despite editing, the text may contain inaccuracies due to incorrect word recognition. If further clarification is needed please contact the office at [phone]          An electronic signature was used to authenticate this note.     --Joya Lawson MD

## 2021-09-30 ENCOUNTER — TELEPHONE (OUTPATIENT)
Dept: INTERNAL MEDICINE CLINIC | Age: 86
End: 2021-09-30

## 2021-10-05 ENCOUNTER — VIRTUAL VISIT (OUTPATIENT)
Dept: INTERNAL MEDICINE CLINIC | Age: 86
End: 2021-10-05
Payer: MEDICARE

## 2021-10-05 DIAGNOSIS — E78.00 PURE HYPERCHOLESTEROLEMIA: ICD-10-CM

## 2021-10-05 DIAGNOSIS — E11.59 TYPE 2 DIABETES MELLITUS WITH OTHER CIRCULATORY COMPLICATION, WITHOUT LONG-TERM CURRENT USE OF INSULIN (HCC): Primary | ICD-10-CM

## 2021-10-05 DIAGNOSIS — E11.59 TYPE 2 DIABETES MELLITUS WITH OTHER CIRCULATORY COMPLICATION, WITHOUT LONG-TERM CURRENT USE OF INSULIN (HCC): ICD-10-CM

## 2021-10-05 PROCEDURE — 99442 PR PHYS/QHP TELEPHONE EVALUATION 11-20 MIN: CPT | Performed by: INTERNAL MEDICINE

## 2021-10-05 RX ORDER — SITAGLIPTIN AND METFORMIN HYDROCHLORIDE 100; 1000 MG/1; MG/1
1 TABLET, FILM COATED, EXTENDED RELEASE ORAL EVERY EVENING
Qty: 30 TABLET | Refills: 2 | Status: SHIPPED | OUTPATIENT
Start: 2021-10-05 | End: 2021-11-01

## 2021-10-05 RX ORDER — PIOGLITAZONEHYDROCHLORIDE 30 MG/1
30 TABLET ORAL DAILY
Qty: 30 TABLET | Refills: 2 | Status: SHIPPED | OUTPATIENT
Start: 2021-10-05 | End: 2021-11-29

## 2021-10-05 NOTE — PROGRESS NOTES
Amy Santillan (:  1931) is a 80 y.o. female,Established patient\"}, here for evaluation of the following chief complaint(s): No chief complaint on file. ASSESSMENT/PLAN:  1. Type 2 diabetes mellitus with other circulatory complication, without long-term current use of insulin ([address]  2. Pure hypercholesterolemia      Return in about 3 months (around 2022). SUBJECTIVE/OBJECTIVE:  HPI   Phone visit with patient's daughter who manages much of her mother's health care due to her mother having dementia. We reviewed her blood work which showed an unexpectedly elevated A1c and lipids. I discussed the possibilities which include lab error, the patient not receiving her medication despite izzy stating she does, or a very large change in her diet. Anne Rodney will check to see if her diet has changed. I compared the most recent labs to prior years labs and the LDL in particular was astoundingly higher. Per discussion with and, the plan is to repeat labs in 6 weeks. She will continue on Livalo at the current dose. The elevated A1c, will change her Janumet slightly to the 100/1000 dose and increase her pioglitazone to 30 mg daily. If A1c remains elevated, will start glargine insulin may consider abdomen ultrasound. Patient is now on the memory care unit so can get nursing help with insulin. We also discussed whether or not the patient should get a repeat brain MRI and and and her sister seem to be in agreement that barring symptoms such as headaches, they will defer on this for now.       Past Medical History:   Diagnosis Date    Atherosclerotic vascular disease     brain w right m2 stenosis and vertebral r/l artery stenosis    Back pain     Brain mass     noted good [facility]  mri    Cervical spondylolysis     Dementia (HCC)     has not seen neurologist. by report  mmse and aricept was started    Diabetes ([address]     type 2    Frequent falls     Hearing loss     Heart disease bypass? cardiologist?    Hyperlipidemia     Hypertension     Mental status alteration 11/2020    [facility]    Osteoporosis     restarted after a drug holiday    Wrist fracture        Current Outpatient Medications   Medication Sig Dispense Refill    SITagliptin-metFORMIN HCl ER (JANUMET XR) 100-1000 MG TB24 Take 1 tablet by mouth every evening Take with dinner. 30 tablet 2    pioglitazone (ACTOS) 30 MG tablet Take 1 tablet by mouth daily 30 tablet 2    alendronate (FOSAMAX) 35 MG tablet TAKE 1 BY MOUTH WEEKLY 30 MINS BEFORE FOOD (EMPTY STOMACH) WITH 8 OZ WATER DO NOT LIE DOWN FOR 30MIN 4 tablet 0    losartan (COZAAR) 25 MG tablet TAKE ONE TABLET BY MOUTH ONCE DAILY WITH LOSARTAN 50MG FOR A TOTAL OF 75MG. 28 tablet 0    dapagliflozin (FARXIGA) 10 MG tablet TAKE 1 TABLET BY MOUTH EACH MORNING 28 tablet 0    losartan (COZAAR) 50 MG tablet TAKE ONE TABLET BY MOUTH ONCE A DAY. 28 tablet 0    donepezil (ARICEPT) 10 MG tablet TAKE ONE TABLET BY MOUTH AT BEDTIME. 28 tablet 0    LIVALO 2 MG TABS tablet TAKE ONE TABLET BY MOUTH AT BEDTIME. 28 tablet 0    ACCU-CHEK CARMEN PLUS strip TESTING ONCE DAILY. DX: E11.9 DIABETES MELLITUS 100 strip 3    Insulin Pen Needle (PEN NEEDLES) 31G X 6 MM MISC 1 each by Does not apply route daily 100 each 1    Multiple Vitamins-Minerals (THEREMS-M) TABS TAKE 1 TABLET BY MOUTH ONCE DAILY. 30 tablet 5    Calcium Carbonate-Vitamin D (OYSTER SHELL CALCIUM/D) 500-200 MG-UNIT TABS TAKE ONE (1) TABLET BY MOUTH TWICE A DAY. 60 tablet 5    aspirin EC 81 MG EC tablet Take 1 tablet by mouth daily 30 tablet 3     No current facility-administered medications for this visit. Last Curiel is a 80 y.o. female whose medical issues are being discussed with her daughter by a Virtual Visit (phone visit) encounter to address concerns as mentioned above.       Pursuant to the emergency declaration under the [facility] waiver authority and the [facility] Appropriations Act, this Virtual Visit was conducted with patient's (and/or legal guardian's) consent, to reduce the patient's risk of exposure to COVID-19 and provide necessary medical care. The patient (and/or legal guardian) has also been advised to contact this office for worsening conditions or problems, and seek emergency medical treatment and/or call 911 if deemed necessary. This note was generated completely or in part utilizing Dragon dictation speech recognition software. Occasionally, words are mistranscribed and despite editing, the text may contain inaccuracies due to incorrect word recognition. If further clarification is needed please contact the office at [phone]      An electronic signature was used to authenticate this note.     --Andrew Baron MD

## 2021-10-05 NOTE — Clinical Note
Please send updated medication list to Cecy and also today's labs slips that need to be done in 6 weeks and results sent to me- call to see who her nurse is- memory care unit  Let her nurse Aissatou Barth know that she needs labs in 6 weeks and results should be sent to me.  [phone] is fax maybe

## 2021-10-07 ENCOUNTER — TELEPHONE (OUTPATIENT)
Dept: INTERNAL MEDICINE CLINIC | Age: 86
End: 2021-10-07

## 2021-10-07 NOTE — TELEPHONE ENCOUNTER
Please send copies of the prescriptions for Janumet XR and Pioglitazone sent to [facility] earlier this week or do they need something else?

## 2021-10-07 NOTE — TELEPHONE ENCOUNTER
Demetria De Jesus states they need orders for patient medications that were increased recently. Please fax to:  [phone].

## 2021-10-29 RX ORDER — LOSARTAN POTASSIUM 25 MG/1
TABLET ORAL
Qty: 28 TABLET | Refills: 0 | Status: SHIPPED | OUTPATIENT
Start: 2021-10-29 | End: 2021-11-29

## 2021-10-29 RX ORDER — ALENDRONATE SODIUM 35 MG/1
TABLET ORAL
Qty: 4 TABLET | Refills: 0 | Status: SHIPPED | OUTPATIENT
Start: 2021-10-29 | End: 2021-11-29

## 2021-10-29 RX ORDER — SITAGLIPTIN AND METFORMIN HYDROCHLORIDE 100; 1000 MG/1; MG/1
TABLET, FILM COATED, EXTENDED RELEASE ORAL
Qty: 28 TABLET | Refills: 0 | OUTPATIENT
Start: 2021-10-29

## 2021-10-29 NOTE — TELEPHONE ENCOUNTER
Last appointment: 10/5/2021  Next appointment: Visit date not found  Last refill: Feli Ao just filled 10/5 w/ refills. All other meds filled 9/7/2021  Sent [employer] message to schedule next appointment due in January.

## 2021-11-01 RX ORDER — SITAGLIPTIN AND METFORMIN HYDROCHLORIDE 100; 1000 MG/1; MG/1
TABLET, FILM COATED, EXTENDED RELEASE ORAL
Qty: 28 TABLET | Refills: 0 | Status: SHIPPED | OUTPATIENT
Start: 2021-11-01 | End: 2021-11-29

## 2021-11-26 DIAGNOSIS — E11.59 TYPE 2 DIABETES MELLITUS WITH OTHER CIRCULATORY COMPLICATION, WITHOUT LONG-TERM CURRENT USE OF INSULIN (HCC): ICD-10-CM

## 2021-11-29 ENCOUNTER — TELEPHONE (OUTPATIENT)
Dept: INTERNAL MEDICINE CLINIC | Age: 86
End: 2021-11-29

## 2021-11-29 RX ORDER — PIOGLITAZONEHYDROCHLORIDE 30 MG/1
30 TABLET ORAL DAILY
Qty: 28 TABLET | Refills: 0 | Status: SHIPPED | OUTPATIENT
Start: 2021-11-29 | End: 2021-12-17

## 2021-11-29 RX ORDER — LOSARTAN POTASSIUM 25 MG/1
TABLET ORAL
Qty: 28 TABLET | Refills: 0 | Status: SHIPPED | OUTPATIENT
Start: 2021-11-29 | End: 2021-12-17

## 2021-11-29 RX ORDER — ALENDRONATE SODIUM 35 MG/1
TABLET ORAL
Qty: 4 TABLET | Refills: 0 | Status: SHIPPED | OUTPATIENT
Start: 2021-11-29 | End: 2021-12-17

## 2021-11-29 RX ORDER — SITAGLIPTIN AND METFORMIN HYDROCHLORIDE 100; 1000 MG/1; MG/1
TABLET, FILM COATED, EXTENDED RELEASE ORAL
Qty: 28 TABLET | Refills: 0 | Status: SHIPPED | OUTPATIENT
Start: 2021-11-29 | End: 2021-12-17

## 2021-11-29 NOTE — TELEPHONE ENCOUNTER
Last appointment: 10/5/2021  Next appointment: 11/29/2021  Last refill:   Actos 30 with 2 10/05/2021  Antumet 28 with 0 11/01/2021

## 2021-11-29 NOTE — TELEPHONE ENCOUNTER
Last appointment: 10/5/2021  Next appointment: 11/26/2021  Last refill: All 1 month with 0 refills 10/29/2021

## 2021-11-29 NOTE — TELEPHONE ENCOUNTER
Spoke to Dipak at [employer] and she tells me they haven't drawn labs on this patient since 01/21.   Faxed labs to [facility] that need to be done and they will have them drawn in the AM.  Routed to Dr Dejuan Velazco for Coral Rosario

## 2021-12-09 ENCOUNTER — TELEPHONE (OUTPATIENT)
Dept: INTERNAL MEDICINE CLINIC | Age: 86
End: 2021-12-09

## 2021-12-09 NOTE — TELEPHONE ENCOUNTER
See Beverly's message. Okay to give verbal order for left hip Xray and Nurse Evaluation for an open area on the left Hip.

## 2021-12-09 NOTE — TELEPHONE ENCOUNTER
Lajuanda Ganser with [facility] is calling to report a patient fall last night. Patient is unable to give details of the fall. Lajuanda Ganser is requesting an Xray order as well as a Nurse Eval for an open area on the left Hip.      Please contact Lajuanda Ganser with questions/concerns or fax orders to [phone]

## 2021-12-09 NOTE — TELEPHONE ENCOUNTER
[place] Larry with [facility] is calling to report a patient fall last night. Patient is unable to give details of the fall. [place] Larry is requesting an Xray order as well as a Nurse Eval for an open area on the left Hip.      Please fax orders to [phone]

## 2021-12-10 ENCOUNTER — TELEPHONE (OUTPATIENT)
Dept: INTERNAL MEDICINE CLINIC | Age: 86
End: 2021-12-10

## 2021-12-10 DIAGNOSIS — M25.559 HIP PAIN: Primary | ICD-10-CM

## 2021-12-13 ENCOUNTER — TELEPHONE (OUTPATIENT)
Dept: INTERNAL MEDICINE CLINIC | Age: 86
End: 2021-12-13

## 2021-12-13 ENCOUNTER — OFFICE VISIT (OUTPATIENT)
Dept: INTERNAL MEDICINE CLINIC | Age: 86
End: 2021-12-13
Payer: MEDICARE

## 2021-12-13 VITALS
SYSTOLIC BLOOD PRESSURE: 118 MMHG | BODY MASS INDEX: 20.01 KG/M2 | OXYGEN SATURATION: 94 % | HEIGHT: 61 IN | DIASTOLIC BLOOD PRESSURE: 58 MMHG | HEART RATE: 67 BPM | WEIGHT: 106 LBS

## 2021-12-13 DIAGNOSIS — B35.6 TINEA CRURIS: ICD-10-CM

## 2021-12-13 DIAGNOSIS — L02.416 ABSCESS OF LEFT HIP: Primary | ICD-10-CM

## 2021-12-13 DIAGNOSIS — M25.552 LEFT HIP PAIN: ICD-10-CM

## 2021-12-13 PROCEDURE — G8484 FLU IMMUNIZE NO ADMIN: HCPCS | Performed by: INTERNAL MEDICINE

## 2021-12-13 PROCEDURE — G8427 DOCREV CUR MEDS BY ELIG CLIN: HCPCS | Performed by: INTERNAL MEDICINE

## 2021-12-13 PROCEDURE — G8420 CALC BMI NORM PARAMETERS: HCPCS | Performed by: INTERNAL MEDICINE

## 2021-12-13 PROCEDURE — 1036F TOBACCO NON-USER: CPT | Performed by: INTERNAL MEDICINE

## 2021-12-13 PROCEDURE — 4040F PNEUMOC VAC/ADMIN/RCVD: CPT | Performed by: INTERNAL MEDICINE

## 2021-12-13 PROCEDURE — 1123F ACP DISCUSS/DSCN MKR DOCD: CPT | Performed by: INTERNAL MEDICINE

## 2021-12-13 PROCEDURE — 99214 OFFICE O/P EST MOD 30 MIN: CPT | Performed by: INTERNAL MEDICINE

## 2021-12-13 PROCEDURE — 1090F PRES/ABSN URINE INCON ASSESS: CPT | Performed by: INTERNAL MEDICINE

## 2021-12-13 RX ORDER — CLOTRIMAZOLE 1 %
CREAM (GRAM) TOPICAL
Qty: 40 G | Refills: 1 | Status: SHIPPED | OUTPATIENT
Start: 2021-12-13 | End: 2021-12-20

## 2021-12-13 RX ORDER — DOXYCYCLINE HYCLATE 100 MG
100 TABLET ORAL 2 TIMES DAILY
Qty: 14 TABLET | Refills: 0 | Status: SHIPPED | OUTPATIENT
Start: 2021-12-13 | End: 2021-12-16

## 2021-12-13 RX ORDER — TERBINAFINE HYDROCHLORIDE 250 MG/1
250 TABLET ORAL DAILY
Qty: 14 TABLET | Refills: 0 | Status: SHIPPED | OUTPATIENT
Start: 2021-12-13 | End: 2021-12-27

## 2021-12-13 ASSESSMENT — PATIENT HEALTH QUESTIONNAIRE - PHQ9
SUM OF ALL RESPONSES TO PHQ9 QUESTIONS 1 & 2: 0
2. FEELING DOWN, DEPRESSED OR HOPELESS: 0
1. LITTLE INTEREST OR PLEASURE IN DOING THINGS: 0
DEPRESSION UNABLE TO ASSESS: FUNCTIONAL CAPACITY MOTIVATION LIMITS ACCURACY
SUM OF ALL RESPONSES TO PHQ QUESTIONS 1-9: 0

## 2021-12-13 NOTE — TELEPHONE ENCOUNTER
Patient should have an appointment today at 3 pm with dr Adrian Haley. She states her daughter is driving from [place] to bring here here. That appointment time is blocked on the schedule we just need to make sure this is for the particular patient.  Please call the clinical manager with any updates

## 2021-12-13 NOTE — PROGRESS NOTES
Chela Crump (:  1931) is a 80 y.o. female, here for evaluation of the following chief complaint(s):    Fall (LEFT HIP PAIN )      ASSESSMENT/PLAN:  1. Abscess of left hip  Wound care nursing facility to evaluate and treat left hip. Change bandage daily.  -     Herpes Simplex Virus,I/II,IgG; Future  -     Culture, Wound  -     Patient is allergic to sulfa and penicillin. Prescribed doxycycline hyclate (VIBRA-TABS) 100 MG tablet; Take 1 tablet by mouth 2 times daily for 7 days, Disp-14 tablet, R-0Normal   If not quickly improving with antibiotics, should see general surgery for incision and drainage.  -     Ernie Guo MD, General Surgery, [facility]  2. Tinea cruris  -     clotrimazole (LOTRIMIN AF) 1 % cream; Apply topically 2 times daily. , Disp-40 g, R-1, Normal  -     terbinafine (LAMISIL) 250 MG tablet; Take 1 tablet by mouth daily for 14 days, Disp-14 tablet, R-0Normal  Can also use barrier cream like Calmoseptine for the discomfort. Try to keep vaginal area dry by changing Depends more often. Patient should see gynecology if not improving. Symptoms likely made better worse by uncontrolled diabetes  3. Left hip pain  Her x-ray was negative for fracture. Discussed communication difficulty with the facility with her daughter. Advised that there needs to be involvement by the nursing home physician for urgent matters. Return if symptoms worsen or fail to improve. SUBJECTIVE/OBJECTIVE:  HPI     Patient is here with her daughter. Patient had a fall at the nursing home last week  and an x-ray was obtained. Asked for the report today since it was not sent to me and fortunately it shows no fracture. The nurse also noted what she stated was a decubitus ulcer of the left hip. She also noted a lesion of the labia. That is why this appointment was arranged. Nursing staff was unsure how long the ulcer and patient has been there.   They state she has not been feverish. Patient reports the 2 areas of concern are painful to palpation. 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 by report 20/30 mmse and aricept was started    Diabetes ([facility] [address]     type 2    Frequent falls     Hearing loss     Heart disease     bypass? cardiologist?    Hyperlipidemia     Hypertension     Mental status alteration 11/2020    [facility]    Osteoporosis     restarted after a drug holiday    Wrist fracture        Current Outpatient Medications   Medication Sig Dispense Refill    doxycycline hyclate (VIBRA-TABS) 100 MG tablet Take 1 tablet by mouth 2 times daily for 7 days 14 tablet 0    clotrimazole (LOTRIMIN AF) 1 % cream Apply topically 2 times daily. 40 g 1    terbinafine (LAMISIL) 250 MG tablet Take 1 tablet by mouth daily for 14 days 14 tablet 0    dapagliflozin (FARXIGA) 10 MG tablet TAKE 1 TABLET BY MOUTH EACH MORNING 28 tablet 0    alendronate (FOSAMAX) 35 MG tablet TAKE 1 TABLET BY MOUTH WEEKLY 30 MINS BEFORE FOOD (EMPTY STOMACH) WITH 8 OZ WATER DO NOT LIE DOWN FOR 30MIN 4 tablet 0    losartan (COZAAR) 25 MG tablet TAKE ONE TABLET BY MOUTH ONCE DAILY WITH LOSARTAN 50MG FOR A TOTAL OF 75MG. 28 tablet 0    JANUMET -1000 MG TB24 TAKE ONE TABLET BY MOUTH DAILY WITH EVENING MEAL 28 tablet 0    donepezil (ARICEPT) 10 MG tablet TAKE ONE TABLET BY MOUTH AT BEDTIME. 28 tablet 5    losartan (COZAAR) 50 MG tablet TAKE ONE TABLET BY MOUTH ONCE A DAY. 28 tablet 5    LIVALO 2 MG TABS tablet TAKE ONE TABLET BY MOUTH AT BEDTIME. 28 tablet 0    ACCU-CHEK CARMEN PLUS strip TESTING ONCE DAILY.  DX: E11.9 DIABETES MELLITUS 100 strip 3    Calcium Carbonate-Vitamin D (OYSTER SHELL CALCIUM/D) 500-200 MG-UNIT TABS TAKE ONE (1) TABLET BY MOUTH TWICE A DAY. 60 tablet 5    aspirin EC 81 MG EC tablet Take 1 tablet by mouth daily 30 tablet 3    pioglitazone (ACTOS) 30 MG tablet Take 1 tablet by mouth daily (Patient not taking: Reported on 12/13/2021) 28 tablet 0    Insulin Pen Needle (PEN NEEDLES) 31G X 6 MM MISC 1 each by Does not apply route daily 100 each 1    Multiple Vitamins-Minerals (THEREMS-M) TABS TAKE 1 TABLET BY MOUTH ONCE DAILY. (Patient not taking: Reported on 12/13/2021) 30 tablet 5     No current facility-administered medications for this visit. Physical Exam  Patient generally ambulates in a wheelchair. We are able to transfer her to the exam table. She is in no acute distress. She is hard of hearing. She is not a good historian. Her lips are noted to be very dry. Vital signs are reviewed/stable. Left lateral hip-large fluctuant abscess with small pustules. Positive erythema. Culture taken of a draining area. Difficult to do pelvic exam as patient had trouble opening her knees while bent. There is diffuse confluent erythema of the labia. Some blood is noted on her depends. There appears to be some pus/discharge but difficult to observe the source. This note was generated completely or in part utilizing Dragon dictation speech recognition software. Occasionally, words are mistranscribed and despite editing, the text may contain inaccuracies due to incorrect word recognition. If further clarification is needed please contact the office at [phone]          An electronic signature was used to authenticate this note.     --Eduardo Burton MD

## 2021-12-13 NOTE — PATIENT INSTRUCTIONS
Lynn - general surgery for left hip to incise and drain    [facility]  GYN for labia lesion if not improving    Lab    Wound care nurse to evaluate and treat left hip - change bandage daily    Try to keep labia area dry, change depends more often    Use topical clotrimazole to labia     calmoseptine

## 2021-12-13 NOTE — Clinical Note
Please find out if patient is having trouble getting scheduled with general surgery because I see that has not been done yet. Please keep me informed if the medications I prescribed are not helping.

## 2021-12-16 ENCOUNTER — TELEPHONE (OUTPATIENT)
Dept: INTERNAL MEDICINE CLINIC | Age: 86
End: 2021-12-16

## 2021-12-16 DIAGNOSIS — E11.59 TYPE 2 DIABETES MELLITUS WITH OTHER CIRCULATORY COMPLICATION, WITHOUT LONG-TERM CURRENT USE OF INSULIN (HCC): ICD-10-CM

## 2021-12-16 LAB
GRAM STAIN RESULT: ABNORMAL
ORGANISM: ABNORMAL
WOUND/ABSCESS: ABNORMAL

## 2021-12-16 RX ORDER — CLINDAMYCIN HYDROCHLORIDE 300 MG/1
300 CAPSULE ORAL 3 TIMES DAILY
Qty: 21 CAPSULE | Refills: 0 | Status: SHIPPED | OUTPATIENT
Start: 2021-12-16 | End: 2021-12-23

## 2021-12-16 RX ORDER — INSULIN GLARGINE 100 [IU]/ML
10 INJECTION, SOLUTION SUBCUTANEOUS NIGHTLY
Qty: 5 PEN | Refills: 0 | Status: ON HOLD | OUTPATIENT
Start: 2021-12-16 | End: 2022-10-08 | Stop reason: HOSPADM

## 2021-12-16 NOTE — TELEPHONE ENCOUNTER
Season with [address] is calling to see if there are any new orders/medication changes since patient's last lab results? Please call her at [phone] and let her know. Thanks.

## 2021-12-16 NOTE — TELEPHONE ENCOUNTER
See order for 10 units of glargine nightly to fax. Contact me with home morning glucose readings in 1 to 2 weeks. May increase her dose after that. I believe she already has insulin pen needles.

## 2021-12-16 NOTE — TELEPHONE ENCOUNTER
Tell Season I had left this lab note:    \"tell patient (or her daughter) the blood test is negative for genital herpes infection. 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 Her diabetes is still poorly controlled and this is likely making the skin rash worse. She should get back on insulin. Is that something she can be helped with at the facility where she resides? Let me know so I can order it. Lipids are elevated but she is allergic to statins. \"    Also is she taking the antibiotics and anti-fungal I prescribed at her last visit?

## 2021-12-16 NOTE — TELEPHONE ENCOUNTER
Faxed orders over to [facility]. She states she will make sure Cecy receives the orders. I tried to reach out to Vidya to speak with her nurse and was not able to do so. Reached out to Ms Vanessa Silva daughter to relay the message from Dr Mahsa Cerda in hopes she may be able to get Zaira Stagers of Ms Vanessa Silva nurse.

## 2021-12-17 ENCOUNTER — TELEPHONE (OUTPATIENT)
Dept: SURGERY | Age: 86
End: 2021-12-17

## 2021-12-17 RX ORDER — LOSARTAN POTASSIUM 25 MG/1
TABLET ORAL
Qty: 28 TABLET | Refills: 0 | Status: SHIPPED | OUTPATIENT
Start: 2021-12-17 | End: 2022-01-20

## 2021-12-17 RX ORDER — SITAGLIPTIN AND METFORMIN HYDROCHLORIDE 100; 1000 MG/1; MG/1
TABLET, FILM COATED, EXTENDED RELEASE ORAL
Qty: 28 TABLET | Refills: 0 | Status: SHIPPED | OUTPATIENT
Start: 2021-12-17 | End: 2022-01-30

## 2021-12-17 RX ORDER — PIOGLITAZONEHYDROCHLORIDE 30 MG/1
30 TABLET ORAL DAILY
Qty: 28 TABLET | Refills: 0 | Status: SHIPPED | OUTPATIENT
Start: 2021-12-17 | End: 2022-01-20

## 2021-12-17 RX ORDER — ALENDRONATE SODIUM 35 MG/1
TABLET ORAL
Qty: 4 TABLET | Refills: 0 | Status: SHIPPED | OUTPATIENT
Start: 2021-12-17 | End: 2022-01-20

## 2021-12-17 NOTE — TELEPHONE ENCOUNTER
Pt's daughter, Laura Menjivar, called informs a swab was taken at pt's office visit earlier this week and it has come back positive for MRSA. Pt has an appointment on Monday, 12/20/2. Laura Menjivar is asking what the protocol is now for the patient to be seen. Please call Laura Menjivar and discuss. She does work retail and can not always answer her phone. She did give permission to leave a detailed voicemail. Contact [phone].

## 2021-12-17 NOTE — TELEPHONE ENCOUNTER
Last appointment: 12/13/2021  Next appointment: Visit date not found  Last refill: 11/29/2021  no return date given at last office visit

## 2021-12-17 NOTE — TELEPHONE ENCOUNTER
Called and spoke with lEi Espinosa, patients daughter and informed her that she could keep the appt on Monday.

## 2021-12-20 ENCOUNTER — OFFICE VISIT (OUTPATIENT)
Dept: SURGERY | Age: 86
End: 2021-12-20
Payer: MEDICARE

## 2021-12-20 VITALS
WEIGHT: 112 LBS | HEART RATE: 67 BPM | SYSTOLIC BLOOD PRESSURE: 187 MMHG | DIASTOLIC BLOOD PRESSURE: 85 MMHG | BODY MASS INDEX: 21.14 KG/M2 | HEIGHT: 61 IN | TEMPERATURE: 97.6 F

## 2021-12-20 DIAGNOSIS — L02.416 CUTANEOUS ABSCESS OF LEFT HIP: Primary | ICD-10-CM

## 2021-12-20 PROCEDURE — G8427 DOCREV CUR MEDS BY ELIG CLIN: HCPCS | Performed by: SURGERY

## 2021-12-20 PROCEDURE — 99203 OFFICE O/P NEW LOW 30 MIN: CPT | Performed by: SURGERY

## 2021-12-20 PROCEDURE — G8484 FLU IMMUNIZE NO ADMIN: HCPCS | Performed by: SURGERY

## 2021-12-20 PROCEDURE — 1090F PRES/ABSN URINE INCON ASSESS: CPT | Performed by: SURGERY

## 2021-12-20 PROCEDURE — 1123F ACP DISCUSS/DSCN MKR DOCD: CPT | Performed by: SURGERY

## 2021-12-20 PROCEDURE — 4040F PNEUMOC VAC/ADMIN/RCVD: CPT | Performed by: SURGERY

## 2021-12-20 PROCEDURE — G8420 CALC BMI NORM PARAMETERS: HCPCS | Performed by: SURGERY

## 2021-12-20 PROCEDURE — 1036F TOBACCO NON-USER: CPT | Performed by: SURGERY

## 2021-12-20 RX ORDER — CLINDAMYCIN HYDROCHLORIDE 300 MG/1
300 CAPSULE ORAL 3 TIMES DAILY
Qty: 21 CAPSULE | Refills: 0 | Status: SHIPPED | OUTPATIENT
Start: 2021-12-20 | End: 2021-12-27

## 2021-12-23 NOTE — PROGRESS NOTES
PATIENT NAME: Jack Mejia     YOB: 1931     TODAY'S DATE: 12/23/2021    Reason for Visit:  Left hip abscess    Requesting Physician:  Summer Diamond    HISTORY OF PRESENT ILLNESS:              The patient is a [address] y.o. female with a PMHx as delineated below who presents with the development of an abscess over her left hip. This spontaneously drained and she was started on antibiotics. Chief Complaint   Patient presents with   [facility] Patient     abscess of left hip        REVIEW OF SYSTEMS:  CONSTITUTIONAL:  negative  HEENT:  negative  RESPIRATORY:  negative  CARDIOVASCULAR:  negative  GASTROINTESTINAL:  negative  GENITOURINARY:  negative  HEMATOLOGIC/LYMPHATIC:  negative  MUSCULOSKELETAL: negative  NEUROLOGICAL:  negative    PMH  Past Medical History:   Diagnosis Date    Atherosclerotic vascular disease     brain w right m2 stenosis and vertebral r/l artery stenosis    Back pain     Brain mass     noted good michelle 11/20 mri    Cervical spondylolysis     Dementia ([facility] [address]     has not seen neurologist. by report 20/30 mmse and aricept was started    Diabetes ([facility] [address]     type 2    Frequent falls     Hearing loss     Heart disease     bypass? cardiologist?    Hyperlipidemia     Hypertension     Mental status alteration 11/2020    [facility]    Osteoporosis     restarted after a drug holiday    Wrist fracture        PSH  Past Surgical History:   Procedure Laterality Date    BRAIN TUMOR EXCISION      benign, right frontotemporal craniotomy    CORONARY ARTERY BYPASS GRAFT      HIP SURGERY      pins    PARTIAL HIP ARTHROPLASTY      left hip       Social History  Social History     Socioeconomic History    Marital status:       Spouse name: Not on file    Number of children: 2    Years of education: Not on file    Highest education level: Not on file   Occupational History    Occupation: accounting     Comment: [employer]   Tobacco Use    Smoking status: Never Smoker    Smokeless tobacco: Never Used   Vaping Use    Vaping Use: Never used   Substance and Sexual Activity    Alcohol use: No    Drug use: No    Sexual activity: Not Currently   Other Topics Concern    Not on file   Social History Narrative    2 daughters live out of town, 5 grandchildren    Ksenia Rose Is in [place]. Other daughter in [facility] [address] is RN     Social Determinants of Health     Financial Resource Strain: Low Risk     Difficulty of Paying Living Expenses: Not hard at all   Food Insecurity: No Food Insecurity    Worried About Running Out of Food in the Last Year: Never true    Harriet of Food in the Last Year: Never true   Transportation Needs:     Lack of Transportation (Medical): Not on file    Lack of Transportation (Non-Medical):  Not on file   Physical Activity:     Days of Exercise per Week: Not on file    Minutes of Exercise per Session: Not on file   Stress:     Feeling of Stress : Not on file   Social Connections:     Frequency of Communication with Friends and Family: Not on file    Frequency of Social Gatherings with Friends and Family: Not on file    Attends Muslim Services: Not on file    Active Member of Clubs or Organizations: Not on file    Attends Club or Organization Meetings: Not on file    Marital Status: Not on file   Intimate Partner Violence:     Fear of Current or Ex-Partner: Not on file    Emotionally Abused: Not on file    Physically Abused: Not on file    Sexually Abused: Not on file   Housing Stability:     Unable to Pay for Housing in the Last Year: Not on file    Number of [place] in the Last Year: Not on file    Unstable Housing in the Last Year: Not on file       Family History:       Problem Relation Age of Onset    Diabetes Mother     High Blood Pressure Mother     Cancer Sister         ovarian    Diabetes Sister        Allergy:   Allergies   Allergen Reactions    Penicillins     Statins     Sulfa Antibiotics PHYSICAL EXAM:  VITALS:  BP (!) 187/85   Pulse 67   Temp 97.6 °F (36.4 °C)   Ht 5' 1\" (1.549 m)   Wt 112 lb (50.8 kg)   BMI 21.16 kg/m²     CONSTITUTIONAL:  alert, no apparent distress and thin  EYES:  sclera clear  ENT:  normocepalic, without obvious abnormality  NECK:  supple, symmetrical, trachea midline and no carotid bruits  LUNGS:  clear to auscultation  CARDIOVASCULAR:  regular rate and rhythm and no murmur noted  ABDOMEN:  no scars, normal bowel sounds, soft, non-distended, non-tender, voluntary guarding absent, no masses palpated and hernia absent  MUSCULOSKELETAL:  0+ pitting edema lower extremities  NEUROLOGIC:  Mental Status Exam:  Level of Alertness:   awake  Orientation:   person, place, time  SKIN:  Over the left hip, there is a resolving abscess. Skin opening with some necrotic skin that was debrided. IMPRESSION/RECOMMENDATIONS:    The patient has had an abscess of the left hip that spontaneously drained. The patient was started on cleocin with appropriate response. The infection is clearing nicely. I will continue cleocin for additional week in case this was MRSA. Wound care instructions were give. F/U in 1 week.      Sidra Go MD

## 2022-01-01 ENCOUNTER — TELEPHONE (OUTPATIENT)
Dept: INTERNAL MEDICINE CLINIC | Age: 87
End: 2022-01-01

## 2022-01-01 ENCOUNTER — APPOINTMENT (OUTPATIENT)
Dept: CT IMAGING | Age: 87
End: 2022-01-01
Payer: MEDICARE

## 2022-01-01 ENCOUNTER — APPOINTMENT (OUTPATIENT)
Dept: GENERAL RADIOLOGY | Age: 87
End: 2022-01-01
Payer: MEDICARE

## 2022-01-01 ENCOUNTER — HOSPITAL ENCOUNTER (EMERGENCY)
Age: 87
Discharge: HOME | End: 2022-11-22
Attending: EMERGENCY MEDICINE
Payer: MEDICARE

## 2022-01-01 ENCOUNTER — TELEPHONE (OUTPATIENT)
Dept: ADMINISTRATIVE | Age: 87
End: 2022-01-01

## 2022-01-01 ENCOUNTER — OFFICE VISIT (OUTPATIENT)
Dept: CARDIOLOGY CLINIC | Age: 87
End: 2022-01-01
Payer: MEDICARE

## 2022-01-01 ENCOUNTER — APPOINTMENT (OUTPATIENT)
Dept: GENERAL RADIOLOGY | Age: 87
DRG: 064 | End: 2022-01-01
Payer: MEDICARE

## 2022-01-01 ENCOUNTER — CARE COORDINATION (OUTPATIENT)
Dept: CASE MANAGEMENT | Age: 87
End: 2022-01-01

## 2022-01-01 ENCOUNTER — HOSPITAL ENCOUNTER (INPATIENT)
Age: 87
LOS: 1 days | DRG: 951 | End: 2022-11-28
Attending: INTERNAL MEDICINE | Admitting: INTERNAL MEDICINE
Payer: COMMERCIAL

## 2022-01-01 ENCOUNTER — HOSPITAL ENCOUNTER (INPATIENT)
Age: 87
LOS: 1 days | DRG: 064 | End: 2022-11-28
Attending: EMERGENCY MEDICINE | Admitting: INTERNAL MEDICINE
Payer: MEDICARE

## 2022-01-01 ENCOUNTER — APPOINTMENT (OUTPATIENT)
Dept: CT IMAGING | Age: 87
DRG: 064 | End: 2022-01-01
Payer: MEDICARE

## 2022-01-01 ENCOUNTER — TELEMEDICINE (OUTPATIENT)
Dept: INTERNAL MEDICINE CLINIC | Age: 87
End: 2022-01-01
Payer: MEDICARE

## 2022-01-01 VITALS
DIASTOLIC BLOOD PRESSURE: 72 MMHG | BODY MASS INDEX: 20.7 KG/M2 | WEIGHT: 106 LBS | SYSTOLIC BLOOD PRESSURE: 112 MMHG | OXYGEN SATURATION: 98 % | HEART RATE: 72 BPM

## 2022-01-01 VITALS
OXYGEN SATURATION: 93 % | HEART RATE: 81 BPM | TEMPERATURE: 98.2 F | DIASTOLIC BLOOD PRESSURE: 75 MMHG | SYSTOLIC BLOOD PRESSURE: 144 MMHG | BODY MASS INDEX: 21.93 KG/M2 | RESPIRATION RATE: 18 BRPM | HEIGHT: 60 IN | WEIGHT: 111.7 LBS

## 2022-01-01 VITALS
BODY MASS INDEX: 20.81 KG/M2 | WEIGHT: 106 LBS | HEART RATE: 70 BPM | DIASTOLIC BLOOD PRESSURE: 54 MMHG | HEIGHT: 60 IN | SYSTOLIC BLOOD PRESSURE: 124 MMHG

## 2022-01-01 VITALS
OXYGEN SATURATION: 95 % | DIASTOLIC BLOOD PRESSURE: 76 MMHG | SYSTOLIC BLOOD PRESSURE: 118 MMHG | TEMPERATURE: 97.5 F | RESPIRATION RATE: 17 BRPM | HEART RATE: 101 BPM

## 2022-01-01 DIAGNOSIS — R73.9 HYPERGLYCEMIA: ICD-10-CM

## 2022-01-01 DIAGNOSIS — Z95.1 HX OF CABG: ICD-10-CM

## 2022-01-01 DIAGNOSIS — E78.2 MIXED HYPERLIPIDEMIA: ICD-10-CM

## 2022-01-01 DIAGNOSIS — I25.10 CAD IN NATIVE ARTERY: ICD-10-CM
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
## 2022-01-01 DIAGNOSIS — R41.82 ALTERED MENTAL STATUS, UNSPECIFIED ALTERED MENTAL STATUS TYPE: Primary | ICD-10-CM

## 2022-01-01 DIAGNOSIS — D39.9 NEOPLASM OF UNCERTAIN BEHAVIOR OF FEMALE GENITAL ORGAN, UNSPECIFIED: ICD-10-CM

## 2022-01-01 DIAGNOSIS — I50.20 HFREF (HEART FAILURE WITH REDUCED EJECTION FRACTION) (HCC): Primary | ICD-10-CM

## 2022-01-01 DIAGNOSIS — R09.02 HYPOXIA: ICD-10-CM

## 2022-01-01 DIAGNOSIS — I10 PRIMARY HYPERTENSION: ICD-10-CM

## 2022-01-01 DIAGNOSIS — I50.22 CHRONIC SYSTOLIC (CONGESTIVE) HEART FAILURE (HCC): ICD-10-CM

## 2022-01-01 DIAGNOSIS — Z09 HOSPITAL DISCHARGE FOLLOW-UP: Primary | ICD-10-CM

## 2022-01-01 DIAGNOSIS — N83.8 OVARIAN MASS: Primary | ICD-10-CM

## 2022-01-01 DIAGNOSIS — I63.9 CEREBROVASCULAR ACCIDENT (CVA), UNSPECIFIED MECHANISM (HCC): ICD-10-CM

## 2022-01-01 DIAGNOSIS — R77.8 ELEVATED TROPONIN: ICD-10-CM

## 2022-01-01 LAB
A/G RATIO: 0.8 (ref 1.1–2.2)
ALBUMIN SERPL-MCNC: 2.6 G/DL (ref 3.4–5)
ALBUMIN SERPL-MCNC: 3.5 G/DL (ref 3.4–5)
ALP BLD-CCNC: 117 U/L (ref 40–129)
ALP BLD-CCNC: 128 U/L (ref 40–129)
ALT SERPL-CCNC: 12 U/L (ref 10–40)
ALT SERPL-CCNC: 15 U/L (ref 10–40)
ANION GAP SERPL CALCULATED.3IONS-SCNC: 11 MMOL/L (ref 3–16)
ANION GAP SERPL CALCULATED.3IONS-SCNC: 12 MMOL/L (ref 3–16)
AST SERPL-CCNC: 22 U/L (ref 15–37)
AST SERPL-CCNC: 25 U/L (ref 15–37)
BACTERIA: ABNORMAL /HPF
BASOPHILS ABSOLUTE: 0.1 K/UL (ref 0–0.2)
BASOPHILS ABSOLUTE: 0.1 K/UL (ref 0–0.2)
BASOPHILS RELATIVE PERCENT: 0.7 %
BASOPHILS RELATIVE PERCENT: 0.9 %
BILIRUB SERPL-MCNC: 0.5 MG/DL (ref 0–1)
BILIRUB SERPL-MCNC: 0.6 MG/DL (ref 0–1)
BILIRUBIN DIRECT: <0.2 MG/DL (ref 0–0.3)
BILIRUBIN URINE: ABNORMAL
BILIRUBIN URINE: NEGATIVE
BILIRUBIN, INDIRECT: NORMAL MG/DL (ref 0–1)
BLOOD, URINE: ABNORMAL
BLOOD, URINE: NEGATIVE
BUN BLDV-MCNC: 12 MG/DL (ref 7–20)
BUN BLDV-MCNC: 21 MG/DL (ref 7–20)
CALCIUM SERPL-MCNC: 8 MG/DL (ref 8.3–10.6)
CALCIUM SERPL-MCNC: 9.1 MG/DL (ref 8.3–10.6)
CHLORIDE BLD-SCNC: 100 MMOL/L (ref 99–110)
CHLORIDE BLD-SCNC: 111 MMOL/L (ref 99–110)
CLARITY: CLEAR
CLARITY: CLEAR
CO2: 22 MMOL/L (ref 21–32)
CO2: 25 MMOL/L (ref 21–32)
COLOR: YELLOW
COLOR: YELLOW
CREAT SERPL-MCNC: 0.6 MG/DL (ref 0.6–1.2)
CREAT SERPL-MCNC: 0.7 MG/DL (ref 0.6–1.2)
EKG ATRIAL RATE: 84 BPM
EKG ATRIAL RATE: 94 BPM
EKG DIAGNOSIS: NORMAL
EKG DIAGNOSIS: NORMAL
EKG P AXIS: 63 DEGREES
EKG P AXIS: 73 DEGREES
EKG P-R INTERVAL: 136 MS
EKG P-R INTERVAL: 144 MS
EKG Q-T INTERVAL: 378 MS
EKG Q-T INTERVAL: 434 MS
EKG QRS DURATION: 94 MS
EKG QRS DURATION: 98 MS
EKG QTC CALCULATION (BAZETT): 472 MS
EKG QTC CALCULATION (BAZETT): 512 MS
EKG R AXIS: 41 DEGREES
EKG R AXIS: 56 DEGREES
EKG T AXIS: 1 DEGREES
EKG T AXIS: 31 DEGREES
EKG VENTRICULAR RATE: 84 BPM
EKG VENTRICULAR RATE: 94 BPM
EOSINOPHILS ABSOLUTE: 0 K/UL (ref 0–0.6)
EOSINOPHILS ABSOLUTE: 0.1 K/UL (ref 0–0.6)
EOSINOPHILS RELATIVE PERCENT: 0.5 %
EOSINOPHILS RELATIVE PERCENT: 0.7 %
GFR SERPL CREATININE-BSD FRML MDRD: >60 ML/MIN/{1.73_M2}
GFR SERPL CREATININE-BSD FRML MDRD: >60 ML/MIN/{1.73_M2}
GLUCOSE BLD-MCNC: 100 MG/DL (ref 70–99)
GLUCOSE BLD-MCNC: 441 MG/DL (ref 70–99)
GLUCOSE URINE: 500 MG/DL
GLUCOSE URINE: NEGATIVE MG/DL
HCT VFR BLD CALC: 43.7 % (ref 36–48)
HCT VFR BLD CALC: 44.3 % (ref 36–48)
HEMOGLOBIN: 14.3 G/DL (ref 12–16)
HEMOGLOBIN: 14.7 G/DL (ref 12–16)
KETONES, URINE: ABNORMAL MG/DL
KETONES, URINE: ABNORMAL MG/DL
LACTIC ACID: 1.8 MMOL/L (ref 0.4–2)
LEUKOCYTE ESTERASE, URINE: ABNORMAL
LEUKOCYTE ESTERASE, URINE: NEGATIVE
LIPASE: 15 U/L (ref 13–60)
LIPASE: 31 U/L (ref 13–60)
LYMPHOCYTES ABSOLUTE: 1.4 K/UL (ref 1–5.1)
LYMPHOCYTES ABSOLUTE: 2.6 K/UL (ref 1–5.1)
LYMPHOCYTES RELATIVE PERCENT: 15.6 %
LYMPHOCYTES RELATIVE PERCENT: 20.4 %
MCH RBC QN AUTO: 27.8 PG (ref 26–34)
MCH RBC QN AUTO: 29.1 PG (ref 26–34)
MCHC RBC AUTO-ENTMCNC: 32.4 G/DL (ref 31–36)
MCHC RBC AUTO-ENTMCNC: 33.6 G/DL (ref 31–36)
MCV RBC AUTO: 86 FL (ref 80–100)
MCV RBC AUTO: 86.7 FL (ref 80–100)
MICROSCOPIC EXAMINATION: YES
MICROSCOPIC EXAMINATION: YES
MONOCYTES ABSOLUTE: 0.4 K/UL (ref 0–1.3)
MONOCYTES ABSOLUTE: 0.7 K/UL (ref 0–1.3)
MONOCYTES RELATIVE PERCENT: 4.8 %
MONOCYTES RELATIVE PERCENT: 5.1 %
NEUTROPHILS ABSOLUTE: 7.2 K/UL (ref 1.7–7.7)
NEUTROPHILS ABSOLUTE: 9.4 K/UL (ref 1.7–7.7)
NEUTROPHILS RELATIVE PERCENT: 73.1 %
NEUTROPHILS RELATIVE PERCENT: 78.2 %
NITRITE, URINE: NEGATIVE
NITRITE, URINE: NEGATIVE
PDW BLD-RTO: 17.4 % (ref 12.4–15.4)
PDW BLD-RTO: 17.4 % (ref 12.4–15.4)
PH UA: 6 (ref 5–8)
PH UA: 6 (ref 5–8)
PLATELET # BLD: 218 K/UL (ref 135–450)
PLATELET # BLD: 258 K/UL (ref 135–450)
PMV BLD AUTO: 10.4 FL (ref 5–10.5)
PMV BLD AUTO: 10.7 FL (ref 5–10.5)
POTASSIUM REFLEX MAGNESIUM: 4.5 MMOL/L (ref 3.5–5.1)
POTASSIUM REFLEX MAGNESIUM: 5.3 MMOL/L (ref 3.5–5.1)
POTASSIUM SERPL-SCNC: 4.8 MMOL/L (ref 3.5–5.1)
PRO-BNP: ABNORMAL PG/ML (ref 0–449)
PROTEIN UA: 100 MG/DL
PROTEIN UA: 100 MG/DL
RAPID INFLUENZA  B AGN: NEGATIVE
RAPID INFLUENZA A AGN: NEGATIVE
RBC # BLD: 5.04 M/UL (ref 4–5.2)
RBC # BLD: 5.15 M/UL (ref 4–5.2)
RBC UA: >100 /HPF (ref 0–4)
RBC UA: NORMAL /HPF (ref 0–4)
SARS-COV-2, NAAT: NOT DETECTED
SODIUM BLD-SCNC: 137 MMOL/L (ref 136–145)
SODIUM BLD-SCNC: 144 MMOL/L (ref 136–145)
SPECIFIC GRAVITY UA: 1.02 (ref 1–1.03)
SPECIFIC GRAVITY UA: >=1.03 (ref 1–1.03)
TOTAL CK: 68 U/L (ref 26–192)
TOTAL PROTEIN: 5.7 G/DL (ref 6.4–8.2)
TOTAL PROTEIN: 7 G/DL (ref 6.4–8.2)
TROPONIN: 0.29 NG/ML
URINE REFLEX TO CULTURE: ABNORMAL
URINE REFLEX TO CULTURE: YES
URINE TYPE: ABNORMAL
URINE TYPE: ABNORMAL
UROBILINOGEN, URINE: 0.2 E.U./DL
UROBILINOGEN, URINE: 1 E.U./DL
WBC # BLD: 12.9 K/UL (ref 4–11)
WBC # BLD: 9.1 K/UL (ref 4–11)
WBC UA: >100 /HPF (ref 0–5)
WBC UA: NORMAL /HPF (ref 0–5)

## 2022-01-01 PROCEDURE — 96365 THER/PROPH/DIAG IV INF INIT: CPT

## 2022-01-01 PROCEDURE — 87040 BLOOD CULTURE FOR BACTERIA: CPT

## 2022-01-01 PROCEDURE — 2580000003 HC RX 258: Performed by: INTERNAL MEDICINE

## 2022-01-01 PROCEDURE — 2700000000 HC OXYGEN THERAPY PER DAY

## 2022-01-01 PROCEDURE — 96372 THER/PROPH/DIAG INJ SC/IM: CPT

## 2022-01-01 PROCEDURE — 93005 ELECTROCARDIOGRAM TRACING: CPT | Performed by: EMERGENCY MEDICINE

## 2022-01-01 PROCEDURE — 85025 COMPLETE CBC W/AUTO DIFF WBC: CPT

## 2022-01-01 PROCEDURE — 6360000002 HC RX W HCPCS: Performed by: EMERGENCY MEDICINE

## 2022-01-01 PROCEDURE — 1090F PRES/ABSN URINE INCON ASSESS: CPT | Performed by: INTERNAL MEDICINE

## 2022-01-01 PROCEDURE — 70450 CT HEAD/BRAIN W/O DYE: CPT

## 2022-01-01 PROCEDURE — 1036F TOBACCO NON-USER: CPT | Performed by: NURSE PRACTITIONER

## 2022-01-01 PROCEDURE — 2580000003 HC RX 258: Performed by: NURSE PRACTITIONER

## 2022-01-01 PROCEDURE — 80048 BASIC METABOLIC PNL TOTAL CA: CPT

## 2022-01-01 PROCEDURE — G8484 FLU IMMUNIZE NO ADMIN: HCPCS | Performed by: NURSE PRACTITIONER

## 2022-01-01 PROCEDURE — 84132 ASSAY OF SERUM POTASSIUM: CPT

## 2022-01-01 PROCEDURE — 6370000000 HC RX 637 (ALT 250 FOR IP)

## 2022-01-01 PROCEDURE — G8427 DOCREV CUR MEDS BY ELIG CLIN: HCPCS | Performed by: NURSE PRACTITIONER

## 2022-01-01 PROCEDURE — 81001 URINALYSIS AUTO W/SCOPE: CPT

## 2022-01-01 PROCEDURE — 1036F TOBACCO NON-USER: CPT | Performed by: INTERNAL MEDICINE

## 2022-01-01 PROCEDURE — 73080 X-RAY EXAM OF ELBOW: CPT

## 2022-01-01 PROCEDURE — G8427 DOCREV CUR MEDS BY ELIG CLIN: HCPCS | Performed by: INTERNAL MEDICINE

## 2022-01-01 PROCEDURE — G8484 FLU IMMUNIZE NO ADMIN: HCPCS | Performed by: INTERNAL MEDICINE

## 2022-01-01 PROCEDURE — 72125 CT NECK SPINE W/O DYE: CPT

## 2022-01-01 PROCEDURE — 96367 TX/PROPH/DG ADDL SEQ IV INF: CPT

## 2022-01-01 PROCEDURE — 6360000002 HC RX W HCPCS

## 2022-01-01 PROCEDURE — 36415 COLL VENOUS BLD VENIPUNCTURE: CPT

## 2022-01-01 PROCEDURE — 73502 X-RAY EXAM HIP UNI 2-3 VIEWS: CPT

## 2022-01-01 PROCEDURE — G8420 CALC BMI NORM PARAMETERS: HCPCS | Performed by: NURSE PRACTITIONER

## 2022-01-01 PROCEDURE — 71045 X-RAY EXAM CHEST 1 VIEW: CPT

## 2022-01-01 PROCEDURE — 83690 ASSAY OF LIPASE: CPT

## 2022-01-01 PROCEDURE — 94761 N-INVAS EAR/PLS OXIMETRY MLT: CPT

## 2022-01-01 PROCEDURE — 99214 OFFICE O/P EST MOD 30 MIN: CPT | Performed by: NURSE PRACTITIONER

## 2022-01-01 PROCEDURE — 1123F ACP DISCUSS/DSCN MKR DOCD: CPT | Performed by: NURSE PRACTITIONER

## 2022-01-01 PROCEDURE — 1123F ACP DISCUSS/DSCN MKR DOCD: CPT | Performed by: INTERNAL MEDICINE

## 2022-01-01 PROCEDURE — 84484 ASSAY OF TROPONIN QUANT: CPT

## 2022-01-01 PROCEDURE — 99213 OFFICE O/P EST LOW 20 MIN: CPT | Performed by: INTERNAL MEDICINE

## 2022-01-01 PROCEDURE — 87804 INFLUENZA ASSAY W/OPTIC: CPT

## 2022-01-01 PROCEDURE — 1111F DSCHRG MED/CURRENT MED MERGE: CPT | Performed by: NURSE PRACTITIONER

## 2022-01-01 PROCEDURE — 99285 EMERGENCY DEPT VISIT HI MDM: CPT

## 2022-01-01 PROCEDURE — 2580000003 HC RX 258: Performed by: EMERGENCY MEDICINE

## 2022-01-01 PROCEDURE — 6370000000 HC RX 637 (ALT 250 FOR IP): Performed by: NURSE PRACTITIONER

## 2022-01-01 PROCEDURE — 1200000000 HC SEMI PRIVATE

## 2022-01-01 PROCEDURE — 99221 1ST HOSP IP/OBS SF/LOW 40: CPT | Performed by: NURSE PRACTITIONER

## 2022-01-01 PROCEDURE — 87635 SARS-COV-2 COVID-19 AMP PRB: CPT

## 2022-01-01 PROCEDURE — 82550 ASSAY OF CK (CPK): CPT

## 2022-01-01 PROCEDURE — 1090F PRES/ABSN URINE INCON ASSESS: CPT | Performed by: NURSE PRACTITIONER

## 2022-01-01 PROCEDURE — 83880 ASSAY OF NATRIURETIC PEPTIDE: CPT

## 2022-01-01 PROCEDURE — 1250000000 HC SEMI PRIVATE HOSPICE R&B

## 2022-01-01 PROCEDURE — 80053 COMPREHEN METABOLIC PANEL: CPT

## 2022-01-01 PROCEDURE — 80076 HEPATIC FUNCTION PANEL: CPT

## 2022-01-01 PROCEDURE — 83605 ASSAY OF LACTIC ACID: CPT

## 2022-01-01 PROCEDURE — G8420 CALC BMI NORM PARAMETERS: HCPCS | Performed by: INTERNAL MEDICINE

## 2022-01-01 PROCEDURE — 1111F DSCHRG MED/CURRENT MED MERGE: CPT | Performed by: INTERNAL MEDICINE

## 2022-01-01 RX ORDER — FUROSEMIDE 40 MG/1
40 TABLET ORAL DAILY
Qty: 90 TABLET | Refills: 3 | Status: ON HOLD | OUTPATIENT
Start: 2022-01-01 | End: 2022-01-01 | Stop reason: HOSPADM

## 2022-01-01 RX ORDER — LORAZEPAM 2 MG/ML
1 CONCENTRATE ORAL
Status: DISCONTINUED | OUTPATIENT
Start: 2022-01-01 | End: 2022-01-01 | Stop reason: HOSPADM

## 2022-01-01 RX ORDER — IPRATROPIUM BROMIDE AND ALBUTEROL SULFATE 2.5; .5 MG/3ML; MG/3ML
1 SOLUTION RESPIRATORY (INHALATION) EVERY 6 HOURS PRN
Status: ON HOLD | COMMUNITY
End: 2022-01-01

## 2022-01-01 RX ORDER — SCOLOPAMINE TRANSDERMAL SYSTEM 1 MG/1
1 PATCH, EXTENDED RELEASE TRANSDERMAL
Status: DISCONTINUED | OUTPATIENT
Start: 2022-01-01 | End: 2022-01-01 | Stop reason: SDUPTHER

## 2022-01-01 RX ORDER — ALBUTEROL SULFATE 2.5 MG/.5ML
2.5 SOLUTION RESPIRATORY (INHALATION) EVERY 6 HOURS PRN
Status: ON HOLD | COMMUNITY
End: 2022-01-01 | Stop reason: HOSPADM

## 2022-01-01 RX ORDER — INSULIN GLARGINE 100 [IU]/ML
15 INJECTION, SOLUTION SUBCUTANEOUS NIGHTLY
Status: ON HOLD | COMMUNITY
End: 2022-01-01 | Stop reason: HOSPADM

## 2022-01-01 RX ORDER — ONDANSETRON 2 MG/ML
4 INJECTION INTRAMUSCULAR; INTRAVENOUS EVERY 6 HOURS PRN
Status: DISCONTINUED | OUTPATIENT
Start: 2022-01-01 | End: 2022-01-01 | Stop reason: SDUPTHER

## 2022-01-01 RX ORDER — SODIUM CHLORIDE 9 MG/ML
1000 INJECTION, SOLUTION INTRAVENOUS CONTINUOUS
Status: DISCONTINUED | OUTPATIENT
Start: 2022-01-01 | End: 2022-01-01 | Stop reason: HOSPADM

## 2022-01-01 RX ORDER — MORPHINE SULFATE 2 MG/ML
2 INJECTION, SOLUTION INTRAMUSCULAR; INTRAVENOUS
Status: DISCONTINUED | OUTPATIENT
Start: 2022-01-01 | End: 2022-01-01 | Stop reason: SDUPTHER

## 2022-01-01 RX ORDER — KETOCONAZOLE 20 MG/G
CREAM TOPICAL AS NEEDED
Status: ON HOLD | COMMUNITY
End: 2022-01-01 | Stop reason: HOSPADM

## 2022-01-01 RX ORDER — MORPHINE SULFATE 2 MG/ML
4 INJECTION, SOLUTION INTRAMUSCULAR; INTRAVENOUS
OUTPATIENT
Start: 2022-01-01

## 2022-01-01 RX ORDER — SCOLOPAMINE TRANSDERMAL SYSTEM 1 MG/1
1 PATCH, EXTENDED RELEASE TRANSDERMAL
Status: DISCONTINUED | OUTPATIENT
Start: 2022-01-01 | End: 2022-01-01 | Stop reason: HOSPADM

## 2022-01-01 RX ORDER — IPRATROPIUM BROMIDE AND ALBUTEROL SULFATE 2.5; .5 MG/3ML; MG/3ML
1 SOLUTION RESPIRATORY (INHALATION) EVERY 6 HOURS PRN
Status: DISCONTINUED | OUTPATIENT
Start: 2022-01-01 | End: 2022-01-01 | Stop reason: HOSPADM

## 2022-01-01 RX ORDER — MORPHINE SULFATE 2 MG/ML
2 INJECTION, SOLUTION INTRAMUSCULAR; INTRAVENOUS
OUTPATIENT
Start: 2022-01-01

## 2022-01-01 RX ORDER — MORPHINE SULFATE 2 MG/ML
2 INJECTION, SOLUTION INTRAMUSCULAR; INTRAVENOUS ONCE
Status: COMPLETED | OUTPATIENT
Start: 2022-01-01 | End: 2022-01-01

## 2022-01-01 RX ORDER — KETOCONAZOLE 20 MG/G
CREAM TOPICAL 2 TIMES DAILY
Status: DISCONTINUED | OUTPATIENT
Start: 2022-01-01 | End: 2022-01-01 | Stop reason: HOSPADM

## 2022-01-01 RX ORDER — SODIUM CHLORIDE 0.9 % (FLUSH) 0.9 %
5-40 SYRINGE (ML) INJECTION 2 TIMES DAILY
Status: DISCONTINUED | OUTPATIENT
Start: 2022-01-01 | End: 2022-01-01 | Stop reason: HOSPADM

## 2022-01-01 RX ORDER — KETOROLAC TROMETHAMINE 30 MG/ML
15 INJECTION, SOLUTION INTRAMUSCULAR; INTRAVENOUS EVERY 6 HOURS PRN
Status: DISCONTINUED | OUTPATIENT
Start: 2022-01-01 | End: 2022-01-01 | Stop reason: HOSPADM

## 2022-01-01 RX ORDER — POLYETHYLENE GLYCOL 3350 17 G/17G
17 POWDER, FOR SOLUTION ORAL DAILY PRN
Status: CANCELLED | OUTPATIENT
Start: 2022-01-01

## 2022-01-01 RX ORDER — SENNA AND DOCUSATE SODIUM 50; 8.6 MG/1; MG/1
2 TABLET, FILM COATED ORAL DAILY
Status: ON HOLD | COMMUNITY
End: 2022-01-01 | Stop reason: HOSPADM

## 2022-01-01 RX ORDER — MORPHINE SULFATE 4 MG/ML
4 INJECTION, SOLUTION INTRAMUSCULAR; INTRAVENOUS
Status: DISCONTINUED | OUTPATIENT
Start: 2022-01-01 | End: 2022-01-01 | Stop reason: HOSPADM

## 2022-01-01 RX ORDER — GUAIFENESIN/DEXTROMETHORPHAN 100-10MG/5
10 SYRUP ORAL EVERY 4 HOURS PRN
Status: ON HOLD | COMMUNITY
End: 2022-01-01 | Stop reason: HOSPADM

## 2022-01-01 RX ORDER — INSULIN LISPRO 100 [IU]/ML
8 INJECTION, SOLUTION INTRAVENOUS; SUBCUTANEOUS ONCE
Status: DISCONTINUED | OUTPATIENT
Start: 2022-01-01 | End: 2022-01-01

## 2022-01-01 RX ORDER — MORPHINE SULFATE 2 MG/ML
2 INJECTION, SOLUTION INTRAMUSCULAR; INTRAVENOUS
Status: DISCONTINUED | OUTPATIENT
Start: 2022-01-01 | End: 2022-01-01 | Stop reason: HOSPADM

## 2022-01-01 RX ORDER — ONDANSETRON 2 MG/ML
4 INJECTION INTRAMUSCULAR; INTRAVENOUS EVERY 6 HOURS PRN
Status: DISCONTINUED | OUTPATIENT
Start: 2022-01-01 | End: 2022-01-01 | Stop reason: HOSPADM

## 2022-01-01 RX ORDER — LOSARTAN POTASSIUM 25 MG/1
75 TABLET ORAL DAILY
Status: ON HOLD | COMMUNITY
End: 2022-01-01 | Stop reason: HOSPADM

## 2022-01-01 RX ORDER — SODIUM CHLORIDE 0.9 % (FLUSH) 0.9 %
5-40 SYRINGE (ML) INJECTION PRN
Status: DISCONTINUED | OUTPATIENT
Start: 2022-01-01 | End: 2022-01-01 | Stop reason: HOSPADM

## 2022-01-01 RX ORDER — SODIUM CHLORIDE, SODIUM LACTATE, POTASSIUM CHLORIDE, CALCIUM CHLORIDE 600; 310; 30; 20 MG/100ML; MG/100ML; MG/100ML; MG/100ML
1000 INJECTION, SOLUTION INTRAVENOUS CONTINUOUS
Status: DISCONTINUED | OUTPATIENT
Start: 2022-01-01 | End: 2022-01-01

## 2022-01-01 RX ORDER — ONDANSETRON 4 MG/1
4 TABLET, ORALLY DISINTEGRATING ORAL EVERY 8 HOURS PRN
Status: CANCELLED | OUTPATIENT
Start: 2022-01-01

## 2022-01-01 RX ORDER — METOPROLOL SUCCINATE 50 MG/1
50 TABLET, EXTENDED RELEASE ORAL DAILY
Qty: 90 TABLET | Refills: 3 | Status: ON HOLD | OUTPATIENT
Start: 2022-01-01 | End: 2022-01-01

## 2022-01-01 RX ORDER — LORAZEPAM 2 MG/ML
1 CONCENTRATE ORAL EVERY 4 HOURS PRN
Status: DISCONTINUED | OUTPATIENT
Start: 2022-01-01 | End: 2022-01-01 | Stop reason: SDUPTHER

## 2022-01-01 RX ORDER — MORPHINE SULFATE 4 MG/ML
4 INJECTION, SOLUTION INTRAMUSCULAR; INTRAVENOUS
Status: DISCONTINUED | OUTPATIENT
Start: 2022-01-01 | End: 2022-01-01 | Stop reason: SDUPTHER

## 2022-01-01 RX ORDER — OXYCODONE HYDROCHLORIDE 5 MG/1
5 TABLET ORAL EVERY 6 HOURS PRN
Status: ON HOLD | COMMUNITY
End: 2022-01-01 | Stop reason: HOSPADM

## 2022-01-01 RX ORDER — METOPROLOL SUCCINATE 25 MG/1
25 TABLET, EXTENDED RELEASE ORAL DAILY
Status: ON HOLD | COMMUNITY
End: 2022-01-01 | Stop reason: HOSPADM

## 2022-01-01 RX ADMIN — MORPHINE SULFATE 2 MG: 2 INJECTION, SOLUTION INTRAMUSCULAR; INTRAVENOUS at 17:28

## 2022-01-01 RX ADMIN — SODIUM CHLORIDE 1000 ML: 9 INJECTION, SOLUTION INTRAVENOUS at 20:47

## 2022-01-01 RX ADMIN — SODIUM CHLORIDE, POTASSIUM CHLORIDE, SODIUM LACTATE AND CALCIUM CHLORIDE 1000 ML: 600; 310; 30; 20 INJECTION, SOLUTION INTRAVENOUS at 13:04

## 2022-01-01 RX ADMIN — KETOCONAZOLE: 20 CREAM TOPICAL at 08:09

## 2022-01-01 RX ADMIN — MORPHINE SULFATE 2 MG: 2 INJECTION, SOLUTION INTRAMUSCULAR; INTRAVENOUS at 10:36

## 2022-01-01 RX ADMIN — CEFTRIAXONE 1000 MG: 1 INJECTION, POWDER, FOR SOLUTION INTRAMUSCULAR; INTRAVENOUS at 13:07

## 2022-01-01 RX ADMIN — INSULIN HUMAN 8 UNITS: 100 INJECTION, SOLUTION PARENTERAL at 21:50

## 2022-01-01 RX ADMIN — AZITHROMYCIN DIHYDRATE 500 MG: 500 INJECTION, POWDER, LYOPHILIZED, FOR SOLUTION INTRAVENOUS at 13:55

## 2022-01-01 RX ADMIN — SODIUM CHLORIDE, PRESERVATIVE FREE 10 ML: 5 INJECTION INTRAVENOUS at 08:09

## 2022-01-01 RX ADMIN — SODIUM CHLORIDE, PRESERVATIVE FREE 10 ML: 5 INJECTION INTRAVENOUS at 21:10

## 2022-01-01 SDOH — ECONOMIC STABILITY: FOOD INSECURITY: WITHIN THE PAST 12 MONTHS, YOU WORRIED THAT YOUR FOOD WOULD RUN OUT BEFORE YOU GOT MONEY TO BUY MORE.: NEVER TRUE

## 2022-01-01 SDOH — ECONOMIC STABILITY: FOOD INSECURITY: WITHIN THE PAST 12 MONTHS, THE FOOD YOU BOUGHT JUST DIDN'T LAST AND YOU DIDN'T HAVE MONEY TO GET MORE.: NEVER TRUE

## 2022-01-01 ASSESSMENT — PATIENT HEALTH QUESTIONNAIRE - PHQ9
1. LITTLE INTEREST OR PLEASURE IN DOING THINGS: 0
SUM OF ALL RESPONSES TO PHQ QUESTIONS 1-9: 0
2. FEELING DOWN, DEPRESSED OR HOPELESS: 0
SUM OF ALL RESPONSES TO PHQ QUESTIONS 1-9: 0
SUM OF ALL RESPONSES TO PHQ QUESTIONS 1-9: 0
SUM OF ALL RESPONSES TO PHQ9 QUESTIONS 1 & 2: 0
SUM OF ALL RESPONSES TO PHQ QUESTIONS 1-9: 0

## 2022-01-01 ASSESSMENT — PAIN - FUNCTIONAL ASSESSMENT: PAIN_FUNCTIONAL_ASSESSMENT: NONE - DENIES PAIN

## 2022-01-01 ASSESSMENT — PAIN SCALES - GENERAL: PAINLEVEL_OUTOF10: 0

## 2022-01-01 ASSESSMENT — SOCIAL DETERMINANTS OF HEALTH (SDOH): HOW HARD IS IT FOR YOU TO PAY FOR THE VERY BASICS LIKE FOOD, HOUSING, MEDICAL CARE, AND HEATING?: NOT HARD AT ALL

## 2022-01-28 RX ORDER — LOSARTAN POTASSIUM 50 MG/1
TABLET ORAL
Qty: 28 TABLET | Refills: 0 | OUTPATIENT
Start: 2022-01-28

## 2022-01-28 NOTE — TELEPHONE ENCOUNTER
Last appointment: 12/13/2021  Next appointment: Visit date not found  Last refill: 1/20/2022  no return date given at last office visit    Spoke with pharmacy, they did not receive this medication on 1/20/2022.

## 2022-02-09 ENCOUNTER — TELEPHONE (OUTPATIENT)
Dept: INTERNAL MEDICINE CLINIC | Age: 87
End: 2022-02-09

## 2022-02-09 NOTE — TELEPHONE ENCOUNTER
Please advise patient is due for office visit in mid March. Please try to find out how her glucose readings are in recent weeks.

## 2022-02-09 NOTE — TELEPHONE ENCOUNTER
Left message for Ronan Teran asking her to call office and schedule appointment with Dr. Shannon Quick for [address] per [facility]. Please schedule as in-person if possible.

## 2022-03-23 DIAGNOSIS — E11.59 TYPE 2 DIABETES MELLITUS WITH OTHER CIRCULATORY COMPLICATION, WITHOUT LONG-TERM CURRENT USE OF INSULIN (HCC): ICD-10-CM

## 2022-03-23 RX ORDER — PIOGLITAZONEHYDROCHLORIDE 30 MG/1
30 TABLET ORAL DAILY
Qty: 28 TABLET | Refills: 0 | Status: SHIPPED | OUTPATIENT
Start: 2022-03-23 | End: 2022-04-22

## 2022-03-23 RX ORDER — LOSARTAN POTASSIUM 25 MG/1
TABLET ORAL
Qty: 28 TABLET | Refills: 0 | Status: SHIPPED | OUTPATIENT
Start: 2022-03-23 | End: 2022-04-22

## 2022-03-23 RX ORDER — SITAGLIPTIN AND METFORMIN HYDROCHLORIDE 100; 1000 MG/1; MG/1
TABLET, FILM COATED, EXTENDED RELEASE ORAL
Qty: 28 TABLET | Refills: 0 | Status: SHIPPED | OUTPATIENT
Start: 2022-03-23 | End: 2022-04-22

## 2022-03-23 RX ORDER — ALENDRONATE SODIUM 35 MG/1
TABLET ORAL
Qty: 4 TABLET | Refills: 0 | Status: SHIPPED | OUTPATIENT
Start: 2022-03-23 | End: 2022-04-22

## 2022-03-23 NOTE — TELEPHONE ENCOUNTER
Last appointment: 12/13/2021  Next appointment: Visit date not found  Last refill: 1/30/22 and 1/20/22

## 2022-04-04 ENCOUNTER — TELEPHONE (OUTPATIENT)
Dept: INTERNAL MEDICINE CLINIC | Age: 87
End: 2022-04-04

## 2022-04-04 NOTE — TELEPHONE ENCOUNTER
Wants to request Verbal order for 9 more weeks of skilled nursing and PT home care. Wants to also have a recent medication list faxed to the number below:    [phone]    Med list had been faxed.

## 2022-04-19 LAB
AVERAGE GLUCOSE: NORMAL
HBA1C MFR BLD: 10.1 %

## 2022-04-21 DIAGNOSIS — E55.9 VITAMIN D DEFICIENCY: ICD-10-CM

## 2022-04-21 DIAGNOSIS — F02.80 LATE ONSET ALZHEIMER'S DISEASE WITHOUT BEHAVIORAL DISTURBANCE (HCC): ICD-10-CM

## 2022-04-21 DIAGNOSIS — R35.0 URINARY FREQUENCY: ICD-10-CM

## 2022-04-21 DIAGNOSIS — G30.1 LATE ONSET ALZHEIMER'S DISEASE WITHOUT BEHAVIORAL DISTURBANCE (HCC): ICD-10-CM

## 2022-04-21 DIAGNOSIS — E78.49 OTHER HYPERLIPIDEMIA: ICD-10-CM

## 2022-04-21 DIAGNOSIS — L02.416 ABSCESS OF LEFT HIP: Primary | ICD-10-CM

## 2022-04-21 DIAGNOSIS — E11.59 TYPE 2 DIABETES MELLITUS WITH OTHER CIRCULATORY COMPLICATION, WITHOUT LONG-TERM CURRENT USE OF INSULIN (HCC): ICD-10-CM

## 2022-04-21 DIAGNOSIS — I10 ESSENTIAL HYPERTENSION: ICD-10-CM

## 2022-04-21 DIAGNOSIS — R53.81 DEBILITY: ICD-10-CM

## 2022-04-21 PROCEDURE — 81003 URINALYSIS AUTO W/O SCOPE: CPT | Performed by: INTERNAL MEDICINE

## 2022-04-22 ENCOUNTER — OFFICE VISIT (OUTPATIENT)
Dept: INTERNAL MEDICINE CLINIC | Age: 87
End: 2022-04-22
Payer: MEDICARE

## 2022-04-22 VITALS
DIASTOLIC BLOOD PRESSURE: 62 MMHG | HEART RATE: 50 BPM | WEIGHT: 113.4 LBS | OXYGEN SATURATION: 95 % | BODY MASS INDEX: 21.43 KG/M2 | SYSTOLIC BLOOD PRESSURE: 116 MMHG

## 2022-04-22 DIAGNOSIS — F02.80 LATE ONSET ALZHEIMER'S DISEASE WITHOUT BEHAVIORAL DISTURBANCE (HCC): ICD-10-CM

## 2022-04-22 DIAGNOSIS — G30.1 LATE ONSET ALZHEIMER'S DISEASE WITHOUT BEHAVIORAL DISTURBANCE (HCC): ICD-10-CM

## 2022-04-22 DIAGNOSIS — D32.9 MENINGIOMA (HCC): ICD-10-CM

## 2022-04-22 DIAGNOSIS — E78.49 OTHER HYPERLIPIDEMIA: ICD-10-CM

## 2022-04-22 DIAGNOSIS — E11.59 TYPE 2 DIABETES MELLITUS WITH OTHER CIRCULATORY COMPLICATION, WITHOUT LONG-TERM CURRENT USE OF INSULIN (HCC): Primary | ICD-10-CM

## 2022-04-22 DIAGNOSIS — I10 ESSENTIAL HYPERTENSION: ICD-10-CM

## 2022-04-22 LAB
BILIRUBIN URINE: NEGATIVE
BLOOD, URINE: NEGATIVE
CLARITY: CLEAR
COLOR: YELLOW
GLUCOSE URINE: >=1000 MG/DL
KETONES, URINE: NEGATIVE MG/DL
LEUKOCYTE ESTERASE, URINE: NEGATIVE
MICROSCOPIC EXAMINATION: ABNORMAL
NITRITE, URINE: NEGATIVE
PH UA: 6 (ref 5–8)
PROTEIN UA: NEGATIVE MG/DL
SPECIFIC GRAVITY UA: 1.01 (ref 1–1.03)
URINE TYPE: ABNORMAL
UROBILINOGEN, URINE: 0.2 E.U./DL

## 2022-04-22 PROCEDURE — 1036F TOBACCO NON-USER: CPT | Performed by: INTERNAL MEDICINE

## 2022-04-22 PROCEDURE — 99214 OFFICE O/P EST MOD 30 MIN: CPT | Performed by: INTERNAL MEDICINE

## 2022-04-22 PROCEDURE — 1090F PRES/ABSN URINE INCON ASSESS: CPT | Performed by: INTERNAL MEDICINE

## 2022-04-22 PROCEDURE — 4040F PNEUMOC VAC/ADMIN/RCVD: CPT | Performed by: INTERNAL MEDICINE

## 2022-04-22 PROCEDURE — 1123F ACP DISCUSS/DSCN MKR DOCD: CPT | Performed by: INTERNAL MEDICINE

## 2022-04-22 PROCEDURE — 3046F HEMOGLOBIN A1C LEVEL >9.0%: CPT | Performed by: INTERNAL MEDICINE

## 2022-04-22 PROCEDURE — G8420 CALC BMI NORM PARAMETERS: HCPCS | Performed by: INTERNAL MEDICINE

## 2022-04-22 PROCEDURE — G8427 DOCREV CUR MEDS BY ELIG CLIN: HCPCS | Performed by: INTERNAL MEDICINE

## 2022-04-22 RX ORDER — PIOGLITAZONEHYDROCHLORIDE 30 MG/1
30 TABLET ORAL DAILY
Qty: 28 TABLET | Refills: 5 | Status: ON HOLD | OUTPATIENT
Start: 2022-04-22 | End: 2022-09-21 | Stop reason: HOSPADM

## 2022-04-22 RX ORDER — SITAGLIPTIN AND METFORMIN HYDROCHLORIDE 100; 1000 MG/1; MG/1
TABLET, FILM COATED, EXTENDED RELEASE ORAL
Qty: 28 TABLET | Refills: 5 | Status: ON HOLD | OUTPATIENT
Start: 2022-04-22 | End: 2022-09-21 | Stop reason: HOSPADM

## 2022-04-22 RX ORDER — LOSARTAN POTASSIUM 25 MG/1
TABLET ORAL
Qty: 28 TABLET | Refills: 5 | Status: ON HOLD | OUTPATIENT
Start: 2022-04-22 | End: 2022-10-08 | Stop reason: SDUPTHER

## 2022-04-22 RX ORDER — ALENDRONATE SODIUM 35 MG/1
TABLET ORAL
Qty: 4 TABLET | Refills: 5 | Status: SHIPPED | OUTPATIENT
Start: 2022-04-22

## 2022-04-22 RX ORDER — DONEPEZIL HYDROCHLORIDE 10 MG/1
TABLET, FILM COATED ORAL
Qty: 28 TABLET | Refills: 5 | Status: ON HOLD | OUTPATIENT
Start: 2022-04-22 | End: 2022-10-08 | Stop reason: HOSPADM

## 2022-04-22 RX ORDER — LOSARTAN POTASSIUM 50 MG/1
TABLET ORAL
Qty: 28 TABLET | Refills: 5 | Status: ON HOLD | OUTPATIENT
Start: 2022-04-22 | End: 2022-10-08 | Stop reason: HOSPADM

## 2022-04-22 NOTE — PROGRESS NOTES
Hui Hdz (:  1931) is a 80 y.o. female, here for evaluation of the following chief complaint(s):    Follow-up (no new concerns )      ASSESSMENT/PLAN:  1. Type 2 diabetes mellitus with other circulatory complication, without long-term current use of insulin (HCC)  Check labs on current meds which include: Janumet, Farxiga, Actos, and Basaglar insulin. The last brought by patient's daughter from the care facility does not fully correlate with the medications I have been sending to her pharmacy so we do need to confirm what she is actually taking. 2. Late onset Alzheimer's disease without behavioral disturbance (HCC)  Stable on donepezil  3. Essential hypertension  Well-controlled on losartan. Need to confirm from the care facility what dose she is actually taking  4. Other hyperlipidemia  Check labs on Livalo  5. Meningioma [facility])  Repeat brain MRI was ordered in September but not yet done. By not getting it done, it appears daughters have opted for \"observation\" to monitor for change in symptoms. Return in about 6 months (around 10/22/2022). SUBJECTIVE/OBJECTIVE:  HPI   Patient is here for routine visit with her daughter. Her left hip lesion is resolved. They have no new complaints. Patient actually seems somewhat more alert today but continues to not be oriented.       Past Medical History:   Diagnosis Date    Atherosclerotic vascular disease     brain w right m2 stenosis and vertebral r/l artery stenosis    Back pain     Brain mass     noted good michelle  mri    Cervical spondylolysis     Dementia (HCC)     has not seen neurologist. by report  mmse and aricept was started    Diabetes ([facility] [address]     type 2    Frequent falls     Hearing loss     Heart disease     bypass? cardiologist?    Hyperlipidemia     Hypertension     Mental status alteration 2020    [facility]    Osteoporosis     restarted after a drug holiday    Wrist fracture        Current Outpatient Medications   Medication Sig Dispense Refill    insulin glargine (BASAGLAR KWIKPEN) 100 UNIT/ML injection pen Inject 10 Units into the skin nightly 5 pen 0    LIVALO 2 MG TABS tablet TAKE ONE TABLET BY MOUTH AT BEDTIME. 28 tablet 0    ACCU-CHEK CARMEN PLUS strip TESTING ONCE DAILY. DX: E11.9 DIABETES MELLITUS 100 strip 3    Insulin Pen Needle (PEN NEEDLES) 31G X 6 MM MISC 1 each by Does not apply route daily 100 each 1    Calcium Carbonate-Vitamin D (OYSTER SHELL CALCIUM/D) 500-200 MG-UNIT TABS TAKE ONE (1) TABLET BY MOUTH TWICE A DAY. 60 tablet 5    aspirin EC 81 MG EC tablet Take 1 tablet by mouth daily 30 tablet 3    losartan (COZAAR) 50 MG tablet TAKE ONE TABLET BY MOUTH ONCE A DAY. 28 tablet 5    donepezil (ARICEPT) 10 MG tablet TAKE ONE TABLET BY MOUTH AT BEDTIME. 28 tablet 5    JANUMET -1000 MG TB24 TAKE ONE TABLET BY MOUTH DAILY WITH EVENING MEAL 28 tablet 5    losartan (COZAAR) 25 MG tablet TAKE ONE TABLET BY MOUTH ONCE A DAY *TAKE WITH 50MG FOR A TOTAL DOSE OF 75MG* 28 tablet 5    dapagliflozin (FARXIGA) 10 MG tablet TAKE 1 TABLET BY MOUTH EACH MORNING 28 tablet 5    alendronate (FOSAMAX) 35 MG tablet TAKE 1 TABLET BY MOUTH WEEKLY 30 MINS BEFORE FOOD (EMPTY STOMACH) WITH 8 OZ WATER DO NOT LIE DOWN FOR 30MIN 4 tablet 5    pioglitazone (ACTOS) 30 MG tablet TAKE 1 TABLET BY MOUTH DAILY 28 tablet 5    Multiple Vitamins-Minerals (THEREMS-M) TABS TAKE 1 TABLET BY MOUTH ONCE DAILY. (Patient not taking: Reported on 4/22/2022) 30 tablet 5     No current facility-administered medications for this visit. Physical Exam  Vitals reviewed. Constitutional:       General: She is not in acute distress. HENT:      Head: Normocephalic and atraumatic. Cardiovascular:      Rate and Rhythm: Normal rate and regular rhythm. Pulmonary:      Effort: Pulmonary effort is normal.      Breath sounds: Normal breath sounds. Musculoskeletal:      Right lower leg: No edema.       Left lower leg: No edema. Neurological:      Mental Status: She is alert. Mental status is at baseline. Psychiatric:      Comments: Pleasant affect               This note was generated completely or in part utilizing Dragon dictation speech recognition software. Occasionally, words are mistranscribed and despite editing, the text may contain inaccuracies due to incorrect word recognition. If further clarification is needed please contact the office at [phone]          An electronic signature was used to authenticate this note.     --Glynn Sullivan MD

## 2022-04-25 PROBLEM — D32.9 MENINGIOMA (HCC): Status: ACTIVE | Noted: 2022-01-01

## 2022-08-03 ENCOUNTER — TELEPHONE (OUTPATIENT)
Dept: INTERNAL MEDICINE CLINIC | Age: 87
End: 2022-08-03

## 2022-08-03 LAB
ESTIMATED AVERAGE GLUCOSE: 200.1 MG/DL
HBA1C MFR BLD: 8.6 %

## 2022-08-03 NOTE — TELEPHONE ENCOUNTER
Season with [address] is requesting Verbal Orders for the following\"  9 more weeks of Skilled Nursing, and Physical Therapy Home Care. Please contact her @ phone # provided.

## 2022-08-16 ENCOUNTER — TELEPHONE (OUTPATIENT)
Dept: INTERNAL MEDICINE CLINIC | Age: 87
End: 2022-08-16

## 2022-08-16 DIAGNOSIS — M25.561 ACUTE PAIN OF RIGHT KNEE: Primary | ICD-10-CM

## 2022-08-16 NOTE — TELEPHONE ENCOUNTER
Jose Garcia with [facility] states patient \"is usually pretty mobile , but yesterday she was limping and complaining of R knee pain. \" \"Patient has no bruises, swelling, and can cross her legs. \"  She is requesting to know if Dr. Talisha Vaughn wants patient to get an Xray. Please contact her @ phone # provided.     Aware doctor  is out of the office today

## 2022-09-14 ENCOUNTER — APPOINTMENT (OUTPATIENT)
Dept: GENERAL RADIOLOGY | Age: 87
DRG: 956 | End: 2022-09-14
Payer: MEDICARE

## 2022-09-14 ENCOUNTER — APPOINTMENT (OUTPATIENT)
Dept: CT IMAGING | Age: 87
DRG: 956 | End: 2022-09-14
Payer: MEDICARE

## 2022-09-14 ENCOUNTER — HOSPITAL ENCOUNTER (INPATIENT)
Age: 87
LOS: 7 days | Discharge: NURSING, REHAB OR LONG-TERM CARE | DRG: 956 | End: 2022-09-21
Attending: STUDENT IN AN ORGANIZED HEALTH CARE EDUCATION/TRAINING PROGRAM | Admitting: STUDENT IN AN ORGANIZED HEALTH CARE EDUCATION/TRAINING PROGRAM
Payer: MEDICARE

## 2022-09-14 DIAGNOSIS — M97.02XA PERIPROSTHETIC FRACTURE AROUND INTERNAL PROSTHETIC LEFT HIP JOINT, INITIAL ENCOUNTER (HCC): ICD-10-CM

## 2022-09-14 DIAGNOSIS — S32.10XA CLOSED FRACTURE OF SACRUM, UNSPECIFIED FRACTURE MORPHOLOGY, INITIAL ENCOUNTER (HCC): Primary | ICD-10-CM

## 2022-09-14 DIAGNOSIS — S32.810A CLOSED PELVIC RING FRACTURE, INITIAL ENCOUNTER (HCC): ICD-10-CM

## 2022-09-14 LAB
ANION GAP SERPL CALCULATED.3IONS-SCNC: 10 MMOL/L (ref 3–16)
BASOPHILS ABSOLUTE: 0 K/UL (ref 0–0.2)
BASOPHILS RELATIVE PERCENT: 0.3 %
BUN BLDV-MCNC: 13 MG/DL (ref 7–20)
CALCIUM SERPL-MCNC: 8.6 MG/DL (ref 8.3–10.6)
CHLORIDE BLD-SCNC: 105 MMOL/L (ref 99–110)
CO2: 24 MMOL/L (ref 21–32)
CREAT SERPL-MCNC: <0.5 MG/DL (ref 0.6–1.2)
EOSINOPHILS ABSOLUTE: 0 K/UL (ref 0–0.6)
EOSINOPHILS RELATIVE PERCENT: 0.4 %
GFR AFRICAN AMERICAN: >60
GFR NON-AFRICAN AMERICAN: >60
GLUCOSE BLD-MCNC: 114 MG/DL (ref 70–99)
GLUCOSE BLD-MCNC: 151 MG/DL (ref 70–99)
GLUCOSE BLD-MCNC: 154 MG/DL (ref 70–99)
HCT VFR BLD CALC: 41.7 % (ref 36–48)
HEMOGLOBIN: 13.8 G/DL (ref 12–16)
LYMPHOCYTES ABSOLUTE: 1.5 K/UL (ref 1–5.1)
LYMPHOCYTES RELATIVE PERCENT: 13.3 %
MCH RBC QN AUTO: 28.5 PG (ref 26–34)
MCHC RBC AUTO-ENTMCNC: 33.1 G/DL (ref 31–36)
MCV RBC AUTO: 86.1 FL (ref 80–100)
MONOCYTES ABSOLUTE: 0.8 K/UL (ref 0–1.3)
MONOCYTES RELATIVE PERCENT: 6.9 %
NEUTROPHILS ABSOLUTE: 9.1 K/UL (ref 1.7–7.7)
NEUTROPHILS RELATIVE PERCENT: 79.1 %
PDW BLD-RTO: 15 % (ref 12.4–15.4)
PERFORMED ON: ABNORMAL
PERFORMED ON: ABNORMAL
PLATELET # BLD: 224 K/UL (ref 135–450)
PMV BLD AUTO: 10 FL (ref 5–10.5)
POTASSIUM SERPL-SCNC: 4.1 MMOL/L (ref 3.5–5.1)
RBC # BLD: 4.85 M/UL (ref 4–5.2)
SODIUM BLD-SCNC: 139 MMOL/L (ref 136–145)
WBC # BLD: 11.5 K/UL (ref 4–11)

## 2022-09-14 PROCEDURE — 71045 X-RAY EXAM CHEST 1 VIEW: CPT

## 2022-09-14 PROCEDURE — 73552 X-RAY EXAM OF FEMUR 2/>: CPT

## 2022-09-14 PROCEDURE — 72125 CT NECK SPINE W/O DYE: CPT

## 2022-09-14 PROCEDURE — 73560 X-RAY EXAM OF KNEE 1 OR 2: CPT

## 2022-09-14 PROCEDURE — 70450 CT HEAD/BRAIN W/O DYE: CPT

## 2022-09-14 PROCEDURE — 97166 OT EVAL MOD COMPLEX 45 MIN: CPT

## 2022-09-14 PROCEDURE — 99285 EMERGENCY DEPT VISIT HI MDM: CPT

## 2022-09-14 PROCEDURE — 1200000000 HC SEMI PRIVATE

## 2022-09-14 PROCEDURE — 6370000000 HC RX 637 (ALT 250 FOR IP): Performed by: STUDENT IN AN ORGANIZED HEALTH CARE EDUCATION/TRAINING PROGRAM

## 2022-09-14 PROCEDURE — 72170 X-RAY EXAM OF PELVIS: CPT

## 2022-09-14 PROCEDURE — 97162 PT EVAL MOD COMPLEX 30 MIN: CPT

## 2022-09-14 PROCEDURE — 80048 BASIC METABOLIC PNL TOTAL CA: CPT

## 2022-09-14 PROCEDURE — 85025 COMPLETE CBC W/AUTO DIFF WBC: CPT

## 2022-09-14 PROCEDURE — 72192 CT PELVIS W/O DYE: CPT

## 2022-09-14 PROCEDURE — 97530 THERAPEUTIC ACTIVITIES: CPT

## 2022-09-14 RX ORDER — LOSARTAN POTASSIUM 25 MG/1
75 TABLET ORAL DAILY
Status: DISCONTINUED | OUTPATIENT
Start: 2022-09-15 | End: 2022-09-21 | Stop reason: HOSPADM

## 2022-09-14 RX ORDER — ACETAMINOPHEN 325 MG/1
650 TABLET ORAL EVERY 6 HOURS PRN
Status: DISCONTINUED | OUTPATIENT
Start: 2022-09-14 | End: 2022-09-18

## 2022-09-14 RX ORDER — INSULIN LISPRO 100 [IU]/ML
0-4 INJECTION, SOLUTION INTRAVENOUS; SUBCUTANEOUS NIGHTLY
Status: DISCONTINUED | OUTPATIENT
Start: 2022-09-14 | End: 2022-09-21 | Stop reason: HOSPADM

## 2022-09-14 RX ORDER — DONEPEZIL HYDROCHLORIDE 10 MG/1
10 TABLET, FILM COATED ORAL NIGHTLY
Status: DISCONTINUED | OUTPATIENT
Start: 2022-09-14 | End: 2022-09-21 | Stop reason: HOSPADM

## 2022-09-14 RX ORDER — TRAMADOL HYDROCHLORIDE 50 MG/1
50 TABLET ORAL EVERY 6 HOURS PRN
Status: DISCONTINUED | OUTPATIENT
Start: 2022-09-14 | End: 2022-09-18

## 2022-09-14 RX ORDER — ACETAMINOPHEN 500 MG
1000 TABLET ORAL EVERY 6 HOURS PRN
Status: DISCONTINUED | OUTPATIENT
Start: 2022-09-14 | End: 2022-09-14 | Stop reason: SDUPTHER

## 2022-09-14 RX ORDER — ACETAMINOPHEN 650 MG/1
650 SUPPOSITORY RECTAL EVERY 6 HOURS PRN
Status: DISCONTINUED | OUTPATIENT
Start: 2022-09-14 | End: 2022-09-18

## 2022-09-14 RX ORDER — ENOXAPARIN SODIUM 100 MG/ML
40 INJECTION SUBCUTANEOUS DAILY
Status: DISCONTINUED | OUTPATIENT
Start: 2022-09-15 | End: 2022-09-18

## 2022-09-14 RX ORDER — DEXTROSE MONOHYDRATE 100 MG/ML
INJECTION, SOLUTION INTRAVENOUS CONTINUOUS PRN
Status: DISCONTINUED | OUTPATIENT
Start: 2022-09-14 | End: 2022-09-21 | Stop reason: HOSPADM

## 2022-09-14 RX ORDER — INSULIN LISPRO 100 [IU]/ML
0-4 INJECTION, SOLUTION INTRAVENOUS; SUBCUTANEOUS
Status: DISCONTINUED | OUTPATIENT
Start: 2022-09-15 | End: 2022-09-21 | Stop reason: HOSPADM

## 2022-09-14 RX ORDER — ONDANSETRON 4 MG/1
4 TABLET, ORALLY DISINTEGRATING ORAL EVERY 8 HOURS PRN
Status: DISCONTINUED | OUTPATIENT
Start: 2022-09-14 | End: 2022-09-18

## 2022-09-14 RX ORDER — OXYCODONE HYDROCHLORIDE 5 MG/1
2.5 TABLET ORAL ONCE
Status: COMPLETED | OUTPATIENT
Start: 2022-09-14 | End: 2022-09-14

## 2022-09-14 RX ORDER — SODIUM CHLORIDE 0.9 % (FLUSH) 0.9 %
5-40 SYRINGE (ML) INJECTION PRN
Status: DISCONTINUED | OUTPATIENT
Start: 2022-09-14 | End: 2022-09-21 | Stop reason: HOSPADM

## 2022-09-14 RX ORDER — POLYETHYLENE GLYCOL 3350 17 G/17G
17 POWDER, FOR SOLUTION ORAL DAILY PRN
Status: DISCONTINUED | OUTPATIENT
Start: 2022-09-14 | End: 2022-09-18

## 2022-09-14 RX ORDER — ACETAMINOPHEN 325 MG/1
650 TABLET ORAL ONCE
Status: COMPLETED | OUTPATIENT
Start: 2022-09-14 | End: 2022-09-14

## 2022-09-14 RX ORDER — ONDANSETRON 2 MG/ML
4 INJECTION INTRAMUSCULAR; INTRAVENOUS EVERY 6 HOURS PRN
Status: DISCONTINUED | OUTPATIENT
Start: 2022-09-14 | End: 2022-09-18

## 2022-09-14 RX ORDER — TRAMADOL HYDROCHLORIDE 50 MG/1
100 TABLET ORAL EVERY 6 HOURS PRN
Status: DISCONTINUED | OUTPATIENT
Start: 2022-09-14 | End: 2022-09-18

## 2022-09-14 RX ORDER — SODIUM CHLORIDE 0.9 % (FLUSH) 0.9 %
5-40 SYRINGE (ML) INJECTION EVERY 12 HOURS SCHEDULED
Status: DISCONTINUED | OUTPATIENT
Start: 2022-09-14 | End: 2022-09-21 | Stop reason: HOSPADM

## 2022-09-14 RX ORDER — SODIUM CHLORIDE 9 MG/ML
INJECTION, SOLUTION INTRAVENOUS PRN
Status: DISCONTINUED | OUTPATIENT
Start: 2022-09-14 | End: 2022-09-21 | Stop reason: HOSPADM

## 2022-09-14 RX ADMIN — TRAMADOL HYDROCHLORIDE 100 MG: 50 TABLET, COATED ORAL at 22:26

## 2022-09-14 RX ADMIN — OXYCODONE 2.5 MG: 5 TABLET ORAL at 15:04

## 2022-09-14 RX ADMIN — ACETAMINOPHEN 650 MG: 325 TABLET ORAL at 12:14

## 2022-09-14 ASSESSMENT — PAIN SCALES - PAIN ASSESSMENT IN ADVANCED DEMENTIA (PAINAD)
NEGVOCALIZATION: 1
TOTALSCORE: 3
FACIALEXPRESSION: 0
BREATHING: 1
NEGVOCALIZATION: 1
BREATHING: 0
CONSOLABILITY: 1
CONSOLABILITY: 0
BODYLANGUAGE: 2
TOTALSCORE: 6
FACIALEXPRESSION: 1
BODYLANGUAGE: 2

## 2022-09-14 ASSESSMENT — ENCOUNTER SYMPTOMS
RESPIRATORY NEGATIVE: 1
GASTROINTESTINAL NEGATIVE: 1
EYES NEGATIVE: 1
ALLERGIC/IMMUNOLOGIC NEGATIVE: 1

## 2022-09-14 ASSESSMENT — PAIN - FUNCTIONAL ASSESSMENT: PAIN_FUNCTIONAL_ASSESSMENT: PAIN ASSESSMENT IN ADVANCED DEMENTIA (PAINAD)

## 2022-09-14 NOTE — ED PROVIDER NOTES
[address] [facility]          ATTENDING PHYSICIAN NOTE       Date of evaluation: 9/14/2022    Chief Complaint     Fall (Pt from nursing home memory unit. Ems states there was no night shift staff on this wing of nursing home, unknown when patient fell but was found on floor this morning. C/o left hip/leg pain)      History of Present Illness     Payal Barrera is a 80 y.o. female who presents with fall    Patient is sent in from [facility] with a reported mechanical fall. EMS is clear that the patient was reported to be at their baseline mental status by report of the nursing staff facility. It is unclear to me if the patient had a witnessed or unwitnessed fall. There was no report from EMS that the patient had any type of preceding symptoms. I did speak with the patient's daughter Glynn Boles and described the patient's mental status, and based on our conversation and my description the patient's daughter confirms the patient is at her mental status baseline. History is limited by the patient's condition: Dementia    Multiple attempts to contact staff at [facility] were unable to reach anyone who is familiar with the patient. I did manage to reach the  once, but when transferred dropped a call and was never able to reach anyone again. PMHx: Brain mass thought to be a meningioma, diabetes, hypertension, hyperlipidemia, and as below  SH: Never smoker, no alcohol, and as below    Review of Systems       ROS:      Unable to obtain due to patient condition: dementia      Past Medical, Surgical, Family, and Social History     She has a past medical history of Atherosclerotic vascular disease, Back pain, Brain mass, Cervical spondylolysis, Dementia ([address] Diabetes ([address] Frequent falls, Hearing loss, Heart disease, Hyperlipidemia, Hypertension, Mental status alteration, Osteoporosis, and Wrist fracture.   She has a past surgical history that includes hip surgery; Partial hip arthroplasty; Brain tumor excision; and Coronary artery bypass graft. Her family history includes Cancer in her sister; Diabetes in her mother and sister; High Blood Pressure in her mother. She reports that she has never smoked. She has never used smokeless tobacco. She reports that she does not drink alcohol and does not use drugs. Medications     Previous Medications    ACCU-CHEK CARMEN PLUS STRIP    TESTING ONCE DAILY. DX: E11.9 DIABETES MELLITUS    ALENDRONATE (FOSAMAX) 35 MG TABLET    TAKE 1 TABLET BY MOUTH WEEKLY 30 MINS BEFORE FOOD (EMPTY STOMACH) WITH 8 OZ WATER DO NOT LIE DOWN FOR 30MIN    ASPIRIN EC 81 MG EC TABLET    Take 1 tablet by mouth daily    CALCIUM CARBONATE-VITAMIN D (OYSTER SHELL CALCIUM/D) 500-200 MG-UNIT TABS    TAKE ONE (1) TABLET BY MOUTH TWICE A DAY. DAPAGLIFLOZIN (FARXIGA) 10 MG TABLET    TAKE 1 TABLET BY MOUTH EACH MORNING    DONEPEZIL (ARICEPT) 10 MG TABLET    TAKE ONE TABLET BY MOUTH AT BEDTIME. INSULIN GLARGINE (BASAGLAR KWIKPEN) 100 UNIT/ML INJECTION PEN    Inject 10 Units into the skin nightly    INSULIN PEN NEEDLE (PEN NEEDLES) 31G X 6 MM MISC    1 each by Does not apply route daily    JANUMET -1000 MG TB24    TAKE ONE TABLET BY MOUTH DAILY WITH EVENING MEAL    LIVALO 2 MG TABS TABLET    TAKE ONE TABLET BY MOUTH AT BEDTIME. LOSARTAN (COZAAR) 25 MG TABLET    TAKE ONE TABLET BY MOUTH ONCE A DAY *TAKE WITH 50MG FOR A TOTAL DOSE OF 75MG*    LOSARTAN (COZAAR) 50 MG TABLET    TAKE ONE TABLET BY MOUTH ONCE A DAY. MULTIPLE VITAMINS-MINERALS (THEREMS-M) TABS    TAKE 1 TABLET BY MOUTH ONCE DAILY. PIOGLITAZONE (ACTOS) 30 MG TABLET    TAKE 1 TABLET BY MOUTH DAILY       Allergies     She is allergic to penicillins, statins, and sulfa antibiotics. Physical Exam     INITIAL VITALS: BP: (!) 157/64, Temp: 98.4 °F (36.9 °C), Heart Rate: 69, Resp: 16, SpO2: 94 %     General:  Well appearing. No acute distress.   Non-toxic appearing    HEENT: Head atraumatic, no Redman's sign or [place] eyes, pupils equal round and reactive to light, EOMI, sclera clear, no facial tenderness to palpation or step offs, no midface instability, no hemotympanum bilaterally, mucus membranes moist, no trismus, no jaw malocclusion, oropharynx WNL, no septal hematoma    Neck:  Supple. Full ROM without pain    Pulmonary:   Non-labored breathing. Breath sounds clear bilaterally. Cardiac:  Regular rate and rhythm. No murmurs. Abdomen:  Soft. Non-tender. Non-distended. No masses. Musculoskeletal: No obvious deformities, no tenderness to palpation except possibly with L femur where she resists PROM, no mildine C, T or L spine tenderness to palpation, full neck ROM without pain, full ROM in all extremities with no pain except for LLE    Vascular:  Extremities warm and perfused. Radial pulses 2+ bilaterally. Dorsalis pedis pulses 2+ bilaterally. Skin:  No rash. No abrasions. Neuro: GCS15, AAOx4. CN 2-12 intact. Normal gait. Sensation intact. Strength grossly equal and symmetric. Extremities:  No peripheral edema. LE symmetric. Diagnostic Results     EKG   None indicated    RADIOLOGY:  CT PELVIS WO CONTRAST Additional Contrast? None   Final Result   1. Subacute, healing fractures of the right obturator ring as detailed above. 2. Nonspecific sclerosis in the midline of the sacrum. There is cortical irregularity along the anterior cortex of the sacrum, which could reflect nondisplaced fractures, age indeterminate. 3. Cystic lesion with mural nodularity along the periphery is present, likely arising from the ovary, and suspicious for primary ovarian neoplasm. This could be further evaluated with ultrasound. XR KNEE LEFT (1-2 VIEWS)   Final Result      No definite fracture. CT CSpine W/O Contrast   Final Result   Impression:      No acute fracture. Multilevel spondylosis and spondylolisthesis, as above.          CT Head W/O Contrast   Final Result   1. Stable changes   2. 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 No evidence of acute intracranial hemorrhage   4. 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 Correlation with contrast   Magnetic resonance imaging recommended if clinically indicated in this patient's age group. 5. No vasogenic edema identified      XR CHEST PORTABLE   Final Result   1. Vascular redistribution secondary to the spinal radiograph   2. Cardiomegaly   3. No active airspace disease   4. Osteoporosis and old right multiple healed rib fractures      XR PELVIS (1-2 VIEWS)   Final Result   Impression: Limited study without acute abnormality. XR KNEE RIGHT (1-2 VIEWS)   Final Result   Impression: No acute osseous abnormality. Osteopenia. XR FEMUR LEFT (MIN 2 VIEWS)   Final Result   Impression: Limited study without acute osseous abnormality. XR KNEE LEFT (1-2 VIEWS)   Final Result   Impression: Limited study without acute osseous abnormality.             CT FEMUR LEFT WO CONTRAST    (Results Pending)       LABS:   Results for orders placed or performed during the hospital encounter of 09/14/22   CBC with Auto Differential   Result Value Ref Range    WBC 11.5 (H) 4.0 - 11.0 K/uL    RBC 4.85 4.00 - 5.20 M/uL    Hemoglobin 13.8 12.0 - 16.0 g/dL    Hematocrit 41.7 36.0 - 48.0 %    MCV 86.1 80.0 - 100.0 fL    MCH 28.5 26.0 - 34.0 pg    MCHC 33.1 31.0 - 36.0 g/dL    RDW 15.0 12.4 - 15.4 %    Platelets 199 827 - 606 K/uL    MPV 10.0 5.0 - 10.5 fL    Neutrophils % 79.1 %    Lymphocytes % 13.3 %    Monocytes % 6.9 %    Eosinophils % 0.4 %    Basophils % 0.3 %    Neutrophils Absolute 9.1 (H) 1.7 - 7.7 K/uL    Lymphocytes Absolute 1.5 1.0 - 5.1 K/uL    Monocytes Absolute 0.8 0.0 - 1.3 K/uL    Eosinophils Absolute 0.0 0.0 - 0.6 K/uL    Basophils Absolute 0.0 0.0 - 0.2 K/uL   Basic Metabolic Panel   Result Value Ref Range    Sodium 139 136 - 145 mmol/L    Potassium 4.1 3.5 - 5.1 mmol/L    Chloride 105 99 - 110 mmol/L    CO2 24 21 - 32 mmol/L    Anion Gap 10 3 - 16    Glucose 151 (H) 70 - 99 mg/dL    BUN 13 7 - 20 mg/dL    Creatinine <0.5 (L) 0.6 - 1.2 mg/dL    GFR Non-African American >60 >60    GFR African American >60 >60    Calcium 8.6 8.3 - 10.6 mg/dL   POCT Glucose   Result Value Ref Range    POC Glucose 154 (H) 70 - 99 mg/dl    Performed on ACCU-CHEK        ED BEDSIDE ULTRASOUND:  None performed    Procedures     None performed    ED Course     Nursing Notes, Past Medical Hx, Past Surgical Hx, Social Hx, Allergies, and Family Hx were reviewed. The patient was given the following medications:  Orders Placed This Encounter   Medications    acetaminophen (TYLENOL) tablet 650 mg    oxyCODONE (ROXICODONE) immediate release tablet 2.5 mg       CONSULTS:  IP CONSULT TO PRIMARY CARE PROVIDER  IP CONSULT TO ORTHOPEDIC SURGERY  IP CONSULT TO CASE MANAGEMENT  IP CONSULT TO HOSPITALIST    MEDICAL DECISIONMAKING / ASSESSMENT / Carlos Natalya is a 80 y.o. female with fall. Pt was hemodynamically stable and afebrile in the Emergency Department. The patient's ATLS primary survey was intact. Patient secondary survey was notable for the findings listed above. Given her advanced age cross-sectional imaging was obtained of the CT of the head and C-spine, which I was unable to clear clinically because of her dementia. These returned without evidence for acute abnormality. I did discuss with Eli Espinosa her daughter the finding on the CT head and recommendation for nonemergent MRI with contrast.  CT of the C-spine shows no fracture or dislocation. Chest  x-ray shows no pneumonia or pneumothorax or x-ray evidence for acute rib fractures.     X-ray of the areas with pain including the left knee, left femur, and pelvis were indeterminate due to limited evaluations. Repeat x-ray of the left knee is more reassuring against fracture. Subsequent x-ray showed right knee pain. A CT of the pelvis was obtained given the indeterminate initial imaging and this returned with some subacute pelvic ring fractures. There is also a sacrum fracture, but reassessment shows no correlating clinical syndrome consistent with this. .  Dr. Renetta Espinal was kind enough from the orthopedic service orthopedic service to evaluate the patient and cleared her for ambulatory trial and weightbearing as tolerated. An incidental finding on the CT pelvis was discussed with Micheal Low as well. I included the recommendation for nonemergent ultrasound to rule out malignancy per my discussion with her. The patient was evaluated physical therapy and Occupational Therapy. The indicated the patient was reporting more pain in the mid thigh. I reassessed the patient and she continues to have soft compartments there. I reviewed the imaging, and is not on the review of the x-ray, I do feel that those are inadequate to fully exclude a femur pathology. We will proceed to CT scan of the femur to more definitively exclude fracture there. CBC shows leukocytosis. There is no renal dysfunction or electrolyte abnormality of significance. At this time the patient will be transferred to the oncoming provider for further care. Pending items  include a CT scan which he will follow up on. He will reassess patient and make final disposition. Clinical Impression     1.  Closed fracture of sacrum, unspecified fracture morphology, initial encounter ([facility] [address]    2. Closed pelvic ring fracture, initial encounter [facility])          Disposition     PATIENT REFERRED TO:  Aleks Rangel MD  [address] [place] [address]  [phone]    Schedule an appointment as soon as possible for a visit in 8 day(s)  For x ray    DISCHARGE MEDICATIONS:  New Prescriptions    No medications on file       DISPOSITION    Pending - please see MICHAELA Serrano MD  09/14/22 [id number] Scribe Attestation (For Scribes USE Only)... Scribe Attestation (For Scribes USE Only).../Attending Attestation (For Attendings USE Only)...

## 2022-09-14 NOTE — ED NOTES
Pt resting comfortably, laying L side at this time upon return from CT. Pt not verbalizing pain at this time.      Samantha Jerry RN  09/14/22 [id number]

## 2022-09-14 NOTE — CONSULTS
Bharath Lange MD  [place] Office: [address] [facility], [address], [address] ([id number]) Aks[place]      Inpatient consult to Orthopedic Surgery  Consult performed by: René Jamil MD  Consult ordered by: Katia Jacobsen MD  Reason for consult: Right hip pain  Assessment/Recommendations: Right  nondisplaced subacute LC1 pelvic ring injury    This fracture tends to heal well without surgical managament  Subacute on x ray an has been stable  OK to mobilize with assistive device and WBAT  Follow up in my clinic in 1-2 weeks for repeat x rays inlet/outlet of the pelvis  [phone] to schedule        Chief Complaint:  Right pelvis fracture    HPI:  80 y.o. female who presents with right hip pain after possible fall. She is a poor historian.  Due to dementia and provides little history      Past Medical History:   Diagnosis Date    Atherosclerotic vascular disease     brain w right m2 stenosis and vertebral r/l artery stenosis    Back pain     Brain mass     noted good michelle 11/20 mri    Cervical spondylolysis     Dementia (HCC)     has not seen neurologist. by report 20/30 mmse and aricept was started    Diabetes [facility])     type 2    Frequent falls     Hearing loss     Heart disease     bypass? cardiologist?    Hyperlipidemia     Hypertension     Mental status alteration 11/2020    [facility] consult    Osteoporosis     restarted after a drug holiday    Wrist fracture        Past Surgical History:   Procedure Laterality Date    BRAIN TUMOR EXCISION      benign, right frontotemporal craniotomy    CORONARY ARTERY BYPASS GRAFT      HIP SURGERY      pins    PARTIAL HIP ARTHROPLASTY      left hip       Social History     Tobacco Use    Smoking status: Never    Smokeless tobacco: Never   Vaping Use    Vaping Use: Never used   Substance Use Topics    Alcohol use: No    Drug use: No       family history includes Cancer in her sister; Diabetes in her mother and sister; High Blood Pressure in her Comments)     Unknown reaction; patient has tolerated multiple courses of cephalexin as an outpatient per chart review as of 09/14/22. Statins Other (See Comments)     Unknown reaction; patient tolerates pitavastatin as an outpatient per chart review as of 9/14/22. Sulfa Antibiotics         Review of Systems   Constitutional: Negative. HENT: Negative. Eyes: Negative. Respiratory: Negative. Cardiovascular: Negative. Gastrointestinal: Negative. Endocrine: Negative. Genitourinary: Negative. Musculoskeletal:  Positive for arthralgias. Skin: Negative. Allergic/Immunologic: Negative. Neurological: Negative. Hematological: Negative. Psychiatric/Behavioral: Negative. Physical Examination:  BP (!) 178/80   Pulse 73   Temp 98.4 °F (36.9 °C) (Oral)   Resp 14   Ht 5' (1.524 m)   Wt 115 lb 8 oz (52.4 kg)   SpO2 100%   BMI 22.56 kg/m²     Physical Exam  Vitals reviewed. Constitutional:       Appearance: Normal appearance. She is normal weight. Comments: Very limited exam due to Dementia   HENT:      Mouth/Throat:      Mouth: Mucous membranes are moist.      Pharynx: Oropharynx is clear. Cardiovascular:      Rate and Rhythm: Normal rate and regular rhythm. Pulses: Normal pulses. Pulmonary:      Effort: Pulmonary effort is normal. No respiratory distress. Abdominal:      General: Abdomen is flat. Palpations: Abdomen is soft. Tenderness: There is no abdominal tenderness. Skin:     General: Skin is warm and dry. Capillary Refill: Capillary refill takes less than 2 seconds. Neurological:      General: No focal deficit present. Mental Status: She is alert. Mental status is at baseline.      Pelvis stable to compression AP and lateral. No sacral tenderness    Right  Lower extremity: Skin intact, no deformity on inspection, full painless range of motion, 5/5 strength throughout, no gross instability, Sensation and motor intact, strong dp pulse      Imaging:  X ray images of the pelvis and CT images of the pelvis independently reviewed and interpreted by me today and are significant for:  Right superior ramus fracture, subacute with callus formation, and possible sacral compression component also non-displaced. Poorly imaged but also noted ar prior left hip arthroplasty and right hip CMN    Labs:  Lab Results   Component Value Date/Time    HGB 15.2 04/22/2022 11:15 AM    CREATININE 0.8 04/22/2022 11:15 AM     04/22/2022 11:15 AM       MDM:  New acute problem   Labs reviewed  Imaging independently reviewed and interpreted  Non-operative fracture management        KRISTINE Farnsworth MD  [facility]  Office: [phone]  Cell: [phone]    09/14/22  12:29 PM

## 2022-09-14 NOTE — ED NOTES
Pt continues sleeping in bed at this time. No change in condition.      Davide Danielle RN  09/14/22 2502

## 2022-09-14 NOTE — PROGRESS NOTES
Occupational Therapy  Facility/Department: [address] [place] [address]  Occupational Therapy Initial Assessment/Tx Note    Name: Carlos Persaud  : 1931  MRN: [id number]  Date of Service: 2022    Assessment: Attempted therapy. Pt unable to tolerate due to severe LLE pain (pt indicated inner thigh is most painful; not necessarily consistent with subacute injuries on CT, discussed with MD after session). Cognition is significantly impaired at baseline, but pt was able to follow a few simple commands during session when they did not involve the painful extremity. Pt is currently unable to tolerate therapy due to pain, but is also unsafe to return to prior level of care. Will continue OT during admit. Discharge Recommendations: Carlos Persaud scored a  on the AM-PAC ADL Inpatient form. Current research shows that an AM-PAC score of 17 or less is typically not associated with a discharge to the patient's home setting. Based on the patient's AM-PAC score and their current ADL deficits, it is recommended that the patient have 3-5 sessions per week of Occupational Therapy at d/c to increase the patient's independence. Please see assessment section for further patient specific details. OT Equipment Recommendations  Equipment Needed: No    Treatment Diagnosis: Decreased activity tolerance, impaired ADLs and mobility      Assessment   Performance deficits / Impairments: Decreased functional mobility ; Decreased ADL status; Decreased endurance;Decreased balance  Treatment Diagnosis: Decreased activity tolerance, impaired ADLs and mobility  Decision Making: Medium Complexity  REQUIRES OT FOLLOW-UP: Yes  Activity Tolerance  Activity Tolerance: Patient limited by pain;Treatment limited secondary to decreased cognition        Plan  If pt discharges prior to next tx, this note will serve as d/c summary.  Continue per POC if pt does not d/c.     Plan  Times per Week: 2-5x  Times per Day: Daily  Current Treatment Recommendations: Balance training, Functional mobility training, Endurance training, Safety education & training, Patient/Caregiver education & training, Equipment evaluation, education, & procurement, Self-Care / ADL     Restrictions  Position Activity Restriction  Other position/activity restrictions: per ortho note:OK to mobilize with assistive device and WBAT    Subjective   General  Chart Reviewed: Yes  Additional Pertinent Hx: 80 y.o. F to ED 9/14 with pain. CT pelvis: Subacute, healing fractures of the right obturator ring, possible sacral nondisplaced fractures, age indeterminate. CT femur ordered after therapy session. Referring Practitioner: Vee Leavitt  Subjective  Subjective: Pt in bed, side lying writhing in pain and asking for the \"fence\" to be let down (rail). One minute, pt asking to sit up, then when attempted requests to be left in pain. Conversation is very tangential.  General Comment  Comments: c/o severe pain to L inner thigh, RN and MD made aware. Social/Functional History  Social/Functional History  Type of Home: Facility (memory care unit possibly)  Additional Comments: No prior information available at this time & pt unable to report. MD reports talking to daughter & states pt could stand unassisted. Objective       Safety Devices  Type of Devices: Left in bed;Nurse notified (rails up and pillows placed for support and protection)  Bed Mobility Training  Bed Mobility Training: Yes  Overall Level of Assistance: Total assistance;Assist X2 (attempted several trials of supine to sit, unsuccessful due to severe pain and pt cognition)  Scooting: Total assistance;Assist X2  Transfer Training  Transfer Training: No     Strength: Generally decreased, functional  ADL  Grooming:  Moderate assistance;Setup  Grooming Skilled Clinical Factors: washed lips and face mod I, set up; OT assisted with use of oral care swab  LE Dressing: Dependent/Total  LE Dressing Skilled Clinical Factors: socks  Toileting: Dependent/Total  Toileting Skilled Clinical Factors: purwick in place     Activity Tolerance  Activity Tolerance: Patient limited by pain     Cognition  Overall Cognitive Status: Exceptions  Cognition Comment: alert, attends to person speaking but does not respond appropriately, most comments very tangential with exception of pain related complaints. responded to name but otherwise disoriented. Orientation  Overall Orientation Status: Impaired  Orientation Level: Disoriented X4           Education Given To: Patient  Education Provided: Role of Therapy;Orientation  Education Method: Verbal  Barriers to Learning: Cognition; Hearing  Education Outcome: Verbalized understanding           AM-PAC Score     AM-[facility] Inpatient Daily Activity Raw Score: 8 (09/14/22 1529)  -PAC Inpatient ADL T-Scale Score : 22.86 (09/14/22 1529)  ADL Inpatient CMS 0-100% Score: 85.69 (09/14/22 1529)  ADL Inpatient CMS G-Code Modifier : CM (09/14/22 1529)      Goals  Short Term Goals  Time Frame for Short term goals: by D/C  Short Term Goal 1: Tolerate supine to sit to progress mobility - Not met  Short Term Goal 2: Tolerate sitting EOB - Not met  Short Term Goal 3: Perform all simple grooming tasks spvn, set up, cues - Not met       Therapy Time   Individual Concurrent Group Co-treatment   Time In 1345         Time Out 1408         Minutes 23          Timed Code Tx Min: 8  Total Tx Time: 23       Vilma Sanchez, OT

## 2022-09-14 NOTE — ED PROVIDER NOTES
[address] ENCOUNTER          ATTENDING PHYSICIAN NOTE       Date of evaluation: 9/14/2022    ADDENDUM:      Care of this patient was assumed from Dr. Myers. The patient was seen for Fall (Pt from nursing home memory unit. Ems states there was no night shift staff on this wing of nursing home, unknown when patient fell but was found on floor this morning. C/o left hip/leg pain)  . The patient's initial evaluation and plan have been discussed with the prior provider who initially evaluated the patient. Nursing Notes, Past Medical Hx, Past Surgical Hx, Social Hx, Allergies, and Family Hx were all reviewed. Diagnostic Results     RADIOLOGY:  CT PELVIS WO CONTRAST Additional Contrast? None   Final Result   1. Subacute, healing fractures of the right obturator ring as detailed above. 2. Nonspecific sclerosis in the midline of the sacrum. There is cortical irregularity along the anterior cortex of the sacrum, which could reflect nondisplaced fractures, age indeterminate. 3. Cystic lesion with mural nodularity along the periphery is present, likely arising from the ovary, and suspicious for primary ovarian neoplasm. This could be further evaluated with ultrasound. XR KNEE LEFT (1-2 VIEWS)   Final Result      No definite fracture. CT CSpine W/O Contrast   Final Result   Impression:      No acute fracture. Multilevel spondylosis and spondylolisthesis, as above. CT Head W/O Contrast   Final Result   1. Stable changes   2. 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 No evidence of acute intracranial hemorrhage   4. 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 Correlation with contrast   Magnetic resonance imaging recommended if clinically indicated in this patient's age group.    5. No vasogenic edema identified      XR CHEST PORTABLE   Final Result   1. Vascular redistribution secondary to the spinal radiograph   2. Cardiomegaly   3. No active airspace disease   4. Osteoporosis and old right multiple healed rib fractures      XR PELVIS (1-2 VIEWS)   Final Result   Impression: Limited study without acute abnormality. XR KNEE RIGHT (1-2 VIEWS)   Final Result   Impression: No acute osseous abnormality. Osteopenia. XR FEMUR LEFT (MIN 2 VIEWS)   Final Result   Impression: Limited study without acute osseous abnormality. XR KNEE LEFT (1-2 VIEWS)   Final Result   Impression: Limited study without acute osseous abnormality.             CT FEMUR LEFT WO CONTRAST    (Results Pending)       LABS:   Results for orders placed or performed during the hospital encounter of 09/14/22   CBC with Auto Differential   Result Value Ref Range    WBC 11.5 (H) 4.0 - 11.0 K/uL    RBC 4.85 4.00 - 5.20 M/uL    Hemoglobin 13.8 12.0 - 16.0 g/dL    Hematocrit 41.7 36.0 - 48.0 %    MCV 86.1 80.0 - 100.0 fL    MCH 28.5 26.0 - 34.0 pg    MCHC 33.1 31.0 - 36.0 g/dL    RDW 15.0 12.4 - 15.4 %    Platelets 942 143 - 591 K/uL    MPV 10.0 5.0 - 10.5 fL    Neutrophils % 79.1 %    Lymphocytes % 13.3 %    Monocytes % 6.9 %    Eosinophils % 0.4 %    Basophils % 0.3 %    Neutrophils Absolute 9.1 (H) 1.7 - 7.7 K/uL    Lymphocytes Absolute 1.5 1.0 - 5.1 K/uL    Monocytes Absolute 0.8 0.0 - 1.3 K/uL    Eosinophils Absolute 0.0 0.0 - 0.6 K/uL    Basophils Absolute 0.0 0.0 - 0.2 K/uL   Basic Metabolic Panel   Result Value Ref Range    Sodium 139 136 - 145 mmol/L    Potassium 4.1 3.5 - 5.1 mmol/L    Chloride 105 99 - 110 mmol/L    CO2 24 21 - 32 mmol/L    Anion Gap 10 3 - 16    Glucose 151 (H) 70 - 99 mg/dL    BUN 13 7 - 20 mg/dL    Creatinine <0.5 (L) 0.6 - 1.2 mg/dL    GFR Non-African American >60 >60    GFR African American >60 >60    Calcium 8.6 8.3 - 10.6 mg/dL   POCT Glucose   Result Value Ref Range    POC Glucose 154 (H) 70 - 99 mg/dl    Performed on ACCU-CHEK        RECENT VITALS:  BP: (!) 178/80, Temp: 98.4 °F (36.9 °C), Heart Rate: 73, Resp: 14, SpO2: 100 %     ED Course          The patient was given the following medications:  Orders Placed This Encounter   Medications    acetaminophen (TYLENOL) tablet 650 mg    oxyCODONE (ROXICODONE) immediate release tablet 2.5 mg       CONSULTS:  IP CONSULT TO PRIMARY CARE PROVIDER  IP CONSULT TO ORTHOPEDIC SURGERY  IP CONSULT TO CASE MANAGEMENT  IP CONSULT TO HOSPITALIST    MEDICAL DECISION MAKING / ASSESSMENT / Jordon Wyatt is a 80 y.o. female from [facility] care unit presenting after a fall. She was found on the floor this morning complaining of hip and leg pain. She had a comprehensive medical evaluation here and thorough imaging at this point. Care was assumed with CT of the left femur pending because it was not felt that the entire femur was obtained in the initial x-ray imaging. I did go in and reassess the patient's left leg and her pain seemed to be only present when extending the leg as I pulled tension on her pelvis where there are known subacute fractures on CT imaging. The patient also seem to localize pain to just above the knee which was well visualized on the x-rays and there is no acute process present there. The remainder of her femur films were felt to be captured as well on the other dedicated femur film which did not go all the way to the knee however based on this patient's pain and none deformed leg on exam, I did not feel it was worth heavily sedating this patient to get further imaging of this area. Nonetheless PT and OT did not feel the patient was safe to go back to her memory care unit since she must be ambulatory there and therefore she was admitted. Clinical Impression     1.  Closed fracture of sacrum, unspecified fracture morphology, initial encounter ([facility] [address]    2. Closed pelvic ring fracture, initial encounter ([facility] [address]

## 2022-09-14 NOTE — H&P
Hospital Medicine History & Physical      PCP: Kendy Keene MD    Date of Admission: 9/14/2022    Date of Service: 9/14/2022    Pt seen/examined on 9/14/2022    Admitted to Inpatient with expected LOS greater than two midnights due to medical therapy. Chief Complaint:     Chief Complaint   Patient presents with    Fall     Pt from nursing home memory unit. Ems states there was no night shift staff on this wing of nursing home, unknown when patient fell but was found on floor this morning. C/o left hip/leg pain       History Of Present Illness:      80 y.o. female with a history of multiple medical problems who presented to [facility] with an apparent mechanical fall from her memory care unit at [facility]. Have attempted to reach out to NH though have not been able to get through. Attempted to call patient's daughters though also have not been able to reach out to them to ascertain code status. Pt currently sleeping in bed, arousable, though completely unreliable in terms of history or ROS. In ED found to have a mild leukocytosis as well as a closed fracture of sacrum and pelvic ring. Ortho consulted, no intervention required, will heal on own. PT saw pt and gave a 6/24, however, ED unable to discharge pt to her facility.  Pt will need to come in for pain management and placement     PMHx: Brain mass thought to be a meningioma, diabetes, hypertension, hyperlipidemia, and as below  SH: Never smoker, no alcohol, and as below    Past Medical History:      Reviewed and non-contributory except as noted below      Diagnosis Date    Atherosclerotic vascular disease     brain w right m2 stenosis and vertebral r/l artery stenosis    Back pain     Brain mass     noted good michelle 11/20 mri    Cervical spondylolysis     Dementia ([facility] [address]     has not seen neurologist. by report 20/30 mmse and aricept was started    Diabetes [facility])     type 2    Frequent falls     Hearing loss     Heart 7/13/21   Genevieve Delatorre MD   ACCU-CHEK CARMEN PLUS strip TESTING ONCE DAILY. DX: E11.9 DIABETES MELLITUS 3/19/21   Genevieve Delatorre MD   Insulin Pen Needle (PEN NEEDLES) 31G X 6 MM MISC 1 each by Does not apply route daily 2/1/21 4/22/22  Genevieve Delatorre MD   Multiple Vitamins-Minerals (THEREMS-M) TABS TAKE 1 TABLET BY MOUTH ONCE DAILY. Patient not taking: No sig reported 5/13/19   Severa Som, DO   Calcium Carbonate-Vitamin D (OYSTER SHELL CALCIUM/D) 500-200 MG-UNIT TABS TAKE ONE (1) TABLET BY MOUTH TWICE A DAY. Patient not taking: Reported on 9/14/2022 5/13/19   Severa Som, DO   aspirin EC 81 MG EC tablet Take 1 tablet by mouth daily 5/13/19   Severa Som, DO       Allergies:     Reviewed and non-contributory except as noted below   Penicillins, Statins, and Sulfa antibiotics    Social History:      Reviewed and non-contributory except as noted below    TOBACCO:   reports that she has never smoked. She has never used smokeless tobacco.  ETOH:   reports no history of alcohol use. Family History:      Reviewed and non-contributory except as noted below        Problem Relation Age of Onset    Diabetes Mother     High Blood Pressure Mother     Cancer Sister         ovarian    Diabetes Sister        REVIEW OF SYSTEMS:   Pertinent positives and negatives as noted in the HPI. All other systems reviewed and negative. PHYSICAL EXAM PERFORMED:    BP (!) 141/65   Pulse 72   Temp 98.4 °F (36.9 °C) (Oral)   Resp 16   Ht 5' (1.524 m)   Wt 115 lb 8 oz (52.4 kg)   SpO2 95%   BMI 22.56 kg/m²     General appearance: No apparent distress, appears stated age, uncooperative  HEENT: Pupils equal, round, and reactive to light. Conjunctivae/corneas clear. Neck: Supple, with full range of motion. No jugular venous distention. Trachea midline. Respiratory:  Normal respiratory effort. Clear to auscultation, bilaterally without Rales/Wheezes/Rhonchi.   Cardiovascular: Regular rate and rhythm Abdomen: Soft, non-tender, non-distended with normal bowel sounds. Musculoskeletal: No clubbing, cyanosis or edema bilaterally. Unable to assess ROM  Skin: Skin color, texture, turgor normal.  Neurologic:  Unable to complete ROS, pt alert, moving limbs spontaneously  Psychiatric: Unable to assess    Labs:     Recent Labs     09/14/22  1348   WBC 11.5*   HGB 13.8   HCT 41.7        Recent Labs     09/14/22  1348      K 4.1      CO2 24   BUN 13   CREATININE <0.5*   CALCIUM 8.6     No results for input(s): AST, ALT, BILIDIR, BILITOT, ALKPHOS in the last 72 hours. No results for input(s): INR in the last 72 hours. No results for input(s): Kathyrn Belch in the last 72 hours. Urinalysis:      Lab Results   Component Value Date/Time    NITRU Negative 04/22/2022 11:21 AM    WBCUA 21 12/17/2019 01:00 PM    RBCUA None seen 12/17/2019 01:00 PM    BLOODU Negative 04/22/2022 11:21 AM    SPECGRAV 1.015 04/22/2022 11:21 AM    GLUCOSEU >=1000 04/22/2022 11:21 AM       Radiology:     CT PELVIS WO CONTRAST Additional Contrast? None   Final Result   1. Subacute, healing fractures of the right obturator ring as detailed above. 2. Nonspecific sclerosis in the midline of the sacrum. There is cortical irregularity along the anterior cortex of the sacrum, which could reflect nondisplaced fractures, age indeterminate. 3. Cystic lesion with mural nodularity along the periphery is present, likely arising from the ovary, and suspicious for primary ovarian neoplasm. This could be further evaluated with ultrasound. XR KNEE LEFT (1-2 VIEWS)   Final Result      No definite fracture. CT CSpine W/O Contrast   Final Result   Impression:      No acute fracture. Multilevel spondylosis and spondylolisthesis, as above. CT Head W/O Contrast   Final Result   1. Stable changes   2.  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 DVT Prophylaxis: Lovenox  Diet: No diet orders on file  Code Status: As I cannot find DNR paperwork, will have her as FULL CODE for now pending discussion with daughters    PT/OT Eval Status: Ongoing    Dispo: Cas Card pending clinical improvement      Ronan White MD   Internal Medicine  9/14/2022 5:46 PM  Reach via [employer]      Thank you Lolly Piper MD for the opportunity to be involved in this patient's care. If you have any questions or concerns please feel free to contact me via [address].

## 2022-09-15 ENCOUNTER — APPOINTMENT (OUTPATIENT)
Dept: CT IMAGING | Age: 87
DRG: 956 | End: 2022-09-15
Payer: MEDICARE

## 2022-09-15 LAB
ANION GAP SERPL CALCULATED.3IONS-SCNC: 14 MMOL/L (ref 3–16)
BASOPHILS ABSOLUTE: 0 K/UL (ref 0–0.2)
BASOPHILS RELATIVE PERCENT: 0.4 %
BUN BLDV-MCNC: 14 MG/DL (ref 7–20)
CALCIUM SERPL-MCNC: 8.6 MG/DL (ref 8.3–10.6)
CHLORIDE BLD-SCNC: 104 MMOL/L (ref 99–110)
CO2: 21 MMOL/L (ref 21–32)
CREAT SERPL-MCNC: <0.5 MG/DL (ref 0.6–1.2)
EOSINOPHILS ABSOLUTE: 0.1 K/UL (ref 0–0.6)
EOSINOPHILS RELATIVE PERCENT: 1.3 %
GFR AFRICAN AMERICAN: >60
GFR NON-AFRICAN AMERICAN: >60
GLUCOSE BLD-MCNC: 109 MG/DL (ref 70–99)
GLUCOSE BLD-MCNC: 125 MG/DL (ref 70–99)
GLUCOSE BLD-MCNC: 136 MG/DL (ref 70–99)
GLUCOSE BLD-MCNC: 173 MG/DL (ref 70–99)
GLUCOSE BLD-MCNC: 182 MG/DL (ref 70–99)
GLUCOSE BLD-MCNC: 91 MG/DL (ref 70–99)
HCT VFR BLD CALC: 42.9 % (ref 36–48)
HEMOGLOBIN: 14.2 G/DL (ref 12–16)
LYMPHOCYTES ABSOLUTE: 2.2 K/UL (ref 1–5.1)
LYMPHOCYTES RELATIVE PERCENT: 22.8 %
MCH RBC QN AUTO: 28.8 PG (ref 26–34)
MCHC RBC AUTO-ENTMCNC: 33.1 G/DL (ref 31–36)
MCV RBC AUTO: 87.1 FL (ref 80–100)
MONOCYTES ABSOLUTE: 0.6 K/UL (ref 0–1.3)
MONOCYTES RELATIVE PERCENT: 6.5 %
NEUTROPHILS ABSOLUTE: 6.7 K/UL (ref 1.7–7.7)
NEUTROPHILS RELATIVE PERCENT: 69 %
PDW BLD-RTO: 15.2 % (ref 12.4–15.4)
PERFORMED ON: ABNORMAL
PERFORMED ON: NORMAL
PLATELET # BLD: 228 K/UL (ref 135–450)
PMV BLD AUTO: 9.4 FL (ref 5–10.5)
POTASSIUM REFLEX MAGNESIUM: 4.9 MMOL/L (ref 3.5–5.1)
RBC # BLD: 4.93 M/UL (ref 4–5.2)
SODIUM BLD-SCNC: 139 MMOL/L (ref 136–145)
WBC # BLD: 9.7 K/UL (ref 4–11)

## 2022-09-15 PROCEDURE — 1200000000 HC SEMI PRIVATE

## 2022-09-15 PROCEDURE — 36415 COLL VENOUS BLD VENIPUNCTURE: CPT

## 2022-09-15 PROCEDURE — 6370000000 HC RX 637 (ALT 250 FOR IP): Performed by: STUDENT IN AN ORGANIZED HEALTH CARE EDUCATION/TRAINING PROGRAM

## 2022-09-15 PROCEDURE — 85025 COMPLETE CBC W/AUTO DIFF WBC: CPT

## 2022-09-15 PROCEDURE — 73700 CT LOWER EXTREMITY W/O DYE: CPT

## 2022-09-15 PROCEDURE — 99221 1ST HOSP IP/OBS SF/LOW 40: CPT | Performed by: NURSE PRACTITIONER

## 2022-09-15 PROCEDURE — 6360000002 HC RX W HCPCS: Performed by: STUDENT IN AN ORGANIZED HEALTH CARE EDUCATION/TRAINING PROGRAM

## 2022-09-15 PROCEDURE — 80048 BASIC METABOLIC PNL TOTAL CA: CPT

## 2022-09-15 PROCEDURE — 2580000003 HC RX 258: Performed by: STUDENT IN AN ORGANIZED HEALTH CARE EDUCATION/TRAINING PROGRAM

## 2022-09-15 RX ADMIN — INSULIN GLARGINE 10 UNITS: 100 INJECTION, SOLUTION SUBCUTANEOUS at 22:53

## 2022-09-15 RX ADMIN — SODIUM CHLORIDE, PRESERVATIVE FREE 10 ML: 5 INJECTION INTRAVENOUS at 08:37

## 2022-09-15 RX ADMIN — SODIUM CHLORIDE, PRESERVATIVE FREE 10 ML: 5 INJECTION INTRAVENOUS at 22:57

## 2022-09-15 RX ADMIN — TRAMADOL HYDROCHLORIDE 100 MG: 50 TABLET, COATED ORAL at 17:01

## 2022-09-15 RX ADMIN — DONEPEZIL HYDROCHLORIDE 10 MG: 10 TABLET, FILM COATED ORAL at 22:42

## 2022-09-15 RX ADMIN — INSULIN GLARGINE 10 UNITS: 100 INJECTION, SOLUTION SUBCUTANEOUS at 00:41

## 2022-09-15 RX ADMIN — SODIUM CHLORIDE, PRESERVATIVE FREE 10 ML: 5 INJECTION INTRAVENOUS at 00:45

## 2022-09-15 RX ADMIN — LOSARTAN POTASSIUM 75 MG: 25 TABLET, FILM COATED ORAL at 08:36

## 2022-09-15 RX ADMIN — ENOXAPARIN SODIUM 40 MG: 100 INJECTION SUBCUTANEOUS at 08:36

## 2022-09-15 RX ADMIN — TRAMADOL HYDROCHLORIDE 100 MG: 50 TABLET, COATED ORAL at 08:36

## 2022-09-15 RX ADMIN — ACETAMINOPHEN 650 MG: 325 TABLET, FILM COATED ORAL at 22:42

## 2022-09-15 ASSESSMENT — PAIN DESCRIPTION - LOCATION: LOCATION: HIP;LEG

## 2022-09-15 ASSESSMENT — PAIN SCALES - PAIN ASSESSMENT IN ADVANCED DEMENTIA (PAINAD)
BREATHING: 0
BODYLANGUAGE: 0
NEGVOCALIZATION: 1
FACIALEXPRESSION: 0
CONSOLABILITY: 2
TOTALSCORE: 7
BODYLANGUAGE: 2
FACIALEXPRESSION: 2
BREATHING: 0
CONSOLABILITY: 0
TOTALSCORE: 1
NEGVOCALIZATION: 1

## 2022-09-15 ASSESSMENT — PAIN DESCRIPTION - DESCRIPTORS: DESCRIPTORS: DISCOMFORT

## 2022-09-15 ASSESSMENT — PAIN SCALES - GENERAL: PAINLEVEL_OUTOF10: 9

## 2022-09-15 ASSESSMENT — PAIN DESCRIPTION - ORIENTATION: ORIENTATION: LEFT

## 2022-09-15 ASSESSMENT — PAIN - FUNCTIONAL ASSESSMENT: PAIN_FUNCTIONAL_ASSESSMENT: PREVENTS OR INTERFERES WITH ALL ACTIVE AND SOME PASSIVE ACTIVITIES

## 2022-09-15 NOTE — PROGRESS NOTES
Physical Therapy/Occupational Therapy    Chart reviewed. Noted pt on strict bedrest and has CT femur pending. Spoke with RN who is going to message MD to see if they want pt to work with therapy. Will need updated activity orders if they do. RN aware. Will f/u later this date as schedule allows.     Mk Woodard, PT [id number] OTR/L [address][id number]

## 2022-09-15 NOTE — PROGRESS NOTES
Hospitalist Progress Note      PCP: Vern Caputo MD    Date of Admission: 9/14/2022    Subjective: Patient seen and examined  Patient not following any commands due to dementia  She is moving limbs spontaneously  patient had left hip pain with any kind of movement  CT with femoral fracture, Ortho was notified, waiting for ortho recs for today. 6:45 PM:-Discussed with daughter, Sabrina Aiken, waiting for ortho recs after femur fracture, patient was walking in her facility prior to fall. I communicated CT femur results with Ortho at 2 PM today    Medications:  Reviewed    Infusion Medications    dextrose      sodium chloride       Scheduled Medications    insulin lispro  0-4 Units SubCUTAneous TID WC    insulin lispro  0-4 Units SubCUTAneous Nightly    donepezil  10 mg Oral Nightly    insulin glargine  10 Units SubCUTAneous Nightly    losartan  75 mg Oral Daily    sodium chloride flush  5-40 mL IntraVENous 2 times per day    enoxaparin  40 mg SubCUTAneous Daily     PRN Meds: glucose, dextrose bolus **OR** dextrose bolus, glucagon (rDNA), dextrose, traMADol **OR** traMADol, sodium chloride flush, sodium chloride, ondansetron **OR** ondansetron, polyethylene glycol, acetaminophen **OR** acetaminophen      Intake/Output Summary (Last 24 hours) at 9/15/2022 1615  Last data filed at 9/15/2022 1450  Gross per 24 hour   Intake 360 ml   Output --   Net 360 ml       Physical Exam Performed:    /73   Pulse 74   Temp 97.4 °F (36.3 °C) (Axillary)   Resp 16   Ht 5' (1.524 m)   Wt 116 lb 10 oz (52.9 kg)   SpO2 92%   BMI 22.78 kg/m²       General appearance: No apparent distress, appears stated age, uncooperative  HEENT: Pupils equal, round, and reactive to light. Conjunctivae/corneas clear. Neck: Supple, with full range of motion. No jugular venous distention. Trachea midline. Respiratory:  Normal respiratory effort. Clear to auscultation, bilaterally without Rales/Wheezes/Rhonchi.   Cardiovascular: Regular rate and rhythm   Abdomen: Soft, non-tender, non-distended with normal bowel sounds. Musculoskeletal: No clubbing, cyanosis or edema bilaterally. Unable to assess ROM  Skin: Skin color, texture, turgor normal.  Neurologic:  Unable to complete ROS, pt alert, moving limbs spontaneously  Psychiatric: Unable to assess    Labs:   Recent Labs     09/14/22  1348 09/15/22  0644   WBC 11.5* 9.7   HGB 13.8 14.2   HCT 41.7 42.9    228     Recent Labs     09/14/22  1348 09/15/22  0644    139   K 4.1 4.9    104   CO2 24 21   BUN 13 14   CREATININE <0.5* <0.5*   CALCIUM 8.6 8.6     No results for input(s): AST, ALT, BILIDIR, BILITOT, ALKPHOS in the last 72 hours. No results for input(s): INR in the last 72 hours. No results for input(s): Penny Axtell in the last 72 hours. Urinalysis:      Lab Results   Component Value Date/Time    NITRU Negative 04/22/2022 11:21 AM    WBCUA 21 12/17/2019 01:00 PM    RBCUA None seen 12/17/2019 01:00 PM    BLOODU Negative 04/22/2022 11:21 AM    SPECGRAV 1.015 04/22/2022 11:21 AM    GLUCOSEU >=1000 04/22/2022 11:21 AM       Radiology:  CT FEMUR LEFT WO CONTRAST   Final Result   1. Age-indeterminate fracture of the proximal left femur adjacent to the left hip arthroplasty. There is no significant adjacent edema or hematoma. 2. Please see concurrent CT of the pelvis for discussion of additional findings. CT PELVIS WO CONTRAST Additional Contrast? None   Final Result   1. Subacute, healing fractures of the right obturator ring as detailed above. 2. Nonspecific sclerosis in the midline of the sacrum. There is cortical irregularity along the anterior cortex of the sacrum, which could reflect nondisplaced fractures, age indeterminate. 3. Cystic lesion with mural nodularity along the periphery is present, likely arising from the ovary, and suspicious for primary ovarian neoplasm. This could be further evaluated with ultrasound.       XR KNEE LEFT (1-2 VIEWS)   Final Result      No definite fracture. CT CSpine W/O Contrast   Final Result   Impression:      No acute fracture. Multilevel spondylosis and spondylolisthesis, as above. CT Head W/O Contrast   Final Result   1. Stable changes   2. 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 No evidence of acute intracranial hemorrhage   4. 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 Correlation with contrast   Magnetic resonance imaging recommended if clinically indicated in this patient's age group. 5. No vasogenic edema identified      XR CHEST PORTABLE   Final Result   1. Vascular redistribution secondary to the spinal radiograph   2. Cardiomegaly   3. No active airspace disease   4. Osteoporosis and old right multiple healed rib fractures      XR PELVIS (1-2 VIEWS)   Final Result   Impression: Limited study without acute abnormality. XR KNEE RIGHT (1-2 VIEWS)   Final Result   Impression: No acute osseous abnormality. Osteopenia. XR FEMUR LEFT (MIN 2 VIEWS)   Final Result   Impression: Limited study without acute osseous abnormality. XR KNEE LEFT (1-2 VIEWS)   Final Result   Impression: Limited study without acute osseous abnormality. Assessment/Plan:    Active Hospital Problems    Diagnosis     [facility]el[facility], [facility] [S32.810A]      Priority: Medium     Closed fracture of sacrum, unspecified fracture morphology, initial encounter  Closed pelvic ring fracture, initial encounter  Closed femoral fracture  - Ortho consulted     Mechanical fall  - Unsure about circumstances. Reached out to NH as well as family, have been unsuccessful in reaching both.    - CM referral for placement  - PT/OT     Alzheimer's dementia - seems to be advanced stage and she seems to be at her baseline according to ED physician, who was able to talk to daughter earlier in the day  - Cont home meds     HTN  - Cont home meds     HLD  - Holding home meds, as only tolerated Livalo, which we don't have     DM2   - SSI  - POCT glucose checks  - Hypoglycemia protocol   - Lantus 10U qhs       DVT Prophylaxis: Lovenox  Diet: ADULT DIET; Regular; 5 carb choices (75 gm/meal)  Code Status: Full Code  PT/OT Eval Status: ordered    Dispo - ipt, pending final Ortho recommendations as well as pain control. Will need placement.   Palliative care consulted      Enmanuel Barajas MD

## 2022-09-15 NOTE — PROGRESS NOTES
Updated daughter, Bernie Roque, who is in [place], on PT status and plan of care. Her cell phone number is [phone].

## 2022-09-15 NOTE — PROGRESS NOTES
Patient admitted to room 5526. Patient alert to self only, VSS, crying out in pain, medicated per mar. Specialty bed ordered, call light within reach, bed in lowest position, and bed alarm on.

## 2022-09-15 NOTE — PROGRESS NOTES
No acute events overnight, VSS, pain managed per mar. Patient is alert to self only, refuses to be repositioned d/t pain. Patient was incontinent of urine during shift.

## 2022-09-15 NOTE — CONSULTS
[facility]  Palliative Medicine Consultation Note      Date Of Admission:9/14/2022  Date of consult: 09/15/22  Seen by [facility] in the past:  No    Recommendations:        I saw Ms. Laura Nava at bedside and she was sleeping comfortably in bed. Upon verbal stimulation she woke up but she was not able to participate in a conversation nor was she able to follow commands. She kept repeating is it time to get up upon every question I asked. The only command she did follow was that she gave me a thumbs up when I showed her how to do so with my hand but she did not repeat those actions again. She is not competent enough to make her own medical decisions. I do see ED registration paperwork from 2016 where there is mention of her having a form where she maybe DNR/CCA/CC however that is vague. I wanted to clarify and determine who her healthcare power of  is so I called the daughter Dashawn Linares on both her home and cell phone number however I did not get a response so I left a voice message requesting her to call us back. After that I proceeded to call the other daughter Sabrina Aiken however I did not get a response from her either so I left a voice message requesting her to call us back. Afterwards I proceeded to call her facility [facility] with the hopes of getting some information regarding her wishes and her family members. The first time I called I was directed to the medical records personnel who did not  so I left a voice message requesting to call us back. I called a second time stating that the person I was referred to was not picking up and is  there someone else I can be transferred to. Upon this request the staff transferred me to enter personnel however that person did not  either and it went to voicemail. I left a voice message requesting that they call us back.     At this point I am unsure what her CODE STATUS is or who her healthcare power of  is. For the time being we will leave her full code until we can hear back. 1. Goals of Care/Advanced Care planning/Code status: Continue current management. Full code. Will discussed further when able to reach pt's family. 2. Pain: No reported or observed pain  3. SOB: No apparent SOB  4. Fracture of sacrum, pelvis s/p mechanical fall: Ortho consulted, conservative mgmt  5. Alzheimer's dementia: seems to be advanced stage and is reportedly at her baseline per EMR. Will discuss further with pt's family when able to reach them. 6. Disposition: TBD, likely return to [facility] when medically ready for discharge. Reason for Consult:         [x]  Goals of Care  [x]  Code Status Discussion/Advanced Care Planning   [x]  Psychosocial/Family Support  [x]  Symptom Management  []  Other (Specify)    Requesting Physician: Dr. Kandy Sapp:  Fall    History Obtained From:  chart review    History of Present Illness:         80 y.o. female with a history of multiple medical problems who presented to [facility] with an apparent mechanical fall from her memory care unit at [facility]. Have attempted to reach out to NH though have not been able to get through. Attempted to call patient's daughters though also have not been able to reach out to them to ascertain code status. Pt currently sleeping in bed, arousable, though completely unreliable in terms of history or ROS. In ED found to have a mild leukocytosis as well as a closed fracture of sacrum and pelvic ring. Ortho consulted, no intervention required, will heal on own. PT saw pt and gave a 6/24, however, ED unable to discharge pt to her facility. Pt will need to come in for pain management and placement      PMHx: Brain mass thought to be a meningioma, diabetes, hypertension, hyperlipidemia.     Subjective:         Past Medical History:        Diagnosis Date    Atherosclerotic vascular disease     brain w right m2 stenosis and vertebral r/l artery stenosis    Back pain     Brain mass     noted good michelle 11/20 mri    Cervical spondylolysis     Dementia (HCC)     has not seen neurologist. by report 20/30 mmse and aricept was started    Diabetes [facility])     type 2    Frequent falls     Hearing loss     Heart disease     bypass? cardiologist?    Hyperlipidemia     Hypertension     Mental status alteration 11/2020    [facility] [employer] consult    Osteoporosis     restarted after a drug holiday    Wrist fracture        Past Surgical History:        Procedure Laterality Date    BRAIN TUMOR EXCISION      benign, right frontotemporal craniotomy    CORONARY ARTERY BYPASS GRAFT      HIP SURGERY      pins    PARTIAL HIP ARTHROPLASTY      left hip       Current Medications:    Medications Prior to Admission: losartan (COZAAR) 50 MG tablet, TAKE ONE TABLET BY MOUTH ONCE A DAY. donepezil (ARICEPT) 10 MG tablet, TAKE ONE TABLET BY MOUTH AT BEDTIME. JANUMET -1000 MG TB24, TAKE ONE TABLET BY MOUTH DAILY WITH EVENING MEAL (Patient not taking: Reported on 9/14/2022)  losartan (COZAAR) 25 MG tablet, TAKE ONE TABLET BY MOUTH ONCE A DAY *TAKE WITH 50MG FOR A TOTAL DOSE OF 75MG*  dapagliflozin (FARXIGA) 10 MG tablet, TAKE 1 TABLET BY MOUTH EACH MORNING  alendronate (FOSAMAX) 35 MG tablet, TAKE 1 TABLET BY MOUTH WEEKLY 30 MINS BEFORE FOOD (EMPTY STOMACH) WITH 8 OZ WATER DO NOT LIE DOWN FOR 30MIN  pioglitazone (ACTOS) 30 MG tablet, TAKE 1 TABLET BY MOUTH DAILY (Patient not taking: Reported on 9/14/2022)  insulin glargine (BASAGLAR KWIKPEN) 100 UNIT/ML injection pen, Inject 10 Units into the skin nightly  LIVALO 2 MG TABS tablet, TAKE ONE TABLET BY MOUTH AT BEDTIME. (Patient taking differently: [facility] takes 25mg)  ACCU-CHEK CARMEN PLUS strip, TESTING ONCE DAILY.  DX: E11.9 DIABETES MELLITUS  Insulin Pen Needle (PEN NEEDLES) 31G X 6 MM MISC, 1 each by Does not apply route daily  Multiple Vitamins-Minerals (THEREMS-M) TABS, TAKE 1 TABLET BY MOUTH ONCE DAILY. (Patient not taking: No sig reported)  Calcium Carbonate-Vitamin D (OYSTER SHELL CALCIUM/D) 500-200 MG-UNIT TABS, TAKE ONE (1) TABLET BY MOUTH TWICE A DAY. (Patient not taking: Reported on 9/14/2022)  aspirin EC 81 MG EC tablet, Take 1 tablet by mouth daily    Allergies:  Penicillins, Statins, and Sulfa antibiotics    Social History:    TOBACCO: reports that she has never smoked. She has never used smokeless tobacco.  ETOH:   reports no history of alcohol use. Patient currently lives [facility] living    Review of Systems -   Review of Systems:   Pt unable to participate in ROS     Objective:          Physical Exam   General appearance: No apparent distress, Does not appear stated age, uncooperative  HEENT: Pupils equal, round, and reactive to light. Conjunctivae/corneas clear. Neck: Supple, with full range of motion. No jugular venous distention. Trachea midline. Respiratory:  Normal respiratory effort. Clear to auscultation, bilaterally without Rales/Wheezes/Rhonchi. Cardiovascular: Regular rate and rhythm   Abdomen: Soft, non-tender, non-distended with normal bowel sounds. Musculoskeletal: No clubbing, cyanosis or edema bilaterally. Unable to assess ROM  Skin: Skin color, texture, turgor normal.  Neurologic:  Unable to complete  Psychiatric: Unable to assess       Palliative Performance Scale:  [] 60% Ambulation reduced; Significant disease; Can't do hobbies/housework; intake normal or reduced; occasional assist; LOC full/confusion  [] 50% Mainly sit/lie; Extensive disease; Can't do any work; Considerable assist; intake normal  Or reduced; LOC full/confusion  [] 40% Mainly in bed; Extensive disease; Mainly assist; intake normal or reduced; occasional assist; LOC full/confusion  [x] 30% Bed Bound; Extensive disease; Total care; intake reduced; LOC full/confusion  [] 20% Bed Bound; Extensive disease;  Total care; intake minimal; Drowsy/coma  [] 10% Bed Bound; Extensive disease; Total care; Mouth care only; Drowsy/coma  [] 0% Death    PPS: 30    Vitals:    /63   Pulse 69   Temp 98 °F (36.7 °C) (Axillary)   Resp 18   Ht 5' (1.524 m)   Wt 116 lb 10 oz (52.9 kg)   SpO2 93%   BMI 22.78 kg/m²     Labs:    BMP:   Recent Labs     09/14/22  1348 09/15/22  0644    139   K 4.1 4.9    104   CO2 24 21   BUN 13 14   CREATININE <0.5* <0.5*   GLUCOSE 151* 109*     CBC:   Recent Labs     09/14/22  1348 09/15/22  0644   WBC 11.5* 9.7   HGB 13.8 14.2   HCT 41.7 42.9    228       LFT's: No results for input(s): AST, ALT, ALB, BILITOT, ALKPHOS in the last 72 hours. Troponin: No results for input(s): TROPONINI in the last 72 hours. BNP: No results for input(s): BNP in the last 72 hours. ABGs: No results for input(s): PHART, OMM7OPH, PO2ART in the last 72 hours. INR: No results for input(s): INR in the last 72 hours. U/A:No results for input(s): NITRITE, COLORU, PHUR, LABCAST, WBCUA, RBCUA, MUCUS, TRICHOMONAS, YEAST, BACTERIA, CLARITYU, SPECGRAV, LEUKOCYTESUR, UROBILINOGEN, BILIRUBINUR, BLOODU, GLUCOSEU, AMORPHOUS in the last 72 hours. Invalid input(s): KETONESU    CT FEMUR LEFT WO CONTRAST   Final Result   1. Age-indeterminate fracture of the proximal left femur adjacent to the left hip arthroplasty. There is no significant adjacent edema or hematoma. 2. Please see concurrent CT of the pelvis for discussion of additional findings. CT PELVIS WO CONTRAST Additional Contrast? None   Final Result   1. Subacute, healing fractures of the right obturator ring as detailed above. 2. Nonspecific sclerosis in the midline of the sacrum. There is cortical irregularity along the anterior cortex of the sacrum, which could reflect nondisplaced fractures, age indeterminate. 3. Cystic lesion with mural nodularity along the periphery is present, likely arising from the ovary, and suspicious for primary ovarian neoplasm.  This could be further evaluated with ultrasound. XR KNEE LEFT (1-2 VIEWS)   Final Result      No definite fracture. CT CSpine W/O Contrast   Final Result   Impression:      No acute fracture. Multilevel spondylosis and spondylolisthesis, as above. CT Head W/O Contrast   Final Result   1. Stable changes   2. 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 No evidence of acute intracranial hemorrhage   4. 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 Correlation with contrast   Magnetic resonance imaging recommended if clinically indicated in this patient's age group. 5. No vasogenic edema identified      XR CHEST PORTABLE   Final Result   1. Vascular redistribution secondary to the spinal radiograph   2. Cardiomegaly   3. No active airspace disease   4. Osteoporosis and old right multiple healed rib fractures      XR PELVIS (1-2 VIEWS)   Final Result   Impression: Limited study without acute abnormality. XR KNEE RIGHT (1-2 VIEWS)   Final Result   Impression: No acute osseous abnormality. Osteopenia. XR FEMUR LEFT (MIN 2 VIEWS)   Final Result   Impression: Limited study without acute osseous abnormality. XR KNEE LEFT (1-2 VIEWS)   Final Result   Impression: Limited study without acute osseous abnormality. Conclusion/Time spent:         Recommendations see above    Time spent with patient and/or family: 20  Time reviewing records: 10 min   Time communicating with staff: 5 min     A total of 35 minutes spent with the patient and family on unit greater than 50% in counseling regarding palliative care and in goals of care for the patient. Thank you to  for this consultation. We will continue to follow Ms. Stephenson's care as needed. Pt was discussed with the resident Dr. Celia Hayes.  Pt was seen and assessed independently by this NP.     [address] [facility]  Inpatient Palliative Care  [phone]

## 2022-09-15 NOTE — PROGRESS NOTES
Occupational Therapy/Physical Therapy  HOLD    Aware bedrest orders were lifted, however, results of CT L Femur state \"Age-indeterminate fracture of the proximal left femur adjacent to the left hip arthroplasty\". Will HOLD PT/OT at this time and await ortho input and WB status clarification - will proceed as/if indicated. Continue per POC. Discussed w/ nurse.     Aurea De Los Santos OTR/L [address]

## 2022-09-15 NOTE — PROGRESS NOTES
4 Eyes Skin Assessment     NAME:  Candice Leal  YOB: 1931  MEDICAL RECORD NUMBER:  [id number]    The patient is being assess for  Admission    I agree that 2 RN's have performed a thorough Head to Toe Skin Assessment on the patient. ALL assessment sites listed below have been assessed. Areas assessed by both nurses:    Head, Face, Ears, Shoulders, Back, Chest, Arms, Elbows, Hands, Sacrum. Buttock, Coccyx, Ischium, and Legs. Feet and Heels    Excoriation in groin, stage 1 pressure injury on L hip        Does the Patient have a Wound?  No noted wound(s)       Alvin Prevention initiated:  Yes   Wound Care Orders initiated:  NA    Pressure Injury (Stage 3,4, Unstageable, DTI, NWPT, and Complex wounds) if present place referral/consult order under [de-identified] No    New and Established Ostomies if present place consult order under : No      Nurse 1 eSignature: Electronically signed by Myrna Ryan RN on 9/15/22 at 12:57 AM EDT    **SHARE this note so that the co-signing nurse is able to place an eSignature**    Nurse 2 eSignature: Electronically signed by Barbara Montero RN on 9/15/22 at 3:21 AM EDT

## 2022-09-15 NOTE — PLAN OF CARE
Problem: Pain  Goal: Verbalizes/displays adequate comfort level or baseline comfort level  Outcome: Progressing     Problem: Confusion  Goal: Confusion, delirium, dementia, or psychosis is improved or at baseline  Description: INTERVENTIONS:  1. Assess for possible contributors to thought disturbance, including medications, impaired vision or hearing, underlying metabolic abnormalities, dehydration, psychiatric diagnoses, and notify attending LIP  2. [place] high risk fall precautions, as indicated  3. Provide frequent short contacts to provide reality reorientation, refocusing and direction  4. Decrease environmental stimuli, including noise as appropriate  5. Monitor and intervene to maintain adequate nutrition, hydration, elimination, sleep and activity  6. If unable to ensure safety without constant attention obtain sitter and review sitter guidelines with assigned personnel  7.  Initiate Psychosocial CNS and Spiritual Care consult, as indicated  Outcome: Progressing

## 2022-09-16 LAB
GLUCOSE BLD-MCNC: 113 MG/DL (ref 70–99)
GLUCOSE BLD-MCNC: 79 MG/DL (ref 70–99)
GLUCOSE BLD-MCNC: 87 MG/DL (ref 70–99)
GLUCOSE BLD-MCNC: 92 MG/DL (ref 70–99)
PERFORMED ON: ABNORMAL
PERFORMED ON: NORMAL

## 2022-09-16 PROCEDURE — 6370000000 HC RX 637 (ALT 250 FOR IP): Performed by: STUDENT IN AN ORGANIZED HEALTH CARE EDUCATION/TRAINING PROGRAM

## 2022-09-16 PROCEDURE — 6360000002 HC RX W HCPCS: Performed by: STUDENT IN AN ORGANIZED HEALTH CARE EDUCATION/TRAINING PROGRAM

## 2022-09-16 PROCEDURE — 6360000002 HC RX W HCPCS: Performed by: INTERNAL MEDICINE

## 2022-09-16 PROCEDURE — 1200000000 HC SEMI PRIVATE

## 2022-09-16 PROCEDURE — 2580000003 HC RX 258: Performed by: STUDENT IN AN ORGANIZED HEALTH CARE EDUCATION/TRAINING PROGRAM

## 2022-09-16 PROCEDURE — 2580000003 HC RX 258: Performed by: INTERNAL MEDICINE

## 2022-09-16 PROCEDURE — 99233 SBSQ HOSP IP/OBS HIGH 50: CPT | Performed by: NURSE PRACTITIONER

## 2022-09-16 RX ORDER — SODIUM CHLORIDE 9 MG/ML
INJECTION, SOLUTION INTRAVENOUS CONTINUOUS
Status: DISCONTINUED | OUTPATIENT
Start: 2022-09-16 | End: 2022-09-21 | Stop reason: HOSPADM

## 2022-09-16 RX ADMIN — SODIUM CHLORIDE: 9 INJECTION, SOLUTION INTRAVENOUS at 23:01

## 2022-09-16 RX ADMIN — INSULIN GLARGINE 10 UNITS: 100 INJECTION, SOLUTION SUBCUTANEOUS at 21:45

## 2022-09-16 RX ADMIN — SODIUM CHLORIDE, PRESERVATIVE FREE 10 ML: 5 INJECTION INTRAVENOUS at 08:49

## 2022-09-16 RX ADMIN — LOSARTAN POTASSIUM 75 MG: 25 TABLET, FILM COATED ORAL at 08:48

## 2022-09-16 RX ADMIN — TRAMADOL HYDROCHLORIDE 50 MG: 50 TABLET, COATED ORAL at 16:15

## 2022-09-16 RX ADMIN — ENOXAPARIN SODIUM 40 MG: 100 INJECTION SUBCUTANEOUS at 08:48

## 2022-09-16 RX ADMIN — HYDROMORPHONE HYDROCHLORIDE 0.25 MG: 1 INJECTION, SOLUTION INTRAMUSCULAR; INTRAVENOUS; SUBCUTANEOUS at 12:41

## 2022-09-16 RX ADMIN — TRAMADOL HYDROCHLORIDE 50 MG: 50 TABLET, COATED ORAL at 08:48

## 2022-09-16 RX ADMIN — SODIUM CHLORIDE, PRESERVATIVE FREE 10 ML: 5 INJECTION INTRAVENOUS at 21:21

## 2022-09-16 RX ADMIN — HYDROMORPHONE HYDROCHLORIDE 0.25 MG: 1 INJECTION, SOLUTION INTRAMUSCULAR; INTRAVENOUS; SUBCUTANEOUS at 22:49

## 2022-09-16 RX ADMIN — SODIUM CHLORIDE, PRESERVATIVE FREE 10 ML: 5 INJECTION INTRAVENOUS at 22:59

## 2022-09-16 RX ADMIN — DONEPEZIL HYDROCHLORIDE 10 MG: 10 TABLET, FILM COATED ORAL at 21:16

## 2022-09-16 ASSESSMENT — PAIN SCALES - PAIN ASSESSMENT IN ADVANCED DEMENTIA (PAINAD)
NEGVOCALIZATION: 1
FACIALEXPRESSION: 2
TOTALSCORE: 7
FACIALEXPRESSION: 2
BREATHING: 0
CONSOLABILITY: 2
FACIALEXPRESSION: 2
BREATHING: 0
BODYLANGUAGE: 2
CONSOLABILITY: 2
NEGVOCALIZATION: 1
NEGVOCALIZATION: 1
BODYLANGUAGE: 2
BREATHING: 0
TOTALSCORE: 7
CONSOLABILITY: 2
TOTALSCORE: 7
BODYLANGUAGE: 2

## 2022-09-16 ASSESSMENT — PAIN DESCRIPTION - DESCRIPTORS
DESCRIPTORS: ACHING
DESCRIPTORS: PATIENT UNABLE TO DESCRIBE

## 2022-09-16 ASSESSMENT — PAIN DESCRIPTION - FREQUENCY: FREQUENCY: INTERMITTENT

## 2022-09-16 ASSESSMENT — PAIN DESCRIPTION - ORIENTATION: ORIENTATION: MID

## 2022-09-16 ASSESSMENT — PAIN SCALES - WONG BAKER
WONGBAKER_NUMERICALRESPONSE: 10
WONGBAKER_NUMERICALRESPONSE: 10
WONGBAKER_NUMERICALRESPONSE: 0
WONGBAKER_NUMERICALRESPONSE: 0

## 2022-09-16 ASSESSMENT — PAIN DESCRIPTION - LOCATION: LOCATION: CHEST;BACK

## 2022-09-16 ASSESSMENT — PAIN SCALES - GENERAL: PAINLEVEL_OUTOF10: 8

## 2022-09-16 ASSESSMENT — PAIN DESCRIPTION - PAIN TYPE: TYPE: ACUTE PAIN

## 2022-09-16 ASSESSMENT — PAIN - FUNCTIONAL ASSESSMENT: PAIN_FUNCTIONAL_ASSESSMENT: INTOLERABLE, UNABLE TO DO ANY ACTIVE OR PASSIVE ACTIVITIES

## 2022-09-16 NOTE — PROGRESS NOTES
Pt transferred to 3308 from . Pt disoriented x4, unable to obtain VS d/t pt ripping of BP cuff and hitting and kicking this RN and Charge RN. Pt screaming for help to get OOB, refusing to turn, no incontinence noted at this time. Will attempt vitals again. Bed in lowest position, wheels locked, alarm on, soft music on for comfort.

## 2022-09-16 NOTE — PROGRESS NOTES
Discussed with Beatriz the director of nursing over at [facility] living [facility]. Sounds like she had a fall back in August, and prior to this she was walking fine with a walker. Since that fall she has been able unable to walk. She had another fall which brought her in this time around and continues to have hip pain. Given that she was walking very well about a month ago prior to her first fall, she may benefit from mobley rgical stabilization to restore mobility and function. Plan for surgical intervention tomorrow    N.p.o. at midnight  Hold anticoagulation  IV fluids   Will discuss further with the patient's family for consent      KRISTINE Carrion Ma  [facility]  Cell: [phone]    Voice recognition software used for this note.  Please notify me if any transcription errors are presen t and I will make an addendum

## 2022-09-16 NOTE — PROGRESS NOTES
Physician Progress Note      PATIENT:               Elvis Lagunas  CSN #:                  [id number]  :                       [id number]  ADMIT DATE:       2022 7:13 AM  DISCH DATE:  RESPONDING  PROVIDER #:        Corrina Ling MD          QUERY TEXT:    Pt admitted with Multiple fractures and has Hx of dementia documented. Pt   noted to [employer]. If possible, please document in the progress notes and   discharge summary if you are evaluating and / or treating any of the   following: The medical record reflects the following:  Risk Factors: 81 y/o with a Hx of Dementia  Clinical Indicators:  nursing PN: Pt transferred to [facility] from . Pt   disoriented x4, unable to obtain VS d/t pt ripping of BP cuff and hitting and   kicking this RN and Charge RN. Pt screaming for help to get OOB, refusing to   turn, no incontinence noted at this time. Will attempt vitals again. Bed in   lowest position, wheels locked, alarm on, soft music on for comfort. Treatment: Safety checks, Bed in lowest position, wheels locked, alarm on,   soft music on for comfort. Options provided:  -- Dementia with behavioral disturbance  -- Dementia without behavioral disturbance  -- Other - I will add my own diagnosis  -- Disagree - Not applicable / Not valid  -- Disagree - Clinically unable to determine / Unknown  -- Refer to Clinical Documentation Reviewer    PROVIDER RESPONSE TEXT:    This patient has dementia with behavioral disturbances.     Query created by: Ernie Chawla on 2022 8:43 AM      Electronically signed by:  Corrina Ling MD 2022 6:43 PM

## 2022-09-16 NOTE — PROGRESS NOTES
Hospitalist Progress Note      PCP: Zoe Prieto MD    Date of Admission: 9/14/2022    Subjective: Patient seen and examined  Patient not following any commands, is crying for help  Ortho is going to follow-up with family about femoral fracture  Continue pain control      Medications:  Reviewed    Infusion Medications    dextrose      sodium chloride       Scheduled Medications    insulin lispro  0-4 Units SubCUTAneous TID WC    insulin lispro  0-4 Units SubCUTAneous Nightly    donepezil  10 mg Oral Nightly    insulin glargine  10 Units SubCUTAneous Nightly    losartan  75 mg Oral Daily    sodium chloride flush  5-40 mL IntraVENous 2 times per day    enoxaparin  40 mg SubCUTAneous Daily     PRN Meds: glucose, dextrose bolus **OR** dextrose bolus, glucagon (rDNA), dextrose, traMADol **OR** traMADol, sodium chloride flush, sodium chloride, ondansetron **OR** ondansetron, polyethylene glycol, acetaminophen **OR** acetaminophen      Intake/Output Summary (Last 24 hours) at 9/16/2022 1602  Last data filed at 9/16/2022 0951  Gross per 24 hour   Intake 440 ml   Output --   Net 440 ml       Physical Exam Performed:    BP (!) 153/44   Pulse 79   Temp 97.7 °F (36.5 °C) (Oral)   Resp 17   Ht 5' (1.524 m)   Wt 117 lb 11.6 oz (53.4 kg)   SpO2 97%   BMI 22.99 kg/m²       General appearance: No apparent distress, appears stated age, uncooperative  HEENT: Pupils equal, round, and reactive to light. Conjunctivae/corneas clear. Neck: Supple, with full range of motion. No jugular venous distention. Trachea midline. Respiratory:  Normal respiratory effort. Clear to auscultation, bilaterally without Rales/Wheezes/Rhonchi. Cardiovascular: Regular rate and rhythm   Abdomen: Soft, non-tender, non-distended with normal bowel sounds. Musculoskeletal: No clubbing, cyanosis or edema bilaterally.   Unable to assess ROM  Skin: Skin color, texture, turgor normal.  Neurologic:  Unable to complete ROS, pt alert, moving limbs spontaneously  Psychiatric: Unable to assess    Labs:   Recent Labs     09/14/22  1348 09/15/22  0644   WBC 11.5* 9.7   HGB 13.8 14.2   HCT 41.7 42.9    228     Recent Labs     09/14/22  1348 09/15/22  0644    139   K 4.1 4.9    104   CO2 24 21   BUN 13 14   CREATININE <0.5* <0.5*   CALCIUM 8.6 8.6     No results for input(s): AST, ALT, BILIDIR, BILITOT, ALKPHOS in the last 72 hours. No results for input(s): INR in the last 72 hours. No results for input(s): Adonica Hoose in the last 72 hours. Urinalysis:      Lab Results   Component Value Date/Time    NITRU Negative 04/22/2022 11:21 AM    WBCUA 21 12/17/2019 01:00 PM    RBCUA None seen 12/17/2019 01:00 PM    BLOODU Negative 04/22/2022 11:21 AM    SPECGRAV 1.015 04/22/2022 11:21 AM    GLUCOSEU >=1000 04/22/2022 11:21 AM       Radiology:  CT FEMUR LEFT WO CONTRAST   Final Result   1. Age-indeterminate fracture of the proximal left femur adjacent to the left hip arthroplasty. There is no significant adjacent edema or hematoma. 2. Please see concurrent CT of the pelvis for discussion of additional findings. CT PELVIS WO CONTRAST Additional Contrast? None   Final Result   1. Subacute, healing fractures of the right obturator ring as detailed above. 2. Nonspecific sclerosis in the midline of the sacrum. There is cortical irregularity along the anterior cortex of the sacrum, which could reflect nondisplaced fractures, age indeterminate. 3. Cystic lesion with mural nodularity along the periphery is present, likely arising from the ovary, and suspicious for primary ovarian neoplasm. This could be further evaluated with ultrasound. XR KNEE LEFT (1-2 VIEWS)   Final Result      No definite fracture. CT CSpine W/O Contrast   Final Result   Impression:      No acute fracture. Multilevel spondylosis and spondylolisthesis, as above. CT Head W/O Contrast   Final Result   1. Stable changes   2.  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 No evidence of acute intracranial hemorrhage   4. 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 Correlation with contrast   Magnetic resonance imaging recommended if clinically indicated in this patient's age group. 5. No vasogenic edema identified      XR CHEST PORTABLE   Final Result   1. Vascular redistribution secondary to the spinal radiograph   2. Cardiomegaly   3. No active airspace disease   4. Osteoporosis and old right multiple healed rib fractures      XR PELVIS (1-2 VIEWS)   Final Result   Impression: Limited study without acute abnormality. XR KNEE RIGHT (1-2 VIEWS)   Final Result   Impression: No acute osseous abnormality. Osteopenia. XR FEMUR LEFT (MIN 2 VIEWS)   Final Result   Impression: Limited study without acute osseous abnormality. XR KNEE LEFT (1-2 VIEWS)   Final Result   Impression: Limited study without acute osseous abnormality. Assessment/Plan:    Active Hospital Problems    Diagnosis     Pelvic ring fracture, closed, initial encounter ([facility] [address] [S32.810A]      Priority: Medium     Closed fracture of sacrum, unspecified fracture morphology, initial encounter  Closed pelvic ring fracture, initial encounter  Closed femoral fracture  - Ortho consulted     Mechanical fall  - Unsure about circumstances. Reached out to NH as well as family, have been unsuccessful in reaching both.    - CM referral for placement  - PT/OT     Alzheimer's dementia - seems to be advanced stage and she seems to be at her baseline according to ED physician, who was able to talk to daughter earlier in the day  - Cont home meds     HTN  - Cont home meds     HLD  - Holding home meds, as only tolerated Livalo, which we don't have     DM2   - SSI  - POCT glucose checks  - Hypoglycemia protocol   - Lantus 10U qhs       DVT Prophylaxis: Lovenox  Diet: ADULT DIET;  Regular  Code Status: Limited  PT/OT Eval Status: ordered    Dispo - ipt, planning surgery tomorrow, n.p.o. after midnight, hold Miguel George MD

## 2022-09-16 NOTE — PROGRESS NOTES
Pt disoriented x4. Pt extremely [place]. Bilateral eye swelling. VSS. Pt favors left side. Try to reposition pt with pillows. Mepilex on coccyx and preventive mepilex on left hip. CT showed left hip fracture. Pt keeps legs up against body. RN did range of motion on rt leg, pt unable to move left leg d/t pain. Order to transfer pt to room 3308. Charge RN [facility] called report, pt transported to new room via bed.

## 2022-09-16 NOTE — CARE COORDINATION
CM following: CM attempted to meet with patient in room this AM. Pt unable to be roused with verbal commands after several attempts. CM will return to room later today as schedule allows.   Electronically signed by Sherren Caul, MSW on 9/16/2022 at 1:49 PM  [phone]

## 2022-09-16 NOTE — PROGRESS NOTES
Still working on coordinating care for this patient's femur fracture. It has been difficult to get in contact with family and I need more information before we can make any definitive plans. No surgical plans today. No need for additional consults regarding this injury. Please just call me directly on my cell if there is any questions [phone]. Thanks      KRISTINE Coon  [facility]  Cell: [phone]    Voice recognition software used for this note.  Please notify me if any transcription errors are present and I will make an addendum

## 2022-09-16 NOTE — PROGRESS NOTES
Palliative Medicine Progress Note    Admit Date: 9/14/2022  Hospital Day:  Hospital Day: 3     CC: Fall  HPI: 80 y.o. female with a history of multiple medical problems who presented to [facility]. with an apparent mechanical fall from her memory care unit at [facility]. Have attempted to reach out to NH though have not been able to get through. Attempted to call patient's daughters though also have not been able to reach out to them to ascertain code status. Pt currently sleeping in bed, arousable, though completely unreliable in terms of history or ROS. In ED found to have a mild leukocytosis as well as a closed fracture of sacrum and pelvic ring. Ortho consulted, no intervention required, will heal on own. PT saw pt and gave a 6/24, however, ED unable to discharge pt to her facility. Pt will need to come in for pain management and placement      PMHx: Brain mass thought to be a meningioma, diabetes, hypertension, hyperlipidemia. Saw pt at the bedside, she is resting comfortably in bed with eyes closed. Did not attempt to wake - per nursing notes has been agitated overnight. Noted CT with femoral fracture, waiting for ortho recs for today. Spoke with pt's daughters Tramaine Buenrostro and Lani Chaudhary over the phone. They are pt's only children, therefore legal NOK. Lani Chaudhary is an RN, has a good understanding of pt's condition. They report pt can ambulate with a walker at baseline, has had several falls recently. They endorse pt is extremely [place]. At baseline her short term memory is quite poor. She is able to recognize familiar faces. They are unsure whether or not they would want to pursue surgery for the pt if indicated, they worry that pt would not tolerate the procedure well - however they report pt was fiercely independent in her younger years and would not want to be bed bound if that could be avoided. They would like to have recommendations from the orthopedic surgeon prior to making that decision.  We did briefly discuss hospice as a potential option. Recommendations:     1. Goals of Care/Advanced Care planning/Code status: DNR/DNI, d/w pt's daughters today. Pt's daughters are unsure whether they would pursue surgery if indicated. Pt is ambulatory at baseline, however falling frequently. They would like to speak with an orthopedic surgeon if surgery is indicated. 2. Pain: No reported or observed pain  3. SOB: No apparent SOB  4. Fracture of sacrum, pelvis s/p mechanical fall: Ortho consulted, new femur fracture on CT  5. Alzheimer's dementia: seems to be advanced stage and is near baseline. Of note, pt is extremely [place] so may not communicate if she cannot hear. 6. Disposition: TBD, likely return to [facility] when medically ready for discharge    Subjective:     Scheduled Meds:   insulin lispro  0-4 Units SubCUTAneous TID WC    insulin lispro  0-4 Units SubCUTAneous Nightly    donepezil  10 mg Oral Nightly    insulin glargine  10 Units SubCUTAneous Nightly    losartan  75 mg Oral Daily    sodium chloride flush  5-40 mL IntraVENous 2 times per day    enoxaparin  40 mg SubCUTAneous Daily       Continuous Infusions:   dextrose      sodium chloride         PRN Meds:glucose, dextrose bolus **OR** dextrose bolus, glucagon (rDNA), dextrose, traMADol **OR** traMADol, sodium chloride flush, sodium chloride, ondansetron **OR** ondansetron, polyethylene glycol, acetaminophen **OR** acetaminophen    Review of Systems. Review of Systems - History obtained from unobtainable from patient due to mental status    Performance status 50      Objective:     Patient Vitals for the past 8 hrs:   BP Temp Temp src Pulse Resp SpO2 Weight   09/16/22 0849 -- 97.7 °F (36.5 °C) Oral 79 17 97 % --   09/16/22 0610 -- -- -- -- -- -- 117 lb 11.6 oz (53.4 kg)   09/16/22 0540 (!) 148/79 97.7 °F (36.5 °C) Oral 84 16 96 % --     I/O last 3 completed shifts: In: 800 [P.O.:800]  Out: -   No intake/output data recorded.     Physical Exam  Constitutional:       Appearance: She is ill-appearing. Cardiovascular:      Rate and Rhythm: Normal rate. Heart sounds: Normal heart sounds. Pulmonary:      Breath sounds: Normal breath sounds. Abdominal:      General: Bowel sounds are normal.      Palpations: Abdomen is soft. Musculoskeletal:      Right lower leg: No edema. Left lower leg: No edema. Skin:     General: Skin is warm and dry. Neurological:      Mental Status: She is disoriented. WBC/Hgb/Hct/Plts:  9.7/14.2/42.9/228 (09/15 0622)           Assessment:     Principal Problem:    Pelvic ring fracture, closed, initial encounter ([facility] [address]  Resolved Problems:    * No resolved hospital problems. *      Time spent with patient and/or family: 27  Time reviewing records: 5  Time communicating with providers: 10    A total of 45 minutes spent with the patient and family on unit greater than 50% face to face time in counseling regarding palliative care and goals of care for the patient.          [address] [facility]  Inpatient Palliative Care  [phone]

## 2022-09-17 ENCOUNTER — ANESTHESIA (OUTPATIENT)
Dept: OPERATING ROOM | Age: 87
DRG: 956 | End: 2022-09-17
Payer: MEDICARE

## 2022-09-17 ENCOUNTER — ANESTHESIA EVENT (OUTPATIENT)
Dept: OPERATING ROOM | Age: 87
DRG: 956 | End: 2022-09-17
Payer: MEDICARE

## 2022-09-17 ENCOUNTER — APPOINTMENT (OUTPATIENT)
Dept: GENERAL RADIOLOGY | Age: 87
DRG: 956 | End: 2022-09-17
Payer: MEDICARE

## 2022-09-17 LAB
GLUCOSE BLD-MCNC: 101 MG/DL (ref 70–99)
GLUCOSE BLD-MCNC: 167 MG/DL (ref 70–99)
GLUCOSE BLD-MCNC: 187 MG/DL (ref 70–99)
PERFORMED ON: ABNORMAL

## 2022-09-17 PROCEDURE — 2580000003 HC RX 258: Performed by: FAMILY MEDICINE

## 2022-09-17 PROCEDURE — 6370000000 HC RX 637 (ALT 250 FOR IP): Performed by: STUDENT IN AN ORGANIZED HEALTH CARE EDUCATION/TRAINING PROGRAM

## 2022-09-17 PROCEDURE — C1776 JOINT DEVICE (IMPLANTABLE): HCPCS | Performed by: ORTHOPAEDIC SURGERY

## 2022-09-17 PROCEDURE — 2580000003 HC RX 258: Performed by: ORTHOPAEDIC SURGERY

## 2022-09-17 PROCEDURE — 2720000010 HC SURG SUPPLY STERILE: Performed by: ORTHOPAEDIC SURGERY

## 2022-09-17 PROCEDURE — 0QS704Z REPOSITION LEFT UPPER FEMUR WITH INTERNAL FIXATION DEVICE, OPEN APPROACH: ICD-10-PCS | Performed by: ORTHOPAEDIC SURGERY

## 2022-09-17 PROCEDURE — 2500000003 HC RX 250 WO HCPCS: Performed by: FAMILY MEDICINE

## 2022-09-17 PROCEDURE — 1200000000 HC SEMI PRIVATE

## 2022-09-17 PROCEDURE — 7100000000 HC PACU RECOVERY - FIRST 15 MIN: Performed by: ORTHOPAEDIC SURGERY

## 2022-09-17 PROCEDURE — A4217 STERILE WATER/SALINE, 500 ML: HCPCS | Performed by: ORTHOPAEDIC SURGERY

## 2022-09-17 PROCEDURE — 6360000002 HC RX W HCPCS: Performed by: FAMILY MEDICINE

## 2022-09-17 PROCEDURE — 7100000001 HC PACU RECOVERY - ADDTL 15 MIN: Performed by: ORTHOPAEDIC SURGERY

## 2022-09-17 PROCEDURE — 6360000002 HC RX W HCPCS: Performed by: ORTHOPAEDIC SURGERY

## 2022-09-17 PROCEDURE — C1713 ANCHOR/SCREW BN/BN,TIS/BN: HCPCS | Performed by: ORTHOPAEDIC SURGERY

## 2022-09-17 PROCEDURE — 2709999900 HC NON-CHARGEABLE SUPPLY: Performed by: ORTHOPAEDIC SURGERY

## 2022-09-17 PROCEDURE — 73502 X-RAY EXAM HIP UNI 2-3 VIEWS: CPT

## 2022-09-17 PROCEDURE — 3700000000 HC ANESTHESIA ATTENDED CARE: Performed by: ORTHOPAEDIC SURGERY

## 2022-09-17 PROCEDURE — 3600000014 HC SURGERY LEVEL 4 ADDTL 15MIN: Performed by: ORTHOPAEDIC SURGERY

## 2022-09-17 PROCEDURE — 3209999900 FLUORO FOR SURGICAL PROCEDURES

## 2022-09-17 PROCEDURE — 3700000001 HC ADD 15 MINUTES (ANESTHESIA): Performed by: ORTHOPAEDIC SURGERY

## 2022-09-17 PROCEDURE — 3600000004 HC SURGERY LEVEL 4 BASE: Performed by: ORTHOPAEDIC SURGERY

## 2022-09-17 PROCEDURE — 6360000002 HC RX W HCPCS: Performed by: INTERNAL MEDICINE

## 2022-09-17 DEVICE — NCB SCREW 5.0   L = 46
Type: IMPLANTABLE DEVICE | Site: HIP | Status: FUNCTIONAL
Brand: NCB®

## 2022-09-17 DEVICE — NCB SCREW 5.0   L = 60
Type: IMPLANTABLE DEVICE | Site: HIP | Status: FUNCTIONAL
Brand: NCB®

## 2022-09-17 DEVICE — IMPLANTABLE DEVICE: Type: IMPLANTABLE DEVICE | Site: HIP | Status: FUNCTIONAL

## 2022-09-17 DEVICE — NCB LOCKING CAP
Type: IMPLANTABLE DEVICE | Site: HIP | Status: FUNCTIONAL
Brand: NCB®

## 2022-09-17 DEVICE — CABLE SURG L635MM DIA1.8MM CO CHROM CERCLAGE CBL RDY: Type: IMPLANTABLE DEVICE | Site: HIP | Status: FUNCTIONAL

## 2022-09-17 DEVICE — NCB SCREW 5.0   L = 36
Type: IMPLANTABLE DEVICE | Site: HIP | Status: FUNCTIONAL
Brand: NCB®

## 2022-09-17 DEVICE — NCB® PP TROCHANTER PLATE, LEFT, NARROW
Type: IMPLANTABLE DEVICE | Site: HIP | Status: FUNCTIONAL
Brand: NCB®

## 2022-09-17 DEVICE — KIRSCHNER WIRE, WITH TROCAR TIP, Ø 2.0 M
Type: IMPLANTABLE DEVICE | Site: HIP | Status: FUNCTIONAL
Brand: KIRSCHNER WIRE

## 2022-09-17 DEVICE — NCB PP PROX FEM PLATE, L,12H, L. 285MM
Type: IMPLANTABLE DEVICE | Site: HIP | Status: FUNCTIONAL
Brand: NCB®

## 2022-09-17 RX ORDER — PROPOFOL 10 MG/ML
INJECTION, EMULSION INTRAVENOUS PRN
Status: DISCONTINUED | OUTPATIENT
Start: 2022-09-17 | End: 2022-09-17 | Stop reason: SDUPTHER

## 2022-09-17 RX ORDER — HALOPERIDOL 5 MG/ML
10 INJECTION INTRAMUSCULAR ONCE
Status: COMPLETED | OUTPATIENT
Start: 2022-09-17 | End: 2022-09-17

## 2022-09-17 RX ORDER — CEFDINIR 300 MG/1
300 CAPSULE ORAL EVERY 12 HOURS SCHEDULED
Status: DISCONTINUED | OUTPATIENT
Start: 2022-09-18 | End: 2022-09-18

## 2022-09-17 RX ORDER — SUCCINYLCHOLINE/SOD CL,ISO/PF 200MG/10ML
SYRINGE (ML) INTRAVENOUS PRN
Status: DISCONTINUED | OUTPATIENT
Start: 2022-09-17 | End: 2022-09-17 | Stop reason: SDUPTHER

## 2022-09-17 RX ORDER — ONDANSETRON 2 MG/ML
4 INJECTION INTRAMUSCULAR; INTRAVENOUS
Status: DISCONTINUED | OUTPATIENT
Start: 2022-09-17 | End: 2022-09-17 | Stop reason: HOSPADM

## 2022-09-17 RX ORDER — ROCURONIUM BROMIDE 10 MG/ML
INJECTION, SOLUTION INTRAVENOUS PRN
Status: DISCONTINUED | OUTPATIENT
Start: 2022-09-17 | End: 2022-09-17 | Stop reason: SDUPTHER

## 2022-09-17 RX ORDER — ONDANSETRON 2 MG/ML
INJECTION INTRAMUSCULAR; INTRAVENOUS PRN
Status: DISCONTINUED | OUTPATIENT
Start: 2022-09-17 | End: 2022-09-17 | Stop reason: SDUPTHER

## 2022-09-17 RX ORDER — SODIUM CHLORIDE 0.9 % (FLUSH) 0.9 %
5-40 SYRINGE (ML) INJECTION PRN
Status: DISCONTINUED | OUTPATIENT
Start: 2022-09-17 | End: 2022-09-17 | Stop reason: HOSPADM

## 2022-09-17 RX ORDER — PHENYLEPHRINE HYDROCHLORIDE 10 MG/ML
INJECTION INTRAVENOUS PRN
Status: DISCONTINUED | OUTPATIENT
Start: 2022-09-17 | End: 2022-09-17 | Stop reason: SDUPTHER

## 2022-09-17 RX ORDER — OXYCODONE HYDROCHLORIDE 5 MG/1
5 TABLET ORAL PRN
Status: DISCONTINUED | OUTPATIENT
Start: 2022-09-17 | End: 2022-09-17 | Stop reason: HOSPADM

## 2022-09-17 RX ORDER — MAGNESIUM HYDROXIDE 1200 MG/15ML
LIQUID ORAL CONTINUOUS PRN
Status: COMPLETED | OUTPATIENT
Start: 2022-09-17 | End: 2022-09-17

## 2022-09-17 RX ORDER — FENTANYL CITRATE 50 UG/ML
25 INJECTION, SOLUTION INTRAMUSCULAR; INTRAVENOUS EVERY 5 MIN PRN
Status: COMPLETED | OUTPATIENT
Start: 2022-09-17 | End: 2022-09-17

## 2022-09-17 RX ORDER — VANCOMYCIN HYDROCHLORIDE 1 G/20ML
INJECTION, POWDER, LYOPHILIZED, FOR SOLUTION INTRAVENOUS PRN
Status: DISCONTINUED | OUTPATIENT
Start: 2022-09-17 | End: 2022-09-17 | Stop reason: ALTCHOICE

## 2022-09-17 RX ORDER — OLANZAPINE 5 MG/1
2.5 TABLET ORAL EVERY 6 HOURS PRN
Status: DISCONTINUED | OUTPATIENT
Start: 2022-09-17 | End: 2022-09-18

## 2022-09-17 RX ORDER — SODIUM CHLORIDE 9 MG/ML
25 INJECTION, SOLUTION INTRAVENOUS PRN
Status: DISCONTINUED | OUTPATIENT
Start: 2022-09-17 | End: 2022-09-17 | Stop reason: HOSPADM

## 2022-09-17 RX ORDER — SODIUM CHLORIDE 0.9 % (FLUSH) 0.9 %
5-40 SYRINGE (ML) INJECTION EVERY 12 HOURS SCHEDULED
Status: DISCONTINUED | OUTPATIENT
Start: 2022-09-17 | End: 2022-09-17 | Stop reason: HOSPADM

## 2022-09-17 RX ORDER — LORAZEPAM 2 MG/ML
0.5 INJECTION INTRAMUSCULAR
Status: DISCONTINUED | OUTPATIENT
Start: 2022-09-17 | End: 2022-09-17 | Stop reason: HOSPADM

## 2022-09-17 RX ORDER — LIDOCAINE HYDROCHLORIDE 20 MG/ML
INJECTION, SOLUTION INTRAVENOUS PRN
Status: DISCONTINUED | OUTPATIENT
Start: 2022-09-17 | End: 2022-09-17 | Stop reason: SDUPTHER

## 2022-09-17 RX ORDER — PROCHLORPERAZINE EDISYLATE 5 MG/ML
5 INJECTION INTRAMUSCULAR; INTRAVENOUS
Status: DISCONTINUED | OUTPATIENT
Start: 2022-09-17 | End: 2022-09-17 | Stop reason: HOSPADM

## 2022-09-17 RX ORDER — SODIUM CHLORIDE 9 MG/ML
INJECTION, SOLUTION INTRAVENOUS CONTINUOUS
Status: DISCONTINUED | OUTPATIENT
Start: 2022-09-17 | End: 2022-09-20

## 2022-09-17 RX ORDER — HALOPERIDOL 5 MG/ML
5 INJECTION INTRAMUSCULAR ONCE
Status: COMPLETED | OUTPATIENT
Start: 2022-09-17 | End: 2022-09-17

## 2022-09-17 RX ORDER — OXYCODONE HYDROCHLORIDE 5 MG/1
10 TABLET ORAL PRN
Status: DISCONTINUED | OUTPATIENT
Start: 2022-09-17 | End: 2022-09-17 | Stop reason: HOSPADM

## 2022-09-17 RX ORDER — DEXAMETHASONE SODIUM PHOSPHATE 4 MG/ML
INJECTION, SOLUTION INTRA-ARTICULAR; INTRALESIONAL; INTRAMUSCULAR; INTRAVENOUS; SOFT TISSUE PRN
Status: DISCONTINUED | OUTPATIENT
Start: 2022-09-17 | End: 2022-09-17 | Stop reason: SDUPTHER

## 2022-09-17 RX ORDER — DIPHENHYDRAMINE HYDROCHLORIDE 50 MG/ML
12.5 INJECTION INTRAMUSCULAR; INTRAVENOUS
Status: DISCONTINUED | OUTPATIENT
Start: 2022-09-17 | End: 2022-09-17 | Stop reason: HOSPADM

## 2022-09-17 RX ORDER — DEXMEDETOMIDINE HYDROCHLORIDE 100 UG/ML
INJECTION, SOLUTION INTRAVENOUS PRN
Status: DISCONTINUED | OUTPATIENT
Start: 2022-09-17 | End: 2022-09-17 | Stop reason: SDUPTHER

## 2022-09-17 RX ORDER — SODIUM CHLORIDE, SODIUM LACTATE, POTASSIUM CHLORIDE, CALCIUM CHLORIDE 600; 310; 30; 20 MG/100ML; MG/100ML; MG/100ML; MG/100ML
INJECTION, SOLUTION INTRAVENOUS CONTINUOUS PRN
Status: DISCONTINUED | OUTPATIENT
Start: 2022-09-17 | End: 2022-09-17 | Stop reason: SDUPTHER

## 2022-09-17 RX ORDER — FENTANYL CITRATE 50 UG/ML
INJECTION, SOLUTION INTRAMUSCULAR; INTRAVENOUS PRN
Status: DISCONTINUED | OUTPATIENT
Start: 2022-09-17 | End: 2022-09-17 | Stop reason: SDUPTHER

## 2022-09-17 RX ORDER — LABETALOL HYDROCHLORIDE 5 MG/ML
10 INJECTION, SOLUTION INTRAVENOUS
Status: DISCONTINUED | OUTPATIENT
Start: 2022-09-17 | End: 2022-09-17 | Stop reason: HOSPADM

## 2022-09-17 RX ORDER — PROPOFOL 10 MG/ML
INJECTION, EMULSION INTRAVENOUS CONTINUOUS PRN
Status: DISCONTINUED | OUTPATIENT
Start: 2022-09-17 | End: 2022-09-17 | Stop reason: SDUPTHER

## 2022-09-17 RX ORDER — CEFAZOLIN SODIUM 2 G/50ML
SOLUTION INTRAVENOUS PRN
Status: DISCONTINUED | OUTPATIENT
Start: 2022-09-17 | End: 2022-09-17 | Stop reason: SDUPTHER

## 2022-09-17 RX ADMIN — FENTANYL CITRATE 25 MCG: 50 INJECTION, SOLUTION INTRAMUSCULAR; INTRAVENOUS at 11:41

## 2022-09-17 RX ADMIN — TRAMADOL HYDROCHLORIDE 50 MG: 50 TABLET, COATED ORAL at 18:13

## 2022-09-17 RX ADMIN — PROPOFOL 50 MG: 10 INJECTION, EMULSION INTRAVENOUS at 09:24

## 2022-09-17 RX ADMIN — HALOPERIDOL LACTATE 5 MG: 5 INJECTION, SOLUTION INTRAMUSCULAR at 12:21

## 2022-09-17 RX ADMIN — FENTANYL CITRATE 25 MCG: 50 INJECTION, SOLUTION INTRAMUSCULAR; INTRAVENOUS at 11:49

## 2022-09-17 RX ADMIN — INSULIN GLARGINE 10 UNITS: 100 INJECTION, SOLUTION SUBCUTANEOUS at 23:02

## 2022-09-17 RX ADMIN — LIDOCAINE HYDROCHLORIDE 80 MG: 20 INJECTION, SOLUTION INTRAVENOUS at 09:26

## 2022-09-17 RX ADMIN — DEXAMETHASONE SODIUM PHOSPHATE 4 MG: 4 INJECTION, SOLUTION INTRAMUSCULAR; INTRAVENOUS at 09:34

## 2022-09-17 RX ADMIN — HYDROMORPHONE HYDROCHLORIDE 0.25 MG: 1 INJECTION, SOLUTION INTRAMUSCULAR; INTRAVENOUS; SUBCUTANEOUS at 23:01

## 2022-09-17 RX ADMIN — Medication 100 MG: at 09:25

## 2022-09-17 RX ADMIN — HYDROMORPHONE HYDROCHLORIDE 0.5 MG: 1 INJECTION, SOLUTION INTRAMUSCULAR; INTRAVENOUS; SUBCUTANEOUS at 13:33

## 2022-09-17 RX ADMIN — SODIUM CHLORIDE: 9 INJECTION, SOLUTION INTRAVENOUS at 16:12

## 2022-09-17 RX ADMIN — PHENYLEPHRINE HYDROCHLORIDE 50 MCG: 10 INJECTION, SOLUTION INTRAMUSCULAR; INTRAVENOUS; SUBCUTANEOUS at 09:44

## 2022-09-17 RX ADMIN — PHENYLEPHRINE HYDROCHLORIDE 100 MCG: 10 INJECTION INTRAVENOUS at 10:36

## 2022-09-17 RX ADMIN — CEFAZOLIN SODIUM 2000 MG: 2 SOLUTION INTRAVENOUS at 09:32

## 2022-09-17 RX ADMIN — DEXMEDETOMIDINE HYDROCHLORIDE 4 MCG: 100 INJECTION, SOLUTION INTRAVENOUS at 09:21

## 2022-09-17 RX ADMIN — FENTANYL CITRATE 25 MCG: 50 INJECTION, SOLUTION INTRAMUSCULAR; INTRAVENOUS at 10:06

## 2022-09-17 RX ADMIN — PHENYLEPHRINE HYDROCHLORIDE 100 MCG: 10 INJECTION INTRAVENOUS at 10:45

## 2022-09-17 RX ADMIN — HYDROMORPHONE HYDROCHLORIDE 0.5 MG: 1 INJECTION, SOLUTION INTRAMUSCULAR; INTRAVENOUS; SUBCUTANEOUS at 12:11

## 2022-09-17 RX ADMIN — SODIUM CHLORIDE, SODIUM LACTATE, POTASSIUM CHLORIDE, AND CALCIUM CHLORIDE: .6; .31; .03; .02 INJECTION, SOLUTION INTRAVENOUS at 09:25

## 2022-09-17 RX ADMIN — HALOPERIDOL LACTATE 5 MG: 5 INJECTION, SOLUTION INTRAMUSCULAR at 12:39

## 2022-09-17 RX ADMIN — ONDANSETRON 4 MG: 2 INJECTION INTRAMUSCULAR; INTRAVENOUS at 09:41

## 2022-09-17 RX ADMIN — FENTANYL CITRATE 25 MCG: 50 INJECTION, SOLUTION INTRAMUSCULAR; INTRAVENOUS at 13:44

## 2022-09-17 RX ADMIN — FENTANYL CITRATE 25 MCG: 50 INJECTION, SOLUTION INTRAMUSCULAR; INTRAVENOUS at 10:16

## 2022-09-17 RX ADMIN — ROCURONIUM BROMIDE 15 MG: 10 INJECTION INTRAVENOUS at 10:50

## 2022-09-17 RX ADMIN — FENTANYL CITRATE 25 MCG: 50 INJECTION, SOLUTION INTRAMUSCULAR; INTRAVENOUS at 14:00

## 2022-09-17 RX ADMIN — SUGAMMADEX 200 MG: 100 INJECTION, SOLUTION INTRAVENOUS at 11:00

## 2022-09-17 RX ADMIN — FENTANYL CITRATE 25 MCG: 50 INJECTION, SOLUTION INTRAMUSCULAR; INTRAVENOUS at 10:25

## 2022-09-17 RX ADMIN — PROPOFOL 50 MCG/KG/MIN: 10 INJECTION, EMULSION INTRAVENOUS at 09:34

## 2022-09-17 RX ADMIN — DEXMEDETOMIDINE HYDROCHLORIDE 4 MCG: 100 INJECTION, SOLUTION INTRAVENOUS at 09:46

## 2022-09-17 RX ADMIN — HALOPERIDOL LACTATE 5 MG: 5 INJECTION, SOLUTION INTRAMUSCULAR at 11:57

## 2022-09-17 RX ADMIN — ROCURONIUM BROMIDE 50 MG: 10 INJECTION INTRAVENOUS at 09:46

## 2022-09-17 RX ADMIN — FENTANYL CITRATE 25 MCG: 50 INJECTION, SOLUTION INTRAMUSCULAR; INTRAVENOUS at 09:25

## 2022-09-17 RX ADMIN — PHENYLEPHRINE HYDROCHLORIDE 50 MCG/MIN: 10 INJECTION, SOLUTION INTRAMUSCULAR; INTRAVENOUS; SUBCUTANEOUS at 09:33

## 2022-09-17 RX ADMIN — HYDROMORPHONE HYDROCHLORIDE 0.5 MG: 1 INJECTION, SOLUTION INTRAMUSCULAR; INTRAVENOUS; SUBCUTANEOUS at 14:16

## 2022-09-17 RX ADMIN — DONEPEZIL HYDROCHLORIDE 10 MG: 10 TABLET, FILM COATED ORAL at 22:01

## 2022-09-17 RX ADMIN — ACETAMINOPHEN 650 MG: 325 TABLET, FILM COATED ORAL at 22:01

## 2022-09-17 ASSESSMENT — PAIN SCALES - PAIN ASSESSMENT IN ADVANCED DEMENTIA (PAINAD)
TOTALSCORE: 8
BREATHING: 0
BODYLANGUAGE: 2
BREATHING: 0
BODYLANGUAGE: 1
FACIALEXPRESSION: 2
BREATHING: 0
TOTALSCORE: 7
NEGVOCALIZATION: 1
BODYLANGUAGE: 2
NEGVOCALIZATION: 2
BODYLANGUAGE: 2
CONSOLABILITY: 0
CONSOLABILITY: 1
BREATHING: 0
TOTALSCORE: 6
TOTALSCORE: 7
FACIALEXPRESSION: 2
NEGVOCALIZATION: 2
BODYLANGUAGE: 2
NEGVOCALIZATION: 2
FACIALEXPRESSION: 0
TOTALSCORE: 2
NEGVOCALIZATION: 2
CONSOLABILITY: 2
CONSOLABILITY: 1
FACIALEXPRESSION: 1
FACIALEXPRESSION: 2
BREATHING: 0
CONSOLABILITY: 1

## 2022-09-17 ASSESSMENT — PAIN DESCRIPTION - PAIN TYPE
TYPE: SURGICAL PAIN

## 2022-09-17 ASSESSMENT — PAIN SCALES - GENERAL
PAINLEVEL_OUTOF10: 7
PAINLEVEL_OUTOF10: 2
PAINLEVEL_OUTOF10: 6
PAINLEVEL_OUTOF10: 7
PAINLEVEL_OUTOF10: 8
PAINLEVEL_OUTOF10: 8

## 2022-09-17 ASSESSMENT — PAIN - FUNCTIONAL ASSESSMENT: PAIN_FUNCTIONAL_ASSESSMENT: INTOLERABLE, UNABLE TO DO ANY ACTIVE OR PASSIVE ACTIVITIES

## 2022-09-17 ASSESSMENT — PAIN DESCRIPTION - LOCATION: LOCATION: LEG

## 2022-09-17 ASSESSMENT — PAIN DESCRIPTION - DESCRIPTORS: DESCRIPTORS: DISCOMFORT

## 2022-09-17 ASSESSMENT — PAIN DESCRIPTION - ORIENTATION: ORIENTATION: LEFT

## 2022-09-17 NOTE — ANESTHESIA PRE PROCEDURE
daily 2/1/21 4/22/22  Genevieve Delatorre MD   Multiple Vitamins-Minerals (THEREMS-M) TABS TAKE 1 TABLET BY MOUTH ONCE DAILY. Patient not taking: No sig reported 5/13/19   Severa Som, DO   Calcium Carbonate-Vitamin D (OYSTER SHELL CALCIUM/D) 500-200 MG-UNIT TABS TAKE ONE (1) TABLET BY MOUTH TWICE A DAY.   Patient not taking: Reported on 9/14/2022 5/13/19   Severa Som, DO   aspirin EC 81 MG EC tablet Take 1 tablet by mouth daily 5/13/19   Severa Som, DO       Current medications:    Current Facility-Administered Medications   Medication Dose Route Frequency Provider Last Rate Last Admin    haloperidol lactate (HALDOL) injection 10 mg  10 mg IntraMUSCular Once Abran Pill, MD        haloperidol lactate (HALDOL) injection 10 mg  10 mg IntraMUSCular Once Abran Pill, MD        0.9 % sodium chloride infusion   IntraVENous Continuous Abran Pill, MD        sodium chloride flush 0.9 % injection 5-40 mL  5-40 mL IntraVENous 2 times per day Abran Pill, MD        sodium chloride flush 0.9 % injection 5-40 mL  5-40 mL IntraVENous PRN Abran Pill, MD        0.9 % sodium chloride infusion  25 mL IntraVENous PRN Abran Pill, MD        fentaNYL (SUBLIMAZE) injection 25 mcg  25 mcg IntraVENous Q5 Min PRN Abran Pill, MD        HYDROmorphone (DILAUDID) injection 0.5 mg  0.5 mg IntraVENous Q5 Min PRN Abran Pill, MD        oxyCODONE (ROXICODONE) immediate release tablet 5 mg  5 mg Oral PRN Abran Pill, MD        Or    oxyCODONE (ROXICODONE) immediate release tablet 10 mg  10 mg Oral PRN Abran Pill, MD        ondansetron [facility]) injection 4 mg  4 mg IntraVENous Once PRN Abran Pill, MD        prochlorperazine (COMPAZINE) injection 5 mg  5 mg IntraVENous Once PRN Abran Pill, MD        LORazepam (ATIVAN) injection 0.5 mg  0.5 mg IntraVENous Once PRN Abran Pill, MD        diphenhydrAMINE (BENADRYL) injection 12.5 mg  12.5 mg IntraVENous Once PRN Abran Pill, MD  labetalol (NORMODYNE;TRANDATE) injection 10 mg  10 mg IntraVENous Q15 Min PRN Carolee Meyers MD        [START ON 9/18/2022] cefdinir (OMNICEF) capsule 300 mg  300 mg Oral 2 times per day Debi Fregoso MD        0.9 % sodium chloride infusion   IntraVENous Continuous Deandre Llanes MD 75 mL/hr at 09/16/22 2301 New Bag at 09/16/22 2301    HYDROmorphone (DILAUDID) injection 0.25 mg  0.25 mg IntraVENous Q6H PRN Deandre Llanes MD   0.25 mg at 09/16/22 2249    glucose chewable tablet 16 g  4 tablet Oral PRN David Person MD        dextrose bolus 10% 125 mL  125 mL IntraVENous PRN David Person MD        Or    dextrose bolus 10% 250 mL  250 mL IntraVENous PRN David Person MD        glucagon (rDNA) injection 1 mg  1 mg SubCUTAneous PRN David Person MD        dextrose 10 % infusion   IntraVENous Continuous PRN David Person MD        insulin lispro (1 Unit Dial) 0-4 Units  0-4 Units SubCUTAneous TID WC David Person MD        insulin lispro (1 Unit Dial) 0-4 Units  0-4 Units SubCUTAneous Nightly David Person MD        donepezil (ARICEPT) tablet 10 mg  10 mg Oral Nightly David Person MD   10 mg at 09/16/22 2116    insulin glargine (LANTUS;BASAGLAR) injection pen 10 Units  10 Units SubCUTAneous Nightly David Person MD   10 Units at 09/16/22 2145    losartan (COZAAR) tablet 75 mg  75 mg Oral Daily David Person MD   75 mg at 09/16/22 0848    traMADol (ULTRAM) tablet 50 mg  50 mg Oral Q6H PRN David Person MD   50 mg at 09/16/22 1615    Or    traMADol (ULTRAM) tablet 100 mg  100 mg Oral Q6H PRN David Person MD   100 mg at 09/15/22 1701    sodium chloride flush 0.9 % injection 5-40 mL  5-40 mL IntraVENous 2 times per day David Person MD   10 mL at 09/16/22 2121    sodium chloride flush 0.9 % injection 5-40 mL  5-40 mL IntraVENous PRN David Person MD   10 mL at 09/16/22 2259    0.9 % sodium chloride infusion   IntraVENous PRN David Person MD        [Held by provider] enoxaparin (LOVENOX) injection 40 mg  40 mg SubCUTAneous Daily Brittni Paulson MD   40 mg at 09/16/22 0848    ondansetron (ZOFRAN-ODT) disintegrating tablet 4 mg  4 mg Oral Q8H PRN Brittni Paulson MD        Or    [facility]ron [facility] injection 4 mg  4 mg IntraVENous Q6H PRN Brittni Paulson MD        polyethylene glycol (GLYCOLAX) packet 17 g  17 g Oral Daily PRN Brittni Paulson MD        acetaminophen (TYLENOL) tablet 650 mg  650 mg Oral Q6H PRN Brittni Paulson MD   650 mg at 09/15/22 2242    Or    acetaminophen (TYLENOL) suppository 650 mg  650 mg Rectal Q6H PRN Brittni Paulson MD         Facility-Administered Medications Ordered in Other Encounters   Medication Dose Route Frequency Provider Last Rate Last Admin    ceFAZolin (ANCEF) 2000 mg in dextrose 3 % 50 mL IVPB (duplex)   IntraVENous PRN Loni Hutson MD   2,000 mg at 09/17/22 0932    dexamethasone (DECADRON) injection   IntraVENous PRN Loni Hutson MD   4 mg at 09/17/22 0934    ondansetron (ZOFRAN) injection   IntraVENous PRN Loni Hutson MD   4 mg at 09/17/22 0941    succinylcholine (ANECTINE) injection   IntraVENous PRN Loni Hutson MD   100 mg at 09/17/22 7621    propofol injection   IntraVENous PRN Loni Hutson MD   50 mg at 09/17/22 0924    dexmedetomidine (PRECEDEX) injection   IntraVENous PRN Loni Hutson MD   4 mcg at 09/17/22 0946    rocuronium (ZEMURON) injection   IntraVENous PRN Loni Hutson MD   15 mg at 09/17/22 1050    phenylephrine (MARCIE-SYNEPHRINE) 50 mg in dextrose 5 % 250 mL infusion   IntraVENous Continuous PRN Loni Huston MD   50 mcg at 09/17/22 0944    lidocaine (cardiac) (XYLOCAINE) injection   IntraVENous PRN Loni Hutson MD   80 mg at 09/17/22 0743    lactated ringers infusion   IntraVENous Continuous PRN Loni Hutson  mL/hr at 09/17/22 0925 New Bag at 09/17/22 0925    fentaNYL (SUBLIMAZE) injection   IntraVENous PRN Loni Hutson MD   25 mcg at 09/17/22 1025    phenylephrine (MARCIE-SYNEPHRINE) 50 mg in dextrose 5 % 250 mL infusion   IntraVENous Continuous PRN Danuta Muse MD   Stopped at 09/17/22 1100    propofol injection   IntraVENous Continuous PRN Danuta Muse MD   Stopped at 09/17/22 1036    phenylephrine (VAZCULEP) injection   IntraVENous PRN Danuta Muse MD   100 mcg at 09/17/22 1045    sugammadex (BRIDION) 200 MG/2ML injection   IntraVENous PRN Danuta Muse MD   200 mg at 09/17/22 1100       Allergies: Allergies   Allergen Reactions    Penicillins Other (See Comments)     Unknown reaction; patient has tolerated multiple courses of cephalexin as an outpatient per chart review as of 09/14/22.  Statins Other (See Comments)     Unknown reaction; patient tolerates pitavastatin as an outpatient per chart review as of 9/14/22.       Sulfa Antibiotics        Problem List:    Patient Active Problem List   Diagnosis Code    Type 2 diabetes mellitus with circulatory disorder, without long-term current use of insulin ([facility]) E11.59    Pure hypercholesterolemia E78.00    Dementia ([facility] [address] F03.90    Diabetes ([address] E11.9    Hyperlipidemia E78.5    Hypertension I10    Osteoporosis M81.0    Meningioma ([address] D32.9    Pelvic ring fracture, closed, initial encounter ([facility] [address] S32.810A       Past Medical History:        Diagnosis Date    Atherosclerotic vascular disease     brain w right m2 stenosis and vertebral r/l artery stenosis    Back pain     Brain mass     noted good michelle 11/20 mri    Cervical spondylolysis     Dementia ([facility] [address]     has not seen neurologist. by report 20/30 mmse and aricept was started    Diabetes ([facility] [address]     type 2    Frequent falls     Hearing loss     Heart disease     bypass? cardiologist?    Hyperlipidemia     Hypertension     Mental status alteration 11/2020    [facility] consult    Osteoporosis     restarted after a drug holiday    Wrist fracture        Past Surgical History:        Procedure Laterality Date    BRAIN TUMOR EXCISION      benign, right frontotemporal craniotomy    CORONARY ARTERY BYPASS GRAFT      HIP SURGERY      pins    PARTIAL HIP ARTHROPLASTY      left hip       Social History:    Social History     Tobacco Use    Smoking status: Never    Smokeless tobacco: Never   Substance Use Topics    Alcohol use: No                                Counseling given: Not Answered      Vital Signs (Current):   Vitals:    09/16/22 0937 09/16/22 1311 09/16/22 1610 09/16/22 1953   BP: (!) 153/44  (!) 160/51 (!) 143/66   Pulse:   77 86   Resp:  17 16 16   Temp:   97.2 °F (36.2 °C) 98.4 °F (36.9 °C)   TempSrc:   Axillary Oral   SpO2:   95% 94%   Weight:       Height:                                                  BP Readings from Last 3 Encounters:   09/16/22 (!) 143/66   04/22/22 116/62   12/20/21 (!) 187/85       NPO Status:                                                                                 BMI:   Wt Readings from Last 3 Encounters:   09/16/22 117 lb 11.6 oz (53.4 kg)   04/22/22 113 lb 6.4 oz (51.4 kg)   12/20/21 112 lb (50.8 kg)     Body mass index is 22.99 kg/m².     CBC:   Lab Results   Component Value Date/Time    WBC 9.7 09/15/2022 06:44 AM    RBC 4.93 09/15/2022 06:44 AM    HGB 14.2 09/15/2022 06:44 AM    HCT 42.9 09/15/2022 06:44 AM    MCV 87.1 09/15/2022 06:44 AM    RDW 15.2 09/15/2022 06:44 AM     09/15/2022 06:44 AM       CMP:   Lab Results   Component Value Date/Time     09/15/2022 06:44 AM    K 4.9 09/15/2022 06:44 AM     09/15/2022 06:44 AM    CO2 21 09/15/2022 06:44 AM    BUN 14 09/15/2022 06:44 AM    CREATININE <0.5 09/15/2022 06:44 AM    GFRAA >60 09/15/2022 06:44 AM    AGRATIO 1.2 04/22/2022 11:15 AM    LABGLOM >60 09/15/2022 06:44 AM    GLUCOSE 109 09/15/2022 06:44 AM    PROT 7.0 04/22/2022 11:15 AM    CALCIUM 8.6 09/15/2022 06:44 AM    BILITOT 0.4 04/22/2022 11:15 AM    ALKPHOS 85 04/22/2022 11:15 AM    AST 11 04/22/2022 11:15 AM    ALT 6 04/22/2022 11:15 AM       POC Tests:   Recent Labs     09/16/22 2113   POCGLU 92 Coags: No results found for: PROTIME, INR, APTT    HCG (If Applicable): No results found for: PREGTESTUR, PREGSERUM, HCG, HCGQUANT     ABGs: No results found for: PHART, PO2ART, PVG2NOL, ZXU2MSM, BEART, J6XEPVAL     Type & Screen (If Applicable):  No results found for: LABABO, LABRH    Drug/Infectious Status (If Applicable):  No results found for: HIV, HEPCAB    COVID-19 Screening (If Applicable): No results found for: COVID19        Anesthesia Evaluation    Airway: Mallampati: II  TM distance: >3 FB   Neck ROM: full  Mouth opening: > = 3 FB   Dental:          Pulmonary:                              Cardiovascular:    (+) hypertension:,         Rhythm: regular  Rate: normal                    Neuro/Psych:   (+) psychiatric history:            GI/Hepatic/Renal:             Endo/Other:                     Abdominal:             Vascular: Other Findings: Confused and agitated          Anesthesia Plan      general     ASA 3       Induction: intravenous. Anesthetic plan and risks discussed with patient and child/children. Plan discussed with CRNA.                     Miranda Moses MD   9/17/2022

## 2022-09-17 NOTE — PROGRESS NOTES
Hospitalist Progress Note      PCP: Alea Knox MD    Date of Admission: 9/14/2022    Chief Complaint: [facility] Course: This is a 35-year-old female with a multiple medical problem admitted with a fall from nursing home noted to have a close fracture of sacrum, closed pelvic ring fracture orthopedic consulted status post surgery on 9/17/2022    Subjective: Patient is comfortable denies any chest pain or shortness of breath pain is controlled. Confused trying to get out of the bed currently in restraints nurse at the bedside.       Medications:  Reviewed    Infusion Medications    sodium chloride      sodium chloride      sodium chloride 75 mL/hr at 09/16/22 2301    dextrose      sodium chloride       Scheduled Medications    sodium chloride flush  5-40 mL IntraVENous 2 times per day    [START ON 9/18/2022] cefdinir  300 mg Oral 2 times per day    haloperidol lactate  5 mg IntraMUSCular Once    insulin lispro  0-4 Units SubCUTAneous TID WC    insulin lispro  0-4 Units SubCUTAneous Nightly    donepezil  10 mg Oral Nightly    insulin glargine  10 Units SubCUTAneous Nightly    losartan  75 mg Oral Daily    sodium chloride flush  5-40 mL IntraVENous 2 times per day    [Held by provider] enoxaparin  40 mg SubCUTAneous Daily     PRN Meds: sodium chloride flush, sodium chloride, fentanNYL, HYDROmorphone, oxyCODONE **OR** oxyCODONE, ondansetron, prochlorperazine, LORazepam, diphenhydrAMINE, labetalol, HYDROmorphone, glucose, dextrose bolus **OR** dextrose bolus, glucagon (rDNA), dextrose, traMADol **OR** traMADol, sodium chloride flush, sodium chloride, ondansetron **OR** ondansetron, polyethylene glycol, acetaminophen **OR** acetaminophen      Intake/Output Summary (Last 24 hours) at 9/17/2022 1234  Last data filed at 9/16/2022 1745  Gross per 24 hour   Intake 60 ml   Output --   Net 60 ml       Physical Exam Performed:    BP (!) 144/70   Pulse 86   Temp 98.1 °F (36.7 °C) (Temporal)   Resp 18 Ht 5' (1.524 m)   Wt 117 lb 11.6 oz (53.4 kg)   SpO2 94%   BMI 22.99 kg/m²     General appearance: No apparent distress, appears stated age and cooperative. HEENT: Pupils equal, round, and reactive to light. Conjunctivae/corneas clear. Neck: Supple, with full range of motion. No jugular venous distention. Trachea midline. Respiratory:  Normal respiratory effort. Clear to auscultation, bilaterally without Rales/Wheezes/Rhonchi. Cardiovascular: Regular rate and rhythm with normal S1/S2 without murmurs, rubs or gallops. Abdomen: Soft, non-tender, non-distended with normal bowel sounds. Musculoskeletal: No clubbing, cyanosis or edema bilaterally. Full range of motion without deformity. Skin: Skin color, texture, turgor normal.  No rashes or lesions. Neurologic:  Neurovascularly intact without any focal sensory/motor deficits. Cranial nerves: II-XII intact, grossly non-focal.  Psychiatric: Alert and oriented, thought content appropriate, normal insight  Capillary Refill: Brisk, 3 seconds, normal   Peripheral Pulses: +2 palpable, equal bilaterally       Labs:   Recent Labs     09/14/22  1348 09/15/22  0644   WBC 11.5* 9.7   HGB 13.8 14.2   HCT 41.7 42.9    228     Recent Labs     09/14/22  1348 09/15/22  0644    139   K 4.1 4.9    104   CO2 24 21   BUN 13 14   CREATININE <0.5* <0.5*   CALCIUM 8.6 8.6     No results for input(s): AST, ALT, BILIDIR, BILITOT, ALKPHOS in the last 72 hours. No results for input(s): INR in the last 72 hours. No results for input(s): Prentis Revels in the last 72 hours. Urinalysis:      Lab Results   Component Value Date/Time    NITRU Negative 04/22/2022 11:21 AM    WBCUA 21 12/17/2019 01:00 PM    RBCUA None seen 12/17/2019 01:00 PM    BLOODU Negative 04/22/2022 11:21 AM    SPECGRAV 1.015 04/22/2022 11:21 AM    GLUCOSEU >=1000 04/22/2022 11:21 AM       Radiology:  XR HIP LEFT (2-3 VIEWS)   Final Result      Intraoperative fluoroscopy was performed. disease   4. Osteoporosis and old right multiple healed rib fractures      XR PELVIS (1-2 VIEWS)   Final Result   Impression: Limited study without acute abnormality. XR KNEE RIGHT (1-2 VIEWS)   Final Result   Impression: No acute osseous abnormality. Osteopenia. XR FEMUR LEFT (MIN 2 VIEWS)   Final Result   Impression: Limited study without acute osseous abnormality. XR KNEE LEFT (1-2 VIEWS)   Final Result   Impression: Limited study without acute osseous abnormality. XR FEMUR LEFT (MIN 2 VIEWS)    (Results Pending)           Assessment/Plan:    Active Hospital Problems    Diagnosis     Pelvic ring fracture, closed, initial encounter ([facility] [address] [S32.810A]      Priority: Medium     Postop day #0 status post open reduction and internal fixation with plate and screws and cerclage wire of the left periprosthetic hip fracture per orthopedic, post op care per orthopedic. Alzheimer's dementia continue with home medication. Now with increased confusion supportive care Zyprexa 2.5 mg p.o. every 6 as needed. Hypertension continue with home medication monitor blood pressure closely hold antihypertensive medication for systolic blood pressure less than 120. Diabetes mellitus type 2 continue with the current care hemoglobin A1c= 8.6 on 8/2/2022    DVT Prophylaxis: Lovenox subcu  Diet: Diet NPO  Code Status: Limited  PT/OT Eval Status:  For orthopedic    Dispo -     Appropriate for A1 Discharge Unit:       Darlis Lombard, MD

## 2022-09-17 NOTE — ANESTHESIA POSTPROCEDURE EVALUATION
Department of Anesthesiology  Postprocedure Note    Patient: Nai Marie  MRN: [id number]  YOB: 1931  Date of evaluation: 9/17/2022      Procedure Summary     Date: 09/17/22 Room / Location: [place] [address]  / [facility]    Anesthesia Start: 4162 Anesthesia Stop: 3928    Procedure: LEFT HIP OPEN REDUCTION INTERNAL FIXATION (Left: Hip) Diagnosis:       Closed fracture of left hip, initial encounter ([facility] [address]      (Closed fracture of left hip, initial encounter ([facility] [address] Jayashree Ceballos)    Surgeons: Sharona Sands MD Responsible Provider: Alison Jon MD    Anesthesia Type: general ASA Status: 3          Anesthesia Type: No value filed.     Elisha Phase I: Elisha Score: 10    Elisha Phase II:        Anesthesia Post Evaluation    Patient location during evaluation: PACU  Level of consciousness: awake, combative and confused  Complications: no  Multimodal analgesia pain management approach

## 2022-09-17 NOTE — PLAN OF CARE
Problem: Skin/Tissue Integrity  Goal: Absence of new skin breakdown  Outcome: Progressing     Problem: Safety - Adult  Goal: Free from fall injury  Outcome: Progressing  Note: Fall precautions in place. Bed is in low and locked position. Bed alarm set. Call light and bedside table within reach. Frequent visual checks made.       Problem: Confusion  Goal: Confusion, delirium, dementia, or psychosis is improved or at baseline  Outcome: Progressing  Flowsheets (Taken 9/17/2022 0346)  Effect of thought disturbance (confusion, delirium, dementia, or psychosis) are managed with adequate functional status:   Assess for contributors to thought disturbance, including medications, impaired vision or hearing, underlying metabolic abnormalities, dehydration, psychiatric diagnoses, notify [place] high risk fall precautions, as indicated   Provide frequent short contacts to provide reality reorientation, refocusing and direction   Decrease environmental stimuli, including noise as appropriate   Monitor and intervene to maintain adequate nutrition, hydration, elimination, sleep and activity

## 2022-09-17 NOTE — DISCHARGE INSTRUCTIONS
Silva Garrett MD  [place] Office: [address] [facility], [address], [address] [phone]) Akshat Gaytan Discharge Instructions  For daytime questions or concerns, please call my clinic: [phone]  For urgent questions after hours call or text me on my cell: [phone]  If I am unavailable, you can still call the clinic number which will connect you to the on call physician. Lovenox x30 days to prevent DVT  Weight bearing as tolerated, protected with walker at all times  Resume regular diet    Dressing/Wound Care: May remove outer dressing after 72 hours and replace with a clean dressing  May shower after 72 hours but do NOT submerge (I.e. no hot tubs, tub baths, pools, lakes, ocean ect). Avoid contact with dirty water. You may cover with a dry dressing or leave it open to the air. Keep it clean. Do not apply any cream or lotion to your incision  Please call immediately if any increasing redness or drainage, any fevers. For pain use the following combinations of medications. They work best together and are safe to take in combination as outlined below  -  Remember that swelling increases pain. When able, elevate and use ice to help with swelling  -  Acetaminophen (Tylenol) - this is OK to take in addition to an anti-inflammatory       - Maximum dosing of 1000mg every 6-8 hours (max 3000mg per day)  -  Anti-inflammatory options - select only one type of anti-inflammatory. All work equally well.  Use what you have available at home.       - Note: avoid this category of medications if you are taking blood thinners, have a history of stomach ulcers, or kidney disease       - Diclofenac - max dose 75mg twice daily (50mg if age over 72)       - Meloxicam - max dose up to 15mg daily (7.5mg if age over 72)       - Ibuprofen (motrin) - max dose up to 800mg every 8 hours (400mg if age over 72)       - Naproxen (aleve) - max dose up to 500mg every 12 hours (250mg if age over 72)  -  Opioid pain medication (oxycodone, hydrocodone, hydromorphone, etc)       - These are strongest but also have the most side effects. Use the smallest dose that is effective. - Do NOT take more than prescribed unless directed to do so. - As pain improves, take less pills and less often. Wean off ASAP       - Some narcotics such as percocet and vicodin also contain acetaminophen. - Please check the label to ensure you do not go over your max dose on acetaminophen       - Do not operate machinery while taking narcotic pain medications       - These medications all cause constipation. Take a stool softener as needed such as colace  - It is unrealistic to expect to be completely pain free after surgery.  The goal is to get you to a manageable level where you are able to sleep and function during your day  - Please call me if you are struggling with acceptable pain control and we will troubleshoot    Follow up with me 10-14 days after surgery for wound check and suture removal.    Dr. Charlene Sparks MD   [facility]  [address] [facility], [address], [address]  Office: [phone]  Cell: [phone]

## 2022-09-17 NOTE — PROGRESS NOTES
Patient has become increasingly agitated and uncooperative. She has pulled IV site out x 3. IV fluids are currently off. She is confused and disoriented. She calmed down after IV was out. Unable to orient. Incontinent of urine. Diaper changed. Aminata area and bilateral groin are red and excoriated. Skin cleansed. Barrier cream applied.

## 2022-09-17 NOTE — PROGRESS NOTES
Dr Zamzam Montenegro and ICU RN Estuardo Mendoza able to place 22G in left hand. PIV wrapped in cobain. IV PRN pain meds administered for pain according to dementia scale. IVF running through PIV. O2 is not picking up well d/t poor perfusion and pt's uncooperativeness. Sometimes it rings off 86-89%, but pt will not allow staff to put nasalcannula or NRB on. She tries to bite and thrashes her head all around. Will re-attempt if O2 drops again.

## 2022-09-17 NOTE — PROGRESS NOTES
VSS. Patient is disoriented x 4. At times, able to be re-directed. Arrived from PACU in bilateral wrist restraints due to patient pulling out multiple IVs, attempting to pull out fonseca and combativeness toward staff. Restraints checked throughout shift per policy. Tolerating small sips of water and jello. Given PRN PO pain meds per pain assessment in advanced dementia scale. No new issues or complaints at this time.

## 2022-09-17 NOTE — PROGRESS NOTES
Unable to get IV in. Two Rns tried unsuccessfully. ICU charge RN requested by this RN for some assistance. RN stated she will see If anyone is available to help.

## 2022-09-17 NOTE — PROGRESS NOTES
PACU Transfer to Floor Note    Procedure(s):  LEFT HIP OPEN REDUCTION INTERNAL FIXATION    Current Allergies: Penicillins, Statins, and Sulfa antibiotics    Pt meets criteria as per Renee Score and ASPAN Standards to transfer to next phase of care. Recent Labs     09/16/22 2113 09/17/22  1135   POCGLU 92 101*       Vitals:    09/17/22 1415   BP: (!) 142/114   Pulse: 98   Resp: 19   Temp:    SpO2:      Vitals within 20% of pt's admission vitals as per RENEE SCORE    SpO2: 91 %           Intake/Output Summary (Last 24 hours) at 9/17/2022 1431  Last data filed at 9/16/2022 1745  Gross per 24 hour   Intake 60 ml   Output --   Net 60 ml       Pain assessment:  delia -dementia. Pt appears to be in pain based on dementia scale    Pain Level: 8    Patient was assessed for alterations to skin integrity. There were not alterations observed. Is patient incontinent: yes, but pt has fonseca catheter in place    Handoff report given at bedside.    Family updated and directed to pt room      9/17/2022 2:31 PM

## 2022-09-17 NOTE — PROGRESS NOTES
5mg more of Haldol administred per Dr Rigo Osuna. Pt also placed in bilat wrist restraints per MD d/t pt's attempts to pull out fonseca and hit staff.      Will re-attempt to place PIV if pt settles down and allows

## 2022-09-17 NOTE — PROGRESS NOTES
4 Eyes Admission Assessment     I agree as the admission nurse that 2 RN's have performed a thorough Head to Toe Skin Assessment on the patient. ALL assessment sites listed below have been assessed on admission. Areas assessed by both nurses:   [x]   Head, Face, and Ears   [x]   Shoulders, Back, and Chest  [x]   Arms, Elbows, and Hands   [x]   Coccyx, Sacrum, and Ischium  [x]   Legs, Feet, and Heels        Does the Patient have Skin Breakdown?   No   W/ exception to left hip sx site  Blanchable redness to side of left knee, right ear, and bilateral hips      Scattered brusing    Alvin Prevention initiated:  Yes   Wound Care Orders initiated:  NA      [facility] nurse consulted for Pressure Injury (Stage 3,4, Unstageable, DTI, NWPT, and Complex wounds) or Alvin score 18 or lower:  NA      Nurse 1 eSignature: Electronically signed by Jarrod Ahn RN on 9/17/22 at 3:14 PM EDT    **SHARE this note so that the co-signing nurse is able to place an eSignature**    Nurse 2 eSignature: Dominique Lundy RN

## 2022-09-17 NOTE — PROGRESS NOTES
Patient calling out. She states that she hurts and is scared. Unable to rate or describe pain due to advanced dementia. Medicated with Hydromorphone as ordered. Attempts made to calm patient and orient to situation. Will continue to monitor.

## 2022-09-17 NOTE — PROGRESS NOTES
Procedure(s):  LEFT HIP OPEN REDUCTION INTERNAL FIXATION    Current Allergies: Penicillins, Statins, and Sulfa antibiotics    Recent Labs     09/16/22 2113 09/17/22  1135   POCGLU 92 101*       Admitted to PACU bed 13 from OR. Arrived on a hospital bed . Attached to PACU monitoring system. Alarms and parameters set. Report received from anesthesia personnel. OR staff did not report skin issues that were observed while in OR  Pt arrived with oxygen per non-rebreather face mask with oxygen at 10 liters. Athrombic wraps in place. Pt uncooperative. Laying on her left side. Pt yelling out words, unable to hold conversation or follow commands. Pain assessed by dementia scale and medication administered per eMAR. Unable to look at procedure site d/t pt's uncooperativeness and risk of hurting patient during attempts to assess site.

## 2022-09-17 NOTE — PROGRESS NOTES
I had a detailed discussion this morning with the patient's daughter Slava Pina [phone] and Kathleen Hernandez [phone] regarding treatment options. We discussed the risks of nonoperative treatment which would require prolonged period of immobility and has high risk for pressure sores, pneumonia and blood clots among other things. High mortality with non operative treatment. We discussed open reduction internal fixation versus revision hemiarthroplasty to a MFTS vs proximal femoral replacement. Pre-existing implant is cemented, and the fracture is not displaced. There is evidence to show that internal fixation of a nondisplaced cemented implant can achieve good implant stability and can allow for immediate weightbearing. Revision hemiarthroplasty would require extensive dissection and possible bone loss to extract cement currently in place or could potentially displace or worsen fracture a lignment. Given the extensive nature of the revision and PFR, and the expected outcomes with internal fixation, I have made this recommendation. I will examine the implants intraoperatively and if adequate stability is felt to be achieved with internal fixation, this is the better option. I will plant to have revision equipment available if needed. I also discussed additional risks of surgery including bleeding, infection, da mage to surrounding structures, need for future procedures, possibility for nonunion, malunion, or implant loosening or displacement further. No guarantees were made or implied. Slava Pina is also aware that there are additional risks not discussed. No part of the procedure does she refused. Plan for surgery this morning    Call if questions      KRISTINE Prieto  [facility]  Cell: [phone]    Voice recognition software used for this note.  Please notify me if any transcription errors are present and I will make an addendum

## 2022-09-17 NOTE — OP NOTE
Jet Rose MD  [place] Office: [address], [address], [address] [facility] ([id number]) [place]      ORIF Periprosthetic Proximal Femur Fracture     Nai Marie  : 1931  PCP: Jone Hurd MD    Surgery Date: 2022          PREOPERATIVE DIAGNOSIS: [facility] [S72.002A]    POSTOPERATIVE DIAGNOSIS: Same    PROCEDURES PERFORMED:  Open Reduction and Internal Fixation with plate, screws and cerclage wires of Left Periprosthetic Hip Fracture In the peritrochanteric region (CPT 21237)    ANESTHESIA: General anesthesia    ANTIBIOTIC: CEFAZOLIN, DOSING PER NURSING CHART, 1g incisional vancomycin    SURGEON: Sharona Sands MD    ASSISTANT: First Assistant: Tyler Singleton. Needed for reduction, limb manipulation, tissue retraction, and wound closure as indicated. FINDINGS:   Fracture was completely nondisplaced, anatomically reduced, no stem subsidence with good intact cement mantle likely still with some at least partial cement attachment. Stable on traction view under dynamic fluoroscopy    IMPLANTS:  Edwina NCB periprosthetic polyaxial locking plate with a greater trochanteric extension, multiple cerclage cables    Implant Name Type Inv. Item Serial No.  Lot No. LRB No. Used Action   CABLE SURG L635MM DIA1. 8MM CO CHROM CERCLAGE CBL RDY - [id number]  CABLE SURG L635MM DIA1. 8MM CO CHROM CERCLAGE CBL RDY  [employer]WD [id number] Left 1 Implanted   CABLE SURG L635MM DIA1. 8MM CO CHROM CERCLAGE CBL RDY - [id number]  CABLE SURG L635MM DIA1. 8MM CO CHROM CERCLAGE CBL RDY  [employer]WD [id number] Left 2 Implanted   PLATE BONE LEFT TRCHNTRC TTNM NRRW PRPRSTHTC NCB - [id number]  PLATE BONE LEFT TRCHNTRC TTNM NRRW PRPRSTHTC NCB  [employer]WD [id number] Left 1 Implanted   BUTTON CBL 2.5MM TI HEX DRV FOR NCB POLYAX RAMIN PLT SYS - [id number]  BUTTON CBL 2.5MM TI HEX DRV FOR NCB POLYAX RAMIN PLT SYS  [employer]- [id number] Left 1 Wasted   CAP SCR DIA8MM HEX DIA3.5MM DST FEM TI RAMIN NCB - [id number]  CAP SCR DIA8MM HEX DIA3.5MM DST FEM TI RAMIN NCB  [employer]  Left 3 Implanted   PLATE BONE [id number] 12 HOLE LEFT PRXML PRPRSTHTC FMRL TTNM NCB - [id number]  PLATE BONE [id number] 12 HOLE LEFT PRXML PRPRSTHTC FMRL TTNM NCB  [employer]  Left 1 Implanted   SCREW BNE L60MM DIA5MM STD RAVINDER DST FEM TI ST KATYA - [id number]  SCREW BNE L60MM DIA5MM STD RAVINDER DST FEM TI ST KATYA  [employer]  Left 1 Implanted   SCREW BNE L36MM DIA5MM DST FEM TI ALLY ST FULL THRD NCB - [id number]  SCREW BNE L36MM DIA5MM DST FEM TI ALLY ST FULL THRD NCB  [employer]-  Left 2 Implanted   SCREW BNE L46MM DIA5MM DST FEM TI ALLY ST FULL THRD NCB - [id number]  SCREW BNE L46MM DIA5MM DST FEM TI ALLY ST FULL THRD NCB  [employer]  Left 1 Implanted   SCREW BNE L20MM DIA3.5MM COR DIA2.7MM TROCHANTERIC TI RAMIN - [id number]  SCREW BNE L20MM DIA3.5MM COR DIA2.7MM TROCHANTERIC TI RAMIN  [employer]WD  Left 1 Implanted   SCREW BONE LOCKING 35MM NOY 16MML TITANIUM NON STERILE - [id number]  SCREW BONE LOCKING 35MM NOY 16MML TITANIUM NON STERILE  [employer]  Left 2 Implanted   SCREW BNE LCK UNIV 3.5X40 MM TI NS ULS - [id number]  SCREW BNE LCK UNIV 3.5X40 MM TI NS ULS  [employer]WD  Left 1 Implanted   K WIRE FIX L280MM DIA2MM W/ TRCR TIP FOR NCB PLATING SYS - [id number]  K WIRE FIX L280MM DIA2MM W/ TRCR TIP FOR NCB PLATING SYS  [employer]  Left 2 Implanted       COMPLICATIONS: None    POSTOPERATIVE CONDITION:  Stable to PACU.     ESTIMATED BLOOD LOSS: 100cc  * No values recorded between 9/17/2022  9:17 AM and 9/17/2022 11:04 AM *    HISTORY OF PRESENT ILLNESS: The patient is a 80 y.o. female with a history of severe dementia, who lives in a memory care unit.   She has had multiple falls, most significant of which was in August.  Prior to that fall she was ambulatory comfortably with a walker and very mobile. She had a fall and was unable to bear weight. Reportedly work-up showed negative x-rays but she had been unable to ambulate. She had a subsequent fall. She was evaluated in the emergency department and found to have some nondisplaced subacute pelvic fractures as well as a nondisplaced periprosthetic left hip fracture around a previous cemented hemiarthroplasty. I discussed with her daughters Key John [phone] and Lisa Lamas [phone]  the different options for treatment, including nonoperative treatment which would have a high mortality rate, ORIF versus revision arthroplasty versus proximal femoral replacement. Given the cemented implant with residual adhesion to the bone, revision would require extensive dissection and likely lead to significant proximal femoral bone loss. Proximal femoral replacement is very large dissection and would set potentially sacrifice the abductor function. Open reduction internal fixation with an anatomically reduced nondisplaced and possibly subacute fracture with a stable stem would provide good opportunity for immediate weightbearing while minimizing dissection and allowing a long plate to protect a large portion of the femur. We decided to proceed with that option      RISKS OF SURGERY:   I had an extensive conversation with the family regarding the risks, benefits, potential complication and reasonable expectations of the planned procedure. Informed verbal consent was given under no distress. We had ample opportunities for questions regarding the usual outcomes. Risks specifically discussed, but not limited to the following, include possible: bleeding, blood clots, anesthesia complications, nerve and blood vessel injury, loss of reduction, malunion, non-union, infection, reinjury hardware breakage or failure, need for additional surgery, and post-operative stiffness.  In addition, in rare cases the patient may have loss of a limb or even death. No guarantee of any particular results were given. The patient understands that in some cases surgery can make them worse. The family desired that we proceed at the soonest possible time with the operation and expressed clear understanding. DETAILS OF PROCEDURE:  The patient was seen in the pre-operative holding area, the operative extremity was marked. The patient was taken back to operating room, anesthetic was induced and the patient was positioned Lateral on the pegboard. All bony prominences were appropriately padded. The extremity was sterilely prepped and draped in the usual fashion. A formal time-out was performed before the start of the case, verifying the appropriate surgical site, procedure, antibiotic administration. All present were in agreement. An incision was made using lateral approach. Hemostasis was maintained during dissection, carried down through skin and subcutaneous tissues. Full thickness flaps were elevated and the IT band was exposed. Incised longitudinally at which point the fracture was encountered. It was nondisplaced and spread the length of approximately half of the implant. It was anatomically reduced. No evidence of instability with rotation. Cerclage cables x2 were placed to further secure this to the implant. Long lateral plate was placed submuscularly with trochanteric extension. This was pinned in place and confirmed to be appropriate on AP and lateral x-rays. Shaft screws were utilized below the stem in a locking fashion. An additional cerclage wire was placed proximally around the fracture area holding the plate to the bone in this location. For greater trochanteric locking screws were placed as well to contain the greater trochanteric fragment. Dynamic fluoroscopy was utilized with a traction view to confirm that there was no displacement or motion in the fracture.   The collar appeared to be adequately seated and did not change with rotation or traction. Fixation was felt to be adequate at this point. Final fluoroscopic images were taken. The wounds were irrigated with copious amounts of normal saline, hemostasis obtained,  vancomycin powder 1g placed deep in the wound, IT band was closed with observable running self locking sutures. Closed in layers and a dry sterile dressing applied. The patient was awoken from anesthesia and returned to PACU in stable condition with good perfusion of the operative extremity. There were no identified interoperative complications. At the end of the case the instrument count was correct.     POST OP PLAN    Prior to fall in August that started left hip pain, she was an active ambulator in the memory care unit  History significant for DM, dementia , CAD, prior left hip cemented hemiarthroplasty    Also with LC1 pelvic ring injury    Post-op short extended abx course x1wk given surgery adjacent to arthroplasty  WBAT on the operative exremity  Multimodal Pain control  DVT ppx: Mobilize, SCDs, lovenox x30 days  PT/OT, increase activity as tolerated  Encourage diet/fluids  Call for questions: [phone]    Disposition: Pending PT and medical stability    The Patient will follow up in 10-14 days for wound check, xrays of the left femur and AP/inlet/outlet pelvis x rays, and staple removal.          Mona Estes MD  [facility]  Office: [phone]  Cell: [phone]    09/19/22  11:57 AM

## 2022-09-18 LAB
ANION GAP SERPL CALCULATED.3IONS-SCNC: 11 MMOL/L (ref 3–16)
BUN BLDV-MCNC: 13 MG/DL (ref 7–20)
CALCIUM SERPL-MCNC: 8.1 MG/DL (ref 8.3–10.6)
CHLORIDE BLD-SCNC: 108 MMOL/L (ref 99–110)
CO2: 21 MMOL/L (ref 21–32)
CREAT SERPL-MCNC: 0.5 MG/DL (ref 0.6–1.2)
GFR AFRICAN AMERICAN: >60
GFR NON-AFRICAN AMERICAN: >60
GLUCOSE BLD-MCNC: 119 MG/DL (ref 70–99)
GLUCOSE BLD-MCNC: 124 MG/DL (ref 70–99)
GLUCOSE BLD-MCNC: 131 MG/DL (ref 70–99)
GLUCOSE BLD-MCNC: 140 MG/DL (ref 70–99)
HCT VFR BLD CALC: 39.5 % (ref 36–48)
HEMOGLOBIN: 12.5 G/DL (ref 12–16)
MCH RBC QN AUTO: 28.1 PG (ref 26–34)
MCHC RBC AUTO-ENTMCNC: 31.8 G/DL (ref 31–36)
MCV RBC AUTO: 88.4 FL (ref 80–100)
PDW BLD-RTO: 15 % (ref 12.4–15.4)
PERFORMED ON: ABNORMAL
PLATELET # BLD: 263 K/UL (ref 135–450)
PMV BLD AUTO: 9.3 FL (ref 5–10.5)
POTASSIUM REFLEX MAGNESIUM: 3.9 MMOL/L (ref 3.5–5.1)
RBC # BLD: 4.47 M/UL (ref 4–5.2)
SODIUM BLD-SCNC: 140 MMOL/L (ref 136–145)
WBC # BLD: 16.1 K/UL (ref 4–11)

## 2022-09-18 PROCEDURE — 36415 COLL VENOUS BLD VENIPUNCTURE: CPT

## 2022-09-18 PROCEDURE — 80048 BASIC METABOLIC PNL TOTAL CA: CPT

## 2022-09-18 PROCEDURE — 85027 COMPLETE CBC AUTOMATED: CPT

## 2022-09-18 PROCEDURE — 6360000002 HC RX W HCPCS: Performed by: ORTHOPAEDIC SURGERY

## 2022-09-18 PROCEDURE — 1200000000 HC SEMI PRIVATE

## 2022-09-18 PROCEDURE — 2580000003 HC RX 258: Performed by: ORTHOPAEDIC SURGERY

## 2022-09-18 PROCEDURE — 6370000000 HC RX 637 (ALT 250 FOR IP): Performed by: STUDENT IN AN ORGANIZED HEALTH CARE EDUCATION/TRAINING PROGRAM

## 2022-09-18 PROCEDURE — 6370000000 HC RX 637 (ALT 250 FOR IP): Performed by: ORTHOPAEDIC SURGERY

## 2022-09-18 PROCEDURE — 6370000000 HC RX 637 (ALT 250 FOR IP): Performed by: HOSPITALIST

## 2022-09-18 RX ORDER — NALBUPHINE HCL 10 MG/ML
5 AMPUL (ML) INJECTION EVERY 5 MIN PRN
Status: DISCONTINUED | OUTPATIENT
Start: 2022-09-18 | End: 2022-09-18

## 2022-09-18 RX ORDER — SENNA AND DOCUSATE SODIUM 50; 8.6 MG/1; MG/1
1 TABLET, FILM COATED ORAL 2 TIMES DAILY
Status: DISCONTINUED | OUTPATIENT
Start: 2022-09-18 | End: 2022-09-21 | Stop reason: HOSPADM

## 2022-09-18 RX ORDER — CEFDINIR 300 MG/1
300 CAPSULE ORAL EVERY 12 HOURS SCHEDULED
Status: DISCONTINUED | OUTPATIENT
Start: 2022-09-19 | End: 2022-09-21 | Stop reason: HOSPADM

## 2022-09-18 RX ORDER — SODIUM CHLORIDE 9 MG/ML
INJECTION, SOLUTION INTRAVENOUS PRN
Status: DISCONTINUED | OUTPATIENT
Start: 2022-09-18 | End: 2022-09-21 | Stop reason: HOSPADM

## 2022-09-18 RX ORDER — POLYETHYLENE GLYCOL 3350 17 G/17G
17 POWDER, FOR SOLUTION ORAL DAILY PRN
Status: DISCONTINUED | OUTPATIENT
Start: 2022-09-18 | End: 2022-09-21 | Stop reason: HOSPADM

## 2022-09-18 RX ORDER — OXYCODONE HYDROCHLORIDE 5 MG/1
5 TABLET ORAL
Status: DISCONTINUED | OUTPATIENT
Start: 2022-09-18 | End: 2022-09-21 | Stop reason: HOSPADM

## 2022-09-18 RX ORDER — ONDANSETRON 4 MG/1
4 TABLET, ORALLY DISINTEGRATING ORAL EVERY 8 HOURS PRN
Status: DISCONTINUED | OUTPATIENT
Start: 2022-09-18 | End: 2022-09-21 | Stop reason: HOSPADM

## 2022-09-18 RX ORDER — SODIUM CHLORIDE 0.9 % (FLUSH) 0.9 %
5-40 SYRINGE (ML) INJECTION PRN
Status: DISCONTINUED | OUTPATIENT
Start: 2022-09-18 | End: 2022-09-21 | Stop reason: HOSPADM

## 2022-09-18 RX ORDER — SODIUM CHLORIDE, SODIUM LACTATE, POTASSIUM CHLORIDE, CALCIUM CHLORIDE 600; 310; 30; 20 MG/100ML; MG/100ML; MG/100ML; MG/100ML
INJECTION, SOLUTION INTRAVENOUS CONTINUOUS
Status: ACTIVE | OUTPATIENT
Start: 2022-09-18 | End: 2022-09-19

## 2022-09-18 RX ORDER — OXYCODONE HYDROCHLORIDE 5 MG/1
2.5 TABLET ORAL
Status: DISCONTINUED | OUTPATIENT
Start: 2022-09-18 | End: 2022-09-21 | Stop reason: HOSPADM

## 2022-09-18 RX ORDER — ENOXAPARIN SODIUM 100 MG/ML
40 INJECTION SUBCUTANEOUS DAILY
Status: DISCONTINUED | OUTPATIENT
Start: 2022-09-18 | End: 2022-09-21 | Stop reason: HOSPADM

## 2022-09-18 RX ORDER — SODIUM CHLORIDE 0.9 % (FLUSH) 0.9 %
5-40 SYRINGE (ML) INJECTION EVERY 12 HOURS SCHEDULED
Status: DISCONTINUED | OUTPATIENT
Start: 2022-09-18 | End: 2022-09-21 | Stop reason: HOSPADM

## 2022-09-18 RX ORDER — ACETAMINOPHEN 500 MG
1000 TABLET ORAL EVERY 8 HOURS
Status: DISCONTINUED | OUTPATIENT
Start: 2022-09-18 | End: 2022-09-21 | Stop reason: HOSPADM

## 2022-09-18 RX ORDER — ONDANSETRON 2 MG/ML
4 INJECTION INTRAMUSCULAR; INTRAVENOUS EVERY 6 HOURS PRN
Status: DISCONTINUED | OUTPATIENT
Start: 2022-09-18 | End: 2022-09-21 | Stop reason: HOSPADM

## 2022-09-18 RX ADMIN — CEFAZOLIN 2000 MG: 2 INJECTION, POWDER, FOR SOLUTION INTRAMUSCULAR; INTRAVENOUS at 16:47

## 2022-09-18 RX ADMIN — HYDROMORPHONE HYDROCHLORIDE 0.5 MG: 1 INJECTION, SOLUTION INTRAMUSCULAR; INTRAVENOUS; SUBCUTANEOUS at 23:30

## 2022-09-18 RX ADMIN — OLANZAPINE 2.5 MG: 5 TABLET, FILM COATED ORAL at 00:08

## 2022-09-18 RX ADMIN — ENOXAPARIN SODIUM 40 MG: 100 INJECTION SUBCUTANEOUS at 11:09

## 2022-09-18 RX ADMIN — SODIUM CHLORIDE, PRESERVATIVE FREE 10 ML: 5 INJECTION INTRAVENOUS at 10:32

## 2022-09-18 RX ADMIN — HYDROMORPHONE HYDROCHLORIDE 0.25 MG: 1 INJECTION, SOLUTION INTRAMUSCULAR; INTRAVENOUS; SUBCUTANEOUS at 16:43

## 2022-09-18 RX ADMIN — INSULIN GLARGINE 10 UNITS: 100 INJECTION, SOLUTION SUBCUTANEOUS at 21:02

## 2022-09-18 RX ADMIN — TRAMADOL HYDROCHLORIDE 100 MG: 50 TABLET, COATED ORAL at 03:39

## 2022-09-18 RX ADMIN — SODIUM CHLORIDE, POTASSIUM CHLORIDE, SODIUM LACTATE AND CALCIUM CHLORIDE: 600; 310; 30; 20 INJECTION, SOLUTION INTRAVENOUS at 10:42

## 2022-09-18 RX ADMIN — CEFAZOLIN 2000 MG: 2 INJECTION, POWDER, FOR SOLUTION INTRAMUSCULAR; INTRAVENOUS at 10:45

## 2022-09-18 RX ADMIN — HYDROMORPHONE HYDROCHLORIDE 0.25 MG: 1 INJECTION, SOLUTION INTRAMUSCULAR; INTRAVENOUS; SUBCUTANEOUS at 12:03

## 2022-09-18 ASSESSMENT — PAIN DESCRIPTION - ORIENTATION: ORIENTATION: LEFT

## 2022-09-18 ASSESSMENT — PAIN DESCRIPTION - PAIN TYPE: TYPE: SURGICAL PAIN

## 2022-09-18 ASSESSMENT — PAIN SCALES - GENERAL: PAINLEVEL_OUTOF10: 9

## 2022-09-18 ASSESSMENT — PAIN DESCRIPTION - DESCRIPTORS: DESCRIPTORS: SORE

## 2022-09-18 ASSESSMENT — PAIN DESCRIPTION - LOCATION: LOCATION: LEG

## 2022-09-18 ASSESSMENT — PAIN - FUNCTIONAL ASSESSMENT: PAIN_FUNCTIONAL_ASSESSMENT: INTOLERABLE, UNABLE TO DO ANY ACTIVE OR PASSIVE ACTIVITIES

## 2022-09-18 NOTE — PLAN OF CARE
Problem: Safety - Adult  Goal: Free from fall injury  Note: No falls occurred this shift; fall precautions maintained. Problem: Pain  Goal: Verbalizes/displays adequate comfort level or baseline comfort level  Outcome: Not Progressing  Note: Difficult to adequately treat pain d/t agitation. Problem: Confusion  Goal: Confusion, delirium, dementia, or psychosis is improved or at baseline  Description: INTERVENTIONS:  1. Assess for possible contributors to thought disturbance, including medications, impaired vision or hearing, underlying metabolic abnormalities, dehydration, psychiatric diagnoses, and notify attending LIP  2. [place] high risk fall precautions, as indicated  3. Provide frequent short contacts to provide reality reorientation, refocusing and direction  4. Decrease environmental stimuli, including noise as appropriate  5. Monitor and intervene to maintain adequate nutrition, hydration, elimination, sleep and activity  6. If unable to ensure safety without constant attention obtain sitter and review sitter guidelines with assigned personnel  7. Initiate Psychosocial CNS and Spiritual Care consult, as indicated    Problem: Safety - Medical Restraint  Goal: Remains free of injury from restraints (Restraint for Interference with Medical Device)  Description: INTERVENTIONS:  1. Determine that other, less restrictive measures have been tried or would not be effective before applying the restraint  2. Evaluate the patient's condition at the time of restraint application  3. Inform patient/family regarding the reason for restraint  4. Q2H: Monitor safety, psychosocial status, comfort, nutrition and hydration    Problem: Pain  Goal: Verbalizes/displays adequate comfort level or baseline comfort level  Outcome: Not Progressing  Note: Difficult to adequately treat pain d/t agitation.

## 2022-09-18 NOTE — PROGRESS NOTES
Hospitalist Progress Note      PCP: Rylie Nice MD    Date of Admission: 9/14/2022    Chief Complaint: [facility] Course: This is a 80-year-old female with a multiple medical problem admitted with a fall from nursing home noted to have a close fracture of sacrum, closed pelvic ring fracture orthopedic consulted status post surgery on 9/17/2022    Subjective: Patient is more lethargic this morning unable to take p.o. medication.       Medications:  Reviewed    Infusion Medications    lactated ringers      sodium chloride      sodium chloride 125 mL/hr at 09/17/22 1612    sodium chloride 75 mL/hr at 09/16/22 2301    dextrose      sodium chloride       Scheduled Medications    sodium chloride flush  5-40 mL IntraVENous 2 times per day    ceFAZolin (ANCEF) IVPB  2,000 mg IntraVENous Q8H    sennosides-docusate sodium  1 tablet Oral BID    enoxaparin  40 mg SubCUTAneous Daily    acetaminophen  1,000 mg Oral Q8H    cefdinir  300 mg Oral 2 times per day    insulin lispro  0-4 Units SubCUTAneous TID WC    insulin lispro  0-4 Units SubCUTAneous Nightly    donepezil  10 mg Oral Nightly    insulin glargine  10 Units SubCUTAneous Nightly    losartan  75 mg Oral Daily    sodium chloride flush  5-40 mL IntraVENous 2 times per day     PRN Meds: sodium chloride flush, sodium chloride, oxyCODONE **OR** oxyCODONE, HYDROmorphone **OR** HYDROmorphone, ondansetron **OR** ondansetron, polyethylene glycol, magnesium hydroxide, nalbuphine, OLANZapine, HYDROmorphone, glucose, dextrose bolus **OR** dextrose bolus, glucagon (rDNA), dextrose, sodium chloride flush, sodium chloride      Intake/Output Summary (Last 24 hours) at 9/18/2022 0957  Last data filed at 9/18/2022 0555  Gross per 24 hour   Intake --   Output 1300 ml   Net -1300 ml       Physical Exam Performed:    BP 95/61   Pulse 84   Temp 97.5 °F (36.4 °C) (Axillary)   Resp 16   Ht 5' (1.524 m)   Wt 117 lb 11.6 oz (53.4 kg)   SpO2 93%   BMI 22.99 kg/m² General appearance: No apparent distress, appears stated age and cooperative. HEENT: Pupils equal, round, and reactive to light. Conjunctivae/corneas clear. Neck: Supple, with full range of motion. No jugular venous distention. Trachea midline. Respiratory:  Normal respiratory effort. Clear to auscultation, bilaterally without Rales/Wheezes/Rhonchi. Cardiovascular: Regular rate and rhythm with normal S1/S2 without murmurs, rubs or gallops. Abdomen: Soft, non-tender, non-distended with normal bowel sounds. Musculoskeletal: No clubbing, cyanosis or edema bilaterally. Full range of motion without deformity. Skin: Skin color, texture, turgor normal.  No rashes or lesions. Neurologic:  Neurovascularly intact without any focal sensory/motor deficits. Cranial nerves: II-XII intact, grossly non-focal.  Psychiatric: Alert and oriented, thought content appropriate, normal insight  Capillary Refill: Brisk, 3 seconds, normal   Peripheral Pulses: +2 palpable, equal bilaterally       Labs:   No results for input(s): WBC, HGB, HCT, PLT in the last 72 hours. No results for input(s): NA, K, CL, CO2, BUN, CREATININE, CALCIUM, PHOS in the last 72 hours. Invalid input(s): MAGNES  No results for input(s): AST, ALT, BILIDIR, BILITOT, ALKPHOS in the last 72 hours. No results for input(s): INR in the last 72 hours. No results for input(s): Prentis Revels in the last 72 hours. Urinalysis:      Lab Results   Component Value Date/Time    NITRU Negative 04/22/2022 11:21 AM    WBCUA 21 12/17/2019 01:00 PM    RBCUA None seen 12/17/2019 01:00 PM    BLOODU Negative 04/22/2022 11:21 AM    SPECGRAV 1.015 04/22/2022 11:21 AM    GLUCOSEU >=1000 04/22/2022 11:21 AM       Radiology:  XR HIP LEFT (2-3 VIEWS)   Final Result      Intraoperative fluoroscopy was performed. Correlate with procedural note for additional information. FLUORO FOR SURGICAL PROCEDURES   Final Result      Intraoperative fluoroscopy was performed. Correlate with procedural note for additional information. CT FEMUR LEFT WO CONTRAST   Final Result   1. Age-indeterminate fracture of the proximal left femur adjacent to the left hip arthroplasty. There is no significant adjacent edema or hematoma. 2. Please see concurrent CT of the pelvis for discussion of additional findings. CT PELVIS WO CONTRAST Additional Contrast? None   Final Result   1. Subacute, healing fractures of the right obturator ring as detailed above. 2. Nonspecific sclerosis in the midline of the sacrum. There is cortical irregularity along the anterior cortex of the sacrum, which could reflect nondisplaced fractures, age indeterminate. 3. Cystic lesion with mural nodularity along the periphery is present, likely arising from the ovary, and suspicious for primary ovarian neoplasm. This could be further evaluated with ultrasound. XR KNEE LEFT (1-2 VIEWS)   Final Result      No definite fracture. CT CSpine W/O Contrast   Final Result   Impression:      No acute fracture. Multilevel spondylosis and spondylolisthesis, as above. CT Head W/O Contrast   Final Result   1. Stable changes   2. 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 No evidence of acute intracranial hemorrhage   4. 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 Correlation with contrast   Magnetic resonance imaging recommended if clinically indicated in this patient's age group. 5. No vasogenic edema identified      XR CHEST PORTABLE   Final Result   1. Vascular redistribution secondary to the spinal radiograph   2. Cardiomegaly   3. No active airspace disease   4.  Osteoporosis and old right multiple healed rib fractures      XR PELVIS (1-2 VIEWS)   Final Result   Impression: Limited study without acute abnormality. XR KNEE RIGHT (1-2 VIEWS)   Final Result   Impression: No acute osseous abnormality. Osteopenia. XR FEMUR LEFT (MIN 2 VIEWS)   Final Result   Impression: Limited study without acute osseous abnormality. XR KNEE LEFT (1-2 VIEWS)   Final Result   Impression: Limited study without acute osseous abnormality. XR FEMUR LEFT (MIN 2 VIEWS)    (Results Pending)           Assessment/Plan:    Active Hospital Problems    Diagnosis     Pel[facility] ring [facility], closed[facility] [S32.810A]      Priority: Medium     Postop day #1 status post open reduction and internal fixation with plate and screws and cerclage wire of the left periprosthetic hip fracture per orthopedic on 9/17/2022, postop care per orthopedic. Check CBC and a BMP in a.m. Alzheimer's dementia continue with home medication. Now with increased lethargy supportive care ,we will discontinue Zyprexa due to increased lethargy. Hypertension continue with home medication monitor blood pressure closely hold antihypertensive medication for systolic blood pressure less than 120. Diabetes mellitus type 2 continue with the current care hemoglobin A1c= 8.6 on 8/2/2022       DVT Prophylaxis: Lovenox subcu  Diet: ADULT DIET;  Regular; 4 carb choices (60 gm/meal)  Code Status: Limited  PT/OT Eval Status: per Malcolm Ortega MD

## 2022-09-18 NOTE — PROGRESS NOTES
Pt disoriented x4 with no acute changes outside of behavior escalation. VSS on RA, pain difficult to assess due to pt behavior, utilizing both pain medication and non pharmacological interventions to help promote pt comfort. Bilat soft wrist restraints maintained this shift to promote pt safety. Pt tolerating PO without emesis; post op fonseca drained adequately this shift, fonseca removed per protocol without complications; incontinence brief applied to pt. Fall and safety precautions maintained.

## 2022-09-18 NOTE — PLAN OF CARE
Problem: Skin/Tissue Integrity  Goal: Absence of new skin breakdown  Description: 1. Monitor for areas of redness and/or skin breakdown  2. Assess vascular access sites hourly  3. Every 4-6 hours minimum:  Change oxygen saturation probe site  4. Every 4-6 hours:  If on nasal continuous positive airway pressure, respiratory therapy assess nares and determine need for appliance change or resting period. Outcome: Progressing  Note: On specialty mattress. Repositioned q2h. Frequent incontinence checks. Problem: Safety - Adult  Goal: Free from fall injury  9/18/2022 0002 by David Solano RN  Note: No falls occurred this shift; fall precautions maintained. Problem: Pain  Goal: Verbalizes/displays adequate comfort level or baseline comfort level  9/18/2022 1157 by Waylon Del Castillo RN  Outcome: Progressing  Flowsheets (Taken 9/18/2022 1157)  Verbalizes/displays adequate comfort level or baseline comfort level:   Encourage patient to monitor pain and request assistance   Administer analgesics based on type and severity of pain and evaluate response   Assess pain using appropriate pain scale   Implement non-pharmacological measures as appropriate and evaluate response  9/18/2022 0002 by David Solano RN  Outcome: Not Progressing  Note: Difficult to adequately treat pain d/t agitation.

## 2022-09-19 ENCOUNTER — APPOINTMENT (OUTPATIENT)
Dept: GENERAL RADIOLOGY | Age: 87
DRG: 956 | End: 2022-09-19
Payer: MEDICARE

## 2022-09-19 PROBLEM — M97.02XA PERIPROSTHETIC FRACTURE AROUND INTERNAL PROSTHETIC LEFT HIP JOINT (HCC): Status: ACTIVE | Noted: 2022-01-01

## 2022-09-19 LAB
ANION GAP SERPL CALCULATED.3IONS-SCNC: 14 MMOL/L (ref 3–16)
BACTERIA: ABNORMAL /HPF
BILIRUBIN URINE: NEGATIVE
BLOOD, URINE: NEGATIVE
BUN BLDV-MCNC: 10 MG/DL (ref 7–20)
CALCIUM SERPL-MCNC: 8.6 MG/DL (ref 8.3–10.6)
CHLORIDE BLD-SCNC: 105 MMOL/L (ref 99–110)
CLARITY: CLEAR
CO2: 22 MMOL/L (ref 21–32)
COLOR: YELLOW
CREAT SERPL-MCNC: <0.5 MG/DL (ref 0.6–1.2)
GFR AFRICAN AMERICAN: >60
GFR NON-AFRICAN AMERICAN: >60
GLUCOSE BLD-MCNC: 101 MG/DL (ref 70–99)
GLUCOSE BLD-MCNC: 126 MG/DL (ref 70–99)
GLUCOSE BLD-MCNC: 128 MG/DL (ref 70–99)
GLUCOSE BLD-MCNC: 139 MG/DL (ref 70–99)
GLUCOSE BLD-MCNC: 96 MG/DL (ref 70–99)
GLUCOSE URINE: 100 MG/DL
HCT VFR BLD CALC: 41.6 % (ref 36–48)
HEMOGLOBIN: 13.4 G/DL (ref 12–16)
KETONES, URINE: 40 MG/DL
LEUKOCYTE ESTERASE, URINE: NEGATIVE
MCH RBC QN AUTO: 28.2 PG (ref 26–34)
MCHC RBC AUTO-ENTMCNC: 32.3 G/DL (ref 31–36)
MCV RBC AUTO: 87.2 FL (ref 80–100)
MICROSCOPIC EXAMINATION: YES
MUCUS: ABNORMAL /LPF
NITRITE, URINE: NEGATIVE
PDW BLD-RTO: 15 % (ref 12.4–15.4)
PERFORMED ON: ABNORMAL
PERFORMED ON: NORMAL
PH UA: 6 (ref 5–8)
PLATELET # BLD: 284 K/UL (ref 135–450)
PMV BLD AUTO: 10.1 FL (ref 5–10.5)
POTASSIUM SERPL-SCNC: 4.2 MMOL/L (ref 3.5–5.1)
PROTEIN UA: 30 MG/DL
RBC # BLD: 4.77 M/UL (ref 4–5.2)
RBC UA: ABNORMAL /HPF (ref 0–4)
SODIUM BLD-SCNC: 141 MMOL/L (ref 136–145)
SPECIFIC GRAVITY UA: >=1.03 (ref 1–1.03)
URINE TYPE: ABNORMAL
UROBILINOGEN, URINE: 0.2 E.U./DL
WBC # BLD: 14.3 K/UL (ref 4–11)
WBC UA: ABNORMAL /HPF (ref 0–5)

## 2022-09-19 PROCEDURE — 73552 X-RAY EXAM OF FEMUR 2/>: CPT

## 2022-09-19 PROCEDURE — 6370000000 HC RX 637 (ALT 250 FOR IP): Performed by: STUDENT IN AN ORGANIZED HEALTH CARE EDUCATION/TRAINING PROGRAM

## 2022-09-19 PROCEDURE — 1200000000 HC SEMI PRIVATE

## 2022-09-19 PROCEDURE — 2580000003 HC RX 258: Performed by: INTERNAL MEDICINE

## 2022-09-19 PROCEDURE — 2580000003 HC RX 258: Performed by: ORTHOPAEDIC SURGERY

## 2022-09-19 PROCEDURE — 6360000002 HC RX W HCPCS: Performed by: ORTHOPAEDIC SURGERY

## 2022-09-19 PROCEDURE — 80048 BASIC METABOLIC PNL TOTAL CA: CPT

## 2022-09-19 PROCEDURE — 81001 URINALYSIS AUTO W/SCOPE: CPT

## 2022-09-19 PROCEDURE — 6370000000 HC RX 637 (ALT 250 FOR IP): Performed by: ORTHOPAEDIC SURGERY

## 2022-09-19 PROCEDURE — 97530 THERAPEUTIC ACTIVITIES: CPT

## 2022-09-19 PROCEDURE — 97166 OT EVAL MOD COMPLEX 45 MIN: CPT

## 2022-09-19 PROCEDURE — 85027 COMPLETE CBC AUTOMATED: CPT

## 2022-09-19 PROCEDURE — 6370000000 HC RX 637 (ALT 250 FOR IP): Performed by: INTERNAL MEDICINE

## 2022-09-19 PROCEDURE — 97162 PT EVAL MOD COMPLEX 30 MIN: CPT

## 2022-09-19 PROCEDURE — 6360000002 HC RX W HCPCS: Performed by: INTERNAL MEDICINE

## 2022-09-19 PROCEDURE — 2580000003 HC RX 258: Performed by: STUDENT IN AN ORGANIZED HEALTH CARE EDUCATION/TRAINING PROGRAM

## 2022-09-19 RX ORDER — QUETIAPINE FUMARATE 25 MG/1
12.5 TABLET, FILM COATED ORAL NIGHTLY
Status: DISCONTINUED | OUTPATIENT
Start: 2022-09-19 | End: 2022-09-21 | Stop reason: HOSPADM

## 2022-09-19 RX ORDER — GENTAMICIN SULFATE 3 MG/ML
1 SOLUTION/ DROPS OPHTHALMIC
Status: DISCONTINUED | OUTPATIENT
Start: 2022-09-19 | End: 2022-09-21 | Stop reason: HOSPADM

## 2022-09-19 RX ORDER — AMOXICILLIN 250 MG
2 CAPSULE ORAL DAILY PRN
Qty: 30 TABLET | Refills: 2 | Status: SHIPPED | OUTPATIENT
Start: 2022-09-19

## 2022-09-19 RX ORDER — METHOCARBAMOL 750 MG/1
750 TABLET, FILM COATED ORAL 3 TIMES DAILY PRN
Qty: 30 TABLET | Refills: 0 | Status: SHIPPED | OUTPATIENT
Start: 2022-09-19 | End: 2022-09-29

## 2022-09-19 RX ORDER — OXYCODONE HYDROCHLORIDE 5 MG/1
5 TABLET ORAL EVERY 6 HOURS PRN
Qty: 28 TABLET | Refills: 0 | Status: SHIPPED | OUTPATIENT
Start: 2022-09-19 | End: 2022-09-26

## 2022-09-19 RX ORDER — METHOCARBAMOL 750 MG/1
750 TABLET, FILM COATED ORAL 3 TIMES DAILY PRN
Status: DISCONTINUED | OUTPATIENT
Start: 2022-09-19 | End: 2022-09-19

## 2022-09-19 RX ORDER — ENOXAPARIN SODIUM 100 MG/ML
40 INJECTION SUBCUTANEOUS DAILY
Qty: 12 ML | Refills: 0 | Status: ON HOLD | OUTPATIENT
Start: 2022-09-19 | End: 2022-10-08 | Stop reason: SDUPTHER

## 2022-09-19 RX ORDER — KETOROLAC TROMETHAMINE 30 MG/ML
10 INJECTION, SOLUTION INTRAMUSCULAR; INTRAVENOUS ONCE
Status: COMPLETED | OUTPATIENT
Start: 2022-09-19 | End: 2022-09-19

## 2022-09-19 RX ADMIN — SENNOSIDES AND DOCUSATE SODIUM 1 TABLET: 50; 8.6 TABLET ORAL at 20:36

## 2022-09-19 RX ADMIN — ENOXAPARIN SODIUM 40 MG: 100 INJECTION SUBCUTANEOUS at 11:37

## 2022-09-19 RX ADMIN — QUETIAPINE FUMARATE 12.5 MG: 25 TABLET ORAL at 20:36

## 2022-09-19 RX ADMIN — GENTAMICIN SULFATE 1 DROP: 3 SOLUTION OPHTHALMIC at 20:37

## 2022-09-19 RX ADMIN — METHOCARBAMOL 500 MG: 100 INJECTION INTRAMUSCULAR; INTRAVENOUS at 15:07

## 2022-09-19 RX ADMIN — CEFDINIR 300 MG: 300 CAPSULE ORAL at 20:37

## 2022-09-19 RX ADMIN — GENTAMICIN SULFATE 1 DROP: 3 SOLUTION OPHTHALMIC at 12:25

## 2022-09-19 RX ADMIN — SODIUM CHLORIDE, PRESERVATIVE FREE 10 ML: 5 INJECTION INTRAVENOUS at 10:42

## 2022-09-19 RX ADMIN — SODIUM CHLORIDE: 9 INJECTION, SOLUTION INTRAVENOUS at 19:47

## 2022-09-19 RX ADMIN — CEFAZOLIN 2000 MG: 2 INJECTION, POWDER, FOR SOLUTION INTRAMUSCULAR; INTRAVENOUS at 14:00

## 2022-09-19 RX ADMIN — SODIUM CHLORIDE, PRESERVATIVE FREE 10 ML: 5 INJECTION INTRAVENOUS at 10:43

## 2022-09-19 RX ADMIN — CEFAZOLIN 2000 MG: 2 INJECTION, POWDER, FOR SOLUTION INTRAMUSCULAR; INTRAVENOUS at 20:40

## 2022-09-19 RX ADMIN — KETOROLAC TROMETHAMINE 9.9 MG: 30 INJECTION, SOLUTION INTRAMUSCULAR at 13:56

## 2022-09-19 RX ADMIN — GENTAMICIN SULFATE 1 DROP: 3 SOLUTION OPHTHALMIC at 16:47

## 2022-09-19 RX ADMIN — DONEPEZIL HYDROCHLORIDE 10 MG: 10 TABLET, FILM COATED ORAL at 20:36

## 2022-09-19 NOTE — PROGRESS NOTES
Palliative Care Chart Review  and Check in Note:     NAME:  Maik Spangler  Admit Date: 9/14/2022  Hospital Day:  Hospital Day: 6   Current Code status: Limited    Palliative care is continuing to following Ms. Matthew Low for symptom management,  and goals of care discussion as needed. Patient's chart reviewed today 9/19/22. Saw pt at the bedside, she opens eyes and regards however not following commands. Pt had ORIF on 9/17. Of note, pt is EXTREMELY [place] and does not have her hearing aids. Suspect that this is contributing to her delirium. Pt's daughters report at baseline pt is able to recognize familiar faces, ambulates and can carry on a conversation - but only with hearing aids in. D/w RN at bedside, will continue delirium precautions. Called pt's daughter Tramaine Buenrostro, left  with callback number. Called pt's daughter Lani Chaudhary, she reports Tramaine Buenrostro picked up pt's hearing aids yesterday and the batteries were corroded. They sent them to be fixed but that will take time. In the mean time they will work to obtain an OTC hearing aid. Plan remains for pt to go to SNF at discharge. They have been updated on pt's reduced PO intake, they are hoping pt will start to eat more once delirium has cleared. We did briefly discuss hospice if pt does not make improvements once at SNF. The following are the currently established goals/code status, and Symptom management. Goals of care: Continue with current management, family hopeful pt will recover to near baseline.      Code status: Limited - DNR/DNI    Discharge plan: Planning for SNF when medically ready for discharge      ANDIE Payne CNP  09/19/22  11:42 AM

## 2022-09-19 NOTE — PROGRESS NOTES
Leobardo Mccall MD  [place] Office: [address] [facility], [address] ([address]) [place]      S: Theodora Ortega remains stable. Agitated at this time  Seems to be struggling with pain control in thigh    O:  BP (!) 160/99   Pulse 98   Temp 97.5 °F (36.4 °C) (Axillary)   Resp 16   Ht 5' (1.524 m)   Wt 117 lb 11.6 oz (53.4 kg)   SpO2 91%   BMI 22.99 kg/m²     She is in no acute distress. Does not follow commands, agitated  Her Respirations are nonlabored  LLE dressing c/d/i, responds to stimulation, observed ehl/fhl/df/pf, palpable pulse    Lab Results   Component Value Date/Time    CREATININE <0.5 (L) 09/19/2022 06:00 AM    HGB 13.4 09/19/2022 06:00 AM       A/P:  80 y.o. female s/p Left hip ORIF, also with LC1 pelvic ring injury    POD# 2    History significant for DM, dementia , CAD, prior left hip cemented hemiarthroplasty    WBAT on the operative extremity  Post-op short extended abx course x1wk given   Multimodal Pain control  DVT ppx: Mobilize, SCDs, lovenox x30 days given immobility  PT/OT  Encourage diet/fluids    Call for questions: [phone]    Disposition: Pending PT and medical stability  Will need follow up in my clinic in 10-14 days for staple removal and x ray        KRISTINE Carlin MD  [facility]  Office: [phone]  Cell: [phone]

## 2022-09-19 NOTE — PROGRESS NOTES
MD [place] Office: [address] [facility], [address], [address] ([address]) [place]      S: Chela Crump remains stable. Resting at this time  Seems to be struggling with pain control  Has been agitated and pulling at lines    O:  BP (!) 160/99   Pulse 98   Temp 97.5 °F (36.4 °C) (Axillary)   Resp 16   Ht 5' (1.524 m)   Wt 117 lb 11.6 oz (53.4 kg)   SpO2 91%   BMI 22.99 kg/m²     She is in no acute distress. Does not follow commands  Her Respirations are nonlabored  LLE dressing c/d/i, responds to stimulation, observed ehl/fhl/df/pf, palpable pulse    I have reviewed her labwork and noted any abnormalities. I have reviewed all lab results and noted any concerns    A/P:  80 y.o. female s/p Left hip ORIF, also with LC1 pelvic ring injury    POD# 1    History significant for DM, dementia , CAD, prior left hip cemented hemiarthroplasty    WBAT on the operative extremity  Post-op short extended abx course x1wk given   Multimodal Pain control  DVT ppx: Mobilize, SCDs, lovenox x30 days given immobility  PT/OT, increase activity as tolerated  Encourage diet/fluids    Call for questions: [phone]    Disposition: Pending PT and medical stability  Will need follow up in my clinic in 10-14 days for staple removal and x ray        KRISTINE Galaviz MD  [facility]  Office: [phone]  Cell: [phone]

## 2022-09-19 NOTE — PLAN OF CARE
Problem: ABCDS Injury Assessment  Goal: Absence of physical injury  9/19/2022 1017 by Vee Vallejo RN  Outcome: Progressing     Problem: Safety - Adult  Goal: Free from fall injury  9/19/2022 1017 by Vee Vallejo RN  Outcome: Progressing

## 2022-09-19 NOTE — PROGRESS NOTES
This RN attempted to administer pt her morning meds with PT/OT with applesauce but pt spit out the medications. Will f/u as needed.

## 2022-09-19 NOTE — CARE COORDINATION
GRANT spoke to Suellen Dao at [facility], they are following for DCP. They will have to wait until Pt is out of restraints. SW will follow up when Pt is ready for DC. GRANT LVM for Pt's daughter Prachi Head. SW following.   Electronically signed by TRENA Crespo, LSW on 9/19/2022 at 11:42 AM  [phone]

## 2022-09-19 NOTE — PLAN OF CARE
Problem: ABCDS Injury Assessment  Goal: Absence of physical injury  9/19/2022 1428 by Earline Sparks RN  Outcome: Progressing  Flowsheets (Taken 9/19/2022 0030 by Genevieve Kinney RN)  Absence of Physical Injury: Implement safety measures based on patient assessment     Problem: Pain  Goal: Verbalizes/displays adequate comfort level or baseline comfort level  9/19/2022 1428 by Earline Sparks RN  Outcome: Jessica Fischer (Taken 9/18/2022 1157 by Dana Carpenter RN)  Verbalizes/displays adequate comfort level or baseline comfort level:   Encourage patient to monitor pain and request assistance   Administer analgesics based on type and severity of pain and evaluate response   Assess pain using appropriate pain scale   Implement non-pharmacological measures as appropriate and evaluate response     Problem: Pain  Goal: Verbalizes/displays adequate comfort level or baseline comfort level  9/19/2022 1428 by Earline Sparks RN  Outcome: Progressing  Flowsheets (Taken 9/18/2022 1157 by Dana Carpenter RN)  Verbalizes/displays adequate comfort level or baseline comfort level:   Encourage patient to monitor pain and request assistance   Administer analgesics based on type and severity of pain and evaluate response   Assess pain using appropriate pain scale   Implement non-pharmacological measures as appropriate and evaluate response  9/19/2022 0030 by Genevieve Kinney RN  Outcome: Not Progressing  Flowsheets (Taken 9/18/2022 1157 by Dana Carpenter RN)  Verbalizes/displays adequate comfort level or baseline comfort level:   Encourage patient to monitor pain and request assistance   Administer analgesics based on type and severity of pain and evaluate response   Assess pain using appropriate pain scale   Implement non-pharmacological measures as appropriate and evaluate response     Problem: Confusion  Goal: Confusion, delirium, dementia, or psychosis is improved or at baseline  Description: INTERVENTIONS:  1.  Assess for possible contributors to thought disturbance, including medications, impaired vision or hearing, underlying metabolic abnormalities, dehydration, psychiatric diagnoses, and notify attending LIP  2. [place] high risk fall precautions, as indicated  3. Provide frequent short contacts to provide reality reorientation, refocusing and direction  4. Decrease environmental stimuli, including noise as appropriate  5. Monitor and intervene to maintain adequate nutrition, hydration, elimination, sleep and activity  6. If unable to ensure safety without constant attention obtain sitter and review sitter guidelines with assigned personnel  7.  Initiate Psychosocial CNS and Spiritual Care consult, as indicated  9/19/2022 0030 by Broderick Amin RN  Outcome: Not Progressing  Flowsheets (Taken 9/17/2022 0346 by Derrek Oakes RN)  Effect of thought disturbance (confusion, delirium, dementia, or psychosis) are managed with adequate functional status:   Assess for contributors to thought disturbance, including medications, impaired vision or hearing, underlying metabolic abnormalities, dehydration, psychiatric diagnoses, notify [facility] high risk fall precautions, as indicated   Provide frequent short contacts to provide reality reorientation, refocusing and direction   Decrease environmental stimuli, including noise as appropriate   Monitor and intervene to maintain adequate nutrition, hydration, elimination, sleep and activity

## 2022-09-19 NOTE — PROGRESS NOTES
Pt easily arousable this shift, increasingly agitated with care. Pt refusing all PO intake when offered and will not accept scheduled PO medications- she persistently spits them out even when mixed with applesauce despite having taken them well in this manner previously. Dr Anabelle Riley notified of change, the pt will not take PO, asked for a second IV form of pain medication as needed since pt will not tolerate oral medication at this time. BP elevated at this time, likely r/t pain, will reassess after controlled better. Pt voiding adequately per attends. Fall and safety precautions maintained.

## 2022-09-19 NOTE — PLAN OF CARE
Problem: Skin/Tissue Integrity  Goal: Absence of new skin breakdown  Description: 1. Monitor for areas of redness and/or skin breakdown  2. Assess vascular access sites hourly  3. Every 4-6 hours minimum:  Change oxygen saturation probe site  4. Every 4-6 hours:  If on nasal continuous positive airway pressure, respiratory therapy assess nares and determine need for appliance change or resting period.   Outcome: Progressing     Problem: ABCDS Injury Assessment  Goal: Absence of physical injury  9/19/2022 1428 by Mariel Madrigal RN  Outcome: Progressing  Flowsheets (Taken 9/19/2022 0030 by Marcos Hall RN)  Absence of Physical Injury: Implement safety measures based on patient assessment  9/19/2022 1428 by Mariel Madrigal RN  Outcome: Progressing  Flowsheets (Taken 9/19/2022 0030 by Marcos Hall RN)  Absence of Physical Injury: Implement safety measures based on patient assessment  9/19/2022 1017 by Mariel Madrigal RN  Outcome: Progressing     Problem: Safety - Adult  Goal: Free from fall injury  9/19/2022 1937 by Jyoti Buckley RN  Outcome: Progressing  9/19/2022 1017 by Mariel Madrigal RN  Outcome: Progressing     Problem: Pain  Goal: Verbalizes/displays adequate comfort level or baseline comfort level  9/19/2022 1428 by Mariel Madrigal RN  Outcome: Progressing  [address] (Taken 9/18/2022 1157 by Andres Low RN)  Verbalizes/displays adequate comfort level or baseline comfort level:   Encourage patient to monitor pain and request assistance   Administer analgesics based on type and severity of pain and evaluate response   Assess pain using appropriate pain scale   Implement non-pharmacological measures as appropriate and evaluate response

## 2022-09-19 NOTE — PLAN OF CARE
Problem: ABCDS Injury Assessment  Goal: Absence of physical injury  Outcome: Progressing  Flowsheets (Taken 9/19/2022 0030)  Absence of Physical Injury: Implement safety measures based on patient assessment     Problem: Safety - Adult  Goal: Free from fall injury  Outcome: Progressing  Note: No falls occurred this shift; fall precautions maintained. Problem: Pain  Goal: Verbalizes/displays adequate comfort level or baseline comfort level  9/19/2022 0030 by Cricket German RN  Outcome: Not Progressing  Problem: Pain  Goal: Verbalizes/displays adequate comfort level or baseline comfort level  9/19/2022 0030 by Cricket German RN  Outcome: Not Progressing  Flowsheets (Taken 9/18/2022 1157 by Lindsay Ross RN)  Verbalizes/displays adequate comfort level or baseline comfort level:   Encourage patient to monitor pain and request assistance   Administer analgesics based on type and severity of pain and evaluate response   Assess pain using appropriate pain scale   Implement non-pharmacological measures as appropriate and evaluate response  9/18/2022 1157 by Lindsay Ross RN  Outcome: Progressing  Flowsheets (Taken 9/18/2022 1157)  Verbalizes/displays adequate comfort level or baseline comfort level:   Encourage patient to monitor pain and request assistance   Administer analgesics based on type and severity of pain and evaluate response   Assess pain using appropriate pain scale   Implement non-pharmacological measures as appropriate and evaluate response     Problem: Confusion  Goal: Confusion, delirium, dementia, or psychosis is improved or at baseline  Description: INTERVENTIONS:  1. Assess for possible contributors to thought disturbance, including medications, impaired vision or hearing, underlying metabolic abnormalities, dehydration, psychiatric diagnoses, and notify attending LIP  2. [place] high risk fall precautions, as indicated  3.  Provide frequent short contacts to provide reality reorientation, refocusing and direction  4. Decrease environmental stimuli, including noise as appropriate  5. Monitor and intervene to maintain adequate nutrition, hydration, elimination, sleep and activity  6. If unable to ensure safety without constant attention obtain sitter and review sitter guidelines with assigned personnel  7.  Initiate Psychosocial CNS and Spiritual Care consult, as indicated  Outcome: Not Progressing  Flowsheets (Taken 9/17/2022 0346 by Katelyn Acosta RN)  Effect of thought disturbance (confusion, delirium, dementia, or psychosis) are managed with adequate functional status:   Assess for contributors to thought disturbance, including medications, impaired vision or hearing, underlying metabolic abnormalities, dehydration, psychiatric diagnoses, notify [facility] high risk fall precautions, as indicated   Provide frequent short contacts to provide reality reorientation, refocusing and direction   Decrease environmental stimuli, including noise as appropriate   Monitor and intervene to maintain adequate nutrition, hydration, elimination, sleep and activity

## 2022-09-19 NOTE — PLAN OF CARE
Problem: Pain  Goal: Verbalizes/displays adequate comfort level or baseline comfort level  9/19/2022 0030 by Camila Bolton RN  Outcome: Not Progressing  Flowsheets (Taken 9/18/2022 1157 by Capri Russell RN)  Verbalizes/displays adequate comfort level or baseline comfort level:   Encourage patient to monitor pain and request assistance   Administer analgesics based on type and severity of pain and evaluate response   Assess pain using appropriate pain scale   Implement non-pharmacological measures as appropriate and evaluate response     Problem: Confusion  Goal: Confusion, delirium, dementia, or psychosis is improved or at baseline  Description: INTERVENTIONS:  1. Assess for possible contributors to thought disturbance, including medications, impaired vision or hearing, underlying metabolic abnormalities, dehydration, psychiatric diagnoses, and notify attending LIP  2. [place] high risk fall precautions, as indicated  3. Provide frequent short contacts to provide reality reorientation, refocusing and direction  4. Decrease environmental stimuli, including noise as appropriate  5. Monitor and intervene to maintain adequate nutrition, hydration, elimination, sleep and activity  6. If unable to ensure safety without constant attention obtain sitter and review sitter guidelines with assigned personnel  7.  Initiate Psychosocial CNS and Spiritual Care consult, as indicated  9/19/2022 0030 by Camila Bolton RN  Outcome: Not Progressing  Flowsheets (Taken 9/17/2022 0346 by Thiago Reaves, PLACIDO)  Effect of thought disturbance (confusion, delirium, dementia, or psychosis) are managed with adequate functional status:   Assess for contributors to thought disturbance, including medications, impaired vision or hearing, underlying metabolic abnormalities, dehydration, psychiatric diagnoses, notify [facility] high risk fall precautions, as indicated   Provide frequent short contacts to provide reality reorientation, refocusing and direction   Decrease environmental stimuli, including noise as appropriate   Monitor and intervene to maintain adequate nutrition, hydration, elimination, sleep and activity

## 2022-09-19 NOTE — PROGRESS NOTES
Physical Therapy  Facility/Department: [facility] 5T ORTHO/NEURO  Physical Therapy Initial Assessment and Treatment    Name: Maik Spangler  : 1931  MRN: [id number]  Date of Service: 2022    Discharge Recommendations:    Maik Spangler scored a 6/24 on the AM-PAC short mobility form. Current research shows that an AM-PAC score of 17 or less is typically not associated with a discharge to the patient's home setting. Based on the patient's AM-PAC score and their current functional mobility deficits, it is recommended that the patient have 3-5 sessions per week of Physical Therapy at d/c to increase the patient's independence. Please see assessment section for further patient specific details. If patient discharges prior to next session this note will serve as a discharge summary. Please see below for the latest assessment towards goals. PT Equipment Recommendations  Equipment Needed:  (defer)      Patient Diagnosis(es): The primary encounter diagnosis was Closed fracture of sacrum, unspecified fracture morphology, initial encounter ([facility] [place] [facility] A diagnosis of Closed pelvic ring fracture, initial encounter [facility]) was also pertinent to this visit. Past Medical History:  has a past medical history of Atherosclerotic vascular disease, Back pain, Brain mass, Cervical spondylolysis, Dementia ([facility] Diabetes ([facility] Frequent falls, Hearing loss, Heart disease, Hyperlipidemia, Hypertension, Mental status alteration, Osteoporosis, and Wrist fracture. Past Surgical History:  has a past surgical history that includes hip surgery; Partial hip arthroplasty; Brain tumor excision; Coronary artery bypass graft; ORIF hip fracture (Left, 2022); and Hip fracture surgery (Left, 2022). Assessment   Body Structures, Functions, Activity Limitations Requiring Skilled Therapeutic Intervention: Decreased functional mobility ; Decreased ROM; Decreased strength;Decreased cognition;Decreased safe awareness;Decreased endurance;Decreased balance; Increased pain  Assessment: Unclear of pt's recent baseline. Pt s/p fall and subsequent ORIF L femur. Per chart, pt has been ambulatory with walker PTA. Pt currently limited by confusion and pain. Dependent for bed mobility and to sit EOB. Resistant to all mobility. Does not follow commands and with minimal eye opening throughout session. Will trial PT to maximize mobility and independence  Treatment Diagnosis: impaired mobility s/p L femur ORIF  Decision Making: Medium Complexity  Requires PT Follow-Up: Yes     Plan   Plan  Plan: 5-7 times per week (trial)  Current Treatment Recommendations: Strengthening, ROM, Functional mobility training  Plan Comment: assess standing/gt as able  Safety Devices  Type of Devices: Bed alarm in place, Left in bed, Nurse notified, Call light within reach  Restraints  Restraints Initially in Place: Yes (bilat wrist restraints reapplied)     Restrictions  Position Activity Restriction  Other position/activity restrictions: FWBAT LLE     Subjective   General  Chart Reviewed: Yes  Additional Pertinent Hx: Adm 9/14 to ED with R hip pain after possible fall. Pt with dementia. 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 CT cervical spine (-). L knee Xray (-). Pelvis CT:Subacute, healing fractures of the right obturator ring. There is cortical irregularity along the anterior cortex of the sacrum, which could reflect nondisplaced fractures, age indeterminate. CT L femur:Age-indeterminate fracture of the proximal left femur adjacent to the left hip arthroplasty. Pt s/p Open Reduction and Internal Fixation with plate, screws and cerclage wires of Left Periprosthetic Hip Fracture In the peritrochanteric region on 9/17.    PMH: dementia, DM, hyperlipidemia, HTN, osteoporosis, partial hip arthroplasty, brain tumor excision, CABG  Referring Practitioner: Katelin Viera MD  Referral Date : 09/18/22  Follows Commands: Impaired  Subjective  Subjective: Pt found supine, bilat wrist restraints. \"Go away. \"  Appears to be in pain with movement, grabs LLE         Social/Functional History  Social/Functional History  Type of Home: Facility ([facility]. care Assisted living)  Additional Comments: Per chart, pt normally able to stand unassisted and was ambulatory with walker  Vision/Hearing  Vision  Vision:  (unable to assess - keeps eyes closed majority of session)  Hearing  Hearing:  (difficult to assess)    Cognition   Orientation  Overall Orientation Status:  (difficult to assess - does not answer orientation questions.)     Objective                 AROM RLE (degrees)  RLE AROM:  (appears WFL - unable to formally assess 2/2 not following commands)  AROM LLE (degrees)  LLE AROM :  (decreased grossly 2/2 pain, not following commands for formal assessment)  Strength RLE  Strength RLE:  (unable to assess - not following commands)  Strength LLE  Strength LLE:  (grossly decreased s/p surgery - unable to formally assess 2/2 not following commands)           Bed mobility  Supine to Sit: Dependent/Total;2 Person assistance  Sit to Supine: 2 Person assistance;Dependent/Total   Nursing attempted to give pt meds  while sitting EOB - pt spit it out        Balance  Sitting - Static:  (Sat EOB approximately 5 minutes with dependent assist - pt resistant to sitting EOB)       Treatment included: bed mobility, sitting balance, pt education      OutComes Score                                                  AM-PAC Score  AM-PAC Inpatient Mobility Raw Score : 6 (09/19/22 1025)  AM-PAC Inpatient T-Scale Score : 23.55 (09/19/22 1025)  Mobility Inpatient CMS 0-100% Score: 100 (09/19/22 1025)  Mobility Inpatient CMS G-Code Modifier : CN (09/19/22 1025)          Tinneti Score       Goals  Short Term Goals  Time Frame for Short term goals: By discharge  Short term goal 1: Sup to sit with mod assist  Short term goal 2: Pt will sit EOB x 10 minutes with min assist  Patient Goals   Patient goals : unable to state due to dementia       Education  Patient Education  Education Given To: Patient  Education Provided: Role of Therapy  Barriers to Learning: Cognition  Education Outcome: Unable to demonstrate understanding      Therapy Time   Individual Concurrent Group Co-treatment   Time In 85 99 60         Time Out 0847         Minutes 23             Timed Code Treatment Minutes: 8      Total Treatment Minutes:  Aidee 24, PT

## 2022-09-19 NOTE — PROGRESS NOTES
Hospital Medicine Care  Progress Note      Chief complain; Fall (Pt from nursing home memory unit. Ems states there was no night shift staff on this wing of nursing home, unknown when patient fell but was found on floor this morning. C/o left hip/leg pain)            Subjective:     Patient is sleepy this morning  Remains in restraints  Discussed with the registered nurse, patient has intermittent agitation  Patient working with therapy according to the nurse    Poor p.o. intake  Review of Systems:     Review of Systems as mentioned above, other ros is negative. Objective:       Medications        Scheduled Meds:   gentamicin  1 drop Both Eyes 6 times per day    QUEtiapine  12.5 mg Oral Nightly    sodium chloride flush  5-40 mL IntraVENous 2 times per day    sennosides-docusate sodium  1 tablet Oral BID    enoxaparin  40 mg SubCUTAneous Daily    acetaminophen  1,000 mg Oral Q8H    cefdinir  300 mg Oral 2 times per day    insulin lispro  0-4 Units SubCUTAneous TID WC    insulin lispro  0-4 Units SubCUTAneous Nightly    donepezil  10 mg Oral Nightly    [Held by provider] insulin glargine  10 Units SubCUTAneous Nightly    losartan  75 mg Oral Daily    sodium chloride flush  5-40 mL IntraVENous 2 times per day     Continuous Infusions:   sodium chloride      sodium chloride 125 mL/hr at 09/17/22 1612    sodium chloride 75 mL/hr at 09/16/22 2301    dextrose      sodium chloride       PRN Meds:.methocarbamol IVPB, sodium chloride flush, sodium chloride, oxyCODONE **OR** oxyCODONE, HYDROmorphone **OR** HYDROmorphone, ondansetron **OR** ondansetron, polyethylene glycol, magnesium hydroxide, glucose, dextrose bolus **OR** dextrose bolus, glucagon (rDNA), dextrose, sodium chloride flush, sodium chloride      Allergies  Allergies   Allergen Reactions    Penicillins Other (See Comments)     Unknown reaction; patient has tolerated multiple courses of cephalexin as an outpatient per chart review as of 09/14/22.     Statins Other (See Comments)     Unknown reaction; patient tolerates pitavastatin as an outpatient per chart review as of 9/14/22. Sulfa Antibiotics          Vitals:    09/19/22 0616 09/19/22 0745 09/19/22 1000 09/19/22 1100   BP: 128/70   (!) 160/99   Pulse: 80   98   Resp: 16   16   Temp: 97.6 °F (36.4 °C)   97.5 °F (36.4 °C)   TempSrc: Axillary   Axillary   SpO2: 96% 96% 93% 91%   Weight:       Height:             Physical exam:         Physical Exam  Constitutional:       Appearance: She is ill-appearing. Comments: sleepy   HENT:      Head: Normocephalic and atraumatic. Cardiovascular:      Rate and Rhythm: Normal rate and regular rhythm. Pulses: Normal pulses. Heart sounds: Normal heart sounds. Pulmonary:      Effort: Pulmonary effort is normal.      Breath sounds: Normal breath sounds. Musculoskeletal:      Cervical back: Normal range of motion and neck supple. Skin:     General: Skin is warm and dry. Capillary Refill: Capillary refill takes less than 2 seconds. Neurological:      Comments: Sleepy and restrained             Labs      Recent Labs     09/18/22  1226 09/19/22  0600   WBC 16.1* 14.3*   HGB 12.5 13.4   HCT 39.5 41.6    284   MCV 88.4 87.2        Recent Labs     09/18/22  1226 09/19/22  0600    141   K 3.9 4.2    105   CO2 21 22   BUN 13 10   CREATININE 0.5* <0.5*       No results for input(s): AST, ALT, ALB, BILIDIR, BILITOT, ALKPHOS in the last 72 hours. No results for input(s): MG in the last 72 hours. Radiology  XR HIP LEFT (2-3 VIEWS)   Final Result      Intraoperative fluoroscopy was performed. Correlate with procedural note for additional information. FLUORO FOR SURGICAL PROCEDURES   Final Result      Intraoperative fluoroscopy was performed. Correlate with procedural note for additional information. CT FEMUR LEFT WO CONTRAST   Final Result   1.  Age-indeterminate fracture of the proximal left femur adjacent to the left hip arthroplasty. There is no significant adjacent edema or hematoma. 2. Please see concurrent CT of the pelvis for discussion of additional findings. CT PELVIS WO CONTRAST Additional Contrast? None   Final Result   1. Subacute, healing fractures of the right obturator ring as detailed above. 2. Nonspecific sclerosis in the midline of the sacrum. There is cortical irregularity along the anterior cortex of the sacrum, which could reflect nondisplaced fractures, age indeterminate. 3. Cystic lesion with mural nodularity along the periphery is present, likely arising from the ovary, and suspicious for primary ovarian neoplasm. This could be further evaluated with ultrasound. XR KNEE LEFT (1-2 VIEWS)   Final Result      No definite fracture. CT CSpine W/O Contrast   Final Result   Impression:      No acute fracture. Multilevel spondylosis and spondylolisthesis, as above. CT Head W/O Contrast   Final Result   1. Stable changes   2. 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 No evidence of acute intracranial hemorrhage   4. 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 Correlation with contrast   Magnetic resonance imaging recommended if clinically indicated in this patient's age group. 5. No vasogenic edema identified      XR CHEST PORTABLE   Final Result   1. Vascular redistribution secondary to the spinal radiograph   2. Cardiomegaly   3. No active airspace disease   4. Osteoporosis and old right multiple healed rib fractures      XR PELVIS (1-2 VIEWS)   Final Result   Impression: Limited study without acute abnormality. XR KNEE RIGHT (1-2 VIEWS)   Final Result   Impression: No acute osseous abnormality. Osteopenia.       XR FEMUR LEFT (MIN 2 VIEWS)   Final Result   Impression: Limited study without acute osseous abnormality. XR KNEE LEFT (1-2 VIEWS)   Final Result   Impression: Limited study without acute osseous abnormality. XR FEMUR LEFT (MIN 2 VIEWS)    (Results Pending)           Assessment and Plan:   Principal Problem:    Periprosthetic fracture around internal prosthetic left hip joint (HCC)  Active Problems:    Pelvic ring fracture, closed, initial encounter ([facility]    DM (diabetes mellitus), type 2, uncontrolled ([facility]    Dementia ([facility]    Hypertension  Resolved Problems:    * No resolved hospital problems.  *     Periprosthetic left hip fracture  S/p left hip open reduction internal fixation  Pain management  DVT prophylaxis: Lovenox  PT OT scores are low    Delirium  Likely secondary to baseline dementia, hospitalization  Check UA  Bladder scan every shift  Start nightly Seroquel to help with nightly agitation and restore normal sleep cycle    Evidence mellitus  Poor p.o. intake  Hold Lantus  Sliding scale insulin    Hypertension  Continue home medication  Monitor BP      Disposition  Pending improvement in mentation  Needs to be off restraints  SNF placement      Lucía Polanco MD  9/19/2022

## 2022-09-19 NOTE — PROGRESS NOTES
Dr. Shun Soto made aware of pt being incontinent and requested further instruction on obtaining UA. Per Dr. Shun Soto, straight-cathed ordered which was performed and pt tolerated procedure well. Aminata-care provided afterwards. Urinalysis and culture sent to lab.

## 2022-09-19 NOTE — PROGRESS NOTES
Pt resting in bed majority of the day. Not pulling at IV. Not trying to get OOB. Was taken out of restraints at 1130. Will continue to monitor.

## 2022-09-19 NOTE — PROGRESS NOTES
Occupational Therapy  Facility/Department: [facility] 5T ORTHO/NEURO  Occupational Therapy Initial Assessment and Treatment    Name: Nai Marie  : 1931  MRN: [id number]  Date of Service: 2022    Discharge Recommendations: Nai Marie scored a 8/24 on the AM-PAC ADL Inpatient form. Current research shows that an AM-PAC score of 17 or less is typically not associated with a discharge to the patient's home setting. Based on the patient's AM-PAC score and their current ADL deficits, it is recommended that the patient have 3-5 sessions per week of Occupational Therapy at d/c to increase the patient's independence. Please see assessment section for further patient specific details. If patient discharges prior to next session this note will serve as a discharge summary. Please see below for the latest assessment towards goals. Patient Diagnosis(es): The primary encounter diagnosis was Closed fracture of sacrum, unspecified fracture morphology, initial encounter ([facility] A diagnosis of Closed pelvic ring fracture, initial encounter [facility]) was also pertinent to this visit. Past Medical History:  has a past medical history of Atherosclerotic vascular disease, Back pain, Brain mass, Cervical spondylolysis, Dementia ([facility] Diabetes ([facility] Frequent falls, Hearing loss, Heart disease, Hyperlipidemia, Hypertension, Mental status alteration, Osteoporosis, and Wrist fracture. Past Surgical History:  has a past surgical history that includes hip surgery; Partial hip arthroplasty; Brain tumor excision; Coronary artery bypass graft; ORIF hip fracture (Left, 2022); and Hip fracture surgery (Left, 2022). Treatment Diagnosis: Decreased activity tolerance, impaired ADLs and mobility      Assessment   Performance deficits / Impairments: Decreased functional mobility ; Decreased ADL status; Decreased endurance;Decreased balance;Decreased posture;Decreased strength;Decreased cognition  Assessment: pt from facility. cognition is significantly impaired at baseline. currently pt unable to follow simple commands. pt requiring total A for all bed mobility. pt states \"ow\" and \"get off me\" throughout session. Pt would continue to benefit from acute OT services while in acute care and inpt at [facility]. Treatment Diagnosis: Decreased activity tolerance, impaired ADLs and mobility  REQUIRES OT FOLLOW-UP: Yes  Activity Tolerance  Activity Tolerance: Patient limited by pain;Treatment limited secondary to decreased cognition  Activity Tolerance Comments: O2 destatted to 85%, pt using nasal canula        Plan   Plan  Times per Week: 2-5 - TRIAL  Times per Day: Daily  Current Treatment Recommendations: Balance training, Functional mobility training, Endurance training, Safety education & training, Patient/Caregiver education & training, Equipment evaluation, education, & procurement, Self-Care / ADL     Restrictions  Position Activity Restriction  Other position/activity restrictions: FWBAT LLE    Subjective   General  Chart Reviewed: Yes  Additional Pertinent Hx: LEFT HIP OPEN REDUCTION INTERNAL FIXATION. dementia  Referring Practitioner: Katelin Viera MD  Diagnosis: pelvic ring fx, closed, initial encounter. Subjective  Subjective: pt supine in bed, restraints in place. reports \"go away\" to therapists. General Comment  Comments: c/o severe pain to L inner thigh, RN and [facility] made aware. Social/Functional History  Social/Functional History  Type of Home: Facility  Additional Comments: Per chart, pt normally able to stand unassisted and was ambulatory with walker. Safety Devices  Type of Devices: Bed alarm in place; Left in bed;Nurse notified;Call light within reach  Restraints  Restraints Initially in Place: Yes (bilateral wrist restraints)  Bed Mobility Training  Bed Mobility Training: Yes  Overall Level of Assistance: Total assistance;Assist X2  Rolling:  Total assistance;Assist X1  Supine to Sit: Total assistance;Assist X2  Sit to Supine: Total assistance;Assist X2  Balance  Sitting: Impaired (max of 2 to sit EOB)  Transfer Training  Transfer Training: No        ADL  Grooming: Maximum assistance;Verbal cueing  Grooming Skilled Clinical Factors: seated EOB pt wiped face, OT went over  Additional Comments: unable to assess additional ADL's              Vision  Vision:  (unable to assess - keeps eyes closed majority of session)  Hearing  Hearing:  (difficult to assess)  Cognition  Cognition Comment: pt unable to respond to orientation questions, only states \"ow\", \"stop\", \"go away\", or \"no\" throughout sesion  Orientation  Overall Orientation Status:  (disoriented, unable to assess)  Orientation Level: Disoriented X4                  Education Given To: Patient  Education Provided: Role of Therapy;Orientation  Education Method: Verbal  Barriers to Learning: Cognition  Education Outcome: Unable to verbalize; Unable to demonstrate understanding;Continued education needed                 AM-PAC Score        AM-[facility] Inpatient Daily Activity Raw Score: 8 (09/19/22 1129)  AM-PAC Inpatient ADL T-Scale Score : 22.86 (09/19/22 1129)  ADL Inpatient CMS 0-100% Score: 85.69 (09/19/22 1129)  ADL Inpatient CMS G-Code Modifier : CM (09/19/22 1129)         Goals  Short Term Goals  Time Frame for Short term goals: d/c  Short Term Goal 1: tolerate sitting EOB for 8 minutes  Short Term Goal 2: complete bed mobility min A  Short Term Goal 3: perform grooming task seated EOB w/ min A       Therapy Time   Individual Concurrent Group Co-treatment   Time In 0824         Time Out 0847         Minutes 23          Timed Code Treatment Minutes:   8    Total Treatment Minutes:  [address]    Therapist was present, directed the patients care, made skilled judgement and was responsible for assessment and treatment of the patient.      ELSI Alatmirano, OTR/L

## 2022-09-20 LAB
ALBUMIN SERPL-MCNC: 2.6 G/DL (ref 3.4–5)
ANION GAP SERPL CALCULATED.3IONS-SCNC: 16 MMOL/L (ref 3–16)
BUN BLDV-MCNC: 10 MG/DL (ref 7–20)
CALCIUM SERPL-MCNC: 8.2 MG/DL (ref 8.3–10.6)
CHLORIDE BLD-SCNC: 110 MMOL/L (ref 99–110)
CO2: 20 MMOL/L (ref 21–32)
CREAT SERPL-MCNC: <0.5 MG/DL (ref 0.6–1.2)
GFR AFRICAN AMERICAN: >60
GFR NON-AFRICAN AMERICAN: >60
GLUCOSE BLD-MCNC: 146 MG/DL (ref 70–99)
GLUCOSE BLD-MCNC: 186 MG/DL (ref 70–99)
GLUCOSE BLD-MCNC: 247 MG/DL (ref 70–99)
GLUCOSE BLD-MCNC: 254 MG/DL (ref 70–99)
GLUCOSE BLD-MCNC: 86 MG/DL (ref 70–99)
HCT VFR BLD CALC: 39.4 % (ref 36–48)
HEMOGLOBIN: 12.7 G/DL (ref 12–16)
MCH RBC QN AUTO: 28.2 PG (ref 26–34)
MCHC RBC AUTO-ENTMCNC: 32.2 G/DL (ref 31–36)
MCV RBC AUTO: 87.8 FL (ref 80–100)
PDW BLD-RTO: 15.1 % (ref 12.4–15.4)
PERFORMED ON: ABNORMAL
PERFORMED ON: NORMAL
PHOSPHORUS: 2 MG/DL (ref 2.5–4.9)
PLATELET # BLD: 265 K/UL (ref 135–450)
PMV BLD AUTO: 9.7 FL (ref 5–10.5)
POTASSIUM SERPL-SCNC: 3.7 MMOL/L (ref 3.5–5.1)
RBC # BLD: 4.49 M/UL (ref 4–5.2)
SODIUM BLD-SCNC: 146 MMOL/L (ref 136–145)
WBC # BLD: 12.3 K/UL (ref 4–11)

## 2022-09-20 PROCEDURE — 6370000000 HC RX 637 (ALT 250 FOR IP): Performed by: STUDENT IN AN ORGANIZED HEALTH CARE EDUCATION/TRAINING PROGRAM

## 2022-09-20 PROCEDURE — 2580000003 HC RX 258: Performed by: INTERNAL MEDICINE

## 2022-09-20 PROCEDURE — 2580000003 HC RX 258: Performed by: STUDENT IN AN ORGANIZED HEALTH CARE EDUCATION/TRAINING PROGRAM

## 2022-09-20 PROCEDURE — 80069 RENAL FUNCTION PANEL: CPT

## 2022-09-20 PROCEDURE — 85027 COMPLETE CBC AUTOMATED: CPT

## 2022-09-20 PROCEDURE — 6360000002 HC RX W HCPCS: Performed by: ORTHOPAEDIC SURGERY

## 2022-09-20 PROCEDURE — 2580000003 HC RX 258: Performed by: ORTHOPAEDIC SURGERY

## 2022-09-20 PROCEDURE — 1200000000 HC SEMI PRIVATE

## 2022-09-20 PROCEDURE — 36415 COLL VENOUS BLD VENIPUNCTURE: CPT

## 2022-09-20 PROCEDURE — 6370000000 HC RX 637 (ALT 250 FOR IP): Performed by: INTERNAL MEDICINE

## 2022-09-20 PROCEDURE — 6370000000 HC RX 637 (ALT 250 FOR IP): Performed by: ORTHOPAEDIC SURGERY

## 2022-09-20 RX ADMIN — ACETAMINOPHEN 1000 MG: 500 TABLET ORAL at 00:41

## 2022-09-20 RX ADMIN — INSULIN LISPRO 2 UNITS: 100 INJECTION, SOLUTION INTRAVENOUS; SUBCUTANEOUS at 16:49

## 2022-09-20 RX ADMIN — GENTAMICIN SULFATE 1 DROP: 3 SOLUTION OPHTHALMIC at 15:51

## 2022-09-20 RX ADMIN — QUETIAPINE FUMARATE 12.5 MG: 25 TABLET ORAL at 20:30

## 2022-09-20 RX ADMIN — CEFDINIR 300 MG: 300 CAPSULE ORAL at 09:13

## 2022-09-20 RX ADMIN — SODIUM CHLORIDE: 9 INJECTION, SOLUTION INTRAVENOUS at 23:25

## 2022-09-20 RX ADMIN — CEFDINIR 300 MG: 300 CAPSULE ORAL at 20:31

## 2022-09-20 RX ADMIN — GENTAMICIN SULFATE 1 DROP: 3 SOLUTION OPHTHALMIC at 11:35

## 2022-09-20 RX ADMIN — INSULIN GLARGINE 10 UNITS: 100 INJECTION, SOLUTION SUBCUTANEOUS at 20:32

## 2022-09-20 RX ADMIN — DONEPEZIL HYDROCHLORIDE 10 MG: 10 TABLET, FILM COATED ORAL at 20:30

## 2022-09-20 RX ADMIN — GENTAMICIN SULFATE 1 DROP: 3 SOLUTION OPHTHALMIC at 00:40

## 2022-09-20 RX ADMIN — SODIUM CHLORIDE, PRESERVATIVE FREE 10 ML: 5 INJECTION INTRAVENOUS at 09:23

## 2022-09-20 RX ADMIN — SODIUM CHLORIDE: 9 INJECTION, SOLUTION INTRAVENOUS at 16:00

## 2022-09-20 RX ADMIN — SENNOSIDES AND DOCUSATE SODIUM 1 TABLET: 50; 8.6 TABLET ORAL at 20:30

## 2022-09-20 RX ADMIN — ACETAMINOPHEN 1000 MG: 500 TABLET ORAL at 09:13

## 2022-09-20 RX ADMIN — LOSARTAN POTASSIUM 75 MG: 25 TABLET, FILM COATED ORAL at 09:13

## 2022-09-20 RX ADMIN — SODIUM CHLORIDE, PRESERVATIVE FREE 10 ML: 5 INJECTION INTRAVENOUS at 09:24

## 2022-09-20 RX ADMIN — ENOXAPARIN SODIUM 40 MG: 100 INJECTION SUBCUTANEOUS at 09:13

## 2022-09-20 RX ADMIN — GENTAMICIN SULFATE 1 DROP: 3 SOLUTION OPHTHALMIC at 20:29

## 2022-09-20 RX ADMIN — SENNOSIDES AND DOCUSATE SODIUM 1 TABLET: 50; 8.6 TABLET ORAL at 09:13

## 2022-09-20 RX ADMIN — ACETAMINOPHEN 1000 MG: 500 TABLET ORAL at 16:01

## 2022-09-20 ASSESSMENT — PAIN SCALES - PAIN ASSESSMENT IN ADVANCED DEMENTIA (PAINAD)
TOTALSCORE: 3
TOTALSCORE: 0
CONSOLABILITY: 0
NEGVOCALIZATION: 1
BREATHING: 0
CONSOLABILITY: 0
FACIALEXPRESSION: 0
TOTALSCORE: 1
BODYLANGUAGE: 0
BREATHING: 0
CONSOLABILITY: 0
BREATHING: 0
FACIALEXPRESSION: 0
TOTALSCORE: 1
NEGVOCALIZATION: 1
BREATHING: 0
FACIALEXPRESSION: 0
NEGVOCALIZATION: 0
CONSOLABILITY: 0
FACIALEXPRESSION: 1
NEGVOCALIZATION: 1
TOTALSCORE: 1
BODYLANGUAGE: 0
BODYLANGUAGE: 0
FACIALEXPRESSION: 0
BODYLANGUAGE: 0
NEGVOCALIZATION: 1
BREATHING: 0
CONSOLABILITY: 0
BODYLANGUAGE: 1

## 2022-09-20 ASSESSMENT — PAIN SCALES - GENERAL: PAINLEVEL_OUTOF10: 0

## 2022-09-20 NOTE — PLAN OF CARE
Problem: Skin/Tissue Integrity  Goal: Absence of new skin breakdown  Description: 1. Monitor for areas of redness and/or skin breakdown  Outcome: Progressing  Note: Patient resting in bed and repositioned with pillows. Patient bathed with soap and water. Barrier cream applied to narciso area. Patient incontinent. Will continue to assess. Problem: Safety - Adult  Goal: Free from fall injury  Outcome: Progressing  Note: Patient resting in bed. Bed alarm on, call light and belongings within reach. Gripper socks on. Video monitoring on for patient safety. Patient has not attempted to get out of bed.

## 2022-09-20 NOTE — PROGRESS NOTES
Patient's daughter called for updates. All questions and concerns addressed at this time.     Benson Jones RN

## 2022-09-20 NOTE — PROGRESS NOTES
Palliative Care Chart Review  and Check in Note:     NAME:  Nai Marie  Admit Date: 9/14/2022  Hospital Day:  Hospital Day: 7   Current Code status: Limited    Palliative care is continuing to following MsSal Dorcas Hatch for symptom management,  and goals of care discussion as needed. Patient's chart reviewed today 9/20/22. Pt is alert, non verbal, regarding this NP. Less agitated today. Per RN, pt ate around 25-50% of her breakfast this morning. Continue delirium precautions. Pt out of restraints since 11am yesterday. The following are the currently established goals/code status, and Symptom management. Goals of care: Continue with current management, family hopeful pt will recover to near baseline. We did briefly discuss hospice if pt does not make improvements once at SNF.      Code status: Limited - DNR/DNI    Discharge plan: Planning for SNF when medically ready for discharge      ANDIE Zhao CNP  09/20/22  10:08 AM

## 2022-09-20 NOTE — PROGRESS NOTES
Patient disoriented x4. VSS on RA. No neurovascular changes noted to LLE. Pulses moderate 2+. Using the PAINAD, patient experiencing some discomfort, scheduled tylenol given crushed in applesauce. NS running at 125 mL/hr per orders. Patient incontinent but voiding adequately. Barrier cream applied to coccyx and patient on specialty mattress.      Jonathan Matta RN

## 2022-09-20 NOTE — PLAN OF CARE
Problem: Skin/Tissue Integrity  Goal: Absence of new skin breakdown  Description: 1. Monitor for areas of redness and/or skin breakdown  2. Assess vascular access sites hourly  3. Every 4-6 hours minimum:  Change oxygen saturation probe site  4. Every 4-6 hours:  If on nasal continuous positive airway pressure, respiratory therapy assess nares and determine need for appliance change or resting period. 9/20/2022 1917 by Kyle Redd RN  Outcome: Progressing  9/20/2022 1145 by Nadira Stone RN  Outcome: Progressing  Note: Patient resting in bed and turned frequently. Patient bathed with soap and water. Barrier cream applied to narciso area. Will continue to monitor. Problem: ABCDS Injury Assessment  Goal: Absence of physical injury  Outcome: Progressing     Problem: Safety - Adult  Goal: Free from fall injury  9/20/2022 1917 by Jyoti Ponce RN  Outcome: Progressing  9/20/2022 1145 by Nadira Stone RN  Outcome: Progressing  Note: Patient resting in bed. Bed alarm on, call light and belongings within reach. Gripper socks on. Video monitoring on for patient safety.

## 2022-09-20 NOTE — PROGRESS NOTES
Hospital Medicine Care  Progress Note      Chief complain; Fall (Pt from nursing home memory unit. Ems states there was no night shift staff on this wing of nursing home, unknown when patient fell but was found on floor this morning. C/o left hip/leg pain)            Subjective:     Patient is much better  Nurses feeding the patient  According to the nurse patient slept well last night      Review of Systems:     Review of Systems as mentioned above, other ros is negative. Objective:       Medications        Scheduled Meds:   gentamicin  1 drop Both Eyes 6 times per day    QUEtiapine  12.5 mg Oral Nightly    sodium chloride flush  5-40 mL IntraVENous 2 times per day    sennosides-docusate sodium  1 tablet Oral BID    enoxaparin  40 mg SubCUTAneous Daily    acetaminophen  1,000 mg Oral Q8H    cefdinir  300 mg Oral 2 times per day    insulin lispro  0-4 Units SubCUTAneous TID WC    insulin lispro  0-4 Units SubCUTAneous Nightly    donepezil  10 mg Oral Nightly    [Held by provider] insulin glargine  10 Units SubCUTAneous Nightly    losartan  75 mg Oral Daily    sodium chloride flush  5-40 mL IntraVENous 2 times per day     Continuous Infusions:   sodium chloride      sodium chloride 125 mL/hr at 09/20/22 0912    dextrose      sodium chloride       PRN Meds:.methocarbamol IVPB, sodium chloride flush, sodium chloride, oxyCODONE **OR** oxyCODONE, HYDROmorphone **OR** HYDROmorphone, ondansetron **OR** ondansetron, polyethylene glycol, magnesium hydroxide, glucose, dextrose bolus **OR** dextrose bolus, glucagon (rDNA), dextrose, sodium chloride flush, sodium chloride      Allergies  Allergies   Allergen Reactions    Penicillins Other (See Comments)     Unknown reaction; patient has tolerated multiple courses of cephalexin as an outpatient per chart review as of 09/14/22. Statins Other (See Comments)     Unknown reaction; patient tolerates pitavastatin as an outpatient per chart review as of 9/14/22.       Sulfa Antibiotics          Vitals:    09/19/22 1839 09/19/22 2300 09/20/22 0300 09/20/22 0629   BP: (!) 141/78 129/76 (!) 157/83 138/77   Pulse: 90 90 (!) 105 79   Resp: 16 18 16 16   Temp: 97.9 °F (36.6 °C) 98 °F (36.7 °C) 98 °F (36.7 °C) 98 °F (36.7 °C)   TempSrc: Axillary Axillary  Axillary   SpO2: 93% 95% 93% 92%   Weight:       Height:             Physical exam:         Physical Exam  Constitutional:       Appearance: She is ill-appearing. Comments: sleepy   HENT:      Head: Normocephalic and atraumatic. Cardiovascular:      Rate and Rhythm: Normal rate and regular rhythm. Pulses: Normal pulses. Heart sounds: Normal heart sounds. Pulmonary:      Effort: Pulmonary effort is normal.      Breath sounds: Normal breath sounds. Musculoskeletal:      Cervical back: Normal range of motion and neck supple. Skin:     General: Skin is warm and dry. Capillary Refill: Capillary refill takes less than 2 seconds. Neurological:      Comments: Sleepy and restrained             Labs      Recent Labs     09/18/22  1226 09/19/22  0600 09/20/22  0958   WBC 16.1* 14.3* 12.3*   HGB 12.5 13.4 12.7   HCT 39.5 41.6 39.4    284 265   MCV 88.4 87.2 87.8          Recent Labs     09/18/22  1226 09/19/22  0600 09/20/22  0958    141 146*   K 3.9 4.2 3.7    105 110   CO2 21 22 20*   PHOS  --   --  2.0*   BUN 13 10 10   CREATININE 0.5* <0.5* <0.5*         No results for input(s): AST, ALT, ALB, BILIDIR, BILITOT, ALKPHOS in the last 72 hours. No results for input(s): MG in the last 72 hours. Radiology  XR FEMUR LEFT (MIN 2 VIEWS)   Final Result      Status post ORIF of the left femur, with anatomic alignment and no visible hardware complication. XR HIP LEFT (2-3 VIEWS)   Final Result      Intraoperative fluoroscopy was performed. Correlate with procedural note for additional information. FLUORO FOR SURGICAL PROCEDURES   Final Result      Intraoperative fluoroscopy was performed. Correlate with procedural note for additional information. CT FEMUR LEFT WO CONTRAST   Final Result   1. Age-indeterminate fracture of the proximal left femur adjacent to the left hip arthroplasty. There is no significant adjacent edema or hematoma. 2. Please see concurrent CT of the pelvis for discussion of additional findings. CT PELVIS WO CONTRAST Additional Contrast? None   Final Result   1. Subacute, healing fractures of the right obturator ring as detailed above. 2. Nonspecific sclerosis in the midline of the sacrum. There is cortical irregularity along the anterior cortex of the sacrum, which could reflect nondisplaced fractures, age indeterminate. 3. Cystic lesion with mural nodularity along the periphery is present, likely arising from the ovary, and suspicious for primary ovarian neoplasm. This could be further evaluated with ultrasound. XR KNEE LEFT (1-2 VIEWS)   Final Result      No definite fracture. CT CSpine W/O Contrast   Final Result   Impression:      No acute fracture. Multilevel spondylosis and spondylolisthesis, as above. CT Head W/O Contrast   Final Result   1. Stable changes   2. 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 No evidence of acute intracranial hemorrhage   4. 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 Correlation with contrast   Magnetic resonance imaging recommended if clinically indicated in this patient's age group. 5. No vasogenic edema identified      XR CHEST PORTABLE   Final Result   1. Vascular redistribution secondary to the spinal radiograph   2. Cardiomegaly   3. No active airspace disease   4.  Osteoporosis and old right multiple healed rib fractures      XR PELVIS (1-2 VIEWS)   Final Result   Impression: Limited study without acute abnormality. XR KNEE RIGHT (1-2 VIEWS)   Final Result   Impression: No acute osseous abnormality. Osteopenia. XR FEMUR LEFT (MIN 2 VIEWS)   Final Result   Impression: Limited study without acute osseous abnormality. XR KNEE LEFT (1-2 VIEWS)   Final Result   Impression: Limited study without acute osseous abnormality. Assessment and Plan:   Principal Problem:    Periprosthetic fracture around internal prosthetic left hip joint (HCC)  Active Problems:    Pelvic ring fracture, closed, initial encounter ([facility]    DM (diabetes mellitus), type 2, uncontrolled ([facility]    Dementia ([facility]    Hypertension  Resolved Problems:    * No resolved hospital problems.  *     Periprosthetic left hip fracture  S/p left hip open reduction internal fixation  Pain management  DVT prophylaxis: Lovenox  PT OT scores are low    Delirium  Likely secondary to baseline dementia, hospitalization  UA consistent with dehydration with elevated urine ketones and sp gravity   Increase IVF     Diabetes  mellitus  Poor p.o. intake  Hold Lantus  Sliding scale insulin    Hypertension  Continue home medication  Monitor BP      Disposition  Off restrains  Volume deplted  Anticipate 24 hours       Lety Galvan MD  9/20/2022

## 2022-09-21 VITALS
WEIGHT: 117.73 LBS | HEART RATE: 90 BPM | SYSTOLIC BLOOD PRESSURE: 150 MMHG | HEIGHT: 60 IN | BODY MASS INDEX: 23.11 KG/M2 | OXYGEN SATURATION: 95 % | DIASTOLIC BLOOD PRESSURE: 84 MMHG | TEMPERATURE: 97.6 F | RESPIRATION RATE: 18 BRPM

## 2022-09-21 LAB
ALBUMIN SERPL-MCNC: 2.3 G/DL (ref 3.4–5)
ANION GAP SERPL CALCULATED.3IONS-SCNC: 10 MMOL/L (ref 3–16)
BUN BLDV-MCNC: 11 MG/DL (ref 7–20)
CALCIUM SERPL-MCNC: 7.7 MG/DL (ref 8.3–10.6)
CHLORIDE BLD-SCNC: 113 MMOL/L (ref 99–110)
CO2: 20 MMOL/L (ref 21–32)
CREAT SERPL-MCNC: <0.5 MG/DL (ref 0.6–1.2)
GFR AFRICAN AMERICAN: >60
GFR NON-AFRICAN AMERICAN: >60
GLUCOSE BLD-MCNC: 117 MG/DL (ref 70–99)
GLUCOSE BLD-MCNC: 124 MG/DL (ref 70–99)
GLUCOSE BLD-MCNC: 186 MG/DL (ref 70–99)
GLUCOSE BLD-MCNC: 299 MG/DL (ref 70–99)
HCT VFR BLD CALC: 36.4 % (ref 36–48)
HEMOGLOBIN: 12 G/DL (ref 12–16)
MCH RBC QN AUTO: 28.3 PG (ref 26–34)
MCHC RBC AUTO-ENTMCNC: 33 G/DL (ref 31–36)
MCV RBC AUTO: 85.6 FL (ref 80–100)
PDW BLD-RTO: 15.2 % (ref 12.4–15.4)
PERFORMED ON: ABNORMAL
PHOSPHORUS: 2.2 MG/DL (ref 2.5–4.9)
PLATELET # BLD: 298 K/UL (ref 135–450)
PMV BLD AUTO: 9.1 FL (ref 5–10.5)
POTASSIUM SERPL-SCNC: 3.5 MMOL/L (ref 3.5–5.1)
RBC # BLD: 4.26 M/UL (ref 4–5.2)
SODIUM BLD-SCNC: 143 MMOL/L (ref 136–145)
WBC # BLD: 11.1 K/UL (ref 4–11)

## 2022-09-21 PROCEDURE — 97530 THERAPEUTIC ACTIVITIES: CPT

## 2022-09-21 PROCEDURE — 85027 COMPLETE CBC AUTOMATED: CPT

## 2022-09-21 PROCEDURE — 36415 COLL VENOUS BLD VENIPUNCTURE: CPT

## 2022-09-21 PROCEDURE — 6360000002 HC RX W HCPCS: Performed by: ORTHOPAEDIC SURGERY

## 2022-09-21 PROCEDURE — 80069 RENAL FUNCTION PANEL: CPT

## 2022-09-21 PROCEDURE — 6370000000 HC RX 637 (ALT 250 FOR IP): Performed by: ORTHOPAEDIC SURGERY

## 2022-09-21 PROCEDURE — 2580000003 HC RX 258: Performed by: INTERNAL MEDICINE

## 2022-09-21 PROCEDURE — 97535 SELF CARE MNGMENT TRAINING: CPT

## 2022-09-21 PROCEDURE — 86304 IMMUNOASSAY TUMOR CA 125: CPT

## 2022-09-21 PROCEDURE — 6370000000 HC RX 637 (ALT 250 FOR IP): Performed by: STUDENT IN AN ORGANIZED HEALTH CARE EDUCATION/TRAINING PROGRAM

## 2022-09-21 RX ORDER — CEFDINIR 300 MG/1
300 CAPSULE ORAL EVERY 12 HOURS SCHEDULED
Qty: 10 CAPSULE | Refills: 0 | Status: SHIPPED | OUTPATIENT
Start: 2022-09-21 | End: 2022-09-26

## 2022-09-21 RX ADMIN — INSULIN LISPRO 2 UNITS: 100 INJECTION, SOLUTION INTRAVENOUS; SUBCUTANEOUS at 12:29

## 2022-09-21 RX ADMIN — ENOXAPARIN SODIUM 40 MG: 100 INJECTION SUBCUTANEOUS at 09:00

## 2022-09-21 RX ADMIN — GENTAMICIN SULFATE 1 DROP: 3 SOLUTION OPHTHALMIC at 09:05

## 2022-09-21 RX ADMIN — SENNOSIDES AND DOCUSATE SODIUM 1 TABLET: 50; 8.6 TABLET ORAL at 11:15

## 2022-09-21 RX ADMIN — LOSARTAN POTASSIUM 75 MG: 25 TABLET, FILM COATED ORAL at 09:00

## 2022-09-21 RX ADMIN — GENTAMICIN SULFATE 1 DROP: 3 SOLUTION OPHTHALMIC at 03:39

## 2022-09-21 RX ADMIN — CEFDINIR 300 MG: 300 CAPSULE ORAL at 09:05

## 2022-09-21 RX ADMIN — ACETAMINOPHEN 1000 MG: 500 TABLET ORAL at 16:56

## 2022-09-21 RX ADMIN — SODIUM CHLORIDE: 9 INJECTION, SOLUTION INTRAVENOUS at 05:46

## 2022-09-21 RX ADMIN — GENTAMICIN SULFATE 1 DROP: 3 SOLUTION OPHTHALMIC at 12:30

## 2022-09-21 RX ADMIN — ACETAMINOPHEN 1000 MG: 500 TABLET ORAL at 08:59

## 2022-09-21 RX ADMIN — MAGNESIUM HYDROXIDE 30 ML: 400 SUSPENSION ORAL at 16:56

## 2022-09-21 RX ADMIN — SODIUM CHLORIDE: 9 INJECTION, SOLUTION INTRAVENOUS at 14:20

## 2022-09-21 RX ADMIN — GENTAMICIN SULFATE 1 DROP: 3 SOLUTION OPHTHALMIC at 16:56

## 2022-09-21 NOTE — CARE COORDINATION
ADDENDUM: GRANT spoke to [facility] Beckford they are able to accept Pt back today. Electronically signed by TRENA Alvarez LSW on 9/21/2022 at 3:06 PM        SW has transport at 1pm via [email or web address]. GRANT spoke to [facility] Beckford who stated their policy is 87NWE restraint free, she is going to check with her admin and call back. SW following.   Electronically signed by TRENA Alvarez LSW on 9/21/2022 at 10:34 AM  [phone]

## 2022-09-21 NOTE — DISCHARGE INSTR - COC
Continuity of Care Form    Patient Name: Ari Cobian   :  1931  MRN:  [id number]    Admit date:  2022  Discharge date:  ***    Code Status Order: Limited   Advance Directives:     Admitting Physician:  Belle Marcus MD  PCP: Vern Caputo MD    Discharging Nurse: [facility] Unit/Room#: 0813/3139-12  Discharging Unit Phone Number: ***    Emergency Contact:   Extended Emergency Contact Information  Primary Emergency Contact: Kourtney Oseiderekalexander Godwinmercedesarin [address] of [address] [place] [address] Phone: [phone]  Mobile Phone: [phone]  Relation: Child  Secondary Emergency Contact: [address] of [address] [facility] Phone: [phone]  Mobile Phone: [phone]  Relation: Child    Past Surgical History:  Past Surgical History:   Procedure Laterality Date    BRAIN TUMOR EXCISION      benign, right frontotemporal craniotomy    CORONARY ARTERY BYPASS GRAFT      HIP FRACTURE SURGERY Left 2022    LEFT HIP OPEN REDUCTION INTERNAL FIXATION performed by Rickie Bermudez MD at [address] [place] [address]      pins    ORIF HIP FRACTURE Left 2022    PARTIAL HIP ARTHROPLASTY      left hip       Immunization History:   Immunization History   Administered Date(s) Administered    COVID-19, PFIZER PURPLE top, DILUTE for use, (age 15 y+), 30mcg/0.3mL 2021, 2021, 10/19/2021    Influenza Vaccine, unspecified formulation 10/23/2017    Influenza, FLUAD, (age 72 y+), Adjuvanted, 0.5mL 10/05/2020, 2021    Influenza, High Dose (Fluzone 65 yrs and older) 2016, 2018    Influenza, Triv, inactivated, subunit, adjuvanted, IM (Fluad 65 yrs and older) 2019    Pneumococcal Conjugate 13-valent ([id number]) 2017    Pneumococcal Polysaccharide ([id number]) 2016    Tdap (Boostrix, Adacel) 2017, 2018    Zoster Live (Zostavax) 2016       Active Problems:  Patient Active Problem List   Diagnosis Code    Type 2 diabetes mellitus with circulatory disorder, without long-term current use of insulin ([facility]) E11.59    Pure hypercholesterolemia E78.00    Dementia ([facility]) F03.90    Diabetes ([facility] E11.9    Hyperlipidemia E78.5    Hypertension I10    Osteoporosis M81.0    Meningioma ([facility] D32.9    Pelvic ring fracture, closed, initial encounter ([facility] S32.810A    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 02XA    DM (diabetes mellitus), type 2, uncontrolled ([facility] E11.65       Isolation/Infection:   Isolation            Contact          Patient Infection Status       Infection Onset Added Last Indicated Last Indicated By Review Planned Expiration Resolved Resolved By    MRSA 12/13/21 12/16/21 12/13/21 Culture, Wound                Nurse Assessment:  Last Vital Signs: /74   Pulse 74   Temp 98 °F (36.7 °C) (Axillary)   Resp 16   Ht 5' (1.524 m)   Wt 117 lb 11.6 oz (53.4 kg)   SpO2 98%   BMI 22.99 kg/m²     Last documented pain score (0-10 scale): Pain Level: 0  Last Weight:   Wt Readings from Last 1 Encounters:   09/16/22 117 lb 11.6 oz (53.4 kg)     Mental Status:  disoriented    IV Access:  - None    Nursing Mobility/ADLs:  Walking   Dependent  Transfer  Dependent  Bathing  Dependent  Dressing  Dependent  Toileting  Dependent  Feeding  Assisted  Med Admin  Assisted  Med Delivery   crushed and prefers mixed with applesauce    Wound Care Documentation and Therapy:  Wound 09/14/22 Hip Anterior; Left (Active)   Wound Etiology Pressure Stage 1 09/19/22 1900   Dressing Status Clean;Dry; Intact 09/20/22 1500   Dressing/Treatment Silver dressing;Tegaderm/transparent film dressing 09/20/22 1500   Dressing Change Due 09/18/22 09/16/22 0750   Wound Assessment Other (Comment) 09/20/22 1500   Drainage Amount None 09/20/22 1500   Odor None 09/20/22 2300   Aminata-wound Assessment Other (Comment) 09/20/22 1500   Margins Other (Comment) 09/20/22 1500   Number of days: 6       Wound Sacrum (Active)   Dressing Status Clean;Dry; Intact 09/20/22 0300   Wound Cleansed Soap and water 09/20/22 1500   Dressing/Treatment Open to air 09/20/22 1500   Wound Assessment Pink/red 09/20/22 1500   Drainage Amount None 09/20/22 1500   Odor None 09/21/22 0300   Aminata-wound Assessment Intact 09/21/22 0300   Margins Attached edges 09/21/22 0300   Number of days:        Incision 09/17/22 Thigh Anterior;Left;Proximal (Active)   Dressing Status Clean;Dry; Intact 09/20/22 1500   Dressing/Treatment Silver dressing;Tegaderm/transparent film dressing 09/20/22 1500   Closure Other (Comment) 09/20/22 1500   Margins Other (Comment) 09/20/22 1500   Incision Assessment Other (Comment) 09/20/22 1500   Drainage Amount None 09/20/22 2300   Odor None 09/20/22 1500   Aminata-incision Assessment Other (Comment) 09/20/22 1500   Number of days: 3        Elimination:  Continence: Bowel: No  Bladder: No  Urinary Catheter: None   Colostomy/Ileostomy/Ileal Conduit: No       Date of Last BM: 9/21/22    Intake/Output Summary (Last 24 hours) at 9/21/2022 0815  Last data filed at 9/20/2022 1912  Gross per 24 hour   Intake 1160 ml   Output --   Net 1160 ml     I/O last 3 completed shifts: In: 1160 [P.O.:1160]  Out: -     Safety Concerns:     History of Falls (last 30 days) and At Risk for Falls    Impairments/Disabilities:      Speech and Hearing    Nutrition Therapy:  Current Nutrition Therapy:   - Oral Diet:  Dental Soft    Routes of Feeding: Oral  Liquids: Thin Liquids  Daily Fluid Restriction: no  Last Modified Barium Swallow with Video (Video Swallowing Test): not done    Treatments at the Time of Hospital Discharge:   Respiratory Treatments:     Oxygen Therapy:  is not on home oxygen therapy.   Ventilator:    - No ventilator support    Rehab Therapies: Physical Therapy and Occupational Therapy and Speech Therapy  Weight Bearing Status/Restrictions: No weight bearing restrictions  Other Medical Equipment (for information only, NOT a DME order):  eliezer torres  Other Treatments: ***    Patient's

## 2022-09-21 NOTE — PLAN OF CARE
Problem: Safety - Adult  Goal: Free from fall injury  Outcome: Progressing   Bed and chair alarms on, bed and chair wheels locked, call light within reach. Problem: Pain  Goal: Verbalizes/displays adequate comfort level or baseline comfort level  Outcome: Progressing   Pt displays no signs of discomfort or pain.

## 2022-09-21 NOTE — TELEPHONE ENCOUNTER
Pt's daughter has some concerns about getting the pt to the office and completing the US due to transporting the pt     Also the family has concerns about when they can take off work to come to [facility] for an appt

## 2022-09-21 NOTE — TELEPHONE ENCOUNTER
Please advise hospital follow-up visit. May need to obtain pelvic ultrasound. Due to patient's dementia, and possible difficulty obtaining pelvic ultrasound, please add on blood test to blood in lab for Ca 125. See order.

## 2022-09-21 NOTE — TELEPHONE ENCOUNTER
Vashti at [address] [place] [address] is calling to let Dr. Amanda Rios know that the add-on lab order requests for \"CA-125\" could not be completed as patient had labs done at [facility]    Please contact Vashti with any additional questions.

## 2022-09-21 NOTE — PROGRESS NOTES
Physical Therapy  Facility/Department: [facility] 5T ORTHO/NEURO  Daily Treatment Note  NAME: Jori Toure  : 1931  MRN: [id number]    Date of Service: 2022    Discharge Recommendations:  Jori Toure scored a 10/24 on the AM-PAC short mobility form. Current research shows that an AM-PAC score of 17 or less is typically not associated with a discharge to the patient's home setting. Based on the patient's AM-PAC score and their current functional mobility deficits, it is recommended that the patient have 3-5 sessions per week of Physical Therapy at d/c to increase the patient's independence. Please see assessment section for further patient specific details. PT Equipment Recommendations  Equipment Needed:  (defer)    Patient Diagnosis(es): The primary encounter diagnosis was Closed fracture of sacrum, unspecified fracture morphology, initial encounter ([facility] 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 Assessment   Assessment: Pt was limited by confusion. Presented with decreased trunk and LE control during bed mobility requiring extra time to perform. Transfers were limited by a fear of falling; relies on lift equipment for OOB activities. Pt was left in the chair and Speech was notified of eating difficulties. Equipment Needed:  (defer)     Plan    Plan  Plan: 5-7 times per week  Current Treatment Recommendations: Strengthening;ROM; Functional mobility training  Plan Comment: assess standing/gt as able     Restrictions  Position Activity Restriction  Other position/activity restrictions: FWBAT LLE     Subjective    Subjective  Subjective: Pt was in bed willing to participate; presents with confusion  Pain: no visual signs of pain  Orientation  Overall Orientation Status: Impaired     Objective     Bed Mobility Training  Bed Mobility Training: Yes  Supine to Sit: Moderate assistance (HOB was elevated; pt pulling on handrail). Scooting: Minimum assistance  Balance  Sitting:  (CGA<>SBA)  Standing:  (fair-)  Transfer Training  Transfer Training: Yes  Sit to Stand: Maximum assistance (due to fear relies heavily on BUE inside STEDY)  Stand to Sit: Maximum assistance  Bed to Chair: Total assistance via STEDY     PT Exercises  Dynamic Sitting Balance Exercises: Sitting EOB for approx 20 mins performing dynamic reaching and weight shifts in all planes. Safety Devices  Type of Devices: Call light within reach; Chair alarm in place; Left in chair;Gait belt  Restraints  Restraints Initially in Place: No       Goals  Short Term Goals  Time Frame for Short term goals: By discharge  Short term goal 1: Sup to sit with mod assist  Short term goal 2: Pt will sit EOB x 10 minutes with min assist  Patient Goals   Patient goals : unable to state due to dementia    Education  Patient Education  Education Given To: Patient  Education Provided: Role of Therapy  Education Provided Comments: importance of OOB  Barriers to Learning: Cognition  Education Outcome: Unable to demonstrate understanding    Therapy Time   Individual Concurrent Group Co-treatment   Time In 0812         Time Out 0857         Minutes Kourtney Kumar [address] A Joh[place], [facility]

## 2022-09-21 NOTE — PROGRESS NOTES
Occupational Therapy  Occupational Therapy  Daily Treatment Note  Patient Name: Payal Barrera  MRN: [id number]    Assessment:   Pt with increased activity tolerance this session. Transfer completed via [place] stedy from EOB to chair. Pt completing bed mobility with Mod A. Pt with fear during completion of sit to stand requiring Max A. Pt would benefit from inpt OT. Continue OT per POC. Discharge Recommendations:     Payal Barrera scored a 9/24 on the AM-PAC ADL Inpatient form. Current research shows that an AM-PAC score of 17 or less is typically not associated with a discharge to the patient's home setting. Based on the patient's AM-PAC score and their current ADL deficits, it is recommended that the patient have 3-5 sessions per week of Occupational Therapy at d/c to increase the patient's independence. Please see assessment section for further patient specific details. If patient discharges prior to next session this note will serve as a discharge summary. Please see below for the latest assessment towards goals. Equipment Needs:      Chart Reviewed: Yes       Other position/activity restrictions: FWBAT LLE     Additional Pertinent Hx: Adm 9/14 to ED with R hip pain after possible fall. Pt with dementia. 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 CT cervical spine (-). L knee Xray (-). Pelvis CT:Subacute, healing fractures of the right obturator ring. There is cortical irregularity along the anterior cortex of the sacrum, which could reflect nondisplaced fractures, age indeterminate. CT L femur:Age-indeterminate fracture of the proximal left femur adjacent to the left hip arthroplasty. Pt s/p Open Reduction and Internal Fixation with plate, screws and cerclage wires of Left Periprosthetic Hip Fracture In the peritrochanteric region on 9/17.    PMH: dementia, DM, hyperlipidemia, HTN, osteoporosis, partial hip arthroplasty, brain tumor excision, CABG Diagnosis: pelvic ring fx, closed, initial encounter. Treatment Diagnosis: Decreased activity tolerance, impaired ADLs and mobility    Subjective:   Pt met supine in bed and cooperative with therapy session. Pt with confusion due to dementia and Hard of hearing. Pain:   No pain noted but pt stating . That was rough after transfer. Pt repositioned to comfort    Social/Functional History  Type of Home: Facility  Additional Comments: Per chart, pt normally able to stand unassisted and was ambulatory with walker. Prior Function  Additional Comments: Per chart, pt normally able to stand unassisted and was ambulatory with walker. Objective:    Cognition/Orientation:  Dementia    Bed mobility   Supine to sit:  Mod A with increased time and effort  Scooting: Min A scooting to EOB and Max A x2 scooting back in chair. Functional Mobility   Sit to Stand: Max A   Stand to Sit: Max A   Bed to Chair Transfer: Dependent via Carli torres  Other: functional mobility dependent upon Carli torres    ADLs   Grooming: Mod A washing face seated in recliner chair  Pt eating breakfast with fingers seated EOB and in recliner chair. Pt requiring reorientation to breakfast tray and assist for set up and cutting food      Activity Tolerance:  Pt cooperative but limited by fear during initial stance and confusion. Patient Education:   Role of OT, activity promotion      Safety Devices in Place:  Pt left seated in recliner chair with alarm on and call light inreach. RN informed    Second Session Therapy Time:   Individual Concurrent Group Co-treatment   Time In 813         Time Out 902         Minutes 49           Timed Code Treatment Minutes: 49     Total Treatment Minutes:       Timed Code Treatment Minutes:    Total Treatment Time:     Goals: (as determined and assessed by primary OT)   Short Term Goals  Time Frame for Short term goals: d/c  Short Term Goal 1: tolerate sitting EOB for 8 minutes  Short Term Goal 2: complete bed mobility min A  Short Term Goal 3: perform grooming task seated EOB w/ min A         Plan:      Times per Week: 2-5 trial   Times per Day: Daily    If patient is discharged prior to next treatment, this note will serve as the discharge summary.       [address] [place] [address], Ne, INA/L

## 2022-09-21 NOTE — DISCHARGE SUMMARY
Discharge Summary    Date:9/21/2022        Patient Fabiana Cramer     YOB: 1931     Age:89 y.o. Admit Date:9/14/2022   Admission Condition:fair   Discharged Condition:good  Discharge Date: 09/21/22    Discharge Diagnoses   Principal Problem:    Periprosthetic fracture around internal prosthetic left hip joint ([address]  Active Problems:    Pelvic ring fracture, closed, initial encounter ([facility]    DM (diabetes mellitus), type 2, uncontrolled ([facility]    Dementia ([facility]    Hypertension  Resolved Problems:    * No resolved hospital problems. [facility] AND [facility] Stay   Narrative of Hospital Course:        Periprosthetic left hip fracture  S/p left hip open reduction internal fixation  Pain management per orthopedics   DVT prophylaxis: Lovenox  PT OT scores are low SNF placement. Delirium  Improved   Likely secondary to baseline dementia, hospitalization       Diabetes  mellitus  Resume home meds on dc . Hold SGLT2 inhibitor as she is high risk for dehydration     Hypertension  Continue home medication  Monitor BP     Has baseline dementia. Ovarian cyst  Incidental findings. Suggest OP US with PCP  Unable to communicate with patient 2/2 dementia  Called daughter Karyna Triana and left VM. PCP follow up updated on dc . Discussed with Discharging RN. Physical exam   Vitals:    09/20/22 1830 09/20/22 2300 09/21/22 0300 09/21/22 0630   BP: 128/83 126/68 (!) 142/85 128/74   Pulse: 89 98 96 74   Resp: 16 16 16 16   Temp: 97.5 °F (36.4 °C) 98 °F (36.7 °C) 98 °F (36.7 °C) 98 °F (36.7 °C)   TempSrc: Oral Axillary Axillary Axillary   SpO2: 96% 98% 98% 98%   Weight:       Height:             Physical Exam  Constitutional:       Appearance: She is ill-appearing. HENT:      Head: Normocephalic and atraumatic. Cardiovascular:      Rate and Rhythm: Normal rate and regular rhythm. Pulses: Normal pulses. Heart sounds: Normal heart sounds.    Pulmonary:      Effort: Pulmonary effort is femur, with anatomic alignment and no visible hardware complication. XR HIP LEFT (2-3 VIEWS)   Final Result      Intraoperative fluoroscopy was performed. Correlate with procedural note for additional information. FLUORO FOR SURGICAL PROCEDURES   Final Result      Intraoperative fluoroscopy was performed. Correlate with procedural note for additional information. CT FEMUR LEFT WO CONTRAST   Final Result   1. Age-indeterminate fracture of the proximal left femur adjacent to the left hip arthroplasty. There is no significant adjacent edema or hematoma. 2. Please see concurrent CT of the pelvis for discussion of additional findings. CT PELVIS WO CONTRAST Additional Contrast? None   Final Result   1. Subacute, healing fractures of the right obturator ring as detailed above. 2. Nonspecific sclerosis in the midline of the sacrum. There is cortical irregularity along the anterior cortex of the sacrum, which could reflect nondisplaced fractures, age indeterminate. 3. Cystic lesion with mural nodularity along the periphery is present, likely arising from the ovary, and suspicious for primary ovarian neoplasm. This could be further evaluated with ultrasound. XR KNEE LEFT (1-2 VIEWS)   Final Result      No definite fracture. CT CSpine W/O Contrast   Final Result   Impression:      No acute fracture. Multilevel spondylosis and spondylolisthesis, as above. CT Head W/O Contrast   Final Result   1. Stable changes   2. 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 No evidence of acute intracranial hemorrhage   4. 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 Correlation with contrast   Magnetic resonance imaging recommended if clinically indicated in this patient's age group.    5. No vasogenic edema identified      XR CHEST PORTABLE   Final Result   1. Vascular redistribution secondary to the spinal radiograph   2. Cardiomegaly   3. No active airspace disease   4. Osteoporosis and old right multiple healed rib fractures      XR PELVIS (1-2 VIEWS)   Final Result   Impression: Limited study without acute abnormality. XR KNEE RIGHT (1-2 VIEWS)   Final Result   Impression: No acute osseous abnormality. Osteopenia. XR FEMUR LEFT (MIN 2 VIEWS)   Final Result   Impression: Limited study without acute osseous abnormality. XR KNEE LEFT (1-2 VIEWS)   Final Result   Impression: Limited study without acute osseous abnormality. Treatment team at time of Discharge: Treatment Team: Attending Provider: Fleta Claude, MD; Consulting Physician: Maria Dolores Cantrell MD; Consulting Physician: Miguel Tejada APRN - CNP; Surgeon: Maria Dolores Cantrell MD; Registered Nurse: Fabrice Cheng RN; Registered Nurse: Pauline Arambula, PLACIDO; Physical Therapist Assistant: Thomas Franklin PTA (Inactive);  Utilization Reviewer: Henny Alston RN; : TRENA Matamoros, LSW    Discharge Plan   Disposition: ECF     Provider Follow-Up:   Maria Dolores Cantrell MD  [facility]. [address] [facility] [address]  [phone]    Schedule an appointment as soon as possible for a visit in 8 day(s)  For x ray    Maria Dolores Cantrell MD  [address] [place] [address] [phone]    Schedule an appointment as soon as possible for a visit in 8 day(s)  For suture removal, For x ray    [address]  [address]         Genevieve Delatorre MD  [address] [place] [address] [address]  [phone]    Follow up in 2 week(s)  follow up  ovarian cyst. ? needs OP pelvis US       Hospital/Incidental Findings Requiring Follow-Up:  XR FEMUR LEFT (MIN 2 VIEWS)   Final Result      Status post ORIF of the left femur, with anatomic alignment and no visible hardware complication. XR HIP LEFT (2-3 VIEWS)   Final Result      Intraoperative fluoroscopy was performed. Correlate with procedural note for additional information. FLUORO FOR SURGICAL PROCEDURES   Final Result      Intraoperative fluoroscopy was performed. Correlate with procedural note for additional information. CT FEMUR LEFT WO CONTRAST   Final Result   1. Age-indeterminate fracture of the proximal left femur adjacent to the left hip arthroplasty. There is no significant adjacent edema or hematoma. 2. Please see concurrent CT of the pelvis for discussion of additional findings. CT PELVIS WO CONTRAST Additional Contrast? None   Final Result   1. Subacute, healing fractures of the right obturator ring as detailed above. 2. Nonspecific sclerosis in the midline of the sacrum. There is cortical irregularity along the anterior cortex of the sacrum, which could reflect nondisplaced fractures, age indeterminate. 3. Cystic lesion with mural nodularity along the periphery is present, likely arising from the ovary, and suspicious for primary ovarian neoplasm. This could be further evaluated with ultrasound. XR KNEE LEFT (1-2 VIEWS)   Final Result      No definite fracture. CT CSpine W/O Contrast   Final Result   Impression:      No acute fracture. Multilevel spondylosis and spondylolisthesis, as above. CT Head W/O Contrast   Final Result   1. Stable changes   2. 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 No evidence of acute intracranial hemorrhage   4. 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 Correlation with contrast   Magnetic resonance imaging recommended if clinically indicated in this patient's age group.    5. No vasogenic edema identified XR CHEST PORTABLE   Final Result   1. Vascular redistribution secondary to the spinal radiograph   2. Cardiomegaly   3. No active airspace disease   4. Osteoporosis and old right multiple healed rib fractures      XR PELVIS (1-2 VIEWS)   Final Result   Impression: Limited study without acute abnormality. XR KNEE RIGHT (1-2 VIEWS)   Final Result   Impression: No acute osseous abnormality. Osteopenia. XR FEMUR LEFT (MIN 2 VIEWS)   Final Result   Impression: Limited study without acute osseous abnormality. XR KNEE LEFT (1-2 VIEWS)   Final Result   Impression: Limited study without acute osseous abnormality. Discharge Medications         Medication List        START taking these medications      cefdinir 300 MG capsule  Commonly known as: OMNICEF  Take 1 capsule by mouth every 12 hours for 5 days     enoxaparin 40 MG/0.4ML  Commonly known as: Lovenox  Inject 0.4 mLs into the skin daily     methocarbamol 750 MG tablet  Commonly known as: Robaxin-750  Take 1 tablet by mouth 3 times daily as needed (spasms)     oxyCODONE 5 MG immediate release tablet  Commonly known as: Roxicodone  Take 1 tablet by mouth every 6 hours as needed for Pain (half tab for mild pain. whole tab for severe) for up to 7 days. Intended supply: 7 days. Take lowest dose possible to manage pain     senna-docusate 8.6-50 MG per tablet  Commonly known as: PERICOLACE  Take 2 tablets by mouth daily as needed for Constipation            CONTINUE taking these medications      Accu-Chek Neha Plus strip  Generic drug: blood glucose test strips  TESTING ONCE DAILY.  DX: E11.9 DIABETES MELLITUS     alendronate 35 MG tablet  Commonly known as: FOSAMAX  TAKE 1 TABLET BY MOUTH WEEKLY 30 MINS BEFORE FOOD (EMPTY STOMACH) WITH 8 OZ WATER DO NOT LIE DOWN FOR 30MIN     aspirin EC 81 MG EC tablet  Take 1 tablet by mouth daily     Shaggyaglar KwikPen 100 UNIT/ML injection pen  Generic drug: insulin glargine  Inject 10 Units into the skin nightly     donepezil 10 MG tablet  Commonly known as: ARICEPT  TAKE ONE TABLET BY MOUTH AT BEDTIME. * losartan 50 MG tablet  Commonly known as: COZAAR  TAKE ONE TABLET BY MOUTH ONCE A DAY. * losartan 25 MG tablet  Commonly known as: COZAAR  TAKE ONE TABLET BY MOUTH ONCE A DAY *TAKE WITH 50MG FOR A TOTAL DOSE OF 75MG*     Pen Needles 31G X 6 MM Misc  1 each by Does not apply route daily           * This list has 2 medication(s) that are the same as other medications prescribed for you. Read the directions carefully, and ask your doctor or other care provider to review them with you. STOP taking these medications      dapagliflozin 10 MG tablet  Commonly known as: Farxiga     Janumet -1000 MG Tb24  Generic drug: SITagliptin-metFORMIN HCl ER     pioglitazone 30 MG tablet  Commonly known as: ACTOS            ASK your doctor about these medications      Livalo 2 MG Tabs tablet  Generic drug: pitavastatin  TAKE ONE TABLET BY MOUTH AT BEDTIME. Oyster Shell Calcium/D 500-200 MG-UNIT Tabs  TAKE ONE (1) TABLET BY MOUTH TWICE A DAY. Therems-M Tabs  TAKE 1 TABLET BY MOUTH ONCE DAILY.                Where to Get Your Medications        You can get these medications from any pharmacy    Bring a paper prescription for each of these medications  cefdinir 300 MG capsule  enoxaparin 40 MG/0.4ML  methocarbamol 750 MG tablet  oxyCODONE 5 MG immediate release tablet  senna-docusate 8.6-50 MG per tablet         Electronically signed by Johnny Stoner MD on 9/21/22 at 10:08 AM EDT

## 2022-09-21 NOTE — PROGRESS NOTES
Pt disoriented x4, no signs of pain or discomfort. On RA. IVF infusing. Pt worked with therapy today, sat in chair for 1 hour, returned to bed x2 with stedy. Silver dressing on L hip clean, dry, intact. Bed alarm on, wheels locked.

## 2022-09-22 ENCOUNTER — TELEPHONE (OUTPATIENT)
Dept: INTERNAL MEDICINE CLINIC | Age: 87
End: 2022-09-22

## 2022-09-22 LAB — CA 125: 67.8 U/ML (ref 0–35)

## 2022-09-22 NOTE — TELEPHONE ENCOUNTER
[facility] Trans[facility] Initial Follow Up Call    Outreach made within 2 business days of discharge: Yes    Patient: Jack Mejia Patient : 1931   MRN: [id number]  Reason for Periprosthetic fracture around internal prosthetic left hip joint   Admission: There are no discharge diagnoses documented for the most recent discharge. Discharge Date: 22       Spoke with: pt's daughter     Discharge department/facility: [facility] Interactive Patient Contact:  Was patient able to fill all prescriptions: Yes  Was patient instructed to bring all medications to the follow-up visit: Yes  Is patient taking all medications as directed in the discharge summary?  Yes  Does patient understand their discharge instructions: Yes  Does patient have questions or concerns that need addressed prior to 7-14 day follow up office visit: yes -     Scheduled appointment with PCP within 7-14 days    Follow Up  Future Appointments   Date Time Provider Atilio Carlson   10/24/2022 10:30 AM Corina Card MD [id number] [address]       Spoke to pt's daughter     Please see phone encounter from 2022

## 2022-09-27 DIAGNOSIS — R97.1 ELEVATED CA-125: ICD-10-CM

## 2022-09-27 DIAGNOSIS — N83.8 OVARIAN MASS: Primary | ICD-10-CM

## 2022-10-01 ENCOUNTER — HOSPITAL ENCOUNTER (INPATIENT)
Age: 87
LOS: 7 days | Discharge: NURSING, REHAB OR LONG-TERM CARE | DRG: 291 | End: 2022-10-08
Attending: STUDENT IN AN ORGANIZED HEALTH CARE EDUCATION/TRAINING PROGRAM | Admitting: INTERNAL MEDICINE
Payer: MEDICARE

## 2022-10-01 ENCOUNTER — APPOINTMENT (OUTPATIENT)
Dept: GENERAL RADIOLOGY | Age: 87
DRG: 291 | End: 2022-10-01
Payer: MEDICARE

## 2022-10-01 DIAGNOSIS — J90 PLEURAL EFFUSION: Primary | ICD-10-CM

## 2022-10-01 DIAGNOSIS — M97.02XD PERIPROSTHETIC FRACTURE AROUND INTERNAL PROSTHETIC LEFT HIP JOINT, SUBSEQUENT ENCOUNTER: ICD-10-CM

## 2022-10-01 PROBLEM — I50.9 HEART FAILURE (HCC): Status: ACTIVE | Noted: 2022-01-01

## 2022-10-01 LAB
A/G RATIO: 1 (ref 1.1–2.2)
ALBUMIN SERPL-MCNC: 3.2 G/DL (ref 3.4–5)
ALP BLD-CCNC: 188 U/L (ref 40–129)
ALT SERPL-CCNC: 17 U/L (ref 10–40)
ANION GAP SERPL CALCULATED.3IONS-SCNC: 15 MMOL/L (ref 3–16)
AST SERPL-CCNC: 20 U/L (ref 15–37)
BACTERIA: ABNORMAL /HPF
BASE EXCESS VENOUS: -0.8 MMOL/L (ref -2–3)
BASOPHILS ABSOLUTE: 0 K/UL (ref 0–0.2)
BASOPHILS RELATIVE PERCENT: 0.3 %
BILIRUB SERPL-MCNC: 0.9 MG/DL (ref 0–1)
BILIRUBIN URINE: NEGATIVE
BLOOD, URINE: ABNORMAL
BUN BLDV-MCNC: 17 MG/DL (ref 7–20)
CALCIUM SERPL-MCNC: 8.2 MG/DL (ref 8.3–10.6)
CARBOXYHEMOGLOBIN: 1.3 % (ref 0–1.5)
CHLORIDE BLD-SCNC: 103 MMOL/L (ref 99–110)
CLARITY: CLEAR
CO2: 23 MMOL/L (ref 21–32)
COLOR: YELLOW
CREAT SERPL-MCNC: 0.8 MG/DL (ref 0.6–1.2)
EOSINOPHILS ABSOLUTE: 0.1 K/UL (ref 0–0.6)
EOSINOPHILS RELATIVE PERCENT: 0.6 %
EPITHELIAL CELLS, UA: ABNORMAL /HPF (ref 0–5)
GFR AFRICAN AMERICAN: >60
GFR NON-AFRICAN AMERICAN: >60
GLUCOSE BLD-MCNC: 246 MG/DL (ref 70–99)
GLUCOSE BLD-MCNC: 246 MG/DL (ref 70–99)
GLUCOSE BLD-MCNC: 325 MG/DL (ref 70–99)
GLUCOSE URINE: 100 MG/DL
HCO3 VENOUS: 25.1 MMOL/L (ref 24–28)
HCT VFR BLD CALC: 42 % (ref 36–48)
HEMOGLOBIN, VEN, REDUCED: 67.4 %
HEMOGLOBIN: 13.2 G/DL (ref 12–16)
INFLUENZA A: NOT DETECTED
INFLUENZA B: NOT DETECTED
KETONES, URINE: ABNORMAL MG/DL
LEUKOCYTE ESTERASE, URINE: ABNORMAL
LYMPHOCYTES ABSOLUTE: 2.1 K/UL (ref 1–5.1)
LYMPHOCYTES RELATIVE PERCENT: 18.1 %
MCH RBC QN AUTO: 28.2 PG (ref 26–34)
MCHC RBC AUTO-ENTMCNC: 31.5 G/DL (ref 31–36)
MCV RBC AUTO: 89.4 FL (ref 80–100)
METHEMOGLOBIN VENOUS: 0.2 % (ref 0–1.5)
MICROSCOPIC EXAMINATION: YES
MONOCYTES ABSOLUTE: 0.6 K/UL (ref 0–1.3)
MONOCYTES RELATIVE PERCENT: 5.1 %
NEUTROPHILS ABSOLUTE: 9 K/UL (ref 1.7–7.7)
NEUTROPHILS RELATIVE PERCENT: 75.9 %
NITRITE, URINE: NEGATIVE
O2 SAT, VEN: 32 %
PCO2, VEN: 45.5 MMHG (ref 41–51)
PDW BLD-RTO: 17.6 % (ref 12.4–15.4)
PERFORMED ON: ABNORMAL
PERFORMED ON: ABNORMAL
PH UA: 6 (ref 5–8)
PH VENOUS: 7.35 (ref 7.35–7.45)
PLATELET # BLD: 457 K/UL (ref 135–450)
PLATELET SLIDE REVIEW: ABNORMAL
PMV BLD AUTO: 9.7 FL (ref 5–10.5)
PO2, VEN: <30 MMHG (ref 25–40)
POTASSIUM REFLEX MAGNESIUM: 4.5 MMOL/L (ref 3.5–5.1)
PRO-BNP: ABNORMAL PG/ML (ref 0–449)
PROCALCITONIN: 0.08 NG/ML (ref 0–0.15)
PROTEIN UA: NEGATIVE MG/DL
RBC # BLD: 4.7 M/UL (ref 4–5.2)
RBC UA: ABNORMAL /HPF (ref 0–4)
SARS-COV-2 RNA, RT PCR: NOT DETECTED
SODIUM BLD-SCNC: 141 MMOL/L (ref 136–145)
SPECIFIC GRAVITY UA: 1.02 (ref 1–1.03)
TCO2 CALC VENOUS: 27 MMOL/L
TOTAL PROTEIN: 6.5 G/DL (ref 6.4–8.2)
URINE REFLEX TO CULTURE: ABNORMAL
URINE TYPE: ABNORMAL
UROBILINOGEN, URINE: 0.2 E.U./DL
WBC # BLD: 11.8 K/UL (ref 4–11)
WBC UA: ABNORMAL /HPF (ref 0–5)
YEAST: PRESENT /HPF

## 2022-10-01 PROCEDURE — 80053 COMPREHEN METABOLIC PANEL: CPT

## 2022-10-01 PROCEDURE — 6360000002 HC RX W HCPCS

## 2022-10-01 PROCEDURE — 84439 ASSAY OF FREE THYROXINE: CPT

## 2022-10-01 PROCEDURE — 84443 ASSAY THYROID STIM HORMONE: CPT

## 2022-10-01 PROCEDURE — 96374 THER/PROPH/DIAG INJ IV PUSH: CPT

## 2022-10-01 PROCEDURE — 87636 SARSCOV2 & INF A&B AMP PRB: CPT

## 2022-10-01 PROCEDURE — 93005 ELECTROCARDIOGRAM TRACING: CPT

## 2022-10-01 PROCEDURE — 71045 X-RAY EXAM CHEST 1 VIEW: CPT

## 2022-10-01 PROCEDURE — 6370000000 HC RX 637 (ALT 250 FOR IP)

## 2022-10-01 PROCEDURE — 99285 EMERGENCY DEPT VISIT HI MDM: CPT

## 2022-10-01 PROCEDURE — 85025 COMPLETE CBC W/AUTO DIFF WBC: CPT

## 2022-10-01 PROCEDURE — 83880 ASSAY OF NATRIURETIC PEPTIDE: CPT

## 2022-10-01 PROCEDURE — 1200000000 HC SEMI PRIVATE

## 2022-10-01 PROCEDURE — 82803 BLOOD GASES ANY COMBINATION: CPT

## 2022-10-01 PROCEDURE — 81001 URINALYSIS AUTO W/SCOPE: CPT

## 2022-10-01 PROCEDURE — 84145 PROCALCITONIN (PCT): CPT

## 2022-10-01 RX ORDER — OXYCODONE HYDROCHLORIDE 5 MG/1
5 CAPSULE ORAL EVERY 6 HOURS PRN
Status: ON HOLD | COMMUNITY
End: 2022-10-08 | Stop reason: SDUPTHER

## 2022-10-01 RX ORDER — SODIUM CHLORIDE 0.9 % (FLUSH) 0.9 %
5-40 SYRINGE (ML) INJECTION PRN
Status: DISCONTINUED | OUTPATIENT
Start: 2022-10-01 | End: 2022-10-08 | Stop reason: HOSPADM

## 2022-10-01 RX ORDER — ASPIRIN 81 MG/1
81 TABLET ORAL DAILY
Status: DISCONTINUED | OUTPATIENT
Start: 2022-10-02 | End: 2022-10-08 | Stop reason: HOSPADM

## 2022-10-01 RX ORDER — FUROSEMIDE 10 MG/ML
20 INJECTION INTRAMUSCULAR; INTRAVENOUS ONCE
Status: COMPLETED | OUTPATIENT
Start: 2022-10-01 | End: 2022-10-01

## 2022-10-01 RX ORDER — SODIUM CHLORIDE 0.9 % (FLUSH) 0.9 %
5-40 SYRINGE (ML) INJECTION EVERY 12 HOURS SCHEDULED
Status: DISCONTINUED | OUTPATIENT
Start: 2022-10-01 | End: 2022-10-08 | Stop reason: HOSPADM

## 2022-10-01 RX ORDER — ONDANSETRON 2 MG/ML
4 INJECTION INTRAMUSCULAR; INTRAVENOUS EVERY 6 HOURS PRN
Status: DISCONTINUED | OUTPATIENT
Start: 2022-10-01 | End: 2022-10-08 | Stop reason: HOSPADM

## 2022-10-01 RX ORDER — INSULIN LISPRO 100 [IU]/ML
0-4 INJECTION, SOLUTION INTRAVENOUS; SUBCUTANEOUS
Status: DISCONTINUED | OUTPATIENT
Start: 2022-10-02 | End: 2022-10-05

## 2022-10-01 RX ORDER — LOSARTAN POTASSIUM 50 MG/1
50 TABLET ORAL DAILY
Status: DISCONTINUED | OUTPATIENT
Start: 2022-10-02 | End: 2022-10-01

## 2022-10-01 RX ORDER — FUROSEMIDE 10 MG/ML
20 INJECTION INTRAMUSCULAR; INTRAVENOUS 2 TIMES DAILY
Status: DISCONTINUED | OUTPATIENT
Start: 2022-10-01 | End: 2022-10-03

## 2022-10-01 RX ORDER — ATORVASTATIN CALCIUM 10 MG/1
10 TABLET, FILM COATED ORAL DAILY
Status: DISCONTINUED | OUTPATIENT
Start: 2022-10-02 | End: 2022-10-08 | Stop reason: HOSPADM

## 2022-10-01 RX ORDER — ACETAMINOPHEN 325 MG/1
650 TABLET ORAL EVERY 6 HOURS PRN
Status: DISCONTINUED | OUTPATIENT
Start: 2022-10-01 | End: 2022-10-08 | Stop reason: HOSPADM

## 2022-10-01 RX ORDER — LOSARTAN POTASSIUM 25 MG/1
25 TABLET ORAL DAILY
Status: DISCONTINUED | OUTPATIENT
Start: 2022-10-02 | End: 2022-10-01

## 2022-10-01 RX ORDER — ONDANSETRON 4 MG/1
4 TABLET, ORALLY DISINTEGRATING ORAL EVERY 8 HOURS PRN
Status: DISCONTINUED | OUTPATIENT
Start: 2022-10-01 | End: 2022-10-08 | Stop reason: HOSPADM

## 2022-10-01 RX ORDER — ENOXAPARIN SODIUM 100 MG/ML
40 INJECTION SUBCUTANEOUS DAILY
Status: DISCONTINUED | OUTPATIENT
Start: 2022-10-02 | End: 2022-10-08 | Stop reason: HOSPADM

## 2022-10-01 RX ORDER — INSULIN LISPRO 100 [IU]/ML
0-4 INJECTION, SOLUTION INTRAVENOUS; SUBCUTANEOUS NIGHTLY
Status: DISCONTINUED | OUTPATIENT
Start: 2022-10-01 | End: 2022-10-05

## 2022-10-01 RX ORDER — SODIUM CHLORIDE 9 MG/ML
INJECTION, SOLUTION INTRAVENOUS PRN
Status: DISCONTINUED | OUTPATIENT
Start: 2022-10-01 | End: 2022-10-01

## 2022-10-01 RX ORDER — ACETAMINOPHEN 650 MG/1
650 SUPPOSITORY RECTAL EVERY 6 HOURS PRN
Status: DISCONTINUED | OUTPATIENT
Start: 2022-10-01 | End: 2022-10-08 | Stop reason: HOSPADM

## 2022-10-01 RX ORDER — DONEPEZIL HYDROCHLORIDE 10 MG/1
10 TABLET, FILM COATED ORAL NIGHTLY
Status: DISCONTINUED | OUTPATIENT
Start: 2022-10-01 | End: 2022-10-08 | Stop reason: HOSPADM

## 2022-10-01 RX ORDER — DEXTROSE MONOHYDRATE 100 MG/ML
INJECTION, SOLUTION INTRAVENOUS CONTINUOUS PRN
Status: DISCONTINUED | OUTPATIENT
Start: 2022-10-01 | End: 2022-10-08 | Stop reason: HOSPADM

## 2022-10-01 RX ORDER — POLYETHYLENE GLYCOL 3350 17 G/17G
17 POWDER, FOR SOLUTION ORAL DAILY PRN
Status: DISCONTINUED | OUTPATIENT
Start: 2022-10-01 | End: 2022-10-08 | Stop reason: HOSPADM

## 2022-10-01 RX ORDER — OXYCODONE HYDROCHLORIDE 5 MG/1
10 CAPSULE ORAL EVERY 6 HOURS PRN
Status: ON HOLD | COMMUNITY
End: 2022-10-08 | Stop reason: HOSPADM

## 2022-10-01 RX ADMIN — FUROSEMIDE 20 MG: 10 INJECTION, SOLUTION INTRAMUSCULAR; INTRAVENOUS at 23:48

## 2022-10-01 RX ADMIN — FUROSEMIDE 20 MG: 10 INJECTION, SOLUTION INTRAMUSCULAR; INTRAVENOUS at 16:04

## 2022-10-01 RX ADMIN — INSULIN GLARGINE 5 UNITS: 100 INJECTION, SOLUTION SUBCUTANEOUS at 23:48

## 2022-10-01 NOTE — ED TRIAGE NOTES
Pt to ed from [facility] care for facial swelling and possible over medicated with oxycodone.

## 2022-10-01 NOTE — ED PROVIDER NOTES
1 [facility]  EMERGENCY DEPARTMENT ENCOUNTER          [address] RESIDENT NOTE       Date of evaluation: 10/1/2022    Chief Complaint     Facial Swelling      History of Present Illness     Ness Yarbrough is a 80 y.o. female with past medical history of brain mass thought to be a meningioma, diabetes, hypertension, hyperlipidemia who presents to the [facility] ED from [facility] with complaints of facial swelling. Due to underlying dementia patient was not able to give much history. Tried contacting with the center but not able to get through. As per the report from EMS, patient was noticed to have increased facial swelling and therefore was brought in for further evaluation. Patient was recently admitted to the hospital for hip fracture s/p left hip open reduction internal fixation and pain management per orthopedics. On arrival to the ED patient is alert and oriented to self, vitals are stable and is afebrile. EMS also noted patient to be satting in the high 80s and therefore was put on oxygen. Patient satting above 90s on 2 L of oxygen in the ED. Review of Systems     Review of Systems   All other systems reviewed and are negative. Past Medical, Surgical, Family, and Social History     She has a past medical history of Atherosclerotic vascular disease, Back pain, Brain mass, Cervical spondylolysis, Dementia ([address] Diabetes ([address] Frequent falls, Hearing loss, Heart disease, Hyperlipidemia, Hypertension, Mental status alteration, Osteoporosis, and Wrist fracture. She has a past surgical history that includes hip surgery; Partial hip arthroplasty; Brain tumor excision; Coronary artery bypass graft; ORIF hip fracture (Left, 09/2022); and Hip fracture surgery (Left, 9/17/2022). Her family history includes Cancer in her sister; Diabetes in her mother and sister; High Blood Pressure in her mother. She reports that she has never smoked.  She has never used smokeless tobacco. She reports that she does not drink alcohol and does not use drugs. Medications     Previous Medications    ACCU-CHEK CARMEN PLUS STRIP    TESTING ONCE DAILY. DX: E11.9 DIABETES MELLITUS    ALENDRONATE (FOSAMAX) 35 MG TABLET    TAKE 1 TABLET BY MOUTH WEEKLY 30 MINS BEFORE FOOD (EMPTY STOMACH) WITH 8 OZ WATER DO NOT LIE DOWN FOR 30MIN    ASPIRIN EC 81 MG EC TABLET    Take 1 tablet by mouth daily    CALCIUM CARBONATE-VITAMIN D (OYSTER SHELL CALCIUM/D) 500-200 MG-UNIT TABS    TAKE ONE (1) TABLET BY MOUTH TWICE A DAY. DONEPEZIL (ARICEPT) 10 MG TABLET    TAKE ONE TABLET BY MOUTH AT BEDTIME. ENOXAPARIN (LOVENOX) 40 MG/0.4ML    Inject 0.4 mLs into the skin daily    INSULIN GLARGINE (BASAGLAR KWIKPEN) 100 UNIT/ML INJECTION PEN    Inject 10 Units into the skin nightly    INSULIN PEN NEEDLE (PEN NEEDLES) 31G X 6 MM MISC    1 each by Does not apply route daily    LIVALO 2 MG TABS TABLET    TAKE ONE TABLET BY MOUTH AT BEDTIME. LOSARTAN (COZAAR) 25 MG TABLET    TAKE ONE TABLET BY MOUTH ONCE A DAY *TAKE WITH 50MG FOR A TOTAL DOSE OF 75MG*    LOSARTAN (COZAAR) 50 MG TABLET    TAKE ONE TABLET BY MOUTH ONCE A DAY. MULTIPLE VITAMINS-MINERALS (THEREMS-M) TABS    TAKE 1 TABLET BY MOUTH ONCE DAILY. OXYCODONE 5 MG CAPSULE    Take 10 mg by mouth every 6 hours as needed for Pain. For  severe pain    OXYCODONE 5 MG CAPSULE    Take 5 mg by mouth every 6 hours as needed for Pain. For mild pain    SENNA-DOCUSATE (PERICOLACE) 8.6-50 MG PER TABLET    Take 2 tablets by mouth daily as needed for Constipation       Allergies     She is allergic to penicillins, statins, and sulfa antibiotics. Physical Exam     INITIAL VITALS: BP: (!) 109/54, Temp: 98.2 °F (36.8 °C), Heart Rate: 88, Resp: 16, SpO2: 95 %   Physical Exam  Constitutional:       Appearance: Normal appearance. She is ill-appearing. HENT:      Head: Normocephalic.       Right Ear: External ear normal.      Left Ear: External ear normal.      Nose: Nose normal. Mouth/Throat:      Mouth: Mucous membranes are moist.   Eyes:      Pupils: Pupils are equal, round, and reactive to light. Cardiovascular:      Rate and Rhythm: Normal rate and regular rhythm. Pulses: Normal pulses. Heart sounds: Normal heart sounds. Pulmonary:      Effort: Pulmonary effort is normal.      Breath sounds: Normal breath sounds. Comments: B/L crackles   Abdominal:      General: There is no distension. Palpations: Abdomen is soft. Tenderness: There is no abdominal tenderness. Musculoskeletal:         General: Normal range of motion. Cervical back: Normal range of motion. Right lower leg: Edema present. Left lower leg: Edema present. Skin:     General: Skin is warm. Neurological:      General: No focal deficit present. Mental Status: She is alert. Comments: Oriented to self   Psychiatric:         Mood and Affect: Mood normal.       Diagnostic Results       RADIOLOGY:  XR CHEST 1 VIEW   Final Result   1. Combined airspace consolidation and moderate to large left pleural effusion which is new   2. Likely atelectatic changes right lower lung   3.  Status post CABG          LABS:   Results for orders placed or performed during the hospital encounter of 10/01/22   COVID-19 & Influenza Combo    Specimen: Nasopharyngeal Swab   Result Value Ref Range    SARS-CoV-2 RNA, RT PCR NOT DETECTED NOT DETECTED    INFLUENZA A NOT DETECTED NOT DETECTED    INFLUENZA B NOT DETECTED NOT DETECTED   CBC with Auto Differential   Result Value Ref Range    WBC 11.8 (H) 4.0 - 11.0 K/uL    RBC 4.70 4.00 - 5.20 M/uL    Hemoglobin 13.2 12.0 - 16.0 g/dL    Hematocrit 42.0 36.0 - 48.0 %    MCV 89.4 80.0 - 100.0 fL    MCH 28.2 26.0 - 34.0 pg    MCHC 31.5 31.0 - 36.0 g/dL    RDW 17.6 (H) 12.4 - 15.4 %    Platelets 487 (H) 552 - 450 K/uL    MPV 9.7 5.0 - 10.5 fL    PLATELET SLIDE REVIEW Increased     Neutrophils % 75.9 %    Lymphocytes % 18.1 %    Monocytes % 5.1 %    Eosinophils % 0.6 %    Basophils % 0.3 %    Neutrophils Absolute 9.0 (H) 1.7 - 7.7 K/uL    Lymphocytes Absolute 2.1 1.0 - 5.1 K/uL    Monocytes Absolute 0.6 0.0 - 1.3 K/uL    Eosinophils Absolute 0.1 0.0 - 0.6 K/uL    Basophils Absolute 0.0 0.0 - 0.2 K/uL   CMP w/ Reflex to MG   Result Value Ref Range    Sodium 141 136 - 145 mmol/L    Potassium reflex Magnesium 4.5 3.5 - 5.1 mmol/L    Chloride 103 99 - 110 mmol/L    CO2 23 21 - 32 mmol/L    Anion Gap 15 3 - 16    Glucose 325 (H) 70 - 99 mg/dL    BUN 17 7 - 20 mg/dL    Creatinine 0.8 0.6 - 1.2 mg/dL    GFR Non-African American >60 >60    GFR African American >60 >60    Calcium 8.2 (L) 8.3 - 10.6 mg/dL    Total Protein 6.5 6.4 - 8.2 g/dL    Albumin 3.2 (L) 3.4 - 5.0 g/dL    Albumin/Globulin Ratio 1.0 (L) 1.1 - 2.2    Total Bilirubin 0.9 0.0 - 1.0 mg/dL    Alkaline Phosphatase 188 (H) 40 - 129 U/L    ALT 17 10 - 40 U/L    AST 20 15 - 37 U/L   Brain Natriuretic Peptide   Result Value Ref Range    Pro-BNP 30,028 (H) 0 - 449 pg/mL   Blood gas, venous (Lab)   Result Value Ref Range    pH, [place] 7.350 7.350 - 7.450    pCO2, [place] 45.5 41.0 - 51.0 mmHg    pO2, [place] <30.0 25.0 - 40.0 mmHg    HCO3, Venous 25.1 24.0 - 28.0 mmol/L    Base Excess, [place] -0.8 -2.0 - 3.0 mmol/L    O2 Sat, [place] 32 Not established %    Carboxyhemoglobin 1.3 0.0 - 1.5 %    MetHgb, [place] 0.2 0.0 - 1.5 %    TC02 (Calc), [place] 27 mmol/L    Hemoglobin, [place], Reduced 67.40 %       ED BEDSIDE ULTRASOUND:  No results found. RECENT VITALS:  BP: (!) 143/74, Temp: 98.2 °F (36.8 °C),Heart Rate: 57, Resp: 18, SpO2: 93 %     Procedures     None     ED Course     Nursing Notes, Past Medical Hx, Past Surgical Hx, Social Hx, Allergies, and FamilyHx were reviewed.     ED Course as of 10/01/22 1604   Sat Oct 01, 2022   1504 pH, [place]: 7.350 [ED]   1504 pCO2, [place]: 45.5 [ED]      ED Course User Index  [ED] Johnny Rush MD       The patient was giventhe following medications:  Orders Placed This Encounter   Medications    furosemide (LASIX) injection 20 mg       CONSULTS:  Aida Lancaster / JUSTYN / Tanmay Joao is a 80 y.o. female with past medical history of brain mass thought to be a meningioma, diabetes, hypertension, hyperlipidemia who presents to the [facility] ED from [facility] unit with complaints of facial swelling. Due to underlying dementia patient was not able to give much history. Tried contacting with the center but not able to get through. As per the report from EMS, patient was noticed to have increased facial swelling and therefore was brought in for further evaluation. Patient was recently admitted to the hospital for hip fracture s/p left hip open reduction internal fixation and pain management per orthopedics. On arrival to the ED patient is alert and oriented to self, vitals are stable and is afebrile. EMS also noted patient to be satting in the high 80s and therefore was put on oxygen. Patient satting above 90s on 2 L of oxygen in the ED. On physical exam patient was noted to have mild bilateral crackles, to positive lower extremity edema, no JVD, benign abdominal exam.  Patient was protecting her airways with no angioedema noted. Ordered labs and imaging. Chest x-ray was remarkable of combined airspace consolidation and moderate to large left-sided pleural effusion. CBC was remarkable of white count of 11.8, proBNP 30,028, CMP with glucose of 325, potassium 4.5, sodium 141. VBG with pH of 7.35 and PCO2 45.5 and COVID and influenza test were negative. Pending UA with reflex to culture. Patient was given a dose of 20 mg of IV Lasix. At this time patient is considered appropriate for admission for acute hypoxic respiratory failure likely secondary to heart failure versus pneumonia. This patient was also evaluated by the attending physician. All care plans were discussed and agreed upon. Clinical Impression     1.  Pleural effusion Disposition     PATIENT REFERRED TO:  No follow-up provider specified.     DISCHARGE MEDICATIONS:  New Prescriptions    No medications on file       DISPOSITION Decision To Admit 10/01/2022 03:58:50 PM      Milly Linn MD  Resident  10/01/22 5200

## 2022-10-01 NOTE — ED NOTES
Pt resting in bed at present. Pt remains confused and sleepy.       María Elena Pappas, PLACIDO  10/01/22 1539

## 2022-10-01 NOTE — H&P
Internal Medicine  History & Physical      CC: facial swelling    HistoryObtained From:  patient    HISTORY OF PRESENT ILLNESS:  Klaus Stover is a 80 y.o. female with medical history of Brain mass thought to be meningioma, type 2 diabetes, hypertension, hyperlipidemia, and baseline dementia who presents with presented to the [facility] emergency department from the [facility] unit where her caretakers noticed that she has had some facial swelling. Due to the patient's underlying dementia, she was not able to give a thorough history. The emergency department attempted to contact the memory unit but was not able to get through. Per the report from the EMS, the patient was noticed to have increased facial swelling was therefore brought into the hospital for further evaluation. Of note, the patient was recently admitted to the hospital for a left hip fracture. Patient is status post ORIF for that left hip fracture. ED course: In the emergency department, the patient was alert and oriented to self. The patient's vitals were appreciated and were noticed to be stable. Blood pressure was noted to be 109/54, heart was noted to be 88, and respirations were noted to be 16 patient is afebrile. The patient was noted to have been saturating in the high 80% and was therefore put on 2 L of oxygen in the emergency department. Patient is currently saturating  in the 90s on two liters. Of note from the patient's exam, she has bilateral lower extremity edema that was estimated to be 2+. On pulmonary auscultation, there were bilateral lower lobe crackles. Imaging studies in the emergency department notable for chest x-ray which showed some airspace consolidation and a large left pleural effusion atelectatic changes to the right lower lung were also noted. Labs in the ED notable for a slight leukocytosis of 11.8. Patient's absolute neutrophils also noted to be increased.   The BMP was within normal limits largely except for a glucose of 325. Alkaline phosphatase also noted to be high at 188. Also of note, proBNP noted to be around 30,000. VBG was obtained in the ED which was within normal limits. pH was noted to be 7.350. Patient has both COVID and influenza negative. The patient was discussed with the admitting hospitalist and it was determined that she would likely benefit from inpatient admission and further work-up of what is likely an acute hypoxic respiratory failure secondary to new onset heart failure with exacerbation. Past Medical History:        Diagnosis Date    Atherosclerotic vascular disease     brain w right m2 stenosis and vertebral r/l artery stenosis    Back pain     Brain mass     noted good michelle 11/20 mri    Cervical spondylolysis     Dementia (HCC)     has not seen neurologist. by report 20/30 mmse and aricept was started    Diabetes [facility])     type 2    Frequent falls     Hearing loss     Heart disease     bypass? cardiologist?    Hyperlipidemia     Hypertension     Mental status alteration 11/2020    [facility] consult    Osteoporosis     restarted after a drug holiday    Wrist fracture        Past Surgical History:        Procedure Laterality Date    BRAIN TUMOR EXCISION      benign, right frontotemporal craniotomy    CORONARY ARTERY BYPASS GRAFT      HIP FRACTURE SURGERY Left 9/17/2022    LEFT HIP OPEN REDUCTION INTERNAL FIXATION performed by Charley Palacios MD at [facility]      pins    ORIF HIP FRACTURE Left 09/2022    PARTIAL HIP ARTHROPLASTY      left hip       Medications Prior to Admission:    Not in a hospital admission. Allergies:  Penicillins, Statins, and Sulfa antibiotics    Social History:   TOBACCO: reports that she has never smoked. She has never used smokeless tobacco.  ETOH:   reports no history of alcohol use.   DRUGS : None  Patient currently lives in a nursing home    Family History:       Problem Relation Age of Onset Diabetes Mother     High Blood Pressure Mother     Cancer Sister         ovarian    Diabetes Sister        Review of Systems   Unable to perform ROS: Dementia     Physical Exam  Constitutional:       General: She is sleeping. Appearance: She is ill-appearing. Interventions: Nasal cannula in place. Neck:      Vascular: Normal carotid pulses. No carotid bruit or JVD. Cardiovascular:      Rate and Rhythm: Normal rate and regular rhythm. Pulses:           Radial pulses are 2+ on the right side and 2+ on the left side. Heart sounds: Normal heart sounds, S1 normal and S2 normal. No murmur heard. Pulmonary:      Effort: Pulmonary effort is normal.      Breath sounds: Examination of the right-lower field reveals rhonchi. Examination of the left-lower field reveals rhonchi. Rhonchi present. Abdominal:      General: Abdomen is flat. Bowel sounds are normal.      Palpations: Abdomen is soft. Musculoskeletal:      Right lower le+ Edema present. Left lower le+ Edema present. Neurological:      Mental Status: She is lethargic. Psychiatric:         Behavior: Behavior is uncooperative. Vitals:    10/01/22 1530   BP: (!) 143/74   Pulse: 57   Resp: 18   Temp:    SpO2: 93%       DATA:    Labs:  BMP:   Recent Labs     10/01/22  1429      K 4.5      CO2 23   BUN 17   CREATININE 0.8   GLUCOSE 325*     CBC:   Recent Labs     10/01/22  1429   WBC 11.8*   HGB 13.2   HCT 42.0   *       LFT's:   Recent Labs     10/01/22  1429   AST 20   ALT 17   BILITOT 0.9   ALKPHOS 188*     Troponin: No results for input(s): TROPONINI in the last 72 hours. BNP: No results for input(s): BNP in the last 72 hours. ABGs: No results for input(s): PHART, RUI6JSG, PO2ART in the last 72 hours. INR: No results for input(s): INR in the last 72 hours.     U/A:No results for input(s): NITRITE, COLORU, PHUR, LABCAST, WBCUA, RBCUA, MUCUS, TRICHOMONAS, YEAST, BACTERIA, CLARITYU, SPECGRAV, LEUKOCYTESUR, UROBILINOGEN, BILIRUBINUR, BLOODU, GLUCOSEU, AMORPHOUS in the last 72 hours. Invalid input(s): KETONESU    XR CHEST 1 VIEW   Final Result   1. Combined airspace consolidation and moderate to large left pleural effusion which is new   2. Likely atelectatic changes right lower lung   3. Status post CABG          ASSESSMENT AND PLAN:  Gee Kwon is a 80 y.o. female with a past medical history of a brain mass that is most likely a meningioma, type 2 diabetes, hypertension, hyperlipidemia, and baseline dementia who presents with facial swelling from her nursing home. Of note, patient recently had a left hip fracture and is status post ORIF for that fracture. Hypoxic respiratory failure likely secondary to new onset heart failure with exacerbation. Patient noted to have bilateral 2+ lower extremity edema and bilateral crackles at both lung bases. As well as a proBNP of around 30,000. Patient has never been formally diagnosed with heart failure  Will obtain echocardiogram  Cardiology Consult- Appreciate recommendations  Lasix IV 20 twice daily  Daily weights  Strict Is and Os  Will monitor oxygen saturation  Continuous telemetry  Trend Troponins  PO/OT  Combined airspace consolidation and moderate to large left pleural effusion- patient noted to have slight leukocytosis at 11.8. Radiographic findings as well as slight leukocytosis as well as bilateral crackles could represent some aspect of pneumonia. Will obtain repeat CXR for resolution of effusion  Will continue supplement with oxygen via nasal cannula  If no resolution of pulmonary symptoms, will consider consulting pulmonologist.  Patient on Lasix 20 IV BID  Will order procalcitonin- follow up on results  Chronic Medical Conditions:  Hypertension-we will continue losartan 75 mg  Dementia-we will continue donepezil 10 mg  Osteoporosis-we will hold the patient's at home alendronate 35 mg weekly while inpatient.   Type 2 diabetes-we will have the patient on Lantus 5 nightly as well as low-dose sliding scale. Hypoglycemia protocols in place. Code Status: Prior  FEN: IVF: none;  No diet orders on file  PPX: Lovenox   DISPO: GMF      This patient will be discussed with attending, Jonathan Seals MD  PGY1, Internal Medicine  10/01/22  4:32 PM

## 2022-10-02 ENCOUNTER — APPOINTMENT (OUTPATIENT)
Dept: GENERAL RADIOLOGY | Age: 87
DRG: 291 | End: 2022-10-02
Payer: MEDICARE

## 2022-10-02 LAB
ANION GAP SERPL CALCULATED.3IONS-SCNC: 11 MMOL/L (ref 3–16)
BASOPHILS ABSOLUTE: 0.1 K/UL (ref 0–0.2)
BASOPHILS RELATIVE PERCENT: 0.7 %
BUN BLDV-MCNC: 12 MG/DL (ref 7–20)
CALCIUM SERPL-MCNC: 8.1 MG/DL (ref 8.3–10.6)
CHLORIDE BLD-SCNC: 106 MMOL/L (ref 99–110)
CHOLESTEROL, TOTAL: 193 MG/DL (ref 0–199)
CO2: 27 MMOL/L (ref 21–32)
CREAT SERPL-MCNC: 0.5 MG/DL (ref 0.6–1.2)
EKG ATRIAL RATE: 87 BPM
EKG DIAGNOSIS: NORMAL
EKG P AXIS: 68 DEGREES
EKG P-R INTERVAL: 150 MS
EKG Q-T INTERVAL: 334 MS
EKG QRS DURATION: 98 MS
EKG QTC CALCULATION (BAZETT): 401 MS
EKG R AXIS: 11 DEGREES
EKG T AXIS: 198 DEGREES
EKG VENTRICULAR RATE: 87 BPM
EOSINOPHILS ABSOLUTE: 0.2 K/UL (ref 0–0.6)
EOSINOPHILS RELATIVE PERCENT: 1.8 %
GFR AFRICAN AMERICAN: >60
GFR NON-AFRICAN AMERICAN: >60
GLUCOSE BLD-MCNC: 185 MG/DL (ref 70–99)
GLUCOSE BLD-MCNC: 191 MG/DL (ref 70–99)
GLUCOSE BLD-MCNC: 206 MG/DL (ref 70–99)
GLUCOSE BLD-MCNC: 214 MG/DL (ref 70–99)
GLUCOSE BLD-MCNC: 225 MG/DL (ref 70–99)
HCT VFR BLD CALC: 36.2 % (ref 36–48)
HDLC SERPL-MCNC: 36 MG/DL (ref 40–60)
HEMOGLOBIN: 11.6 G/DL (ref 12–16)
LDL CHOLESTEROL CALCULATED: 132 MG/DL
LYMPHOCYTES ABSOLUTE: 2 K/UL (ref 1–5.1)
LYMPHOCYTES RELATIVE PERCENT: 17.7 %
MAGNESIUM: 1.5 MG/DL (ref 1.8–2.4)
MCH RBC QN AUTO: 28.3 PG (ref 26–34)
MCHC RBC AUTO-ENTMCNC: 32 G/DL (ref 31–36)
MCV RBC AUTO: 88.2 FL (ref 80–100)
MONOCYTES ABSOLUTE: 0.7 K/UL (ref 0–1.3)
MONOCYTES RELATIVE PERCENT: 6 %
NEUTROPHILS ABSOLUTE: 8.5 K/UL (ref 1.7–7.7)
NEUTROPHILS RELATIVE PERCENT: 73.8 %
PDW BLD-RTO: 17.2 % (ref 12.4–15.4)
PERFORMED ON: ABNORMAL
PLATELET # BLD: 423 K/UL (ref 135–450)
PMV BLD AUTO: 9.1 FL (ref 5–10.5)
POTASSIUM SERPL-SCNC: 3.9 MMOL/L (ref 3.5–5.1)
RBC # BLD: 4.11 M/UL (ref 4–5.2)
SODIUM BLD-SCNC: 144 MMOL/L (ref 136–145)
T4 FREE: 1.5 NG/DL (ref 0.9–1.8)
TRIGL SERPL-MCNC: 123 MG/DL (ref 0–150)
TROPONIN: 0.09 NG/ML
TROPONIN: 0.11 NG/ML
TSH SERPL DL<=0.05 MIU/L-ACNC: 1.71 UIU/ML (ref 0.27–4.2)
VLDLC SERPL CALC-MCNC: 25 MG/DL
WBC # BLD: 11.5 K/UL (ref 4–11)

## 2022-10-02 PROCEDURE — 83735 ASSAY OF MAGNESIUM: CPT

## 2022-10-02 PROCEDURE — 80048 BASIC METABOLIC PNL TOTAL CA: CPT

## 2022-10-02 PROCEDURE — 73552 X-RAY EXAM OF FEMUR 2/>: CPT

## 2022-10-02 PROCEDURE — 85025 COMPLETE CBC W/AUTO DIFF WBC: CPT

## 2022-10-02 PROCEDURE — 36415 COLL VENOUS BLD VENIPUNCTURE: CPT

## 2022-10-02 PROCEDURE — 84484 ASSAY OF TROPONIN QUANT: CPT

## 2022-10-02 PROCEDURE — 6370000000 HC RX 637 (ALT 250 FOR IP)

## 2022-10-02 PROCEDURE — 99223 1ST HOSP IP/OBS HIGH 75: CPT | Performed by: INTERNAL MEDICINE

## 2022-10-02 PROCEDURE — 2500000003 HC RX 250 WO HCPCS

## 2022-10-02 PROCEDURE — 6360000002 HC RX W HCPCS

## 2022-10-02 PROCEDURE — 2580000003 HC RX 258

## 2022-10-02 PROCEDURE — 1200000000 HC SEMI PRIVATE

## 2022-10-02 PROCEDURE — 80061 LIPID PANEL: CPT

## 2022-10-02 PROCEDURE — 51702 INSERT TEMP BLADDER CATH: CPT

## 2022-10-02 RX ORDER — OLANZAPINE 10 MG/1
2.5 INJECTION, POWDER, LYOPHILIZED, FOR SOLUTION INTRAMUSCULAR
Status: COMPLETED | OUTPATIENT
Start: 2022-10-02 | End: 2022-10-02

## 2022-10-02 RX ADMIN — SODIUM CHLORIDE, PRESERVATIVE FREE 10 ML: 5 INJECTION INTRAVENOUS at 08:46

## 2022-10-02 RX ADMIN — SODIUM CHLORIDE, PRESERVATIVE FREE 10 ML: 5 INJECTION INTRAVENOUS at 12:23

## 2022-10-02 RX ADMIN — ASPIRIN 81 MG: 81 TABLET, COATED ORAL at 08:46

## 2022-10-02 RX ADMIN — SODIUM CHLORIDE, PRESERVATIVE FREE 10 ML: 5 INJECTION INTRAVENOUS at 20:38

## 2022-10-02 RX ADMIN — ENOXAPARIN SODIUM 40 MG: 100 INJECTION SUBCUTANEOUS at 08:45

## 2022-10-02 RX ADMIN — ATORVASTATIN CALCIUM 10 MG: 10 TABLET, FILM COATED ORAL at 08:46

## 2022-10-02 RX ADMIN — OLANZAPINE 2.5 MG: 10 INJECTION, POWDER, FOR SOLUTION INTRAMUSCULAR at 22:28

## 2022-10-02 RX ADMIN — DONEPEZIL HYDROCHLORIDE 10 MG: 10 TABLET, FILM COATED ORAL at 20:38

## 2022-10-02 RX ADMIN — INSULIN GLARGINE 5 UNITS: 100 INJECTION, SOLUTION SUBCUTANEOUS at 20:41

## 2022-10-02 RX ADMIN — FUROSEMIDE 20 MG: 10 INJECTION, SOLUTION INTRAMUSCULAR; INTRAVENOUS at 08:45

## 2022-10-02 RX ADMIN — FUROSEMIDE 20 MG: 10 INJECTION, SOLUTION INTRAMUSCULAR; INTRAVENOUS at 19:01

## 2022-10-02 ASSESSMENT — PAIN SCALES - PAIN ASSESSMENT IN ADVANCED DEMENTIA (PAINAD)
NEGVOCALIZATION: 0
TOTALSCORE: 0
BODYLANGUAGE: 0
BREATHING: 0
FACIALEXPRESSION: 0
CONSOLABILITY: 0

## 2022-10-02 ASSESSMENT — PAIN SCALES - GENERAL
PAINLEVEL_OUTOF10: 0
PAINLEVEL_OUTOF10: 0

## 2022-10-02 NOTE — PROGRESS NOTES
Patient readmitted with hypoxia, facial swelling, possible CHF exacerbation. Work-up in progress. I received consult for wound eval and staple removal since the patient has been unable to attend follow-up appointment. Will swing by to see the patient at the next available opportunity but certainly is appropriate to follow-up as an outpatient as had already been planned if she happens to be ready for discharge before that  time. Call if questions      KRISTINE Oro  [facility]  Cell: [phone]    Voice recognition software used for this note.  Please notify me if any transcription errors are present and I will make an addendum

## 2022-10-02 NOTE — PLAN OF CARE
Problem: Skin/Tissue Integrity  Goal: Absence of new skin breakdown  Description: 1. Monitor for areas of redness and/or skin breakdown  2. Assess vascular access sites hourly  3. Every 4-6 hours minimum:  Change oxygen saturation probe site  4. Every 4-6 hours:  If on nasal continuous positive airway pressure, respiratory therapy assess nares and determine need for appliance change or resting period.   Outcome: Progressing     Problem: Discharge Planning  Goal: Discharge to home or other facility with appropriate resources  Outcome: Progressing  Flowsheets (Taken 10/1/2022 1616 by Rosita Spence RN)  Discharge to home or other facility with appropriate resources:   Identify barriers to discharge with patient and caregiver   Identify discharge learning needs (meds, wound care, etc)     Problem: Safety - Adult  Goal: Free from fall injury  Outcome: Progressing

## 2022-10-02 NOTE — CONSULTS
Reason for Consultation/Chief Complaint: presents with facial swelling. History of Present Illness:  Marcelo Hale is a 80 y.o. patient whom we were asked to see for CHF/trop. Also hx CAD/CABG. Sent in from her memory unit with what caretakers felt was facial swelling. In ER noted to have O2 sats in 80s. Required supplemental O2. Noted to have significant edema. Noted BNP 30,000. COVID/Flu negative. CXR with air space consolidation. Admitted with CHF. Past Medical History:   has a past medical history of Atherosclerotic vascular disease, Back pain, Brain mass, Cervical spondylolysis, Dementia ([address] Diabetes ([address] Frequent falls, Hearing loss, Heart disease, Hyperlipidemia, Hypertension, Mental status alteration, Osteoporosis, and Wrist fracture. Surgical History:   has a past surgical history that includes hip surgery; Partial hip arthroplasty; Brain tumor excision; Coronary artery bypass graft; ORIF hip fracture (Left, 09/2022); and Hip fracture surgery (Left, 9/17/2022). Social History:   reports that she has never smoked. She has never used smokeless tobacco. She reports that she does not drink alcohol and does not use drugs. Family History:  No evidence for sudden cardiac death or premature CAD    Home Medications:  Were reviewed and are listed in nursing record. and/or listed below  Prior to Admission medications    Medication Sig Start Date End Date Taking? Authorizing Provider   oxyCODONE 5 MG capsule Take 10 mg by mouth every 6 hours as needed for Pain. For  severe pain   Yes Historical Provider, MD   oxyCODONE 5 MG capsule Take 5 mg by mouth every 6 hours as needed for Pain.  For mild pain   Yes Historical Provider, MD   insulin lispro (HUMALOG) 100 UNIT/ML injection vial Inject into the skin 3 times daily (before meals) 0-200= 0 units, 201-250=1 unit, 252-300=2 units, 301-350=3 units, 351-400 =4 units, 401-450= 5 units   Yes Historical Provider, MD   enoxaparin (LOVENOX) 40 MG/0.4ML Inject 0.4 mLs into the skin daily 9/19/22 10/19/22  Mary Key MD   senna-docusate (PERICOLACE) 8.6-50 MG per tablet Take 2 tablets by mouth daily as needed for Constipation 9/19/22   Mary Key MD   losartan (COZAAR) 50 MG tablet TAKE ONE TABLET BY MOUTH ONCE A DAY. 4/22/22   Kris Rodgers MD   donepezil (ARICEPT) 10 MG tablet TAKE ONE TABLET BY MOUTH AT BEDTIME. Patient not taking: Reported on 10/1/2022 4/22/22   Kris Rodgers MD   losartan (COZAAR) 25 MG tablet TAKE ONE TABLET BY MOUTH ONCE A DAY *TAKE WITH 50MG FOR A TOTAL DOSE OF 75MG* 4/22/22   Kris Rodgers MD   alendronate (FOSAMAX) 35 MG tablet TAKE 1 TABLET BY MOUTH WEEKLY 30 MINS BEFORE FOOD (EMPTY STOMACH) WITH 8 OZ WATER DO NOT LIE DOWN FOR 30MIN 4/22/22   Kris Rodgers MD   JANUMET -1000 MG TB24 TAKE ONE TABLET BY MOUTH DAILY WITH EVENING MEAL  Patient not taking: Reported on 9/14/2022 4/22/22 9/21/22  Kris Rodgers MD   dapagliflozin (FARXIGA) 10 MG tablet TAKE 1 TABLET BY MOUTH EACH MORNING 4/22/22 9/21/22  Kris Rodgers MD   pioglitazone (ACTOS) 30 MG tablet TAKE 1 TABLET BY MOUTH DAILY  Patient not taking: Reported on 9/14/2022 4/22/22 9/21/22  Kirs Rodgers MD   insulin glargine (BASAGLAR KWIKPEN) 100 UNIT/ML injection pen Inject 10 Units into the skin nightly  Patient not taking: Reported on 10/1/2022 12/16/21   Kris Rodgers MD   LIVALO 2 MG TABS tablet TAKE ONE TABLET BY MOUTH AT BEDTIME. Patient taking differently: [facility] takes 25mg 7/13/21   Kris Rodgers MD   ACCU-CHEK CARMEN PLUS strip TESTING ONCE DAILY.  DX: E11.9 DIABETES MELLITUS 3/19/21   Kris Rodgers MD   Insulin Pen Needle (PEN NEEDLES) 31G X 6 MM MISC 1 each by Does not apply route daily 2/1/21 4/22/22  Kris Rodgers MD   Multiple Vitamins-Minerals (THEREMS-M) TABS TAKE 1 TABLET BY MOUTH ONCE DAILY. 5/13/19   [place] Bullocks, DO   Calcium Carbonate-Vitamin D (OYSTER SHELL CALCIUM/D) 500-200 MG-UNIT TABS TAKE ONE (1) TABLET BY MOUTH TWICE A DAY. 5/13/19   Valerie Bullocks, DO        Allergies:  Penicillins, Statins, and Sulfa antibiotics     Review of Systems:   Unable due to dementia    Physical Examination:    Vitals:    10/02/22 0421   BP: 124/66   Pulse: 76   Resp: 18   Temp: 97.5 °F (36.4 °C)   SpO2: 92%    Weight: 122 lb 9.2 oz (55.6 kg)         General Appearance:  Awakens, no distress, restless, frail/fragile   Head:  Normocephalic, without obvious abnormality, atraumatic   Eyes:  PERRL, conjunctiva/corneas clear       Nose: Nares normal, no drainage or sinus tenderness   Throat: Lips, mucosa, and tongue normal   Neck: Supple, symmetrical, trachea midline       Lungs:   Decreased BS, occ rhonchi, respirations unlabored   Chest Wall:  No tenderness or deformity   Heart:  Regular rate and rhythm, S1, S2 normal, no murmur, rub or gallop   Abdomen:   Soft, non-tender, bowel sounds active all four quadrants           Extremities: Extremities normal, atraumatic, no cyanosis. ++edema       Skin: Skin color, texture, turgor normal, no rashes or lesions       Neurologic: Normal gross motor .          Labs  CBC:   Lab Results   Component Value Date/Time    WBC 11.8 10/01/2022 02:29 PM    RBC 4.70 10/01/2022 02:29 PM    HGB 13.2 10/01/2022 02:29 PM    HCT 42.0 10/01/2022 02:29 PM    MCV 89.4 10/01/2022 02:29 PM    RDW 17.6 10/01/2022 02:29 PM     10/01/2022 02:29 PM     CMP:    Lab Results   Component Value Date/Time     10/01/2022 02:29 PM    K 4.5 10/01/2022 02:29 PM     10/01/2022 02:29 PM    CO2 23 10/01/2022 02:29 PM    BUN 17 10/01/2022 02:29 PM    CREATININE 0.8 10/01/2022 02:29 PM    GFRAA >60 10/01/2022 02:29 PM    AGRATIO 1.0 10/01/2022 02:29 PM    LABGLOM >60 10/01/2022 02:29 PM    GLUCOSE 325 10/01/2022 02:29 PM    PROT 6.5 10/01/2022 02:29 PM    CALCIUM 8.2 10/01/2022 02:29 PM    BILITOT 0.9 10/01/2022 02:29 PM    ALKPHOS 188 10/01/2022 02:29 PM    AST 20 10/01/2022 02:29 PM    ALT 17 10/01/2022 02:29 PM     PT/INR:  No results found for: PTINR  Lab Results   Component Value Date    TROPONINI 0.11 (H) 10/02/2022       EKG:  I have reviewed EKG with the following interpretation:  Impression: personally reviewed, NSR, Freq PAC, NSSTTW changes. Assessment  Patient Active Problem List   Diagnosis    Type 2 diabetes mellitus with circulatory disorder, without long-term current use of insulin (HCC)    Pure hypercholesterolemia    Dementia (HCC)    Diabetes ([facility]    Hyperlipidemia    Hypertension    Osteoporosis    Meningioma ([facility]    Pelvic ring fracture, closed, initial encounter ([facility]    Periprosthetic fracture around internal prosthetic left hip joint ([facility]    DM (diabetes mellitus), type 2, uncontrolled    Heart failure ([facility]         Plan:    Hypoxia on presentation with elevated BNP suggestive of CHF. Trop elevated but was hypoxic. COVID/Flu negative. No hx chest pain. Trending trops. IV lasix. Watch Cr. I/Os. Daily wts. ASA/statin. Start low dose B blocker. Echo. Monitor. Will follow.

## 2022-10-02 NOTE — DISCHARGE INSTRUCTIONS
Extra Heart Failure sites:   https://[employer]/ --- this is American Heart Association interactive Healthier Living with Heart Failure guidebook. Please copy and paste link into search bar. Use your mouse to scroll through the pages. Lots and lots of info / tips    HF [place] joey  --- free smart phone joey available for [employer] and [employer]. Use your phone to track sodium / fluid intake,  symptoms, weight, etc.    [employer] - website-- [employer] is a dialysis Where's Up. All dialysis patients follow a renal diet which IS low sodium!! This website offers free seasonal cookbooks.   Each quarter, they will release 25-30 new recipes with a breakdown of calories, sodium, glucose, etc    [email or web address]/recipes -- more free recipes

## 2022-10-02 NOTE — PROGRESS NOTES
Progress Note    Admit Date: 10/1/2022  Day: 1         Diet: ADULT DIET; Regular; 3 carb choices (45 gm/meal); Low Sodium (2 gm)    CC: Facial swelling     Interval history:   Patient seen and examined   Still on 2-3-L  NC   No subjective information reported by the patient is oriented x 1 only. Staples from Prior ORIF in 9/2022 noticed. Barriers to discharge: 02 requirement, PT/OT, 2-D ECHO pending       HPI: Liset Sandoval is a 80 y.o. female with medical history of Brain mass thought to be meningioma, type 2 diabetes, hypertension, hyperlipidemia, and baseline dementia who presents with presented to the [facility] emergency department from the [facility] unit where her caretakers noticed that she has had some facial swelling    Medications:     Scheduled Meds:   aspirin EC  81 mg Oral Daily    donepezil  10 mg Oral Nightly    enoxaparin  40 mg SubCUTAneous Daily    atorvastatin  10 mg Oral Daily    sodium chloride flush  5-40 mL IntraVENous 2 times per day    furosemide  20 mg IntraVENous BID    insulin lispro  0-4 Units SubCUTAneous TID WC    insulin lispro  0-4 Units SubCUTAneous Nightly    insulin glargine  5 Units SubCUTAneous Nightly     Continuous Infusions:   dextrose       PRN Meds:sodium chloride flush, ondansetron **OR** ondansetron, polyethylene glycol, acetaminophen **OR** acetaminophen, perflutren lipid microspheres, glucose, dextrose bolus **OR** dextrose bolus, glucagon (rDNA), dextrose    Objective:   Vitals:   T-max:  Patient Vitals for the past 8 hrs:   BP Temp Temp src Pulse Resp SpO2 Weight   10/02/22 0755 135/67 97.3 °F (36.3 °C) Axillary 79 18 94 % --   10/02/22 0421 124/66 97.5 °F (36.4 °C) Axillary 76 18 92 % 122 lb 9.2 oz (55.6 kg)     No intake or output data in the 24 hours ending 10/02/22 0830    Review of Systems  Unable to obtain due to patient factor     Physical Exam  Vitals reviewed. Constitutional:       General: She is not in acute distress.      Appearance: She is not ill-appearing, toxic-appearing or diaphoretic. HENT:      Head: Normocephalic. Eyes:      Extraocular Movements: Extraocular movements intact. Pupils: Pupils are equal, round, and reactive to light. Cardiovascular:      Rate and Rhythm: Rhythm irregular. Pulmonary:      Effort: Pulmonary effort is normal.      Breath sounds: Normal breath sounds. Abdominal:      Palpations: Abdomen is soft. Skin:     Coloration: Skin is not jaundiced or pale. Findings: No bruising, erythema, lesion or rash. Neurological:      Mental Status: Mental status is at baseline. Psychiatric:         Mood and Affect: Mood normal.         Behavior: Behavior normal.       LABS:    CBC:   Recent Labs     10/01/22  1429   WBC 11.8*   HGB 13.2   HCT 42.0   *   MCV 89.4     Renal:    Recent Labs     10/01/22  1429      K 4.5      CO2 23   BUN 17   CREATININE 0.8   GLUCOSE 325*   CALCIUM 8.2*   ANIONGAP 15     Hepatic:   Recent Labs     10/01/22  1429   AST 20   ALT 17   BILITOT 0.9   PROT 6.5   LABALBU 3.2*   ALKPHOS 188*     Troponin:   Recent Labs     10/02/22  0018   TROPONINI 0.11*     BNP: No results for input(s): BNP in the last 72 hours. Lipids: No results for input(s): CHOL, HDL in the last 72 hours. Invalid input(s): LDLCALCU, TRIGLYCERIDE  ABGs:  No results for input(s): PHART, PCY5WZO, PO2ART, QDR7QNK, BEART, THGBART, L9OVPCOP, LDL3FAR in the last 72 hours. INR: No results for input(s): INR in the last 72 hours. Lactate: No results for input(s): LACTATE in the last 72 hours. Cultures:  -----------------------------------------------------------------  RAD:   XR CHEST 1 VIEW   Final Result   1. Combined airspace consolidation and moderate to large left pleural effusion which is new   2. Likely atelectatic changes right lower lung   3.  Status post CABG          Assessment/Plan:   Brennan Mendenhall is a 80 y.o. female with a past medical history of a brain mass that is most likely a meningioma, type 2 diabetes, hypertension, hyperlipidemia, and baseline dementia who presents with facial swelling from her nursing home. Of note, patient recently had a left hip fracture and is status post ORIF for that fracture. Hypoxic respiratory failure likely secondary to new onset heart failure with exacerbation. Patient noted to have bilateral 2+ lower extremity edema and bilateral crackles at both lung bases. As well as a proBNP of around 30,000. Patient has never been formally diagnosed with heart failure  ECHO pending   Cardiology Consult- Appreciate recommendations  Continue IV lasix and insert fonseca for accurate intake and output   Daily weights  Strict Is and Os  Will monitor oxygen saturation  Continuous telemetry  Trend Troponins  PO/OT  Combined airspace consolidation and moderate to large left pleural effusion- patient noted to have slight leukocytosis at 11.8. Radiographic findings as well as slight leukocytosis as well as bilateral crackles could represent some aspect of pneumonia. Will obtain repeat CXR for resolution of effusion  Will continue supplement with oxygen via nasal cannula  If no resolution of pulmonary symptoms, will consider consulting pulmonologist.  Patient on Lasix 20 IV BID  Will order procalcitonin- follow up on results  Chronic Medical Conditions:  Hypertension-we will continue losartan 75 mg  Dementia-we will continue donepezil 10 mg  Osteoporosis-we will hold the patient's at home alendronate 35 mg weekly while inpatient. Type 2 diabetes-we will have the patient on Lantus 5 nightly as well as low-dose sliding scale. Hypoglycemia protocols in place.      Code Status: Prior  FEN: Regular diet   PPX: Lovenox   DISPO: CHRISTINA Joyner MD, PGY-II  10/02/22  8:30 AM    This patient has been staffed and discussed with Mayra Rojo MD.

## 2022-10-02 NOTE — PROGRESS NOTES
4 Eyes Admission Assessment     I agree as the admission nurse that 2 RN's have performed a thorough Head to Toe Skin Assessment on the patient. ALL assessment sites listed below have been assessed on admission. Areas assessed by both nurses: Zoë Marinas  [x]   Head, Face, and Ears   [x]   Shoulders, Back, and Chest  [x]   Arms, Elbows, and Hands   [x]   Coccyx, Sacrum, and Ischium  [x]   Legs, Feet, and Heels        Does the Patient have Skin Breakdown?   No         Alvin Prevention initiated:  Yes   Wound Care Orders initiated:  No      [facility] nurse consulted for Pressure Injury (Stage 3,4, Unstageable, DTI, NWPT, and Complex wounds) or Alvin score 18 or lower:  Yes      Nurse 1 eSignature: Electronically signed by Miriam Martínez RN on 10/2/22 at 7:07 AM EDT    **SHARE this note so that the co-signing nurse is able to place an eSignature**    Nurse 2 eSignature: Electronically signed by Falguni Danielle RN on 10/2/22 at 7:49 AM EDT

## 2022-10-02 NOTE — CARE COORDINATION
Case Management Assessment           Initial Evaluation                Date / Time of Evaluation: 10/2/2022 3:07 PM                 Assessment Completed by: Selwyn Goodwin RN    Patient Name: Keith Rodriguez     YOB: 1931  Diagnosis: Pleural effusion [J90]  Heart failure ([address] [I50.9]     Date / Time: 10/1/2022  1:28 PM    Patient Admission Status: Inpatient    If patient is discharged prior to next notation, then this note serves as note for discharge by case management. Current PCP: Len Juan MD  Clinic Patient: No    Chart Reviewed: Yes  Patient/ Family Interviewed: Yes    Initial assessment completed at bedside with: grand-daughter     Hospitalization in the last 30 days: Yes    Emergency Contacts:  Extended Emergency Contact Information  Primary Emergency Contact: Kourtney Wilson [address] [place] [address] Phone: [phone]  Mobile Phone: [phone]  Relation: Child  Secondary Emergency Contact: Domi Hedrick [address] Phone: [phone]  Mobile Phone: [phone]  Relation: Child    Advance Directives:   Code Status: DNR-CCA         Financial  Payor: MEDICARE / Plan: MEDICARE PART A AND B / Product Type: *No Product type* /     Pre-cert required for SNF: No    Pharmacy    [facility] [address] [phone] - F [phone]  [facility] [address]  Phone: [phone] Fax: [phone]    [facility] [place] [phone] - F [phone]  [address]  [address] [place] [address]  Phone: [phone] Fax: [phone]      Potential assistance Purchasing Medications: Potential Assistance Purchasing Medications: No  Does Patient want to participate in local refill/ meds to beds program?:      Meds To Beds General Rules:  1. Can ONLY be done Monday- Friday between 8:30am-5pm  2.  Prescription(s) must be in pharmacy by 3pm to be filled same day  3. Copy of patient's insurance/ prescription drug card and patient face sheet must be sent along with the prescription(s)  4. Cost of Rx cannot be added to hospital bill. If financial assistance is needed, please contact unit  or ;  or  CANNOT provide pharmacy voucher for patients co-pays  5. Patients can then  the prescription on their way out of the hospital at discharge, or pharmacy can deliver to the bedside if staff is available. (payment due at time of pick-up or delivery - cash, check, or card accepted)     Able to afford home medications/ co-pay costs: Yes    ADLS  Support Systems: Children, Family Members, Friends/Neighbors    PT AM-PAC:   /24  OT AM-PAC:   /24    [place] at [facility], [address] [place] [address]  Steps:      Plans to RETURN to current housing: TBD  Barriers to RETURNING to current housing: medical stability; PT/OT    [address]  Currently ACTIVE with [address]: No  Home Care Agency: Not Applicable           Durable Medical Equipment  DME Provider:    Equipment: walker    Home Oxygen and [address] prior to admission: No  [address]: Not Applicable  Other Respiratory Equipment:          DISCHARGE PLAN:  Disposition: [address]: [facility] Phone: [phone] Fax:      Transportation PLAN for discharge: EMS transportation     Factors facilitating achievement of predicted outcomes: Family support and Caregiver support    Barriers to discharge: Confusion, Cognitive deficit, Limited safety awareness, and Communication deficit    Additional Case Management Notes: Cm met with pt's grand-daughter at bedside. Pt lives at [facility]. She had a recent stay at [facility] after at hip fracture. CM confirmed with Anthony Buitrago at [facility], pt was at [facility] apartment when she was re-admitted. She is unsure if pt needs SNF if she has covered days.  She will review and call CM back on Monday. The Plan for Transition of Care is related to the following treatment goals of Pleural effusion [J90]  Heart failure ([address] [I50.9]    The Patient and/or patient representative Citlaly Goldman and her family were provided with a choice of provider and agrees with the discharge plan Yes    Freedom of choice list was provided with basic dialogue that supports the patient's individualized plan of care/goals and shares the quality data associated with the providers.  Yes    Care Transition patient: No    Genevieve Holley RN  [facility]  Case Management Department  Ph: [phone]

## 2022-10-03 ENCOUNTER — APPOINTMENT (OUTPATIENT)
Dept: GENERAL RADIOLOGY | Age: 87
DRG: 291 | End: 2022-10-03
Payer: MEDICARE

## 2022-10-03 LAB
ANION GAP SERPL CALCULATED.3IONS-SCNC: 11 MMOL/L (ref 3–16)
BASOPHILS ABSOLUTE: 0.1 K/UL (ref 0–0.2)
BASOPHILS RELATIVE PERCENT: 0.6 %
BUN BLDV-MCNC: 10 MG/DL (ref 7–20)
CALCIUM SERPL-MCNC: 8.2 MG/DL (ref 8.3–10.6)
CHLORIDE BLD-SCNC: 99 MMOL/L (ref 99–110)
CO2: 30 MMOL/L (ref 21–32)
CREAT SERPL-MCNC: 0.6 MG/DL (ref 0.6–1.2)
EKG ATRIAL RATE: 85 BPM
EKG DIAGNOSIS: NORMAL
EKG P AXIS: 41 DEGREES
EKG P-R INTERVAL: 150 MS
EKG Q-T INTERVAL: 336 MS
EKG QRS DURATION: 100 MS
EKG QTC CALCULATION (BAZETT): 399 MS
EKG R AXIS: 41 DEGREES
EKG T AXIS: 252 DEGREES
EKG VENTRICULAR RATE: 85 BPM
EOSINOPHILS ABSOLUTE: 0.2 K/UL (ref 0–0.6)
EOSINOPHILS RELATIVE PERCENT: 1.4 %
GFR AFRICAN AMERICAN: >60
GFR NON-AFRICAN AMERICAN: >60
GLUCOSE BLD-MCNC: 114 MG/DL (ref 70–99)
GLUCOSE BLD-MCNC: 146 MG/DL (ref 70–99)
GLUCOSE BLD-MCNC: 153 MG/DL (ref 70–99)
GLUCOSE BLD-MCNC: 154 MG/DL (ref 70–99)
GLUCOSE BLD-MCNC: 190 MG/DL (ref 70–99)
HCT VFR BLD CALC: 42.1 % (ref 36–48)
HEMOGLOBIN: 13.4 G/DL (ref 12–16)
LYMPHOCYTES ABSOLUTE: 2.9 K/UL (ref 1–5.1)
LYMPHOCYTES RELATIVE PERCENT: 20.2 %
MAGNESIUM: 2 MG/DL (ref 1.8–2.4)
MCH RBC QN AUTO: 27.9 PG (ref 26–34)
MCHC RBC AUTO-ENTMCNC: 31.7 G/DL (ref 31–36)
MCV RBC AUTO: 88 FL (ref 80–100)
MONOCYTES ABSOLUTE: 0.8 K/UL (ref 0–1.3)
MONOCYTES RELATIVE PERCENT: 5.4 %
NEUTROPHILS ABSOLUTE: 10.2 K/UL (ref 1.7–7.7)
NEUTROPHILS RELATIVE PERCENT: 72.4 %
PDW BLD-RTO: 17.4 % (ref 12.4–15.4)
PERFORMED ON: ABNORMAL
PLATELET # BLD: 476 K/UL (ref 135–450)
PMV BLD AUTO: 8.8 FL (ref 5–10.5)
POTASSIUM SERPL-SCNC: 3.1 MMOL/L (ref 3.5–5.1)
RBC # BLD: 4.79 M/UL (ref 4–5.2)
SODIUM BLD-SCNC: 140 MMOL/L (ref 136–145)
TROPONIN: 0.08 NG/ML
WBC # BLD: 14.1 K/UL (ref 4–11)

## 2022-10-03 PROCEDURE — 6370000000 HC RX 637 (ALT 250 FOR IP): Performed by: STUDENT IN AN ORGANIZED HEALTH CARE EDUCATION/TRAINING PROGRAM

## 2022-10-03 PROCEDURE — 6370000000 HC RX 637 (ALT 250 FOR IP)

## 2022-10-03 PROCEDURE — 36415 COLL VENOUS BLD VENIPUNCTURE: CPT

## 2022-10-03 PROCEDURE — 97535 SELF CARE MNGMENT TRAINING: CPT

## 2022-10-03 PROCEDURE — 93005 ELECTROCARDIOGRAM TRACING: CPT

## 2022-10-03 PROCEDURE — 97530 THERAPEUTIC ACTIVITIES: CPT

## 2022-10-03 PROCEDURE — 84484 ASSAY OF TROPONIN QUANT: CPT

## 2022-10-03 PROCEDURE — 97166 OT EVAL MOD COMPLEX 45 MIN: CPT

## 2022-10-03 PROCEDURE — 97161 PT EVAL LOW COMPLEX 20 MIN: CPT

## 2022-10-03 PROCEDURE — 73552 X-RAY EXAM OF FEMUR 2/>: CPT

## 2022-10-03 PROCEDURE — 2580000003 HC RX 258

## 2022-10-03 PROCEDURE — 1200000000 HC SEMI PRIVATE

## 2022-10-03 PROCEDURE — 99233 SBSQ HOSP IP/OBS HIGH 50: CPT | Performed by: INTERNAL MEDICINE

## 2022-10-03 PROCEDURE — 80048 BASIC METABOLIC PNL TOTAL CA: CPT

## 2022-10-03 PROCEDURE — 83735 ASSAY OF MAGNESIUM: CPT

## 2022-10-03 PROCEDURE — 85025 COMPLETE CBC W/AUTO DIFF WBC: CPT

## 2022-10-03 PROCEDURE — 6360000002 HC RX W HCPCS

## 2022-10-03 PROCEDURE — 93010 ELECTROCARDIOGRAM REPORT: CPT | Performed by: INTERNAL MEDICINE

## 2022-10-03 RX ORDER — FUROSEMIDE 10 MG/ML
40 INJECTION INTRAMUSCULAR; INTRAVENOUS 2 TIMES DAILY
Status: DISCONTINUED | OUTPATIENT
Start: 2022-10-03 | End: 2022-10-07

## 2022-10-03 RX ORDER — MAGNESIUM SULFATE IN WATER 40 MG/ML
2000 INJECTION, SOLUTION INTRAVENOUS ONCE
Status: COMPLETED | OUTPATIENT
Start: 2022-10-03 | End: 2022-10-03

## 2022-10-03 RX ORDER — POTASSIUM CHLORIDE 20 MEQ/1
40 TABLET, EXTENDED RELEASE ORAL ONCE
Status: DISCONTINUED | OUTPATIENT
Start: 2022-10-03 | End: 2022-10-06

## 2022-10-03 RX ADMIN — ASPIRIN 81 MG: 81 TABLET, COATED ORAL at 10:20

## 2022-10-03 RX ADMIN — ENOXAPARIN SODIUM 40 MG: 100 INJECTION SUBCUTANEOUS at 10:20

## 2022-10-03 RX ADMIN — ACETAMINOPHEN 650 MG: 325 TABLET, FILM COATED ORAL at 02:32

## 2022-10-03 RX ADMIN — FUROSEMIDE 40 MG: 10 INJECTION, SOLUTION INTRAMUSCULAR; INTRAVENOUS at 17:37

## 2022-10-03 RX ADMIN — METOPROLOL TARTRATE 25 MG: 25 TABLET, FILM COATED ORAL at 21:38

## 2022-10-03 RX ADMIN — ATORVASTATIN CALCIUM 10 MG: 10 TABLET, FILM COATED ORAL at 10:21

## 2022-10-03 RX ADMIN — SODIUM CHLORIDE, PRESERVATIVE FREE 10 ML: 5 INJECTION INTRAVENOUS at 10:21

## 2022-10-03 RX ADMIN — ACETAMINOPHEN 650 MG: 325 TABLET, FILM COATED ORAL at 21:38

## 2022-10-03 RX ADMIN — MAGNESIUM SULFATE HEPTAHYDRATE 2000 MG: 40 INJECTION, SOLUTION INTRAVENOUS at 04:37

## 2022-10-03 RX ADMIN — DONEPEZIL HYDROCHLORIDE 10 MG: 10 TABLET, FILM COATED ORAL at 21:37

## 2022-10-03 RX ADMIN — FUROSEMIDE 20 MG: 10 INJECTION, SOLUTION INTRAMUSCULAR; INTRAVENOUS at 10:21

## 2022-10-03 ASSESSMENT — PAIN SCALES - PAIN ASSESSMENT IN ADVANCED DEMENTIA (PAINAD)
NEGVOCALIZATION: 0
CONSOLABILITY: 0
TOTALSCORE: 0
BODYLANGUAGE: 0
FACIALEXPRESSION: 0
BREATHING: 0

## 2022-10-03 ASSESSMENT — PAIN DESCRIPTION - DESCRIPTORS
DESCRIPTORS: PATIENT UNABLE TO DESCRIBE
DESCRIPTORS: PATIENT UNABLE TO DESCRIBE

## 2022-10-03 ASSESSMENT — PAIN SCALES - WONG BAKER: WONGBAKER_NUMERICALRESPONSE: 4

## 2022-10-03 ASSESSMENT — PAIN SCALES - GENERAL
PAINLEVEL_OUTOF10: 0

## 2022-10-03 NOTE — PROGRESS NOTES
Staples removed this morning. Incisions are clean dry and intact without erythema or drainage. Does not cooperate with motor or sensory exam and is quite agitated. Repeat femur x-ray ordered because the current x-rays do not actually show enough of the femur to make any sort of clinical decision or judgment. Carlota Kelley  [facility]  Cell: [phone]    Voice recognit ion software used for this note.  Please notify me if any transcription errors are present and I will make an addendum

## 2022-10-03 NOTE — PROGRESS NOTES
Internal Medicine Progress Note    Admit Date: 10/1/2022  Day: 2   Diet: ADULT DIET; Regular; 3 carb choices (45 gm/meal); Low Fat/Low Chol/High Fiber/2 gm Na    CC: Facial Swelling    Interval history: Overnight, patient had 3 episodes of nonsustained SVTs. An EKG was obtained which was without changes from EKG on admission. No evidence of ischemia. Magnesium was also noted to be low and was repleted by the night team.  Patient had some agitation for which she received some Zyprexa. Otherwise, hemodynamically stable. Labs appreciated-largely stable. Vitals appreciated-stable. Telemetry appreciated-stable except for aforementioned SVTs. HPI:  Justyna Hightower is a 80 y.o. female with medical history of Brain mass thought to be meningioma, type 2 diabetes, hypertension, hyperlipidemia, and baseline dementia who presents with presented to the [facility] emergency department from the [facility] where her caretakers noticed that she has had some facial swelling. Due to the patient's underlying dementia, she was not able to give a thorough history. The emergency department attempted to contact the memory unit but was not able to get through. Per the report from the EMS, the patient was noticed to have increased facial swelling was therefore brought into the hospital for further evaluation. Of note, the patient was recently admitted to the hospital for a left hip fracture. Patient is status post ORIF for that left hip fracture. ED course: In the emergency department, the patient was alert and oriented to self. The patient's vitals were appreciated and were noticed to be stable. Blood pressure was noted to be 109/54, heart was noted to be 88, and respirations were noted to be 16 patient is afebrile. The patient was noted to have been saturating in the high 80% and was therefore put on 2 L of oxygen in the emergency department.   Patient is currently saturating  in the 90s on two liters. Of note from the patient's exam, she has bilateral lower extremity edema that was estimated to be 2+. On pulmonary auscultation, there were bilateral lower lobe crackles. Imaging studies in the emergency department notable for chest x-ray which showed some airspace consolidation and a large left pleural effusion atelectatic changes to the right lower lung were also noted. Labs in the ED notable for a slight leukocytosis of 11.8. Patient's absolute neutrophils also noted to be increased. The BMP was within normal limits largely except for a glucose of 325. Alkaline phosphatase also noted to be high at 188. Also of note, proBNP noted to be around 30,000. VBG was obtained in the ED which was within normal limits. pH was noted to be 7.350. Patient has both COVID and influenza negative. The patient was discussed with the admitting hospitalist and it was determined that she would likely benefit from inpatient admission and further work-up of what is likely an acute hypoxic respiratory failure secondary to new onset heart failure with exacerbation.     Medications:     Scheduled Meds:   aspirin EC  81 mg Oral Daily    donepezil  10 mg Oral Nightly    enoxaparin  40 mg SubCUTAneous Daily    atorvastatin  10 mg Oral Daily    sodium chloride flush  5-40 mL IntraVENous 2 times per day    furosemide  20 mg IntraVENous BID    insulin lispro  0-4 Units SubCUTAneous TID WC    insulin lispro  0-4 Units SubCUTAneous Nightly    insulin glargine  5 Units SubCUTAneous Nightly     Continuous Infusions:   dextrose       PRN Meds:sodium chloride flush, ondansetron **OR** ondansetron, polyethylene glycol, acetaminophen **OR** acetaminophen, perflutren lipid microspheres, glucose, dextrose bolus **OR** dextrose bolus, glucagon (rDNA), dextrose    Objective:   Vitals:   T-max:  Patient Vitals for the past 8 hrs:   Weight   10/03/22 0442 119 lb 7.8 oz (54.2 kg)       Intake/Output Summary (Last 24 hours) at 10/3/2022 08  Last data filed at 10/3/2022 0442  Gross per 24 hour   Intake 470 ml   Output 1360 ml   Net -890 ml       Physical Exam  Constitutional:       Appearance: She is underweight. Eyes:      Extraocular Movements: Extraocular movements intact. Neck:      Vascular: No JVD. Cardiovascular:      Rate and Rhythm: Normal rate and regular rhythm. Pulses:           Radial pulses are 2+ on the right side and 2+ on the left side. Heart sounds: Normal heart sounds, S1 normal and S2 normal. No murmur heard. Pulmonary:      Effort: Pulmonary effort is normal.      Breath sounds: Normal breath sounds and air entry. Abdominal:      General: Abdomen is flat. Bowel sounds are normal.      Palpations: Abdomen is soft. Tenderness: There is no abdominal tenderness. Musculoskeletal:      Right lower le+ Pitting Edema present. Left lower le+ Pitting Edema present. Skin:     Capillary Refill: Capillary refill takes less than 2 seconds. Neurological:      Mental Status: She is lethargic and disoriented. Psychiatric:         Behavior: Behavior is uncooperative. LABS:    CBC:   Recent Labs     10/01/22  1429 10/02/22  08   WBC 11.8* 11.5*   HGB 13.2 11.6*   HCT 42.0 36.2   * 423   MCV 89.4 88.2     Renal:    Recent Labs     10/01/22  1429 10/02/22  08    144   K 4.5 3.9    106   CO2 23 27   BUN 17 12   CREATININE 0.8 0.5*   GLUCOSE 325* 214*   CALCIUM 8.2* 8.1*   MG  --  1.50*   ANIONGAP 15 11     Hepatic:   Recent Labs     10/01/22  1429   AST 20   ALT 17   BILITOT 0.9   PROT 6.5   LABALBU 3.2*   ALKPHOS 188*     Troponin:   Recent Labs     10/02/22  0018 10/02/22  0823   TROPONINI 0.11* 0.09*     BNP: No results for input(s): BNP in the last 72 hours. Lipids:   Recent Labs     10/02/22  0823   CHOL 193   HDL 36*     ABGs:  No results for input(s): PHART, FBG7KQL, PO2ART, IHP7XLA, BEART, THGBART, L2EVWOYT, DRF9JKD in the last 72 hours.     INR: No results for input(s): INR in the last 72 hours. Lactate: No results for input(s): LACTATE in the last 72 hours. Cultures:  -----------------------------------------------------------------  RAD:   XR FEMUR LEFT (MIN 2 VIEWS)   Final Result   Impression:   Prior left total hip arthroplasty and ORIF of periprosthetic fracture. No acute abnormality. XR CHEST 1 VIEW   Final Result   1. Combined airspace consolidation and moderate to large left pleural effusion which is new   2. Likely atelectatic changes right lower lung   3. Status post CABG            Assessment/Plan:   Gee Kwon is a 80 y.o. female with a past medical history of a brain mass that is most likely a meningioma, type 2 diabetes, hypertension, hyperlipidemia, and baseline dementia who presents with facial swelling from her nursing home. Of note, patient recently had a left hip fracture and is status post ORIF for that fracture. Hypoxic respiratory failure likely secondary to fluid overload. Patient noted to have bilateral 2+ lower extremity edema and bilateral crackles at both lung bases. As well as a proBNP of around 30,000. Patient has never been formally diagnosed with heart failure  Will obtain echocardiogram- pending as of 10/3/22  Cardiology Consult- recommended initiation low dose B blocker  Lasix IV 40 twice daily  Daily weights  Strict Is and Os- down ~1L since admission  Will monitor oxygen saturation  Continuous telemetry  Trend Troponins. 0.09 down from 0.11. Will order another troponin for a 3rd value. PO/OT  Combined airspace consolidation and moderate to large left pleural effusion- patient noted to have slight leukocytosis at 11.8. Radiographic findings as well as slight leukocytosis as well as bilateral crackles could represent some aspect of pneumonia.   Will obtain repeat CXR for resolution of effusion  Will continue supplement with oxygen via nasal cannula  If no resolution of pulmonary symptoms, will consider consulting pulmonologist.  Will up the Lasix to 40mg BID IV  Will order procalcitonin- 0.08. Unlikely septic. Paroxsymal SVT overnight:  Patient will be started on Lopressor 25mg BID for rate control. Chronic Medical Conditions:  Hypertension-we will continue losartan 75 mg  Dementia-we will continue donepezil 10 mg  Osteoporosis-we will hold the patient's at home alendronate 35 mg weekly while inpatient. Type 2 diabetes-we will have the patient on Lantus 5 nightly as well as low-dose sliding scale. Hypoglycemia protocols in place. Code Status:DNR-CCA  FEN: ADULT DIET; Regular; 3 carb choices (45 gm/meal);  Low Fat/Low Chol/High Fiber/2 gm Na  PPX:  Lovenox  DISPO: [facility]    Ene Xiao MD  PGY1, Internal Medicine  10/03/22  8:26 AM    This patient will be staffed and discussed with Ron Garner MD.

## 2022-10-03 NOTE — PROGRESS NOTES
Physical Therapy  Facility/Department: [address]  Physical Therapy Initial Assessment and Treatment    Name: Ramón Busby  : 1931  MRN: [id number]  Date of Service: 10/3/2022    Discharge Recommendations:    Ramón Busby scored a  on the AM-PAC short mobility form. Current research shows that an AM-PAC score of 17 or less is typically not associated with a discharge to the patient's home setting. Based on the patient's AM-PAC score and their current functional mobility deficits, it is recommended that the patient have 3-5 sessions per week of Physical Therapy at d/c to increase the patient's independence. Please see assessment section for further patient specific details. If patient discharges prior to next session this note will serve as a discharge summary. Please see below for the latest assessment towards goals. PT Equipment Recommendations  Equipment Needed: No      Patient Diagnosis(es): The encounter diagnosis was Pleural effusion. Past Medical History:  has a past medical history of Atherosclerotic vascular disease, Back pain, Brain mass, Cervical spondylolysis, Dementia ([address] Diabetes ([address] Frequent falls, Hearing loss, Heart disease, Hyperlipidemia, Hypertension, Mental status alteration, Osteoporosis, and Wrist fracture. Past Surgical History:  has a past surgical history that includes hip surgery; Partial hip arthroplasty; Brain tumor excision; Coronary artery bypass graft; ORIF hip fracture (Left, 2022); and Hip fracture surgery (Left, 2022). Assessment   Assessment: Pt is currently living at [facility]. Uncertain of level of functional mobility post hip fx/ORIF 22. Currently the pt requires mod x 2 for attempted sit to stand and is unable to fully stand. Bed mobility required assist level between dep x 2 and max x 1.   Recommend continued IP therapies to maximize mobility and safety  Treatment Diagnosis: Decreased functional mobilty 2/2 cognition/weakness  Barriers to Learning: cognition  Activity Tolerance  Activity Tolerance: Treatment limited secondary to decreased cognition     Plan   Physcial Therapy Plan  General Plan:  (2-5)  Current Treatment Recommendations: Strengthening, ROM, Functional mobility training, Safety education & training, Gait training, Transfer training  Safety Devices  Type of Devices: Call light within reach, Left in bed, Nurse notified, Bed alarm in place     Restrictions  Position Activity Restriction  Other position/activity restrictions: up with assist  FWBAT  post Left hip ORIF 9/17     Subjective   General - Pt in bed upon PT entry, gown and covers off, O2 off  Patient assessed for rehabilitation services?: Yes  Additional Pertinent Hx: Adm 10/1 from [place] [address] with facial swelling. Recent ORIF 9/17 - [facility]  Referring Practitioner: Margot Gilford  Diagnosis: facial swelling, AMS         Social/Functional History  Social/Functional History  Type of Home: Assisted living  Additional Comments: per chart review, pt was in assisted living. Pt is poor historian this date, unable to verbalize prior levelsof assistance  Vision/Hearing  Vision  Vision: Within Functional Limits  Hearing  Hearing Exceptions: Bilateral hearing aid;Hard of hearing/hearing concerns    Cognition   Orientation  Orientation Level: Oriented to person;Disoriented to place; Disoriented to time;Disoriented to situation  Cognition  Overall Cognitive Status: Exceptions  Arousal/Alertness: Inconsistent responses to stimuli  Following Commands: Inconsistently follows commands  Attention Span: Difficulty attending to directions  Memory: Decreased recall of biographical Information;Decreased recall of precautions;Decreased recall of recent events;Decreased short term memory  Safety Judgement: Decreased awareness of need for assistance;Decreased awareness of need for safety  Problem Solving: Decreased awareness of errors  Insights: Decreased awareness of deficits  Initiation: Requires cues for all  Sequencing: Requires cues for all     Objective   Heart Rate: 72  Heart Rate Source: Monitor  BP: (!) 153/98  BP Location: Left upper arm  BP Method: Automatic  Patient Position: Semi fowlers  MAP (Calculated): 116.33  Resp: 20  SpO2:  (80-91 on room air, 93 on 2L O2)  O2 Device: None (Room air)     Observation/Palpation  Observation: LLE bruising noted     ROM - Able to move LE's in supine to roll and kick off covers, able to move knees and ankles partial range in sitting. Strength - Moves in supine to remove covers. Limited movement in sitting. Unable to fully assess 2/2 cognition   Bed mobility  Supine to Sit: Maximum assistance (hob mod elevated)  Sit to Supine: Dependent/Total;2 Person assistance  Scootin Person assistance;Dependent/Total  Transfers  Sit to Stand: Moderate Assistance;2 Person Assistance;Maximum Assistance (attempted sit to stand from eob x 3. Pt unable to achieve more than partial stance with mod x 2 (first two attempts) and max x 1 (3rd attempt) with walker stabilized)        Balance  Comments: Pt sat on EOB x 25 min, initially needed mod to max assist to maintain midline, progressed to SBA with feet on floor, and usng UEs for blowing nose. Pt seated on eob doffing and donning new gown, washing up with PT/OT assist and feeding pt yogurt/oatmeal.  Pt did not initiate eating.     AM-PAC Score  -PAC Inpatient Mobility Raw Score : 9 (10/03/22 1057)  AM-PAC Inpatient T-Scale Score : 30.55 (10/03/22 1057)  Mobility Inpatient CMS 0-100% Score: 81.38 (10/03/22 1057)  Mobility Inpatient CMS G-Code Modifier : CM (10/03/22 1057)         Goals  Short Term Goals  Time Frame for Short Term Goals: Discharge  Short Term Goal 1: Bed mobility with mod x 1  Short Term Goal 2: Sit to stand with mod x 2  Short Term Goal 3: Ambulate 10 ft with RW and mod x 2  Patient Goals   Patient Goals : Not specifically stated       Education role of PT, bed mobility, safety, transfers.       Therapy Time   Individual Concurrent Group Co-treatment   Time In 0830         Time Out 0910         Minutes 40             Timed Code Treatment Minutes:   25    Total Treatment Minutes:  [facility]

## 2022-10-03 NOTE — PROGRESS NOTES
Occupational Therapy  Facility/Department: [address] [place] [address]  Occupational Therapy Initial Assessment    Name: Rell Brunson  : 1931  MRN: [id number]  Date of Service: 10/3/2022    Discharge Recommendations: Rell Brunson scored a 10/24 on the AM-PAC ADL Inpatient form. Current research shows that an AM-PAC score of 17 or less is typically not associated with a discharge to the patient's home setting. Based on the patient's AM-PAC score and their current ADL deficits, it is recommended that the patient have 3-5 sessions per week of Occupational Therapy at d/c to increase the patient's independence. Please see assessment section for further patient specific details. If patient discharges prior to next session this note will serve as a discharge summary. Please see below for the latest assessment towards goals. OT Equipment Recommendations  Other: defer to next level of care     Patient Diagnosis(es): The encounter diagnosis was Pleural effusion. Past Medical History:  has a past medical history of Atherosclerotic vascular disease, Back pain, Brain mass, Cervical spondylolysis, Dementia ([address] Diabetes ([address] Frequent falls, Hearing loss, Heart disease, Hyperlipidemia, Hypertension, Mental status alteration, Osteoporosis, and Wrist fracture. Past Surgical History:  has a past surgical history that includes hip surgery; Partial hip arthroplasty; Brain tumor excision; Coronary artery bypass graft; ORIF hip fracture (Left, 2022); and Hip fracture surgery (Left, 2022). Treatment Diagnosis: decreased endurance and cognition for functional mobility and ADL performance    Assessment   Performance deficits / Impairments: Decreased functional mobility ; Decreased endurance;Decreased ADL status; Decreased strength;Decreased balance;Decreased posture;Decreased cognition;Decreased safe awareness    Assessment: Pt is 80 y.o. female presenting from [facility] to [facility] with AMS and heart failure. Previous hospitalization for ORIF of L LE. Pt is poor historian with cognitive deficits this date, unable to verbalize prior level of assistance. Pt is currently needing Min-Max A for feeding and total A for LB ADLs, Max x1 to Total x2 for bed mobility. Pt unable to achieve full stance despite 3 trials and Mod x2 assist. Pt would benefit from continued OT services after discharge. Recommend SNF vs Assisted Living with 24hr assistance and OT. Will follow on OT POC. Treatment Diagnosis: decreased endurance and cognition for functional mobility and ADL performance  Prognosis: Fair  Decision Making: Medium Complexity  REQUIRES OT FOLLOW-UP: Yes  Activity Tolerance  Activity Tolerance: Treatment limited secondary to decreased cognition        Plan   Occupational Therapy Plan  Times Per Week: 2-5  Current Treatment Recommendations: Functional mobility training, Balance training, Cognitive reorientation, Self-Care / ADL, Strengthening, Endurance training     Restrictions  Position Activity Restriction  Other position/activity restrictions: up with assist  FWBAT  post Left hip ORIF 9/17    Subjective   General  Chart Reviewed: Yes  Patient assessed for rehabilitation services?: Yes  Additional Pertinent Hx: 80 y.o. female with PMHx of Brain mass thought to be meningioma, T2DM, HTN, hyperlipidemia, and baseline dementia who presents to ED from the [facility] memory unit with facial swelling, admitted for further work up of acute hypoxic respiratory failure secondary to new onset heart failure with exacerbation. Of note, patient is status post ORIF for that left hip fracture. In the ED, pt was put on 2 L of O2- saturating  in the 90s. Pt has BLE edema estimated to be 2+. On pulmonary auscultation, there were bilateral lower lobe crackles. Femur XR= Prior left total hip arthroplasty and ORIF of periprosthetic fracture. No acute abnormality. CHest XR= .  Combined airspace consolidation and moderate to large left pleural effusion which is new. Likely atelectatic changes right lower lung. Status post CABG. Family / Caregiver Present: No  Referring Practitioner: Aditya Ellison MD  Diagnosis: Heart failure  Subjective  Subjective: Pt met seated EOB with PT present, agreeable to OT session     Social/Functional History  Social/Functional History  Type of Home: Assisted living  Additional Comments: per chart review, pt was in assisted living. Pt is poor historian this date, unable to verbalize prior levelsof assistance       Objective   SpO2:  (80-91 on room air, 93 on 2L O2- pt minimally compliant with nasal cannula)  O2 Device: None (Room air)     Observation/Palpation  Observation: LLE bruising noted  Safety Devices  Type of Devices: Call light within reach; Left in bed;Nurse notified; Bed alarm in place  Restraints  Restraints Initially in Place: No  Balance  Sitting: Impaired (pt seated EOB for ~25 mins, balance fluctuated from mod/max initially, progressing to SBA)  Sitting - Static: Fair (occasional)  Sitting - Dynamic: Fair (occasional)  Standing: High guard (3 partial stands. Unable to achieve full stance with Mod x2 or Max x1, stating \"I don't want to, I'm too old\")     AROM: Generally decreased, functional  Strength: Generally decreased, functional  Coordination: Generally decreased, functional  Tone: Normal  ADL  Feeding: Setup; Increased time to complete; Beverage management;Bringing food to mouth assist;Maximum assistance  Feeding Skilled Clinical Factors: Pt required Min to Max A due to cognitive deficits; pt demonstrated ability to bring spoon/straw to mouth on 3/6 trials with therapist assisting for other 3 trials  Grooming: Stand by assistance;Verbal cueing; Increased time to complete;Setup  Grooming Skilled Clinical Factors: pt combed hair while seated EOB with increased time and verbal cueing; pt did not use wash cloth for face and did not use tooth brush when presented, instead attempted to wipe the bristles with tissues   UE Bathing: Dependent/Total  UE Bathing Skilled Clinical Factors: Pt required assistance with warm bath wipes; due to cognitive deficits pt was minimally participatory   LE Dressing: Dependent/Total  LE Dressing Skilled Clinical Factors: Pt required assistance to don/doff shoes     Activity Tolerance  Activity Tolerance: Treatment limited secondary to decreased cognition     Transfers  Sit to stand: 2 Person assistance;Maximum assistance (3 partial stands; Mod x2 and Max x1)  Stand to sit: Moderate assistance;2 Person assistance  Vision  Vision: Within Functional Limits  Hearing  Hearing: Exceptions to [facility] (Left hearing aid  Simultaneous filing. User may not have seen previous data.)  Hearing Exceptions: Bilateral hearing aid;Hard of hearing/hearing concerns  Cognition  Overall Cognitive Status: Exceptions  Arousal/Alertness: Inconsistent responses to stimuli  Following Commands: Inconsistently follows commands  Attention Span: Difficulty attending to directions  Memory: Decreased recall of biographical Information;Decreased recall of precautions;Decreased recall of recent events;Decreased short term memory  Safety Judgement: Decreased awareness of need for assistance;Decreased awareness of need for safety  Problem Solving: Decreased awareness of errors  Insights: Decreased awareness of deficits  Initiation: Requires cues for all  Sequencing: Requires cues for all  Orientation  Orientation Level: Oriented to person;Disoriented to place; Disoriented to time;Disoriented to situation  Pt has dementia at baseline    Education Given To: Patient  Education Provided: Role of Therapy;Plan of Care  Education Method: Verbal  Barriers to Learning: Cognition  Education Outcome: Continued education needed    AM-PAC Score  AM-[facility] Inpatient Daily Activity Raw Score: 10 (10/03/22 1104)  AM-PAC Inpatient ADL T-Scale Score : 27.31 (10/03/22 1104)  ADL Inpatient CMS 0-100% Score: 74.7 (10/03/22 1104)  ADL Inpatient CMS G-Code Modifier : CL (10/03/22 1104)    Goals  Short Term Goals  Time Frame for Short Term Goals: by discharge  Short Term Goal 1: Pt will perform functional sit to stand with Mod A x1  Short Term Goal 2: Pt will tolerate sitting EOB with SBA for 10 minutes to perform grooming tasks  Short Term Goal 3: Pt will perform UB dressing with Min A  Patient Goals   Patient goals : not stated       Therapy Time   Individual Concurrent Group Co-treatment   Time In 0834         Time Out 0921         Minutes 47         Timed Code Treatment Minutes: 32 Minutes (+15 min OT eval)       Eli Jean, S/OT  Therapist was present, directed the patient's care, made skilled judgement, and was responsible for assessment and treatment of the patient.    Roderick Myles OTR/L

## 2022-10-03 NOTE — PROGRESS NOTES
[address] Daily Progress Note      Admit Date:  10/1/2022    Subjective:  Ms. Antonio Castelan was seen and examined. F/U CHF. Oriented to name only. Confused.      Objective:   BP (!) 153/98   Pulse 72   Temp 97.4 °F (36.3 °C) (Axillary)   Resp 20   Ht 5' (1.524 m)   Wt 119 lb 7.8 oz (54.2 kg)   SpO2 94%   BMI 23.34 kg/m²     Intake/Output Summary (Last 24 hours) at 10/3/2022 1347  Last data filed at 10/3/2022 0442  Gross per 24 hour   Intake 420 ml   Output 1360 ml   Net -940 ml       TELEMETRY: Sinus     Physical Exam:  General:  Awake, alert, NAD  Skin:  Warm and dry  Neck:  JVP difficult  Chest:  decreased BS, respiration normal  Cardiovascular:  RRR S1S2  Abdomen:  Soft + BS  Extremities:  Tr-+ L>R edema    Medications:    potassium chloride  40 mEq Oral Once    furosemide  40 mg IntraVENous BID    metoprolol tartrate  25 mg Oral BID    aspirin EC  81 mg Oral Daily    donepezil  10 mg Oral Nightly    enoxaparin  40 mg SubCUTAneous Daily    atorvastatin  10 mg Oral Daily    sodium chloride flush  5-40 mL IntraVENous 2 times per day    insulin lispro  0-4 Units SubCUTAneous TID WC    insulin lispro  0-4 Units SubCUTAneous Nightly    insulin glargine  5 Units SubCUTAneous Nightly      dextrose       sodium chloride flush, ondansetron **OR** ondansetron, polyethylene glycol, acetaminophen **OR** acetaminophen, perflutren lipid microspheres, glucose, dextrose bolus **OR** dextrose bolus, glucagon (rDNA), dextrose    Lab Data:  CBC:   Recent Labs     10/01/22  1429 10/02/22  0823 10/03/22  0940   WBC 11.8* 11.5* 14.1*   HGB 13.2 11.6* 13.4   HCT 42.0 36.2 42.1   MCV 89.4 88.2 88.0   * 423 476*     BMP:   Recent Labs     10/01/22  1429 10/02/22  0823 10/03/22  0940    144 140   K 4.5 3.9 3.1*    106 99   CO2 23 27 30   BUN 17 12 10   CREATININE 0.8 0.5* 0.6     LIVER PROFILE:   Recent Labs     10/01/22  1429   AST 20   ALT 17   BILITOT 0.9   ALKPHOS 188*     PT/INR: No results for input(s): PROTIME, INR in the last 72 hours. APTT: No results for input(s): APTT in the last 72 hours. BNP:  No results for input(s): BNP in the last 72 hours. IMAGING:     Assessment:  Patient Active Problem List    Diagnosis Date Noted    Heart failure ([facility] 10/01/2022    Periprosthetic fracture around internal prosthetic left hip joint ([facility] 09/19/2022    DM (diabetes mellitus), type 2, uncontrolled 09/19/2022    Pelvic ring fracture, closed, initial encounter [facility] 09/14/2022    Meningioma ([facility] 04/25/2022    Dementia ([facility]     Diabetes ([facility]     Hyperlipidemia     Hypertension     Osteoporosis     Pure hypercholesterolemia 04/17/2017    Type 2 diabetes mellitus with circulatory disorder, without long-term current use of insulin ([facility] 11/16/2016       Plan:  Oriented to name only. Reasonable diuresis. On RA. Still with edema. Cr stable. Continue IV lasix. Watch cr.        Core Measures:  Discharge instructions:   LVEF documented:   ACEI for LV dysfunction:   Smoking Cessation:    Benedict Kent MD, MD 10/3/2022 1:47 PM

## 2022-10-03 NOTE — PLAN OF CARE
Problem: Skin/Tissue Integrity  Goal: Absence of new skin breakdown  Description: 1. Monitor for areas of redness and/or skin breakdown  2. Assess vascular access sites hourly  3. Every 4-6 hours minimum:  Change oxygen saturation probe site  4. Every 4-6 hours:  If on nasal continuous positive airway pressure, respiratory therapy assess nares and determine need for appliance change or resting period. Outcome: Progressing  Note: Pt at risk for skin breakdown. Skin assessment complete. Pt repositioned in bed with pillow support. Will continue to monitor.         Problem: Respiratory - Adult  Goal: Achieves optimal ventilation and oxygenation  Outcome: Progressing     Problem: Cardiovascular - Adult  Goal: Maintains optimal cardiac output and hemodynamic stability  Outcome: Progressing

## 2022-10-03 NOTE — CARE COORDINATION
Case Management Assessment           Daily Note                 Date/ Time of Note: 10/3/2022 11:05 AM         Note completed by: Ann Banks RN    Patient Name: Falguni Arenas  YOB: 1931    Diagnosis:Pleural effusion [J90]  Heart failure ([address] [I50.9]  Patient Admission Status: Inpatient    Date of Admission:10/1/2022  1:28 PM Length of Stay: 2 GLOS:      Current Plan of Care:     Ortho consulted  for  follow up;  Staples removed this morning. Incisions are clean dry and intact without erythema or drainage. Does not cooperate with motor or sensory exam and is quite agitated. Repeat femur x-ray ordered because the current x-rays do not actually show enough of the femur to make any sort of clinical decision or judgment    ________________________________________________________________________________________  PT AM-PAC: 9 / 24 per last evaluation on: 10/03/2022    OT AM-PAC: 10 / 24 per last evaluation on: pending      DME Needs for discharge: None    ________________________________________________________________________________________  Discharge Plan: Home with Home Health Care: VS  SNF  likely      Tentative discharge date: today / tomorrow  . PTOT      Current barriers to discharge:   Factors facilitating achievement of predicted outcomes: Family support and Caregiver support     Barriers to discharge: Confusion, Cognitive deficit, Limited safety awareness, and Communication deficit    Referrals completed: SNF: [facility]  Following      Resources/ information provided: Not indicated at this time  ________________________________________________________________________________________  Case Management Notes:    Transportation PLAN for discharge: EMS transportation      Additional Case Management Notes:     CM  following for  d/c planning  / needs :   -   Pt lives at [facility].   -   She had a recent stay at [facility] after at hip fracture.       CM confirmed with Nasreen Barreto at [facility], pt was at [facility] apartment when she was re-admitted. She is unsure if pt needs SNF if she has covered days. She will review and call CM back on Monday. CM  recv'd call back form  Nasreen Louisn  at  [facility]  and pt  does have  medicare  days  left  and has  a secondary Insurance  if  SNF were  to be  needed at  d/c  they can accept and  meet her  needs  ;    CM also recv'd a  call from  [facility]  who stated they are  active  with the  pt  pta  as  well , as they were  the  caregivers  who suggested she return to Hospital ,  ( facial ,Swelling  ) . .  Sw / CM cont to follow . Indiana Fitch and her family were provided with choice of provider; she and her family are in agreement with the discharge plan. Care Transition Patient:  Yes    Ann Banks RN  [facility]  Case Management Department  Ph: [phone]

## 2022-10-03 NOTE — PROGRESS NOTES
Pt refusing to keep nasal cannula on, continually ripping it off. SPO2 ranges from 83%-90% without it. Pt alert to self and trying to scratch/swing at staff when putting on O2. MD Mcnamara notified via perfect serve after multiple attempts to place cannula on- zyprexa given prn per orders. At this time patient is 88-89% at rest without cannula, will re-attempt cannula placement when patient calms.

## 2022-10-04 LAB
ANION GAP SERPL CALCULATED.3IONS-SCNC: 13 MMOL/L (ref 3–16)
BASOPHILS ABSOLUTE: 0.1 K/UL (ref 0–0.2)
BASOPHILS RELATIVE PERCENT: 0.5 %
BUN BLDV-MCNC: 9 MG/DL (ref 7–20)
CALCIUM SERPL-MCNC: 8.4 MG/DL (ref 8.3–10.6)
CHLORIDE BLD-SCNC: 102 MMOL/L (ref 99–110)
CO2: 28 MMOL/L (ref 21–32)
CREAT SERPL-MCNC: 0.6 MG/DL (ref 0.6–1.2)
EOSINOPHILS ABSOLUTE: 0.1 K/UL (ref 0–0.6)
EOSINOPHILS RELATIVE PERCENT: 0.9 %
GFR AFRICAN AMERICAN: >60
GFR NON-AFRICAN AMERICAN: >60
GLUCOSE BLD-MCNC: 113 MG/DL (ref 70–99)
GLUCOSE BLD-MCNC: 126 MG/DL (ref 70–99)
GLUCOSE BLD-MCNC: 172 MG/DL (ref 70–99)
GLUCOSE BLD-MCNC: 55 MG/DL (ref 70–99)
GLUCOSE BLD-MCNC: 64 MG/DL (ref 70–99)
GLUCOSE BLD-MCNC: 71 MG/DL (ref 70–99)
GLUCOSE BLD-MCNC: 88 MG/DL (ref 70–99)
HCT VFR BLD CALC: 39 % (ref 36–48)
HEMOGLOBIN: 12.8 G/DL (ref 12–16)
LYMPHOCYTES ABSOLUTE: 3 K/UL (ref 1–5.1)
LYMPHOCYTES RELATIVE PERCENT: 18.5 %
MAGNESIUM: 1.7 MG/DL (ref 1.8–2.4)
MCH RBC QN AUTO: 28.4 PG (ref 26–34)
MCHC RBC AUTO-ENTMCNC: 32.8 G/DL (ref 31–36)
MCV RBC AUTO: 86.7 FL (ref 80–100)
MONOCYTES ABSOLUTE: 0.9 K/UL (ref 0–1.3)
MONOCYTES RELATIVE PERCENT: 5.6 %
NEUTROPHILS ABSOLUTE: 12.1 K/UL (ref 1.7–7.7)
NEUTROPHILS RELATIVE PERCENT: 74.5 %
PDW BLD-RTO: 16.7 % (ref 12.4–15.4)
PERFORMED ON: ABNORMAL
PERFORMED ON: NORMAL
PERFORMED ON: NORMAL
PLATELET # BLD: 433 K/UL (ref 135–450)
PMV BLD AUTO: 9.5 FL (ref 5–10.5)
POTASSIUM SERPL-SCNC: 3 MMOL/L (ref 3.5–5.1)
PRO-BNP: ABNORMAL PG/ML (ref 0–449)
RBC # BLD: 4.5 M/UL (ref 4–5.2)
SODIUM BLD-SCNC: 143 MMOL/L (ref 136–145)
VITAMIN B-12: 743 PG/ML (ref 211–911)
WBC # BLD: 16.2 K/UL (ref 4–11)

## 2022-10-04 PROCEDURE — 82607 VITAMIN B-12: CPT

## 2022-10-04 PROCEDURE — 6360000002 HC RX W HCPCS: Performed by: STUDENT IN AN ORGANIZED HEALTH CARE EDUCATION/TRAINING PROGRAM

## 2022-10-04 PROCEDURE — 2580000003 HC RX 258: Performed by: STUDENT IN AN ORGANIZED HEALTH CARE EDUCATION/TRAINING PROGRAM

## 2022-10-04 PROCEDURE — 83735 ASSAY OF MAGNESIUM: CPT

## 2022-10-04 PROCEDURE — 2580000003 HC RX 258

## 2022-10-04 PROCEDURE — 1200000000 HC SEMI PRIVATE

## 2022-10-04 PROCEDURE — 6370000000 HC RX 637 (ALT 250 FOR IP): Performed by: STUDENT IN AN ORGANIZED HEALTH CARE EDUCATION/TRAINING PROGRAM

## 2022-10-04 PROCEDURE — 36415 COLL VENOUS BLD VENIPUNCTURE: CPT

## 2022-10-04 PROCEDURE — 97535 SELF CARE MNGMENT TRAINING: CPT

## 2022-10-04 PROCEDURE — 97530 THERAPEUTIC ACTIVITIES: CPT

## 2022-10-04 PROCEDURE — 80048 BASIC METABOLIC PNL TOTAL CA: CPT

## 2022-10-04 PROCEDURE — 85025 COMPLETE CBC W/AUTO DIFF WBC: CPT

## 2022-10-04 PROCEDURE — 6360000002 HC RX W HCPCS

## 2022-10-04 PROCEDURE — 6370000000 HC RX 637 (ALT 250 FOR IP)

## 2022-10-04 PROCEDURE — 83880 ASSAY OF NATRIURETIC PEPTIDE: CPT

## 2022-10-04 PROCEDURE — 99233 SBSQ HOSP IP/OBS HIGH 50: CPT | Performed by: INTERNAL MEDICINE

## 2022-10-04 RX ORDER — MAGNESIUM SULFATE IN WATER 40 MG/ML
2000 INJECTION, SOLUTION INTRAVENOUS ONCE
Status: COMPLETED | OUTPATIENT
Start: 2022-10-04 | End: 2022-10-04

## 2022-10-04 RX ORDER — POTASSIUM CHLORIDE 7.45 MG/ML
10 INJECTION INTRAVENOUS
Status: DISCONTINUED | OUTPATIENT
Start: 2022-10-04 | End: 2022-10-04 | Stop reason: SDUPTHER

## 2022-10-04 RX ORDER — POTASSIUM CHLORIDE 7.45 MG/ML
10 INJECTION INTRAVENOUS
Status: COMPLETED | OUTPATIENT
Start: 2022-10-04 | End: 2022-10-04

## 2022-10-04 RX ADMIN — ATORVASTATIN CALCIUM 10 MG: 10 TABLET, FILM COATED ORAL at 08:14

## 2022-10-04 RX ADMIN — POTASSIUM CHLORIDE 10 MEQ: 7.46 INJECTION, SOLUTION INTRAVENOUS at 11:23

## 2022-10-04 RX ADMIN — POTASSIUM CHLORIDE 10 MEQ: 7.46 INJECTION, SOLUTION INTRAVENOUS at 12:45

## 2022-10-04 RX ADMIN — CEFTRIAXONE 1000 MG: 1 INJECTION, POWDER, FOR SOLUTION INTRAMUSCULAR; INTRAVENOUS at 16:11

## 2022-10-04 RX ADMIN — FUROSEMIDE 40 MG: 10 INJECTION, SOLUTION INTRAMUSCULAR; INTRAVENOUS at 08:13

## 2022-10-04 RX ADMIN — POTASSIUM CHLORIDE 10 MEQ: 7.46 INJECTION, SOLUTION INTRAVENOUS at 16:59

## 2022-10-04 RX ADMIN — DEXTROSE MONOHYDRATE 125 ML: 10 INJECTION, SOLUTION INTRAVENOUS at 08:03

## 2022-10-04 RX ADMIN — ENOXAPARIN SODIUM 40 MG: 100 INJECTION SUBCUTANEOUS at 08:14

## 2022-10-04 RX ADMIN — MAGNESIUM SULFATE HEPTAHYDRATE 2000 MG: 40 INJECTION, SOLUTION INTRAVENOUS at 14:27

## 2022-10-04 RX ADMIN — POTASSIUM CHLORIDE 10 MEQ: 7.46 INJECTION, SOLUTION INTRAVENOUS at 15:30

## 2022-10-04 RX ADMIN — METOPROLOL TARTRATE 25 MG: 25 TABLET, FILM COATED ORAL at 08:13

## 2022-10-04 RX ADMIN — SODIUM CHLORIDE, PRESERVATIVE FREE 10 ML: 5 INJECTION INTRAVENOUS at 21:15

## 2022-10-04 RX ADMIN — SODIUM CHLORIDE, PRESERVATIVE FREE 10 ML: 5 INJECTION INTRAVENOUS at 08:21

## 2022-10-04 RX ADMIN — ASPIRIN 81 MG: 81 TABLET, COATED ORAL at 08:14

## 2022-10-04 RX ADMIN — INSULIN GLARGINE 5 UNITS: 100 INJECTION, SOLUTION SUBCUTANEOUS at 00:34

## 2022-10-04 RX ADMIN — POTASSIUM CHLORIDE 10 MEQ: 7.46 INJECTION, SOLUTION INTRAVENOUS at 13:57

## 2022-10-04 ASSESSMENT — PAIN SCALES - PAIN ASSESSMENT IN ADVANCED DEMENTIA (PAINAD)
FACIALEXPRESSION: 0
CONSOLABILITY: 0
NEGVOCALIZATION: 0
FACIALEXPRESSION: 0
BREATHING: 0
BREATHING: 0
CONSOLABILITY: 0
NEGVOCALIZATION: 0
BREATHING: 0
BODYLANGUAGE: 0
TOTALSCORE: 0
CONSOLABILITY: 0
FACIALEXPRESSION: 0
TOTALSCORE: 0
BREATHING: 0
TOTALSCORE: 0
TOTALSCORE: 0
CONSOLABILITY: 0
NEGVOCALIZATION: 0
NEGVOCALIZATION: 0
TOTALSCORE: 0
FACIALEXPRESSION: 0
FACIALEXPRESSION: 0
BODYLANGUAGE: 0
BREATHING: 0
CONSOLABILITY: 0
BODYLANGUAGE: 0
BODYLANGUAGE: 0
NEGVOCALIZATION: 0
BODYLANGUAGE: 0

## 2022-10-04 NOTE — CARE COORDINATION
Case Management Assessment           Daily Note                 Date/ Time of Note: 10/4/2022 2:48 PM         Note completed by: Tequila Hightower RN    Patient Name: Catherine Cote  YOB: 1931    Diagnosis:Pleural effusion [J90]  Heart failure ([address] [I50.9]  Patient Admission Status: Inpatient    Date of Admission:10/1/2022  1:28 PM Length of Stay: 3 GLOS: GMLOS: 3.9    Current Plan of Care:     Ortho consulted  for  follow up;     Plan:  Oriented to name only. Reasonable diuresis. On RA.   -  Still with edema. Cr stable. Continue IV lasix. Watch cr.     ________________________________________________________________________________________  PT AM-PAC: 9 / 24 per last evaluation on: 10/03/2022    OT AM-PAC: 10 / 24 per last evaluation on: pending      DME Needs for discharge: None    ________________________________________________________________________________________  Discharge Plan: Home with Home Health Care: VS  SNF  likely      Tentative discharge date: today / tomorrow  . PTOT      Current barriers to discharge:   Factors facilitating achievement of predicted outcomes: Family support and Caregiver support     Barriers to discharge: Confusion, Cognitive deficit, Limited safety awareness, and Communication deficit    Referrals completed: SNF: [facility]  Following      Resources/ information provided: Not indicated at this time  ________________________________________________________________________________________  Case Management Notes:    Transportation PLAN for discharge: EMS transportation      Additional Case Management Notes:     CM  following for  d/c planning  / needs :   -   Pt lives at [facility].   -   She had a recent stay at [facility] after at hip fracture. CM confirmed with Trista Sanchez at [facility], pt was at [facility] apartment when she was re-admitted. She is unsure if pt needs SNF if she has covered days. She will review and call CM back on Monday.     VICKY

## 2022-10-04 NOTE — PROGRESS NOTES
Internal Medicine Progress Note    Admit Date: 10/1/2022  Day: 3   Diet: ADULT DIET; Regular; 3 carb choices (45 gm/meal); Low Fat/Low Chol/High Fiber/2 gm Na    CC: Facial Swelling    Interval history: Patient seen at the bedside this morning. Still oriented only to person. Patient started on Lopressor 25 twice daily yesterday for rate control given that she had some SVTs overnight 2 nights ago. Patient tolerated the Lopressor 25 well. Vitals appreciated-stable. Patient also tolerated increase of Lasix yesterday from 20 IV twice daily to 40 IV twice daily. Diuresis has improved. Currently down 3.29 L this admission. Labs appreciated-stable. HPI:  Melonie Chang is a 80 y.o. female with medical history of Brain mass thought to be meningioma, type 2 diabetes, hypertension, hyperlipidemia, and baseline dementia who presents with presented to the [facility] emergency department from the [facility] memory unit where her caretakers noticed that she has had some facial swelling. Due to the patient's underlying dementia, she was not able to give a thorough history. The emergency department attempted to contact the memory unit but was not able to get through. Per the report from the EMS, the patient was noticed to have increased facial swelling was therefore brought into the hospital for further evaluation. Of note, the patient was recently admitted to the hospital for a left hip fracture. Patient is status post ORIF for that left hip fracture. ED course: In the emergency department, the patient was alert and oriented to self. The patient's vitals were appreciated and were noticed to be stable. Blood pressure was noted to be 109/54, heart was noted to be 88, and respirations were noted to be 16 patient is afebrile. The patient was noted to have been saturating in the high 80% and was therefore put on 2 L of oxygen in the emergency department.   Patient is currently saturating  in the 90s on two liters. Of note from the patient's exam, she has bilateral lower extremity edema that was estimated to be 2+. On pulmonary auscultation, there were bilateral lower lobe crackles. Imaging studies in the emergency department notable for chest x-ray which showed some airspace consolidation and a large left pleural effusion atelectatic changes to the right lower lung were also noted. Labs in the ED notable for a slight leukocytosis of 11.8. Patient's absolute neutrophils also noted to be increased. The BMP was within normal limits largely except for a glucose of 325. Alkaline phosphatase also noted to be high at 188. Also of note, proBNP noted to be around 30,000. VBG was obtained in the ED which was within normal limits. pH was noted to be 7.350. Patient has both COVID and influenza negative. The patient was discussed with the admitting hospitalist and it was determined that she would likely benefit from inpatient admission and further work-up of what is likely an acute hypoxic respiratory failure secondary to new onset heart failure with exacerbation.     Medications:     Scheduled Meds:   potassium chloride  40 mEq Oral Once    furosemide  40 mg IntraVENous BID    metoprolol tartrate  25 mg Oral BID    aspirin EC  81 mg Oral Daily    donepezil  10 mg Oral Nightly    enoxaparin  40 mg SubCUTAneous Daily    atorvastatin  10 mg Oral Daily    sodium chloride flush  5-40 mL IntraVENous 2 times per day    insulin lispro  0-4 Units SubCUTAneous TID WC    insulin lispro  0-4 Units SubCUTAneous Nightly    insulin glargine  5 Units SubCUTAneous Nightly     Continuous Infusions:   dextrose       PRN Meds:sodium chloride flush, ondansetron **OR** ondansetron, polyethylene glycol, acetaminophen **OR** acetaminophen, perflutren lipid microspheres, glucose, dextrose bolus **OR** dextrose bolus, glucagon (rDNA), dextrose    Objective:   Vitals:   T-max:  Patient Vitals for the past 8 hrs:   BP Temp Temp src Pulse Resp SpO2 Weight   10/04/22 0807 131/79 97.3 °F (36.3 °C) Axillary 65 18 -- --   10/04/22 0336 (!) 150/100 97.3 °F (36.3 °C) Axillary 79 20 90 % 110 lb 3.7 oz (50 kg)   10/04/22 0037 128/66 97.5 °F (36.4 °C) Oral 59 18 94 % --         Intake/Output Summary (Last 24 hours) at 10/4/2022 0825  Last data filed at 10/4/2022 0039  Gross per 24 hour   Intake --   Output 2400 ml   Net -2400 ml         Physical Exam  Constitutional:       Appearance: She is underweight. Eyes:      Extraocular Movements: Extraocular movements intact. Neck:      Vascular: No JVD. Cardiovascular:      Rate and Rhythm: Normal rate and regular rhythm. Pulses:           Radial pulses are 2+ on the right side and 2+ on the left side. Heart sounds: Normal heart sounds, S1 normal and S2 normal. No murmur heard. Pulmonary:      Effort: Pulmonary effort is normal.      Breath sounds: Normal breath sounds and air entry. Abdominal:      General: Abdomen is flat. Bowel sounds are normal.      Palpations: Abdomen is soft. Tenderness: There is no abdominal tenderness. Musculoskeletal:      Right lower le+ Pitting Edema present. Left lower le+ Pitting Edema present. Skin:     Capillary Refill: Capillary refill takes less than 2 seconds. Neurological:      Mental Status: She is lethargic and disoriented. Psychiatric:         Behavior: Behavior is uncooperative.          LABS:    CBC:   Recent Labs     10/02/22  0823 10/03/22  0940 10/04/22  0705   WBC 11.5* 14.1* 16.2*   HGB 11.6* 13.4 12.8   HCT 36.2 42.1 39.0    476* 433   MCV 88.2 88.0 86.7       Renal:    Recent Labs     10/01/22  1429 10/02/22  0823 10/03/22  0940    144 140   K 4.5 3.9 3.1*    106 99   CO2 23 27 30   BUN 17 12 10   CREATININE 0.8 0.5* 0.6   GLUCOSE 325* 214* 146*   CALCIUM 8.2* 8.1* 8.2*   MG  --  1.50* 2.00   ANIONGAP 15 11 11       Hepatic:   Recent Labs     10/01/22  1429   AST 20   ALT 17   BILITOT 0.9   PROT 6.5 LABALBU 3.2*   ALKPHOS 188*       Troponin:   Recent Labs     10/02/22  0018 10/02/22  0823 10/03/22  1256   TROPONINI 0.11* 0.09* 0.08*       BNP: No results for input(s): BNP in the last 72 hours. Lipids:   Recent Labs     10/02/22  0823   CHOL 193   HDL 36*       ABGs:  No results for input(s): PHART, DWX8ZGV, PO2ART, YFU1HWQ, BEART, THGBART, J4BDYSDB, BLM3KWY in the last 72 hours. INR: No results for input(s): INR in the last 72 hours. Lactate: No results for input(s): LACTATE in the last 72 hours. Cultures:  -----------------------------------------------------------------  RAD:   XR FEMUR LEFT (MIN 2 VIEWS)   Final Result      Unchanged appearance of left hip arthroplasty and ORIF hardware. No acute fracture or complication seen. XR FEMUR LEFT (MIN 2 VIEWS)   Final Result   Impression:   Prior left total hip arthroplasty and ORIF of periprosthetic fracture. No acute abnormality. XR CHEST 1 VIEW   Final Result   1. Combined airspace consolidation and moderate to large left pleural effusion which is new   2. Likely atelectatic changes right lower lung   3. Status post CABG            Assessment/Plan:   Nnamdi Peralta is a 80 y.o. female with a past medical history of a brain mass that is most likely a meningioma, type 2 diabetes, hypertension, hyperlipidemia, and baseline dementia who presents with facial swelling from her nursing home. Of note, patient recently had a left hip fracture and is status post ORIF for that fracture. Hypoxic respiratory failure likely secondary to fluid overload. Patient noted to have bilateral 2+ lower extremity edema and bilateral crackles at both lung bases. As well as a proBNP of around 30,000.   Patient has never been formally diagnosed with heart failure  Will obtain echocardiogram- pending as of 10/3/22  Cardiology Consult- recommended initiation low dose B blocker  Lasix IV 40 twice daily  Daily weights  Strict Is and Os- down ~1L since admission  Will monitor oxygen saturation  Continuous telemetry  Trend Troponins. 0.09 down from 0.11. Will order another troponin for a 3rd value. PO/OT  Combined airspace consolidation and moderate to large left pleural effusion- patient noted to have slight leukocytosis at 11.8. Radiographic findings as well as slight leukocytosis as well as bilateral crackles could represent some aspect of pneumonia. Will obtain repeat CXR for resolution of effusion  Will continue supplement with oxygen via nasal cannula  If no resolution of pulmonary symptoms, will consider consulting pulmonologist.  Will up the Lasix to 40mg BID IV  Will order procalcitonin- 0.08. Unlikely septic. Paroxsymal SVT overnight:  Patient will be started on Lopressor 25mg BID for rate control. Chronic Medical Conditions:  Hypertension-we will continue losartan 75 mg  Dementia-we will continue donepezil 10 mg  Osteoporosis-we will hold the patient's at home alendronate 35 mg weekly while inpatient. Type 2 diabetes-we will have the patient on Lantus 5 nightly as well as low-dose sliding scale. Hypoglycemia protocols in place. Code Status:DNR-CCA  FEN: ADULT DIET; Regular; 3 carb choices (45 gm/meal);  Low Fat/Low Chol/High Fiber/2 gm Na  PPX:  Lovenox  DISPO: [facility]    Juan Saunders MD  PGY1, Internal Medicine  10/04/22  8:25 AM    This patient will be staffed and discussed with Lauren Vu MD.

## 2022-10-04 NOTE — PLAN OF CARE
Problem: Skin/Tissue Integrity  Goal: Absence of new skin breakdown  Description: 1. Monitor for areas of redness and/or skin breakdown  2. Assess vascular access sites hourly  3. Every 4-6 hours minimum:  Change oxygen saturation probe site  4. Every 4-6 hours:  If on nasal continuous positive airway pressure, respiratory therapy assess nares and determine need for appliance change or resting period. Outcome: Progressing     Problem: ABCDS Injury Assessment  Goal: Absence of physical injury  Outcome: Progressing     Problem: Discharge Planning  Goal: Discharge to home or other facility with appropriate resources  Outcome: Progressing     Problem: Safety - Adult  Goal: Free from fall injury  Outcome: Progressing  Note: Pt is a fall risk. Sitter and camera for safety. No attempts to get OOB have been made. Problem: Respiratory - Adult  Goal: Achieves optimal ventilation and oxygenation  Outcome: Progressing  Note: 94% on RA.

## 2022-10-04 NOTE — PROGRESS NOTES
Occupational Therapy  Facility/Department: [address]  Daily Treatment Note  NAME: Kanchan Mendez  : 1931  MRN: [id number]    Date of Service: 10/4/2022    Discharge Recommendations: Kanchan Mendez scored a 9/24 on the AM-PAC ADL Inpatient form. Current research shows that an AM-PAC score of 17 or less is typically not associated with a discharge to the patient's home setting. Based on the patient's AM-PAC score and their current ADL deficits, it is recommended that the patient have 3-5 sessions per week of Occupational Therapy at d/c to increase the patient's independence. Please see assessment section for further patient specific details. If patient discharges prior to next session this note will serve as a discharge summary. Please see below for the latest assessment towards goals. OT Equipment Recommendations  Other: defer to next level of care      Patient Diagnosis(es): The encounter diagnosis was Pleural effusion. Assessment    Assessment: Pt continues to be limited by poor cognition, Ox0 this date and initially very sleepy needing maximal encouragement to awaken for session. Pt required 2 person A for initial bed mobility and partial sit<>stand transfer to [facility] and standard commode. Pt required total A for brief mngmt and narciso care. Pt is a 1 on 1 feed 2/2 cognition and maximal time was spent this session assisting patient with lunch 2/2 pt having no reported PO intake throughout the day. Pt would benefit from ongoing inpatien therapy services at discharge. Will cont to follow on acute OT POC. Activity Tolerance: Treatment limited secondary to decreased cognition  Other: defer to next level of care      Plan   Occupational Therapy Plan  Times Per Week: 2-5  Current Treatment Recommendations: Functional mobility training;Balance training;Cognitive reorientation;Self-Care / ADL; Strengthening; Endurance training     Restrictions   Up with assistance    Subjective Subjective  Subjective: Pt met sidelying in bed, asleep. Pt required maximal verbal cues and lights on to somewhat wake up. Pt awoken further once assisted to seated position on edge of bed and mumbled \"toilet, it's coming, we need to hurry\". Pt was initially resistant to use of eliezer stedy, \"get me out of this thing\" but became more comfortable with reassurance. Pain: Pt reports pain in her arm, motioning to IV placement- RN present in room to address  Orientation  Overall Orientation Status: Impaired  Orientation Level: Disoriented X4  Cognition  Overall Cognitive Status: Exceptions  Arousal/Alertness: Inconsistent responses to stimuli  Following Commands: Inconsistently follows commands  Attention Span: Difficulty attending to directions  Memory: Decreased recall of biographical Information;Decreased recall of precautions;Decreased recall of recent events;Decreased short term memory  Safety Judgement: Decreased awareness of need for assistance;Decreased awareness of need for safety  Problem Solving: Decreased awareness of errors  Insights: Decreased awareness of deficits  Initiation: Requires cues for all  Sequencing: Requires cues for all  Cognition Comment: Pt asleep and disoriented upon arrival, required increased time to awaken and repsond to stimuli and commands        Objective       Bed Mobility Training  Bed Mobility Training: Yes  Overall Level of Assistance: Total assistance;Assist X2  Rolling: Assist X1;Maximum assistance; Additional time (encouagement throughout)  Supine to Sit: Assist X2;Maximum assistance  Sit to Supine: Assist X1;Maximum assistance (assist to manage BLEs)  Scooting:  Total assistance;Assist X2 (to scoot to [address])  Balance  Sitting: Impaired (Pt seated EOB ~10 mins, flucuated between requiring mod A initially and progressing to CGA)  Sitting - Static: Fair (occasional)  Sitting - Dynamic: Fair (occasional)  Standing: High guard (unable to achieve full stance, 5 partial stands throughout session with use of Hugo Ren)  Standing - Static: Constant support  Transfer Training  Transfer Training: Yes  Sit to Stand: Maximum assistance;Assist X2 (at [address] [facility] stedy; very effortful; increased time; patient perform 2 sit<>stand transfers from EOB with eliezer torres)  Stand to Sit: Maximum assistance;Assist X2  Toilet Transfer: Adaptive equipment;Assist X2;Maximum assistance Hugo Ren)  Gait  Distance (ft):  (eliezer torers was used to transport patient in/out of bathroom and back to bed)  Wheelchair Management  Wheelchair Management: No     ADL  Feeding: Setup; Increased time to complete; Beverage management;Bringing food to mouth assist;Maximum assistance  Feeding Skilled Clinical Factors: Increased time spent on feeding this date. Per RN, pt had no PO intake all day. While seated up on edge of bed, patient was presented with pudding. Pt required max A for scoop assistance and to bring to mouth. Pt ate entire pudding. After using the restroom, pt returned to bed. Sitting up in bed, pt was presented with meatload, rice and broccoli. Pt had 7 small bites of meatload gravy with rice and required max A. Pt took several drinks of lemonade with assistance to properly oriente hand with cup and straw to mouth. Pt has cognitive deficits which patient requires a 1 on 1 feed. Pt was then requesting coffee. Pt successfully brought cup of coffee to mouth 10 times during session without any spillage. Pt appropriately placed coffee on tray table once finished. RN notified. Grooming: Stand by assistance;Verbal cueing; Increased time to complete;Setup;Dependent/Total  Grooming Skilled Clinical Factors: pt washed face while seated EOB with increased time and verbal cueing;  Total A for use of shampoo cap and to style hair  LE Dressing: Dependent/Total;Maximum assistance  LE Dressing Skilled Clinical Factors: max A to don shoes from seated on edge of bed; total A to don new brief from bed level  Toileting Skilled Clinical Factors: fonseca in place; pt attempted BM at commode, only smear was left and pt was cleaned with max A via bath wipes while she partially stood at [employer]        Safety Devices  Type of Devices: Call light within reach; Left in bed;Nurse notified; Bed alarm in place;Sitter present  Restraints  Restraints Initially in Place: No     Patient Education  Education Given To: Patient  Education Provided: Role of Therapy;Plan of Care;Transfer Training;ADL Adaptive Strategies  Education Method: Verbal  Barriers to Learning: Cognition  Education Outcome: Continued education needed    Goals  Short Term Goals  Time Frame for Short Term Goals: by discharge  Short Term Goal 1: Pt will perform functional sit to stand with Mod A x1- not met  Short Term Goal 2: Pt will tolerate sitting EOB with SBA for 10 minutes to perform grooming tasks- progressing  Short Term Goal 3: Pt will perform UB dressing with Min A- not met  Short Term Goal 4: New goal established 10/04: pt will bring utensil to mouth independently during self feeding task 5/5 trials with verbal cues and tactile cues as needed   Patient Goals   Patient goals : not stated       Therapy Time   Individual Concurrent Group Co-treatment   Time In 1340         Time Out 1450         Minutes 70         Timed Code Treatment Minutes: [address] [facility],

## 2022-10-04 NOTE — PROGRESS NOTES
[address] Daily Progress Note      Admit Date:  10/1/2022    Subjective:  Ms. Ruiz Bernal was seen and examined. F/U CHF. Oriented to name only. Confused.      Objective:   /66   Pulse 77   Temp 97.3 °F (36.3 °C) (Axillary)   Resp 14   Ht 5' (1.524 m)   Wt 110 lb 3.7 oz (50 kg)   SpO2 92%   BMI 21.53 kg/m²     Intake/Output Summary (Last 24 hours) at 10/4/2022 1521  Last data filed at 10/4/2022 1443  Gross per 24 hour   Intake 0 ml   Output 3800 ml   Net -3800 ml       TELEMETRY: Sinus     Physical Exam:  General:  Awake, alert, NAD  Skin:  Warm and dry  Neck:  JVP difficult  Chest:  decreased BS, respiration normal  Cardiovascular:  RRR S1S2  Abdomen:  Soft + BS  Extremities:  Tr L>R edema    Medications:    potassium chloride  10 mEq IntraVENous Q1H    magnesium sulfate  2,000 mg IntraVENous Once    cefTRIAXone (ROCEPHIN) IV  1,000 mg IntraVENous Q24H    potassium chloride  40 mEq Oral Once    [Held by provider] furosemide  40 mg IntraVENous BID    [Held by provider] metoprolol tartrate  25 mg Oral BID    aspirin EC  81 mg Oral Daily    donepezil  10 mg Oral Nightly    enoxaparin  40 mg SubCUTAneous Daily    atorvastatin  10 mg Oral Daily    sodium chloride flush  5-40 mL IntraVENous 2 times per day    insulin lispro  0-4 Units SubCUTAneous TID WC    insulin lispro  0-4 Units SubCUTAneous Nightly    insulin glargine  5 Units SubCUTAneous Nightly      dextrose       sodium chloride flush, ondansetron **OR** ondansetron, polyethylene glycol, acetaminophen **OR** acetaminophen, perflutren lipid microspheres, glucose, dextrose bolus **OR** dextrose bolus, glucagon (rDNA), dextrose    Lab Data:  CBC:   Recent Labs     10/02/22  0823 10/03/22  0940 10/04/22  0705   WBC 11.5* 14.1* 16.2*   HGB 11.6* 13.4 12.8   HCT 36.2 42.1 39.0   MCV 88.2 88.0 86.7    476* 433     BMP:   Recent Labs     10/02/22  0823 10/03/22  0940 10/04/22  0705    140 143   K 3.9 3.1* 3.0*    99 102   CO2 27 30 28   BUN 12 10 9   CREATININE 0.5* 0.6 0.6     LIVER PROFILE:   No results for input(s): AST, ALT, LIPASE, BILIDIR, BILITOT, ALKPHOS in the last 72 hours. Invalid input(s): AMYLASE,  ALB    PT/INR: No results for input(s): PROTIME, INR in the last 72 hours. APTT: No results for input(s): APTT in the last 72 hours. BNP:  No results for input(s): BNP in the last 72 hours. IMAGING:     Assessment:  Patient Active Problem List    Diagnosis Date Noted    Heart failure ([facility] 10/01/2022    Periprosthetic fracture around internal prosthetic left hip joint ([facility] 09/19/2022    DM (diabetes mellitus), type 2, uncontrolled 09/19/2022    Pelvic ring fracture, closed, initial encounter ([facility] 09/14/2022    Meningioma ([facility] 04/25/2022    Dementia ([facility]     Diabetes ([facility]     Hyperlipidemia     Hypertension     Osteoporosis     Pure hypercholesterolemia 04/17/2017    Type 2 diabetes mellitus with circulatory disorder, without long-term current use of insulin ([facility] 11/16/2016       Plan:  Oriented to name only. Reasonable diuresis. Edema all but resolved/minimal.  On RA. Good diuresis. Watch cr.        Core Measures:  Discharge instructions:   LVEF documented:   ACEI for LV dysfunction:   Smoking Cessation:    Saba Nino MD, MD 10/4/2022 3:21 PM

## 2022-10-04 NOTE — DISCHARGE SUMMARY
INTERNAL MEDICINE DEPARTMENT AT [address] [place] [address]  DISCHARGE SUMMARY    Patient ID: Rell Brunson                                             Discharge Date: 10/4/2022   Patient's PCP: Rayne Scott MD                                          Discharge Physician: Jojo Dias MD  Admit Date: 10/1/2022   Admitting Physician: Sachin Evans MD    PROBLEMS DURING HOSPITALIZATION:  Present on Admission:   Heart failure ([address]      DISCHARGE DIAGNOSES: New onset diastolic heart failure    HPI:  Rell Brunson is a 80 y.o. female with medical history of Brain mass thought to be meningioma, type 2 diabetes, hypertension, hyperlipidemia, and baseline dementia who presents with presented to the [facility] emergency department from the [facility] memory unit where her caretakers noticed that she has had some facial swelling. Due to the patient's underlying dementia, she was not able to give a thorough history. The emergency department attempted to contact the memory unit but was not able to get through. Per the report from the EMS, the patient was noticed to have increased facial swelling was therefore brought into the hospital for further evaluation. Of note, the patient was recently admitted to the hospital for a left hip fracture. Patient is status post ORIF for that left hip fracture. ED course: In the emergency department, the patient was alert and oriented to self. The patient's vitals were appreciated and were noticed to be stable. Blood pressure was noted to be 109/54, heart was noted to be 88, and respirations were noted to be 16 patient is afebrile. The patient was noted to have been saturating in the high 80% and was therefore put on 2 L of oxygen in the emergency department. Patient is currently saturating  in the 90s on two liters. Of note from the patient's exam, she has bilateral lower extremity edema that was estimated to be 2+.   On pulmonary auscultation, there were bilateral lower lobe crackles. Imaging studies in the emergency department notable for chest x-ray which showed some airspace consolidation and a large left pleural effusion atelectatic changes to the right lower lung were also noted. Labs in the ED notable for a slight leukocytosis of 11.8. Patient's absolute neutrophils also noted to be increased. The BMP was within normal limits largely except for a glucose of 325. Alkaline phosphatase also noted to be high at 188. Also of note, proBNP noted to be around 30,000. VBG was obtained in the ED which was within normal limits. pH was noted to be 7.350. Patient has both COVID and influenza negative. The patient was discussed with the admitting hospitalist and it was determined that she would likely benefit from inpatient admission and further work-up of what is likely an acute hypoxic respiratory failure secondary to new onset heart failure with exacerbation. DAY OF DISCHARGE:  On the day of discharge, the patient had no acute complaints. Patient seemed to be at her baseline which is oriented only to self. Patient was able to have her echocardiogram yesterday which showed that she had ejection fraction of around 45 to 50% and showed some evidence of grade 1 diastolic dysfunction. The patient will follow-up as an outpatient with cardiology for further management of congestive heart failure. Patient continued to diurese overnight. She is down about 6 L since admission. The following issues were addressed during hospitalization:    Hypoxic respiratory failure likely secondary to fluid overload  Upon presentation to the emergency department, patient was noted to have bilateral 2+ lower extremity pitting edema as well as bilateral crackles in both lung bases. Of note, BNP was around 30,000.   Patient had never been formally diagnosed with heart failure but has had a recent echocardiogram around 2 years ago which showed that her ejection fraction between 55 to 60%. Patient had a repeat echocardiogram while inpatient. Cardiology was also consulted and they recommended starting the patient on a low-dose beta-blocker. Patient was started on IV Lasix 40 twice daily. She had significant diuresis of around 3.5 L while she was inpatient. Patient's daily weights and strict I's and O's were monitored. Patient was also started on supplemental oxygen via nasal cannula as needed. Patient was also noted to have a slight troponinemia of around 0.09 which had come down from 0.11 on admission. A third troponin was obtained which showed further decline to 0.09. PT OT saw the patient and her scores were consistent with SNF placement on discharge. Upon discharge, patient was no longer requiring oxygen supplementation via nasal cannula. Patient had a repeat echocardiogram while inpatient which showed a slightly reduced ejection fraction of around 45 to 50% as well as some evidence of grade 1 diastolic dysfunction. Combined airspace consolidation and moderate large to left pleural effusion  Chest x-ray in the emergency department was concerning for a left pleural effusion with potential underlying infectious etiology in the lung. Patient was started on aggressive diuresis with IV Lasix 40 twice daily. Patient responded well and had good urine output. A repeat chest x-ray for resolution of the pleural effusion was obtained. Effusion had resolved by the time of discharge. There was no underlying infection. Paroxysmal SVT  2 nights into her admission, the patient had an episode of paroxysmal SVT. Patient was then started on metoprolol 25 twice daily for rate control. Patient had no further episodes of SVTs while inpatient. Chronic medical conditions:  Hypertension-patient's losartan home dose of 75 mg was held  Dementia-patient's at home donepezil dose of 10 mg was continued  Type 2 diabetes-patient was put on Lantus nightly as well as a low-dose sliding scale. Hypoglycemia protocols are in place. Patient did not have any significant hypoglycemic episodes while she was inpatient. Osteoporosis-patient is at home alendronate 35 mg weekly was held. Physical Exam:  /79   Pulse 65   Temp 97.3 °F (36.3 °C) (Axillary)   Resp 18   Ht 5' (1.524 m)   Wt 110 lb 3.7 oz (50 kg)   SpO2 90%   BMI 21.53 kg/m²       Consults: cardiology  Significant Diagnostic Studies: CT of the head without contrast, chest x-ray  Treatments: IV Lasix twice daily, metoprolol 25 twice daily  Disposition: long term care facility  Discharged Condition: Stable  Follow Up: Primary Care Physician in one week    DISCHARGE MEDICATION:     Medication List        CONTINUE taking these medications      Pen Needles 31G X 6 MM Misc  1 each by Does not apply route daily            ASK your doctor about these medications      Accu-Chek Neha Plus strip  Generic drug: blood glucose test strips  TESTING ONCE DAILY. DX: E11.9 DIABETES MELLITUS     alendronate 35 MG tablet  Commonly known as: FOSAMAX  TAKE 1 TABLET BY MOUTH WEEKLY 30 MINS BEFORE FOOD (EMPTY STOMACH) WITH 8 OZ WATER DO NOT LIE DOWN FOR 30MIN     Basaglar KwikPen 100 UNIT/ML injection pen  Generic drug: insulin glargine  Inject 10 Units into the skin nightly     dapagliflozin 10 MG tablet  Commonly known as: Farxiga  TAKE 1 TABLET BY MOUTH EACH MORNING     donepezil 10 MG tablet  Commonly known as: ARICEPT  TAKE ONE TABLET BY MOUTH AT BEDTIME.     enoxaparin 40 MG/0.4ML  Commonly known as: Lovenox  Inject 0.4 mLs into the skin daily     insulin lispro 100 UNIT/ML injection vial  Commonly known as: HUMALOG     Janumet -1000 MG Tb24  Generic drug: SITagliptin-metFORMIN HCl ER  TAKE ONE TABLET BY MOUTH DAILY WITH EVENING MEAL     Livalo 2 MG Tabs tablet  Generic drug: pitavastatin  TAKE ONE TABLET BY MOUTH AT BEDTIME. * losartan 50 MG tablet  Commonly known as: COZAAR  TAKE ONE TABLET BY MOUTH ONCE A DAY.      * losartan 25 MG tablet  Commonly known as: COZAAR  TAKE ONE TABLET BY MOUTH ONCE A DAY *TAKE WITH 50MG FOR A TOTAL DOSE OF 75MG*     * oxyCODONE 5 MG capsule     * oxyCODONE 5 MG capsule     Oyster Shell Calcium/D 500-200 MG-UNIT Tabs  TAKE ONE (1) TABLET BY MOUTH TWICE A DAY. pioglitazone 30 MG tablet  Commonly known as: ACTOS  TAKE 1 TABLET BY MOUTH DAILY     senna-docusate 8.6-50 MG per tablet  Commonly known as: PERICOLACE  Take 2 tablets by mouth daily as needed for Constipation     Therems-M Tabs  TAKE 1 TABLET BY MOUTH ONCE DAILY. * This list has 4 medication(s) that are the same as other medications prescribed for you. Read the directions carefully, and ask your doctor or other care provider to review them with you.                 Activity: activity as tolerated  Diet: regular diet  Wound Care: none needed    Time Spent on discharge is more than 30 minutes    Nile Gramajo MD  PGY1, Internal Medicine  10/04/22  10:18 AM

## 2022-10-04 NOTE — PLAN OF CARE
Problem: Skin/Tissue Integrity  Goal: Absence of new skin breakdown  Description: 1. Monitor for areas of redness and/or skin breakdown  2. Assess vascular access sites hourly  3. Every 4-6 hours minimum:  Change oxygen saturation probe site  4. Every 4-6 hours:  If on nasal continuous positive airway pressure, respiratory therapy assess nares and determine need for appliance change or resting period. 10/4/2022 1331 by Mary Alves RN  Outcome: Progressing  10/4/2022 0335 by Patience Dasilva RN  Outcome: Progressing     Problem: Safety - Adult  Goal: Free from fall injury  10/4/2022 1331 by Mary Alves RN  Outcome: Progressing  10/4/2022 0335 by Patience Dasilva RN  Outcome: Progressing  Note: Pt is a fall risk. Sitter and camera for safety. No attempts to get OOB have been made.

## 2022-10-04 NOTE — PROGRESS NOTES
Physical Therapy  Facility/Department: [address] [place] [address]  Physical Therapy Daily Treatment    Name: Claudine Castillo  : 1931  MRN: [id number]  Date of Service: 10/4/2022    Discharge Recommendations:    Claudine Castillo scored a  on the AM-PAC short mobility form. Current research shows that an AM-PAC score of 17 or less is typically not associated with a discharge to the patient's home setting. Based on the patient's AM-PAC score and their current functional mobility deficits, it is recommended that the patient have 3-5 sessions per week of Physical Therapy at d/c to increase the patient's independence. Please see assessment section for further patient specific details. If patient discharges prior to next session this note will serve as a discharge summary. Please see below for the latest assessment towards goals. PT Equipment Recommendations  Equipment Needed: No      Patient Diagnosis(es): The encounter diagnosis was Pleural effusion. Past Medical History:  has a past medical history of Atherosclerotic vascular disease, Back pain, Brain mass, Cervical spondylolysis, Dementia ([address] Diabetes ([address] Frequent falls, Hearing loss, Heart disease, Hyperlipidemia, Hypertension, Mental status alteration, Osteoporosis, and Wrist fracture. Past Surgical History:  has a past surgical history that includes hip surgery; Partial hip arthroplasty; Brain tumor excision; Coronary artery bypass graft; ORIF hip fracture (Left, 2022); and Hip fracture surgery (Left, 2022). Assessment   Assessment: Pt is currently living at [facility]. Uncertain of level of functional mobility post hip fx/ORIF 22. Currently the pt requires Max A x1-2 for bed mobility and max A x2 to stand and transfer in [facility].  Recommend continued IP therapies to maximize mobility and safety  Treatment Diagnosis: Decreased functional mobilty 2/2 cognition/weakness  Barriers to Learning: cognition  Requires PT Follow-Up: Yes  Activity Tolerance  Activity Tolerance: Treatment limited secondary to decreased cognition     Plan   Physcial Therapy Plan  General Plan:  (2-5)  Current Treatment Recommendations: Strengthening, ROM, Functional mobility training, Safety education & training, Gait training, Transfer training, Therapeutic activities  Safety Devices  Type of Devices: Call light within reach, Left in bed, Nurse notified, Bed alarm in place, Sitter present  Restraints  Restraints Initially in Place: No     Restrictions  Position Activity Restriction  Other position/activity restrictions: up with assist  FWBAT  post Left hip ORIF 9/17     Subjective   Subjective  Subjective: pt sleeping on entry, difficult to wake. Once upright pt is more alert, difficult to understand but pointing at her RUE at times reporting pain in IV site. RN notified. Social/Functional History  Social/Functional History  Type of Home: Assisted living  Additional Comments: per chart review, pt was in assisted living.  Pt is poor historian this date, unable to verbalize prior levelsof assistance  Vision/Hearing  Vision  Vision: Within Functional Limits  Hearing  Hearing: Exceptions to [facility] (Left hearing aid  )  Hearing Exceptions: Bilateral hearing aid;Hard of hearing/hearing concerns    Cognition   Orientation  Overall Orientation Status: Impaired  Orientation Level: Disoriented X4  Cognition  Overall Cognitive Status: Exceptions  Arousal/Alertness: Inconsistent responses to stimuli  Following Commands: Inconsistently follows commands  Attention Span: Difficulty attending to directions  Memory: Decreased recall of biographical Information;Decreased recall of precautions;Decreased recall of recent events;Decreased short term memory  Safety Judgement: Decreased awareness of need for assistance;Decreased awareness of need for safety  Problem Solving: Decreased awareness of errors  Insights: Decreased awareness of deficits  Initiation: Requires cues for all  Sequencing: Requires cues for all  Cognition Comment: Pt asleep and disoriented upon arrival, required increased time to awaken and repsond to stimuli and commands     Objective   Heart Rate: 77  Heart Rate Source: Monitor  BP: 104/66  BP Location: Left upper arm  BP Method: Automatic  Patient Position: Semi fowlers  MAP (Calculated): 78.67  Resp: 14  SpO2: 92 %  O2 Device: None (Room air)      Bed Mobility Training  Bed Mobility Training: Yes  Overall Level of Assistance: Total assistance;Assist X2  Rolling: Assist X1;Maximum assistance; Additional time (encouagement throughout)  Supine to Sit: Assist X2;Maximum assistance  Sit to Supine: Assist X1;Maximum assistance (assist to manage BLEs)  Scooting:  Total assistance;Assist X2 (to scoot to [facility])  Balance  Sitting: Impaired (Pt seated EOB ~10 mins, flucuated between requiring mod A initially and progressing to CGA)  Sitting - Static: Fair (occasional)  Sitting - Dynamic: Fair (occasional)  Standing: High guard (unable to achieve full stance, 5 partial stands throughout session with use of Florette Duck)  Standing - Static: Constant support  Transfer Training  Transfer Training: Yes  Sit to Stand: Maximum assistance;Assist X2 (at [address] [place] [address]; very effortful; increased time; patient perform 2 sit<>stand transfers from EOB with eliezer stedy)  Stand to Sit: Maximum assistance;Assist X2  Toilet Transfer: Adaptive equipment;Assist X2;Maximum assistance Florette Duck)  Gait  Distance (ft):  (eliezer stedy was used to transport patient in/out of bathroom and back to bed)  Wheelchair Management  Wheelchair Management: No           AM-PAC Score  AM-PAC Inpatient Mobility Raw Score : 9 (10/04/22 1544)  AM-PAC Inpatient T-Scale Score : 30.55 (10/04/22 1544)  Mobility Inpatient CMS 0-100% Score: 81.38 (10/04/22 1544)  Mobility Inpatient CMS G-Code Modifier : CM (10/04/22 1544)        Goals  Short Term Goals  Time Frame for Short Term Goals:

## 2022-10-05 LAB
ANION GAP SERPL CALCULATED.3IONS-SCNC: 11 MMOL/L (ref 3–16)
BASOPHILS ABSOLUTE: 0 K/UL (ref 0–0.2)
BASOPHILS RELATIVE PERCENT: 0.3 %
BUN BLDV-MCNC: 11 MG/DL (ref 7–20)
CALCIUM SERPL-MCNC: 7.9 MG/DL (ref 8.3–10.6)
CHLORIDE BLD-SCNC: 98 MMOL/L (ref 99–110)
CO2: 29 MMOL/L (ref 21–32)
CREAT SERPL-MCNC: 0.6 MG/DL (ref 0.6–1.2)
EOSINOPHILS ABSOLUTE: 0.1 K/UL (ref 0–0.6)
EOSINOPHILS RELATIVE PERCENT: 1.2 %
GFR AFRICAN AMERICAN: >60
GFR NON-AFRICAN AMERICAN: >60
GLUCOSE BLD-MCNC: 167 MG/DL (ref 70–99)
GLUCOSE BLD-MCNC: 194 MG/DL (ref 70–99)
GLUCOSE BLD-MCNC: 252 MG/DL (ref 70–99)
GLUCOSE BLD-MCNC: 321 MG/DL (ref 70–99)
GLUCOSE BLD-MCNC: 362 MG/DL (ref 70–99)
HCT VFR BLD CALC: 37.5 % (ref 36–48)
HEMOGLOBIN: 12.2 G/DL (ref 12–16)
LV EF: 48 %
LVEF MODALITY: NORMAL
LYMPHOCYTES ABSOLUTE: 2 K/UL (ref 1–5.1)
LYMPHOCYTES RELATIVE PERCENT: 18.1 %
MAGNESIUM: 1.8 MG/DL (ref 1.8–2.4)
MCH RBC QN AUTO: 28.3 PG (ref 26–34)
MCHC RBC AUTO-ENTMCNC: 32.6 G/DL (ref 31–36)
MCV RBC AUTO: 86.8 FL (ref 80–100)
MONOCYTES ABSOLUTE: 0.6 K/UL (ref 0–1.3)
MONOCYTES RELATIVE PERCENT: 5.6 %
NEUTROPHILS ABSOLUTE: 8.4 K/UL (ref 1.7–7.7)
NEUTROPHILS RELATIVE PERCENT: 74.8 %
PDW BLD-RTO: 17.4 % (ref 12.4–15.4)
PERFORMED ON: ABNORMAL
PLATELET # BLD: 338 K/UL (ref 135–450)
PMV BLD AUTO: 9.1 FL (ref 5–10.5)
POTASSIUM SERPL-SCNC: 3.6 MMOL/L (ref 3.5–5.1)
RBC # BLD: 4.33 M/UL (ref 4–5.2)
SODIUM BLD-SCNC: 138 MMOL/L (ref 136–145)
WBC # BLD: 11.2 K/UL (ref 4–11)

## 2022-10-05 PROCEDURE — 51702 INSERT TEMP BLADDER CATH: CPT

## 2022-10-05 PROCEDURE — 99233 SBSQ HOSP IP/OBS HIGH 50: CPT | Performed by: INTERNAL MEDICINE

## 2022-10-05 PROCEDURE — 97530 THERAPEUTIC ACTIVITIES: CPT

## 2022-10-05 PROCEDURE — 97535 SELF CARE MNGMENT TRAINING: CPT

## 2022-10-05 PROCEDURE — 1200000000 HC SEMI PRIVATE

## 2022-10-05 PROCEDURE — 2580000003 HC RX 258

## 2022-10-05 PROCEDURE — 80048 BASIC METABOLIC PNL TOTAL CA: CPT

## 2022-10-05 PROCEDURE — 2580000003 HC RX 258: Performed by: STUDENT IN AN ORGANIZED HEALTH CARE EDUCATION/TRAINING PROGRAM

## 2022-10-05 PROCEDURE — 6360000002 HC RX W HCPCS: Performed by: STUDENT IN AN ORGANIZED HEALTH CARE EDUCATION/TRAINING PROGRAM

## 2022-10-05 PROCEDURE — 6370000000 HC RX 637 (ALT 250 FOR IP)

## 2022-10-05 PROCEDURE — 85025 COMPLETE CBC W/AUTO DIFF WBC: CPT

## 2022-10-05 PROCEDURE — 93306 TTE W/DOPPLER COMPLETE: CPT

## 2022-10-05 PROCEDURE — 6370000000 HC RX 637 (ALT 250 FOR IP): Performed by: STUDENT IN AN ORGANIZED HEALTH CARE EDUCATION/TRAINING PROGRAM

## 2022-10-05 PROCEDURE — 83735 ASSAY OF MAGNESIUM: CPT

## 2022-10-05 PROCEDURE — 36415 COLL VENOUS BLD VENIPUNCTURE: CPT

## 2022-10-05 PROCEDURE — 6360000002 HC RX W HCPCS

## 2022-10-05 RX ORDER — INSULIN LISPRO 100 [IU]/ML
0-8 INJECTION, SOLUTION INTRAVENOUS; SUBCUTANEOUS
Status: DISCONTINUED | OUTPATIENT
Start: 2022-10-05 | End: 2022-10-08 | Stop reason: HOSPADM

## 2022-10-05 RX ORDER — INSULIN LISPRO 100 [IU]/ML
0-4 INJECTION, SOLUTION INTRAVENOUS; SUBCUTANEOUS NIGHTLY
Status: DISCONTINUED | OUTPATIENT
Start: 2022-10-05 | End: 2022-10-08 | Stop reason: HOSPADM

## 2022-10-05 RX ADMIN — ASPIRIN 81 MG: 81 TABLET, COATED ORAL at 09:35

## 2022-10-05 RX ADMIN — INSULIN LISPRO 8 UNITS: 100 INJECTION, SOLUTION INTRAVENOUS; SUBCUTANEOUS at 12:03

## 2022-10-05 RX ADMIN — FUROSEMIDE 40 MG: 10 INJECTION, SOLUTION INTRAMUSCULAR; INTRAVENOUS at 17:50

## 2022-10-05 RX ADMIN — SODIUM CHLORIDE, PRESERVATIVE FREE 10 ML: 5 INJECTION INTRAVENOUS at 09:36

## 2022-10-05 RX ADMIN — INSULIN GLARGINE 5 UNITS: 100 INJECTION, SOLUTION SUBCUTANEOUS at 22:33

## 2022-10-05 RX ADMIN — ATORVASTATIN CALCIUM 10 MG: 10 TABLET, FILM COATED ORAL at 09:36

## 2022-10-05 RX ADMIN — CEFTRIAXONE 1000 MG: 1 INJECTION, POWDER, FOR SOLUTION INTRAMUSCULAR; INTRAVENOUS at 15:16

## 2022-10-05 RX ADMIN — FUROSEMIDE 40 MG: 10 INJECTION, SOLUTION INTRAMUSCULAR; INTRAVENOUS at 09:35

## 2022-10-05 RX ADMIN — DONEPEZIL HYDROCHLORIDE 10 MG: 10 TABLET, FILM COATED ORAL at 22:31

## 2022-10-05 RX ADMIN — METOPROLOL TARTRATE 25 MG: 25 TABLET, FILM COATED ORAL at 09:36

## 2022-10-05 RX ADMIN — METOPROLOL TARTRATE 25 MG: 25 TABLET, FILM COATED ORAL at 22:31

## 2022-10-05 RX ADMIN — SODIUM CHLORIDE, PRESERVATIVE FREE 10 ML: 5 INJECTION INTRAVENOUS at 22:40

## 2022-10-05 RX ADMIN — INSULIN LISPRO 4 UNITS: 100 INJECTION, SOLUTION INTRAVENOUS; SUBCUTANEOUS at 17:22

## 2022-10-05 RX ADMIN — ENOXAPARIN SODIUM 40 MG: 100 INJECTION SUBCUTANEOUS at 09:35

## 2022-10-05 ASSESSMENT — PAIN SCALES - PAIN ASSESSMENT IN ADVANCED DEMENTIA (PAINAD)
FACIALEXPRESSION: 0
CONSOLABILITY: 0
BODYLANGUAGE: 0
TOTALSCORE: 0
BREATHING: 0
NEGVOCALIZATION: 0

## 2022-10-05 NOTE — PROGRESS NOTES
[facility] Daily Progress Note      Admit Date:  10/1/2022    Subjective:  Ms. Cheryle Police was seen and examined. F/U CHF. Oriented to name only. Confused. Objective:   BP (!) 108/49   Pulse 63   Temp 97.2 °F (36.2 °C) (Tympanic)   Resp 18   Ht 5' (1.524 m)   Wt 114 lb 6.7 oz (51.9 kg)   SpO2 94%   BMI 22.35 kg/m²     Intake/Output Summary (Last 24 hours) at 10/5/2022 1338  Last data filed at 10/5/2022 0800  Gross per 24 hour   Intake 240 ml   Output 1625 ml   Net -1385 ml       TELEMETRY: Sinus     Physical Exam:  General:  Awake, alert, NAD  Skin:  Warm and dry  Neck:  JVP difficult  Chest:  decreased BS, respiration normal  Cardiovascular:  RRR S1S2  Abdomen:  Soft + BS  Extremities:  Tr L>R edema    Medications:    insulin lispro  0-8 Units SubCUTAneous TID WC    insulin lispro  0-4 Units SubCUTAneous Nightly    cefTRIAXone (ROCEPHIN) IV  1,000 mg IntraVENous Q24H    potassium chloride  40 mEq Oral Once    furosemide  40 mg IntraVENous BID    metoprolol tartrate  25 mg Oral BID    aspirin EC  81 mg Oral Daily    donepezil  10 mg Oral Nightly    enoxaparin  40 mg SubCUTAneous Daily    atorvastatin  10 mg Oral Daily    sodium chloride flush  5-40 mL IntraVENous 2 times per day    insulin glargine  5 Units SubCUTAneous Nightly      dextrose       sodium chloride flush, ondansetron **OR** ondansetron, polyethylene glycol, acetaminophen **OR** acetaminophen, perflutren lipid microspheres, glucose, dextrose bolus **OR** dextrose bolus, glucagon (rDNA), dextrose    Lab Data:  CBC:   Recent Labs     10/03/22  0940 10/04/22  0705   WBC 14.1* 16.2*   HGB 13.4 12.8   HCT 42.1 39.0   MCV 88.0 86.7   * 433     BMP:   Recent Labs     10/03/22  0940 10/04/22  0705    143   K 3.1* 3.0*   CL 99 102   CO2 30 28   BUN 10 9   CREATININE 0.6 0.6     LIVER PROFILE:   No results for input(s): AST, ALT, LIPASE, BILIDIR, BILITOT, ALKPHOS in the last 72 hours. Invalid input(s):   AMYLASE,  ALB    PT/INR: No results for input(s): PROTIME, INR in the last 72 hours. APTT: No results for input(s): APTT in the last 72 hours. BNP:  No results for input(s): BNP in the last 72 hours. IMAGING:     Assessment:  Patient Active Problem List    Diagnosis Date Noted    Heart failure ([facility] 10/01/2022    Periprosthetic fracture around internal prosthetic left hip joint ([facility] 09/19/2022    DM (diabetes mellitus), type 2, uncontrolled 09/19/2022    Pelvic ring fracture, closed, initial encounter ([facility] 09/14/2022    Meningioma ([facility] 04/25/2022    Dementia ([facility]     Diabetes ([facility]     Hyperlipidemia     Hypertension     Osteoporosis     Pure hypercholesterolemia 04/17/2017    Type 2 diabetes mellitus with circulatory disorder, without long-term current use of insulin ([facility] 11/16/2016       Plan:  Oriented to name only. Reasonable diuresis. Edema all but resolved/minimal.  On RA. Good diuresis. Watch cr.   On lopressor       Core Measures:  Discharge instructions:   LVEF documented:   ACEI for LV dysfunction:   Smoking Cessation:    Tabitha Khoury MD, MD 10/5/2022 1:38 PM

## 2022-10-05 NOTE — CARE COORDINATION
[facility] here and says patient can go back to [facility] for rehab before going back to their [facility] assisted living. Family in agreement. Patient has no precert needed and if we call the morning of needed discharge day, they will be able to accept at [facility] as long as they have beds. At this time, they have beds available.  Electronically signed by Josephine Brandon RN on 10/5/2022 at 4:33 PM

## 2022-10-05 NOTE — PROGRESS NOTES
Occupational Therapy  Facility/Department: 1 [facility] [address]  Daily Treatment Note  NAME: Catherine Cote  : 1931  MRN: [id number]    Date of Service: 10/5/2022    Discharge Recommendations: Catherine Cote scored a 13/24 on the AM-PAC ADL Inpatient form. Current research shows that an AM-PAC score of 17 or less is typically not associated with a discharge to the patient's home setting. Based on the patient's AM-PAC score and their current ADL deficits, it is recommended that the patient have 3-5 sessions per week of Occupational Therapy at d/c to increase the patient's independence. Please see assessment section for further patient specific details. If patient discharges prior to next session this note will serve as a discharge summary. Please see below for the latest assessment towards goals. OT Equipment Recommendations  Other: defer to next level of care      Patient Diagnosis(es): The encounter diagnosis was Pleural effusion. Assessment    Assessment: Pt showing improvement in all areas of ADLs and fx mobility this date. Pt progressed to needing max A for toileting by being able to stand during entire brief change and narciso care as well as assist with lifting feet for LB dressing. Pt engaged in feeding self, brushing teeth, and washing her face while seated on edge of bed. With mod A x 2, pt was transferred via [address] [place] [address] stedy to chair and stood a total of 4 times throughout session 2 person assistance. Pt responds well to verbal encouragement and visual cues throughout session. Pt left upright in chair at end of session. Pt would benefit from ongoing inpatient OT at discharge. Will cont to follow on acute OT POC.   Activity Tolerance: Patient tolerated treatment well;Treatment limited secondary to decreased cognition  Other: defer to next level of care      Plan   Occupational Therapy Plan  Times Per Week: 2-5  Current Treatment Recommendations: Functional mobility training;Balance training;Cognitive reorientation;Self-Care / ADL; Strengthening; Endurance training     Restrictions   Up with assistance    Subjective   Subjective  Subjective: Pt met sleeping in bed. Pt agreeable to OT treatment session with encouragement. Pt stating, \"what am I supposed to be doing? oh stand, I can do that\". Pt very pleasant throughout session and responding well to calm soft voice with redirection as needed. Pain: no c/o of pain throughout session  Orientation  Overall Orientation Status: Impaired  Orientation Level: Oriented to person  Pain: no s/s of pain  Cognition  Overall Cognitive Status: Exceptions  Arousal/Alertness: Inconsistent responses to stimuli  Following Commands: Follows one step commands with increased time; Follows one step commands with repetition  Attention Span: Difficulty attending to directions  Memory: Decreased recall of biographical Information;Decreased recall of precautions;Decreased recall of recent events;Decreased short term memory  Safety Judgement: Decreased awareness of need for assistance;Decreased awareness of need for safety  Insights: Decreased awareness of deficits  Initiation: Requires cues for some  Sequencing: Requires cues for some  Cognition Comment: Pt with improved alertness and attentiveness this date        Objective    Vitals  Vitals  SpO2: 94 %  O2 Device: Nasal cannula  Comment: 3L of O2  Bed Mobility Training  Bed Mobility Training: Yes  Overall Level of Assistance: Maximum assistance;Assist X1  Supine to Sit: Maximum assistance;Assist X1 (with HOB elevated.)  Scooting: Maximum assistance (max A x 1 to scoot patient to edge of bed via mk pad)  Balance  Sitting: Impaired (SBA at EOB)  Sitting - Static: Good (unsupported)  Sitting - Dynamic: Fair (occasional)  Standing: Impaired (2 person assist with RW or [place])  Standing - Static: Constant support  Transfer Training  Transfer Training: Yes  Sit to Stand: Assist X2;Moderate assistance (up to RW (x2 repetitions) & up to [place] (x 2 repetitions). )  Stand to Sit: Moderate assistance;Assist X2 (x 2 repetitions from RW. x 2 repetitions from Avnera.)  Bed to Chair: Total assistance (with use of [place])  Gait Training  Gait Training: No  Gait  Overall Level of Assistance:  (eliezer torres was used to transfer pt from bed to chair)  Wheelchair Management  Wheelchair Management: No     ADL  Feeding: Setup; Increased time to complete; Beverage management;Bringing food to mouth assist;Minimal assistance  Feeding Skilled Clinical Factors: Breakfast tray present at beginning of session. Pt drank several sips of orange juice while seated on edge of bed. Once pt was transferred to the chair, pancake and sausage were cut up into pieces by therapist and set-up for patient. With min A, pt successfully fed herself 3 bites of pancakes and then successfully retrieved 1 bite of sausage with SBA. Pt had coffee set-up with cream and sugar and with SBA, pt retrieved coffee mug and took several sips. Pt then took several sips of orange juice again. Pt demonstrated increased invovlement throughout session vs previous session. Grooming: Stand by assistance;Verbal cueing; Increased time to complete;Setup;Minimal assistance  Grooming Skilled Clinical Factors: Pt sat at edge of bed and perform face washing with wash cloth w/ set-up and SBA. Then pt used green sponge to clean tongue with verbal cues and demonstration with SBA. Pt then had toothbrush and toothpaste set-up for her and she engaged in brushing teeth with min A for thoroughness. LE Dressing: Maximum assistance  LE Dressing Skilled Clinical Factors: Max A to don shoes and socks - pt lifted BLE to assist with task;  Max A to doff soiled brief and don new brief- pt stood in eliezer torres throughout  Toileting: Maximum assistance  Toileting Skilled Clinical Factors: fonseca remains in place; pt required max A for brief mngmt and narciso care following BM smear        Safety Devices  Type of Devices: Call light within reach;Nurse notified; Left in chair;Chair alarm in place;Gait belt;Sitter present  Restraints  Restraints Initially in Place: No     Patient Education  Education Given To: Patient  Education Provided: Transfer Training;ADL Adaptive Strategies; Role of Therapy;Orientation; Fall Prevention Strategies  Education Method: Verbal;Demonstration  Barriers to Learning: Cognition  Education Outcome: Continued education needed    Goals  Short Term Goals  Time Frame for Short Term Goals: by discharge  Short Term Goal 1: Pt will perform functional sit to stand with Mod A x1- progressing  Short Term Goal 2: Pt will tolerate sitting EOB with SBA for 10 minutes to perform grooming tasks- goal met; new goal: pt will tolerate standing for 1 min with max A x 1   Short Term Goal 3: Pt will perform UB dressing with Min A- not addressed  Short Term Goal 4: New goal established 10/04: pt will bring utensil to mouth independently during self feeding task 5/5 trials with verbal cues and tactile cues as needed- pt progressing  Patient Goals   Patient goals : not stated       Therapy Time   Individual Concurrent Group Co-treatment   Time In 0845         Time Out 0940         Minutes 55         Timed Code Treatment Minutes: [address] [facility] [place],

## 2022-10-05 NOTE — PROGRESS NOTES
Physical Therapy  Facility/Department: 1 [facility] [address]  Daily Treatment Note  NAME: Rhonda Moses  : 1931  MRN: [id number]    Date of Service: 10/5/2022    Discharge Recommendations:  Rhonda Moses scored a 9/24 on the AM-PAC short mobility form. Current research shows that an AM-PAC score of 17 or less is typically not associated with a discharge to the patient's home setting. Based on the patient's AM-PAC score and their current functional mobility deficits, it is recommended that the patient have 3-5 sessions per week of Physical Therapy at d/c to increase the patient's independence. Please see assessment section for further patient specific details. If patient discharges prior to next session this note will serve as a discharge summary. Please see below for the latest assessment towards goals. PT Equipment Recommendations  Equipment Needed:  (defer to next level of care)    Patient Diagnosis(es): The encounter diagnosis was Pleural effusion. Assessment   Assessment: Pt tolerated PT tx today with no adverse events. She participated in functional txf training & EOB sitting balance tasks. She continues to require inc'd physical assist from 1-2 person to complete txfs however inc'd initiation noted today with all tasks. Pt will continue to benefit from skilled PT services during her hospital stay to address her deficits in strength, balance, mobility & activity tolerance. Recommend continued skilled inpatient PT services upon medical clearance. Activity Tolerance: Treatment limited secondary to decreased cognition  Equipment Needed:  (defer to next level of care)     Plan    Physcial Therapy Plan  General Plan:  (2-5 times per week)  Current Treatment Recommendations: Strengthening;ROM; Functional mobility training; Safety education & training;Gait training;Transfer training; Therapeutic activities     Restrictions  Position Activity Restriction  Other position/activity restrictions: up with assist  FWBAT  post Left hip ORIF 9/17     Subjective    Subjective  Subjective: Pt with garbled nonsensical speech. Difficult to understand  Pain: no s/s of pain  Orientation  Overall Orientation Status: Impaired  Orientation Level: Oriented to person  Cognition  Overall Cognitive Status: Exceptions  Arousal/Alertness: Inconsistent responses to stimuli  Following Commands: Follows one step commands with increased time; Follows one step commands with repetition  Attention Span: Difficulty attending to directions  Memory: Decreased recall of biographical Information;Decreased recall of precautions;Decreased recall of recent events;Decreased short term memory  Safety Judgement: Decreased awareness of need for assistance;Decreased awareness of need for safety  Insights: Decreased awareness of deficits  Initiation: Requires cues for some  Sequencing: Requires cues for some  Cognition Comment: Pt with improved alertness and attentiveness this date     Objective        Bed Mobility Training  Bed Mobility Training: Yes  Overall Level of Assistance: Maximum assistance;Assist X1  Supine to Sit: Maximum assistance;Assist X1 (with HOB elevated.)  Scooting: Maximum assistance (max A x 1 to scoot patient to edge of bed via mk pad)  Balance  Sitting: Impaired (SBA at EOB)  Sitting - Static: Good (unsupported)  Sitting - Dynamic: Fair (occasional)  Standing: Impaired (2 person assist with RW or [place])  Standing - Static: Constant support  Transfer Training  Transfer Training: Yes  Sit to Stand: Assist X2;Moderate assistance (up to RW (x2 repetitions) & up to [place] (x 2 repetitions). )  Stand to Sit: Moderate assistance;Assist X2 (x 2 repetitions from RW. x 2 repetitions from [place].)  Bed to Chair: Total assistance (with use of [place])  Gait Training  Gait Training: No  Gait  Overall Level of Assistance:  (eliezer torres was used to transfer pt from bed to chair)  Wheelchair Management  Wheelchair Management: No        Other Specialty Interventions  Other Treatments/Modalities: Pt participated in EOB sitting balance tasks including reaching outside SEEMA while completing grooming tasks. CGA<>SBA for balance with cues required for upright posture. Safety Devices  Type of Devices: Call light within reach;Nurse notified; Left in chair;Chair alarm in place;Sitter present  Restraints  Restraints Initially in Place: No       Goals  Short Term Goals  Time Frame for Short Term Goals: Discharge - all ongoing  Short Term Goal 1: Bed mobility with mod x 1  Short Term Goal 2: Sit to stand with mod x 2  Short Term Goal 3: Ambulate 10 ft with RW and mod x 2  Patient Goals   Patient Goals : Not specifically stated    Education  Patient Education  Education Given To: Patient  Education Provided: Role of Therapy  Education Method: Verbal;Demonstration  Barriers to Learning: Cognition  Education Outcome: Continued education needed    Therapy Time   Individual Concurrent Group Co-treatment   Time In       0845   Time Out       0940   Minutes       55   Timed Code Treatment Minutes: [address], PT

## 2022-10-05 NOTE — PROGRESS NOTES
Internal Medicine Progress Note    Admit Date: 10/1/2022  Day: 4   Diet: ADULT DIET; Regular; 3 carb choices (45 gm/meal); Low Fat/Low Chol/High Fiber/2 gm Na    CC: Facial Swelling    Interval history: Patient seen at the bedside this morning. No acute events overnight. Patient still only oriented to person. Started Rocephin yesterday for possible uncomplicated UTI. Patient refusing Griffiths changed to obtain urine culture. Patient also refused echocardiogram yesterday. We are considering having a bedside echocardiogram as patient stated that she would likely be willing to have that done at the bedside patient continues to diurese well, down an additional 1.5 L since yesterday. Lasix was held however yesterday due to some soft blood pressures. Vitals appreciated this morning-blood pressures in the 140s over 70s. Labs appreciated-largely stable. Telemetry appreciated-stable. HPI:  Gee Kwon is a 80 y.o. female with medical history of Brain mass thought to be meningioma, type 2 diabetes, hypertension, hyperlipidemia, and baseline dementia who presents with presented to the [facility] emergency department from the [facility] memory unit where her caretakers noticed that she has had some facial swelling. Due to the patient's underlying dementia, she was not able to give a thorough history. The emergency department attempted to contact the memory unit but was not able to get through. Per the report from the EMS, the patient was noticed to have increased facial swelling was therefore brought into the hospital for further evaluation. Of note, the patient was recently admitted to the hospital for a left hip fracture. Patient is status post ORIF for that left hip fracture. ED course: In the emergency department, the patient was alert and oriented to self. The patient's vitals were appreciated and were noticed to be stable.   Blood pressure was noted to be 109/54, heart was noted to be 88, and respirations were noted to be 16 patient is afebrile. The patient was noted to have been saturating in the high 80% and was therefore put on 2 L of oxygen in the emergency department. Patient is currently saturating  in the 90s on two liters. Of note from the patient's exam, she has bilateral lower extremity edema that was estimated to be 2+. On pulmonary auscultation, there were bilateral lower lobe crackles. Imaging studies in the emergency department notable for chest x-ray which showed some airspace consolidation and a large left pleural effusion atelectatic changes to the right lower lung were also noted. Labs in the ED notable for a slight leukocytosis of 11.8. Patient's absolute neutrophils also noted to be increased. The BMP was within normal limits largely except for a glucose of 325. Alkaline phosphatase also noted to be high at 188. Also of note, proBNP noted to be around 30,000. VBG was obtained in the ED which was within normal limits. pH was noted to be 7.350. Patient has both COVID and influenza negative. The patient was discussed with the admitting hospitalist and it was determined that she would likely benefit from inpatient admission and further work-up of what is likely an acute hypoxic respiratory failure secondary to new onset heart failure with exacerbation.     Medications:     Scheduled Meds:   cefTRIAXone (ROCEPHIN) IV  1,000 mg IntraVENous Q24H    potassium chloride  40 mEq Oral Once    furosemide  40 mg IntraVENous BID    metoprolol tartrate  25 mg Oral BID    aspirin EC  81 mg Oral Daily    donepezil  10 mg Oral Nightly    enoxaparin  40 mg SubCUTAneous Daily    atorvastatin  10 mg Oral Daily    sodium chloride flush  5-40 mL IntraVENous 2 times per day    insulin lispro  0-4 Units SubCUTAneous TID     insulin lispro  0-4 Units SubCUTAneous Nightly    insulin glargine  5 Units SubCUTAneous Nightly     Continuous Infusions:   dextrose       PRN Meds:sodium chloride flush, ondansetron **OR** ondansetron, polyethylene glycol, acetaminophen **OR** acetaminophen, perflutren lipid microspheres, glucose, dextrose bolus **OR** dextrose bolus, glucagon (rDNA), dextrose    Objective:   Vitals:   T-max:  Patient Vitals for the past 8 hrs:   BP Temp Temp src Pulse Resp SpO2 Weight   10/05/22 0744 139/69 (!) 96.5 °F (35.8 °C) Axillary 91 20 91 % --   10/05/22 0629 116/70 97.3 °F (36.3 °C) Axillary 88 20 91 % --   10/05/22 0600 -- -- -- -- -- -- 114 lb 6.7 oz (51.9 kg)         Intake/Output Summary (Last 24 hours) at 10/5/2022 0815  Last data filed at 10/5/2022 0542  Gross per 24 hour   Intake 200 ml   Output 1625 ml   Net -1425 ml         Physical Exam  Constitutional:       Appearance: She is underweight. Eyes:      Extraocular Movements: Extraocular movements intact. Neck:      Vascular: No JVD. Cardiovascular:      Rate and Rhythm: Normal rate and regular rhythm. Pulses:           Radial pulses are 2+ on the right side and 2+ on the left side. Heart sounds: Normal heart sounds, S1 normal and S2 normal. No murmur heard. Pulmonary:      Effort: Pulmonary effort is normal.      Breath sounds: Normal breath sounds and air entry. Abdominal:      General: Abdomen is flat. Bowel sounds are normal.      Palpations: Abdomen is soft. Tenderness: There is no abdominal tenderness. Musculoskeletal:      Right lower le+ Pitting Edema present. Left lower le+ Pitting Edema present. Skin:     Capillary Refill: Capillary refill takes less than 2 seconds. Neurological:      Mental Status: She is lethargic and disoriented. Psychiatric:         Behavior: Behavior is uncooperative.          LABS:    CBC:   Recent Labs     10/02/22  0823 10/03/22  0940 10/04/22  0705   WBC 11.5* 14.1* 16.2*   HGB 11.6* 13.4 12.8   HCT 36.2 42.1 39.0    476* 433   MCV 88.2 88.0 86.7       Renal:    Recent Labs     10/02/22  0823 10/03/22  0940 10/04/22  07    022 563 K 3.9 3.1* 3.0*    99 102   CO2 27 30 28   BUN 12 10 9   CREATININE 0.5* 0.6 0.6   GLUCOSE 214* 146* 55*   CALCIUM 8.1* 8.2* 8.4   MG 1.50* 2.00 1.70*   ANIONGAP 11 11 13       Hepatic:   No results for input(s): AST, ALT, BILITOT, BILIDIR, PROT, LABALBU, ALKPHOS in the last 72 hours. Troponin:   Recent Labs     10/02/22  0823 10/03/22  1256   TROPONINI 0.09* 0.08*       BNP: No results for input(s): BNP in the last 72 hours. Lipids:   Recent Labs     10/02/22  0823   CHOL 193   HDL 36*       ABGs:  No results for input(s): PHART, XGU6LEM, PO2ART, OYX5NAW, BEART, THGBART, Q1VUMAGO, SIS9TDQ in the last 72 hours. INR: No results for input(s): INR in the last 72 hours. Lactate: No results for input(s): LACTATE in the last 72 hours. Cultures:  -----------------------------------------------------------------  RAD:   XR FEMUR LEFT (MIN 2 VIEWS)   Final Result      Unchanged appearance of left hip arthroplasty and ORIF hardware. No acute fracture or complication seen. XR FEMUR LEFT (MIN 2 VIEWS)   Final Result   Impression:   Prior left total hip arthroplasty and ORIF of periprosthetic fracture. No acute abnormality. XR CHEST 1 VIEW   Final Result   1. Combined airspace consolidation and moderate to large left pleural effusion which is new   2. Likely atelectatic changes right lower lung   3. Status post CABG            Assessment/Plan:   Liset Sandoval is a 80 y.o. female with a past medical history of a brain mass that is most likely a meningioma, type 2 diabetes, hypertension, hyperlipidemia, and baseline dementia who presents with facial swelling from her nursing home. Of note, patient recently had a left hip fracture and is status post ORIF for that fracture. Hypoxic respiratory failure likely secondary to fluid overload. Patient noted to have bilateral 2+ lower extremity edema and bilateral crackles at both lung bases. As well as a proBNP of around 30,000.   Patient has never been formally diagnosed with heart failure  Will obtain echocardiogram-patient refused echocardiogram yesterday. We will try again today with a bedside echocardiogram if at the echocardiography department is amenable to that plan. Cardiology Consult- recommended initiation low dose B blocker  Lasix IV 40 twice daily  Daily weights  Strict Is and Os- down ~1L since admission  Will monitor oxygen saturation  Continuous telemetry  Trend Troponins. 0.09 down from 0.11. Will order another troponin for a 3rd value. PO/OT  Combined airspace consolidation and moderate to large left pleural effusion- patient noted to have slight leukocytosis at 11.8. Radiographic findings as well as slight leukocytosis as well as bilateral crackles could represent some aspect of pneumonia. Will obtain repeat CXR for resolution of effusion  Will continue supplement with oxygen via nasal cannula  If no resolution of pulmonary symptoms, will consider consulting pulmonologist.  Will up the Lasix to 40mg BID IV  Will order procalcitonin- 0.08. Unlikely septic. Paroxsymal SVT overnight: Resolved  Patient will be started on Lopressor 25mg BID for rate control. Possible uncomplicated UTI: Patient noted to have some WBCs on UA. Ceftriaxone 1mg daily for 5 days. Will transition the patient to PO Keflex upon discharge for the remainder of 5 day course. Chronic Medical Conditions:  Hypertension-we will continue losartan 75 mg  Dementia-we will continue donepezil 10 mg  Osteoporosis-we will hold the patient's at home alendronate 35 mg weekly while inpatient. Type 2 diabetes-we will have the patient on Lantus 5 nightly as well as low-dose sliding scale. Hypoglycemia protocols in place. Code Status:DNR-CCA  FEN: ADULT DIET; Regular; 3 carb choices (45 gm/meal);  Low Fat/Low Chol/High Fiber/2 gm Na  PPX:  Lovenox  DISPO: CLARITA Brower MD  PGY1, Internal Medicine  10/05/22  8:15 AM    This patient will be staffed and discussed with Michael Freeman MD.

## 2022-10-06 ENCOUNTER — APPOINTMENT (OUTPATIENT)
Dept: GENERAL RADIOLOGY | Age: 87
DRG: 291 | End: 2022-10-06
Payer: MEDICARE

## 2022-10-06 LAB
ANION GAP SERPL CALCULATED.3IONS-SCNC: 9 MMOL/L (ref 3–16)
BASOPHILS ABSOLUTE: 0.1 K/UL (ref 0–0.2)
BASOPHILS RELATIVE PERCENT: 0.8 %
BUN BLDV-MCNC: 9 MG/DL (ref 7–20)
CALCIUM SERPL-MCNC: 8.2 MG/DL (ref 8.3–10.6)
CHLORIDE BLD-SCNC: 100 MMOL/L (ref 99–110)
CO2: 32 MMOL/L (ref 21–32)
CREAT SERPL-MCNC: 0.5 MG/DL (ref 0.6–1.2)
EOSINOPHILS ABSOLUTE: 0.3 K/UL (ref 0–0.6)
EOSINOPHILS RELATIVE PERCENT: 2.7 %
GFR AFRICAN AMERICAN: >60
GFR NON-AFRICAN AMERICAN: >60
GLUCOSE BLD-MCNC: 136 MG/DL (ref 70–99)
GLUCOSE BLD-MCNC: 158 MG/DL (ref 70–99)
GLUCOSE BLD-MCNC: 190 MG/DL (ref 70–99)
GLUCOSE BLD-MCNC: 220 MG/DL (ref 70–99)
HCT VFR BLD CALC: 40.6 % (ref 36–48)
HEMOGLOBIN: 13.1 G/DL (ref 12–16)
LYMPHOCYTES ABSOLUTE: 1.9 K/UL (ref 1–5.1)
LYMPHOCYTES RELATIVE PERCENT: 20.9 %
MAGNESIUM: 1.8 MG/DL (ref 1.8–2.4)
MCH RBC QN AUTO: 28.2 PG (ref 26–34)
MCHC RBC AUTO-ENTMCNC: 32.2 G/DL (ref 31–36)
MCV RBC AUTO: 87.6 FL (ref 80–100)
MONOCYTES ABSOLUTE: 0.5 K/UL (ref 0–1.3)
MONOCYTES RELATIVE PERCENT: 5.9 %
NEUTROPHILS ABSOLUTE: 6.4 K/UL (ref 1.7–7.7)
NEUTROPHILS RELATIVE PERCENT: 69.7 %
PDW BLD-RTO: 17.3 % (ref 12.4–15.4)
PERFORMED ON: ABNORMAL
PLATELET # BLD: 345 K/UL (ref 135–450)
PMV BLD AUTO: 9.6 FL (ref 5–10.5)
POTASSIUM SERPL-SCNC: 3.1 MMOL/L (ref 3.5–5.1)
RBC # BLD: 4.64 M/UL (ref 4–5.2)
SODIUM BLD-SCNC: 141 MMOL/L (ref 136–145)
WBC # BLD: 9.2 K/UL (ref 4–11)

## 2022-10-06 PROCEDURE — 97530 THERAPEUTIC ACTIVITIES: CPT

## 2022-10-06 PROCEDURE — 80048 BASIC METABOLIC PNL TOTAL CA: CPT

## 2022-10-06 PROCEDURE — 99233 SBSQ HOSP IP/OBS HIGH 50: CPT | Performed by: INTERNAL MEDICINE

## 2022-10-06 PROCEDURE — 6370000000 HC RX 637 (ALT 250 FOR IP): Performed by: STUDENT IN AN ORGANIZED HEALTH CARE EDUCATION/TRAINING PROGRAM

## 2022-10-06 PROCEDURE — 6370000000 HC RX 637 (ALT 250 FOR IP)

## 2022-10-06 PROCEDURE — 83735 ASSAY OF MAGNESIUM: CPT

## 2022-10-06 PROCEDURE — 6370000000 HC RX 637 (ALT 250 FOR IP): Performed by: INTERNAL MEDICINE

## 2022-10-06 PROCEDURE — 1200000000 HC SEMI PRIVATE

## 2022-10-06 PROCEDURE — 85025 COMPLETE CBC W/AUTO DIFF WBC: CPT

## 2022-10-06 PROCEDURE — 71045 X-RAY EXAM CHEST 1 VIEW: CPT

## 2022-10-06 PROCEDURE — 2580000003 HC RX 258

## 2022-10-06 PROCEDURE — 36415 COLL VENOUS BLD VENIPUNCTURE: CPT

## 2022-10-06 PROCEDURE — 2580000003 HC RX 258: Performed by: STUDENT IN AN ORGANIZED HEALTH CARE EDUCATION/TRAINING PROGRAM

## 2022-10-06 PROCEDURE — 6360000002 HC RX W HCPCS

## 2022-10-06 PROCEDURE — 6360000002 HC RX W HCPCS: Performed by: STUDENT IN AN ORGANIZED HEALTH CARE EDUCATION/TRAINING PROGRAM

## 2022-10-06 PROCEDURE — 97535 SELF CARE MNGMENT TRAINING: CPT

## 2022-10-06 RX ORDER — SPIRONOLACTONE 25 MG/1
12.5 TABLET ORAL DAILY
Status: DISCONTINUED | OUTPATIENT
Start: 2022-10-06 | End: 2022-10-08 | Stop reason: HOSPADM

## 2022-10-06 RX ORDER — POTASSIUM CHLORIDE 7.45 MG/ML
10 INJECTION INTRAVENOUS
Status: COMPLETED | OUTPATIENT
Start: 2022-10-06 | End: 2022-10-06

## 2022-10-06 RX ORDER — METOPROLOL SUCCINATE 25 MG/1
12.5 TABLET, EXTENDED RELEASE ORAL DAILY
Status: DISCONTINUED | OUTPATIENT
Start: 2022-10-07 | End: 2022-10-07

## 2022-10-06 RX ADMIN — ENOXAPARIN SODIUM 40 MG: 100 INJECTION SUBCUTANEOUS at 09:18

## 2022-10-06 RX ADMIN — SODIUM CHLORIDE, PRESERVATIVE FREE 10 ML: 5 INJECTION INTRAVENOUS at 09:26

## 2022-10-06 RX ADMIN — FUROSEMIDE 40 MG: 10 INJECTION, SOLUTION INTRAMUSCULAR; INTRAVENOUS at 18:36

## 2022-10-06 RX ADMIN — METOPROLOL TARTRATE 25 MG: 25 TABLET, FILM COATED ORAL at 09:18

## 2022-10-06 RX ADMIN — ASPIRIN 81 MG: 81 TABLET, COATED ORAL at 09:18

## 2022-10-06 RX ADMIN — POTASSIUM CHLORIDE 10 MEQ: 10 INJECTION, SOLUTION INTRAVENOUS at 18:23

## 2022-10-06 RX ADMIN — POTASSIUM CHLORIDE 10 MEQ: 10 INJECTION, SOLUTION INTRAVENOUS at 14:53

## 2022-10-06 RX ADMIN — CEFTRIAXONE 1000 MG: 1 INJECTION, POWDER, FOR SOLUTION INTRAMUSCULAR; INTRAVENOUS at 14:58

## 2022-10-06 RX ADMIN — POTASSIUM CHLORIDE 10 MEQ: 10 INJECTION, SOLUTION INTRAVENOUS at 16:37

## 2022-10-06 RX ADMIN — FUROSEMIDE 40 MG: 10 INJECTION, SOLUTION INTRAMUSCULAR; INTRAVENOUS at 09:18

## 2022-10-06 RX ADMIN — SPIRONOLACTONE 12.5 MG: 25 TABLET, FILM COATED ORAL at 15:00

## 2022-10-06 RX ADMIN — POTASSIUM CHLORIDE 10 MEQ: 10 INJECTION, SOLUTION INTRAVENOUS at 13:01

## 2022-10-06 RX ADMIN — ATORVASTATIN CALCIUM 10 MG: 10 TABLET, FILM COATED ORAL at 09:18

## 2022-10-06 RX ADMIN — INSULIN LISPRO 2 UNITS: 100 INJECTION, SOLUTION INTRAVENOUS; SUBCUTANEOUS at 18:31

## 2022-10-06 ASSESSMENT — PAIN SCALES - GENERAL
PAINLEVEL_OUTOF10: 0

## 2022-10-06 NOTE — PROGRESS NOTES
Physical Therapy  Facility/Department: [address] [place] [address]  Physical Therapy Daily Treatment    Name: Claudine Castillo  : 1931  MRN: [id number]  Date of Service: 10/6/2022    Discharge Recommendations:    Claudine Castillo scored a 10/24 on the AM-PAC short mobility form. Current research shows that an AM-PAC score of 17 or less is typically not associated with a discharge to the patient's home setting. Based on the patient's AM-PAC score and their current functional mobility deficits, it is recommended that the patient have 3-5 sessions per week of Physical Therapy at d/c to increase the patient's independence. Please see assessment section for further patient specific details. If patient discharges prior to next session this note will serve as a discharge summary. Please see below for the latest assessment towards goals. PT Equipment Recommendations  Equipment Needed: No      Patient Diagnosis(es): The encounter diagnosis was Pleural effusion. Past Medical History:  has a past medical history of Atherosclerotic vascular disease, Back pain, Brain mass, Cervical spondylolysis, Dementia ([address] Diabetes ([address] Frequent falls, Hearing loss, Heart disease, Hyperlipidemia, Hypertension, Mental status alteration, Osteoporosis, and Wrist fracture. Past Surgical History:  has a past surgical history that includes hip surgery; Partial hip arthroplasty; Brain tumor excision; Coronary artery bypass graft; ORIF hip fracture (Left, 2022); and Hip fracture surgery (Left, 2022). Assessment   Body Structures, Functions, Activity Limitations Requiring Skilled Therapeutic Intervention: Decreased functional mobility ; Decreased cognition;Decreased strength;Decreased endurance;Decreased balance  Assessment: Pt is currently living at [facility]. Uncertain of level of functional mobility post hip fx/ORIF 22.   Currently the pt requires Max A x1-2 for bed mobility and min A x1 to stand and transfer in [facility]. Recommend continued IP therapies to maximize mobility and safety  Treatment Diagnosis: Decreased functional mobilty 2/2 cognition/weakness  Barriers to Learning: cognition  Requires PT Follow-Up: Yes  Activity Tolerance  Activity Tolerance: Treatment limited secondary to decreased cognition     Plan   Physcial Therapy Plan  General Plan:  (2-5 times per week)  Current Treatment Recommendations: Strengthening, ROM, Functional mobility training, Safety education & training, Gait training, Transfer training, Therapeutic activities  Safety Devices  Type of Devices: Call light within reach, Nurse notified, Left in chair, Chair alarm in place, Sitter present  Restraints  Restraints Initially in Place: No     Restrictions  Position Activity Restriction  Other position/activity restrictions: up with assist  FWBAT  post Left hip ORIF 9/17     Subjective   Subjective  Subjective: pt sleeping on entry, easy to wake. Once upright pt is more alert. no c/o pain. Social/Functional History  Social/Functional History  Type of Home: Assisted living  Additional Comments: per chart review, pt was in assisted living. Pt is poor historian this date, unable to verbalize prior levelsof assistance  Vision/Hearing  Vision  Vision: Within Functional Limits  Hearing  Hearing: Exceptions to [facility] (Left hearing aid  )  Hearing Exceptions: Bilateral hearing aid;Hard of hearing/hearing concerns    Cognition   Orientation  Overall Orientation Status: Impaired  Orientation Level: Oriented to person  Cognition  Overall Cognitive Status: Exceptions  Arousal/Alertness: Inconsistent responses to stimuli  Following Commands: Follows one step commands with increased time; Follows one step commands with repetition  Attention Span: Difficulty attending to directions  Memory: Decreased recall of biographical Information;Decreased recall of precautions;Decreased recall of recent events;Decreased short term memory  Initiation: Requires cues for some  Sequencing: Requires cues for some     Objective   Heart Rate: 65  Heart Rate Source: Monitor  BP: (!) 112/57  BP Location: Right upper arm  BP Method: Automatic  Patient Position: Left side  MAP (Calculated): 75.33  Resp: 16  SpO2: 90 %  O2 Device: None (Room air)      Balance  Sitting: Impaired (SBA at EOB)  Sitting - Static: Good (unsupported)  Sitting - Dynamic: Fair (occasional)  Standing: With support (Pt stood in [facility] stedy for ~1 minute)  Standing - Static: Constant support  Bed mobility  Supine to Sit: Maximum assistance (hob mod elevated)  Scooting: Dependent/Total;2 Person assistance (2 person to boost back into recliner)  Transfers  Sit to Stand: Minimal Assistance  Stand to Sit: Minimal Assistance  Comment: Min A to stand EOB>sarastedy and sarastedy>recliner with cues for hand placement. Tolerates standing up to 1min in stedy. AM-PAC Score  AM-PAC Inpatient Mobility Raw Score : 10 (10/06/22 [facility])  AM-PAC Inpatient T-Scale Score : 32.29 (10/06/22 [facility])  Mobility Inpatient CMS 0-100% Score: 76.75 (10/06/22 [facility])  Mobility Inpatient CMS G-Code Modifier : CL (10/06/22 [facility])        Goals  Short Term Goals  Time Frame for Short Term Goals: Discharge - all ongoing  Short Term Goal 1: Bed mobility with mod x 1  Short Term Goal 2: Sit to stand with mod x 2  Short Term Goal 3: Ambulate 10 ft with RW and mod x 2  Patient Goals   Patient Goals : Not specifically stated       Education  Patient Education  Education Given To: Patient  Education Provided: Role of Therapy  Education Method: Verbal;Demonstration  Barriers to Learning: Cognition  Education Outcome: Continued education needed      Therapy Time   Individual Concurrent Group Co-treatment   Time In 1156         Time Out 1220         Minutes 24          Timed Code Treatment Minutes: 24    Total Treatment Minutes: 24  If patient is discharged prior to next treatment, this note will serve as the discharge summary.     Carlos Vaughan PT, DPT, NCS, CSRS

## 2022-10-06 NOTE — PLAN OF CARE
Problem: Skin/Tissue Integrity  Goal: Absence of new skin breakdown  Description: 1. Monitor for areas of redness and/or skin breakdown  2. Assess vascular access sites hourly  3. Every 4-6 hours minimum:  Change oxygen saturation probe site  4. Every 4-6 hours:  If on nasal continuous positive airway pressure, respiratory therapy assess nares and determine need for appliance change or resting period.   Outcome: Progressing     Problem: ABCDS Injury Assessment  Goal: Absence of physical injury  Outcome: Progressing     Problem: Discharge Planning  Goal: Discharge to home or other facility with appropriate resources  Outcome: Progressing     Problem: Safety - Adult  Goal: Free from fall injury  Outcome: Progressing     Problem: Respiratory - Adult  Goal: Achieves optimal ventilation and oxygenation  Outcome: Progressing     Problem: Cardiovascular - Adult  Goal: Maintains optimal cardiac output and hemodynamic stability  Outcome: Progressing  Goal: Absence of cardiac dysrhythmias or at baseline  Outcome: Progressing     Problem: Metabolic/Fluid and Electrolytes - Adult  Goal: Electrolytes maintained within normal limits  Outcome: Progressing  Goal: Hemodynamic stability and optimal renal function maintained  Outcome: Progressing     Problem: Pain  Goal: Verbalizes/displays adequate comfort level or baseline comfort level  Outcome: Progressing

## 2022-10-06 NOTE — PLAN OF CARE
Problem: Skin/Tissue Integrity  Goal: Absence of new skin breakdown  Description: 1. Monitor for areas of redness and/or skin breakdown  2. Assess vascular access sites hourly  3. Every 4-6 hours minimum:  Change oxygen saturation probe site  4. Every 4-6 hours:  If on nasal continuous positive airway pressure, respiratory therapy assess nares and determine need for appliance change or resting period.   Outcome: Progressing     Problem: Safety - Adult  Goal: Free from fall injury  Outcome: Progressing     Problem: Cardiovascular - Adult  Goal: Maintains optimal cardiac output and hemodynamic stability  Outcome: Progressing     Problem: Pain  Goal: Verbalizes/displays adequate comfort level or baseline comfort level  Outcome: Progressing

## 2022-10-06 NOTE — PROGRESS NOTES
Occupational Therapy  Facility/Department: [address]  Daily Treatment Note  NAME: Liset Sandoval  : 1931  MRN: [id number]    Date of Service: 10/6/2022    Discharge Recommendations: Liset Sandoval scored a 14/24 on the AM-PAC ADL Inpatient form. Current research shows that an AM-PAC score of 17 or less is typically not associated with a discharge to the patient's home setting. Based on the patient's AM-PAC score and their current ADL deficits, it is recommended that the patient have 3-5 sessions per week of Occupational Therapy at d/c to increase the patient's independence. Please see assessment section for further patient specific details. If patient discharges prior to next session this note will serve as a discharge summary. Please see below for the latest assessment towards goals. OT Equipment Recommendations  Other: defer to next level of care      Patient Diagnosis(es): The encounter diagnosis was Pleural effusion. Assessment    Assessment: Pt making signficant improvement with self feeding this date and accomplishing short term feeding goal. After following patient for daily sessions, pt responds best to self feeding when she is out of bed and in the chair. Pt able to feed self with only needing initial set-up this date. Pt retrieved spoon from tray and self initiated feeding task. Pt also washed face with warm wash cloth with set-up. Pt stood in [address] stedy for ~1 min and performed sit<>stand transfer with min Ax1 in eliezer stedy. Pt continues to be limited by cognitive impairement. Recommend ongoing inpatient therapy services with memory care at discharge. Will cont to follow on acute OT POC.   Activity Tolerance: Treatment limited secondary to decreased cognition  Other: defer to next level of care      Plan   Occupational Therapy Plan  Times Per Week: 2-5  Current Treatment Recommendations: Functional mobility training;Balance training;Cognitive reorientation;Self-Care / ADL;Strengthening; Endurance training     Restrictions   Up with assistance    Subjective   Subjective  Subjective: Pt lying asleep in bed upon OT arrival. Pt awoken with greeting and pt rubbing eyes stating, \"I don't know what I'm supposed to be doing\". Pt requires increased encouragement and responds well to session. Pt at end of session stating, \"I slept for such a long time\". Pain: no c/o of pain throughout session  Orientation  Overall Orientation Status: Impaired  Orientation Level: Oriented to person  Cognition  Overall Cognitive Status: Exceptions  Arousal/Alertness: Inconsistent responses to stimuli  Following Commands: Follows one step commands with increased time; Follows one step commands with repetition  Attention Span: Difficulty attending to directions  Memory: Decreased recall of biographical Information;Decreased recall of precautions;Decreased recall of recent events;Decreased short term memory  Safety Judgement: Decreased awareness of need for assistance;Decreased awareness of need for safety  Problem Solving: Decreased awareness of errors  Insights: Decreased awareness of deficits  Initiation: Requires cues for some  Sequencing: Requires cues for some  Cognition Comment: Pt with decreased verbalizations this date.  Likely from just being awoken        Objective         Bed Mobility Training  Bed Mobility Training: Yes  Overall Level of Assistance: Maximum assistance;Assist X1  Supine to Sit: Maximum assistance;Assist X1  Scooting: Maximum assistance;Assist X2 (to scoot to back of chair and to edge of bed)  Balance  Sitting: Impaired (SBA at EOB)  Sitting - Static: Good (unsupported)  Sitting - Dynamic: Fair (occasional)  Standing: With support (Pt stood in [address] [place] [address] for ~1 minute)  Standing - Static: Constant support  Transfer Training  Transfer Training: Yes  Sit to Stand: Minimum assistance;Assist X1 (min A x1 to  [facility] from edge of bed)  Stand to Sit: Assist X1;Minimum assistance (min A x 1 to descend into chair from [address] [place] [address] stedy)  Bed to Chair: Total assistance (use of eliezer stedy- pt stood in eliezer stedy throughout transfer)  Gait  Overall Level of Assistance:  (use of eliezer stedy)  Wheelchair Management  Wheelchair Management: No     ADL  Feeding: Setup; Increased time to complete; Beverage management;Bringing food to mouth assist;Stand by assistance  Feeding Skilled Clinical Factors: Pt sat up in chair with lunch tray present. Pt was able to use spoon to retrieve noodles and fed self throughout entire meal. Pt self initiated feeding herself and also initially required min A for beverage mangement then progressed to independence. Pt continues to progress. It is NECESSARY that patient be up in the chair for self feeding. Pt does not engage in ANY feeding tasks from bed level. Pt reponds very well to feeding self when seated up. Grooming: Verbal cueing; Increased time to complete;Stand by assistance;Setup  Grooming Skilled Clinical Factors: Pt used wash cloth to wash face from bed level this date as she woke up        Safety Devices  Type of Devices: Call light within reach;Nurse notified; Left in chair;Chair alarm in place;Sitter present  Restraints  Restraints Initially in Place: No     Patient Education  Education Given To: Patient;Staff (education given to PCA about importance of pt being up for meals)  Education Provided: Transfer Training;ADL Adaptive Strategies; Role of Therapy;Orientation; Fall Prevention Strategies  Education Method: Verbal;Demonstration  Barriers to Learning: Cognition  Education Outcome: Continued education needed    Goals  Short Term Goals  Time Frame for Short Term Goals: by discharge  Short Term Goal 1: Pt will perform functional sit to stand with Mod A x1  Short Term Goal 2: Pt will tolerate sitting EOB with SBA for 10 minutes to perform grooming tasks  Short Term Goal 3: Pt will perform UB dressing with Min A  Short Term Goal 4: New goal established 10/04: pt will bring utensil to mouth independently during self feeding task 5/5 trials with verbal cues and tactile cues as needed- goal met 10/6, continue goal for carryover   Patient Goals   Patient goals : not stated       Therapy Time   Individual Concurrent Group Co-treatment   Time In 1156         Time Out 1220         Minutes 24         Timed Code Treatment Minutes: 25 Minutes       Jorge Laughlin, OT

## 2022-10-06 NOTE — PROGRESS NOTES
[facility] Daily Progress Note      Admit Date:  10/1/2022    Subjective:  Ms. José Antonio Hicks was seen and examined. F/U CHF. Oriented to name only. Confused.      Objective:   BP (!) 112/57   Pulse 65   Temp 97.3 °F (36.3 °C) (Axillary)   Resp 16   Ht 5' (1.524 m)   Wt 110 lb 0.2 oz (49.9 kg)   SpO2 90%   BMI 21.48 kg/m²     Intake/Output Summary (Last 24 hours) at 10/6/2022 1533  Last data filed at 10/6/2022 1302  Gross per 24 hour   Intake 16 ml   Output 1600 ml   Net -1584 ml       TELEMETRY: Sinus     Physical Exam:  General:  Awake, alert, NAD  Skin:  Warm and dry  Neck:  JVP difficult  Chest:  decreased BS, respiration normal  Cardiovascular:  RRR S1S2  Abdomen:  Soft + BS  Extremities:  Tr L>R edema    Medications:    potassium chloride  10 mEq IntraVENous Q1H    [START ON 10/7/2022] metoprolol succinate  12.5 mg Oral Daily    spironolactone  12.5 mg Oral Daily    insulin lispro  0-8 Units SubCUTAneous TID WC    insulin lispro  0-4 Units SubCUTAneous Nightly    cefTRIAXone (ROCEPHIN) IV  1,000 mg IntraVENous Q24H    furosemide  40 mg IntraVENous BID    aspirin EC  81 mg Oral Daily    donepezil  10 mg Oral Nightly    enoxaparin  40 mg SubCUTAneous Daily    atorvastatin  10 mg Oral Daily    sodium chloride flush  5-40 mL IntraVENous 2 times per day    insulin glargine  5 Units SubCUTAneous Nightly      dextrose       sodium chloride flush, ondansetron **OR** ondansetron, polyethylene glycol, acetaminophen **OR** acetaminophen, perflutren lipid microspheres, glucose, dextrose bolus **OR** dextrose bolus, glucagon (rDNA), dextrose    Lab Data:  CBC:   Recent Labs     10/04/22  0705 10/05/22  1546 10/06/22  1028   WBC 16.2* 11.2* 9.2   HGB 12.8 12.2 13.1   HCT 39.0 37.5 40.6   MCV 86.7 86.8 87.6    338 345     BMP:   Recent Labs     10/04/22  0705 10/05/22  1546 10/06/22  1028    138 141   K 3.0* 3.6 3.1*    98* 100   CO2 28 29 32   BUN 9 11 9   CREATININE 0.6 0.6 0.5*     LIVER PROFILE:   No results for input(s): AST, ALT, LIPASE, BILIDIR, BILITOT, ALKPHOS in the last 72 hours. Invalid input(s): AMYLASE,  ALB    PT/INR: No results for input(s): PROTIME, INR in the last 72 hours. APTT: No results for input(s): APTT in the last 72 hours. BNP:  No results for input(s): BNP in the last 72 hours. IMAGING:     Assessment:  Patient Active Problem List    Diagnosis Date Noted    Heart failure ([facility] 10/01/2022    Periprosthetic fracture around internal prosthetic left hip joint ([facility] 09/19/2022    DM (diabetes mellitus), type 2, uncontrolled 09/19/2022    Pelvic ring fracture, closed, initial encounter [facility] 09/14/2022    Meningioma ([facility] 04/25/2022    Dementia ([facility]     Diabetes ([facility]     Hyperlipidemia     Hypertension     Osteoporosis     Pure hypercholesterolemia 04/17/2017    Type 2 diabetes mellitus with circulatory disorder, without long-term current use of insulin ([facility] 11/16/2016       Plan:  Continues confused. Reasonable diuresis. Edema resolved. On RA. Watch cr. Echo showing mild LV dysfunction. Change to toprol, add aldactone. If pressure allows add ACE.         Core Measures:  Discharge instructions:   LVEF documented:   ACEI for LV dysfunction:   Smoking Cessation:    Marie Yang MD, MD 10/6/2022 3:33 PM

## 2022-10-06 NOTE — PROGRESS NOTES
Internal Medicine Progress Note    Admit Date: 10/1/2022  Day: 5   Diet: ADULT DIET; Regular; 3 carb choices (45 gm/meal); Low Fat/Low Chol/High Fiber/2 gm Na    CC: Facial Swelling    Interval history: Patient doing well overnight, no acute complaints. Patient interviewed at the bedside this morning with no issues. Patient was able to have her bedside echocardiogram yesterday which demonstrated an ejection fraction of 40 to 50% with evidence of grade 1 diastolic dysfunction. Patient will need to follow-up with cardiology as outpatient. Expected discharge tomorrow. HPI:  Nnamdi Peralta is a 80 y.o. female with medical history of Brain mass thought to be meningioma, type 2 diabetes, hypertension, hyperlipidemia, and baseline dementia who presents with presented to the [facility] emergency department from the [facility] memory unit where her caretakers noticed that she has had some facial swelling. Due to the patient's underlying dementia, she was not able to give a thorough history. The emergency department attempted to contact the memory unit but was not able to get through. Per the report from the EMS, the patient was noticed to have increased facial swelling was therefore brought into the hospital for further evaluation. Of note, the patient was recently admitted to the hospital for a left hip fracture. Patient is status post ORIF for that left hip fracture. ED course: In the emergency department, the patient was alert and oriented to self. The patient's vitals were appreciated and were noticed to be stable. Blood pressure was noted to be 109/54, heart was noted to be 88, and respirations were noted to be 16 patient is afebrile. The patient was noted to have been saturating in the high 80% and was therefore put on 2 L of oxygen in the emergency department. Patient is currently saturating  in the 90s on two liters.   Of note from the patient's exam, she has bilateral lower extremity edema that was estimated to be 2+. On pulmonary auscultation, there were bilateral lower lobe crackles. Imaging studies in the emergency department notable for chest x-ray which showed some airspace consolidation and a large left pleural effusion atelectatic changes to the right lower lung were also noted. Labs in the ED notable for a slight leukocytosis of 11.8. Patient's absolute neutrophils also noted to be increased. The BMP was within normal limits largely except for a glucose of 325. Alkaline phosphatase also noted to be high at 188. Also of note, proBNP noted to be around 30,000. VBG was obtained in the ED which was within normal limits. pH was noted to be 7.350. Patient has both COVID and influenza negative. The patient was discussed with the admitting hospitalist and it was determined that she would likely benefit from inpatient admission and further work-up of what is likely an acute hypoxic respiratory failure secondary to new onset heart failure with exacerbation.     Medications:     Scheduled Meds:   potassium chloride  10 mEq IntraVENous Q1H    insulin lispro  0-8 Units SubCUTAneous TID WC    insulin lispro  0-4 Units SubCUTAneous Nightly    cefTRIAXone (ROCEPHIN) IV  1,000 mg IntraVENous Q24H    furosemide  40 mg IntraVENous BID    metoprolol tartrate  25 mg Oral BID    aspirin EC  81 mg Oral Daily    donepezil  10 mg Oral Nightly    enoxaparin  40 mg SubCUTAneous Daily    atorvastatin  10 mg Oral Daily    sodium chloride flush  5-40 mL IntraVENous 2 times per day    insulin glargine  5 Units SubCUTAneous Nightly     Continuous Infusions:   dextrose       PRN Meds:sodium chloride flush, ondansetron **OR** ondansetron, polyethylene glycol, acetaminophen **OR** acetaminophen, perflutren lipid microspheres, glucose, dextrose bolus **OR** dextrose bolus, glucagon (rDNA), dextrose    Objective:   Vitals:   T-max:  Patient Vitals for the past 8 hrs:   BP Temp Temp src Pulse Resp SpO2 Weight 10/06/22 1145 (!) 112/57 97.3 °F (36.3 °C) Axillary 65 16 90 % --   10/06/22 0818 116/74 98.1 °F (36.7 °C) Axillary 67 16 95 % --   10/06/22 0600 -- -- -- -- -- -- 110 lb 0.2 oz (49.9 kg)         Intake/Output Summary (Last 24 hours) at 10/6/2022 1301  Last data filed at 10/6/2022 0103  Gross per 24 hour   Intake 240 ml   Output 1600 ml   Net -1360 ml         Physical Exam  Constitutional:       Appearance: She is underweight. Eyes:      Extraocular Movements: Extraocular movements intact. Neck:      Vascular: No JVD. Cardiovascular:      Rate and Rhythm: Normal rate and regular rhythm. Pulses:           Radial pulses are 2+ on the right side and 2+ on the left side. Heart sounds: Normal heart sounds, S1 normal and S2 normal. No murmur heard. Pulmonary:      Effort: Pulmonary effort is normal.      Breath sounds: Normal breath sounds and air entry. Abdominal:      General: Abdomen is flat. Bowel sounds are normal.      Palpations: Abdomen is soft. Tenderness: There is no abdominal tenderness. Musculoskeletal:      Right lower le+ Pitting Edema present. Left lower le+ Pitting Edema present. Skin:     Capillary Refill: Capillary refill takes less than 2 seconds. Neurological:      Mental Status: She is lethargic and disoriented. Psychiatric:         Behavior: Behavior is uncooperative. LABS:    CBC:   Recent Labs     10/04/22  0705 10/05/22  1546 10/06/22  1028   WBC 16.2* 11.2* 9.2   HGB 12.8 12.2 13.1   HCT 39.0 37.5 40.6    338 345   MCV 86.7 86.8 87.6       Renal:    Recent Labs     10/04/22  0705 10/05/22  1546 10/06/22  1028    138 141   K 3.0* 3.6 3.1*    98* 100   CO2 28 29 32   BUN 9 11 9   CREATININE 0.6 0.6 0.5*   GLUCOSE 55* 321* 190*   CALCIUM 8.4 7.9* 8.2*   MG 1.70* 1.80 1.80   ANIONGAP 13 11 9       Hepatic:   No results for input(s): AST, ALT, BILITOT, BILIDIR, PROT, LABALBU, ALKPHOS in the last 72 hours.     Troponin:   No results for input(s): TROPONINI in the last 72 hours. BNP: No results for input(s): BNP in the last 72 hours. Lipids:   No results for input(s): CHOL, HDL in the last 72 hours. Invalid input(s): LDLCALCU, TRIGLYCERIDE    ABGs:  No results for input(s): PHART, NDY2OQJ, PO2ART, YTZ0TIJ, BEART, THGBART, Q7RFHRCS, UCX5ROO in the last 72 hours. INR: No results for input(s): INR in the last 72 hours. Lactate: No results for input(s): LACTATE in the last 72 hours. Cultures:  -----------------------------------------------------------------  RAD:   XR CHEST 1 VIEW   Final Result      Moderate pulmonary edema. Left pleural effusion. Stable cardiomediastinal silhouette status post sternotomy. Old right rib fractures. XR FEMUR LEFT (MIN 2 VIEWS)   Final Result      Unchanged appearance of left hip arthroplasty and ORIF hardware. No acute fracture or complication seen. XR FEMUR LEFT (MIN 2 VIEWS)   Final Result   Impression:   Prior left total hip arthroplasty and ORIF of periprosthetic fracture. No acute abnormality. XR CHEST 1 VIEW   Final Result   1. Combined airspace consolidation and moderate to large left pleural effusion which is new   2. Likely atelectatic changes right lower lung   3. Status post CABG            Assessment/Plan:   Ludwig Montoya is a 80 y.o. female with a past medical history of a brain mass that is most likely a meningioma, type 2 diabetes, hypertension, hyperlipidemia, and baseline dementia who presents with facial swelling from her nursing home. Of note, patient recently had a left hip fracture and is status post ORIF for that fracture. Hypoxic respiratory failure likely secondary to fluid overload. Patient noted to have bilateral 2+ lower extremity edema and bilateral crackles at both lung bases. As well as a proBNP of around 30,000.   Patient has never been formally diagnosed with heart failure  Will obtain echocardiogram-patient refused echocardiogram yesterday. We will try again today with a bedside echocardiogram if at the echocardiography department is amenable to that plan. Cardiology Consult- recommended initiation low dose B blocker  Lasix IV 40 twice daily  Daily weights  Strict Is and Os- down ~1L since admission  Will monitor oxygen saturation  Continuous telemetry  Trend Troponins. 0.09 down from 0.11. Will order another troponin for a 3rd value. PO/OT  Combined airspace consolidation and moderate to large left pleural effusion- patient noted to have slight leukocytosis at 11.8. Radiographic findings as well as slight leukocytosis as well as bilateral crackles could represent some aspect of pneumonia. Will obtain repeat CXR for resolution of effusion  Will continue supplement with oxygen via nasal cannula  If no resolution of pulmonary symptoms, will consider consulting pulmonologist.  Will up the Lasix to 40mg BID IV  Will order procalcitonin- 0.08. Unlikely septic. Paroxsymal SVT overnight: Resolved  Patient will be started on Lopressor 25mg BID for rate control. Possible uncomplicated UTI: Patient noted to have some WBCs on UA. Ceftriaxone 1mg daily for 5 days. Will transition the patient to PO Keflex upon discharge for the remainder of 5 day course. Chronic Medical Conditions:  Hypertension-we will continue losartan 75 mg  Dementia-we will continue donepezil 10 mg  Osteoporosis-we will hold the patient's at home alendronate 35 mg weekly while inpatient. Type 2 diabetes-we will have the patient on Lantus 5 nightly as well as low-dose sliding scale. Hypoglycemia protocols in place. Code Status:DNR-CCA  FEN: ADULT DIET; Regular; 3 carb choices (45 gm/meal);  Low Fat/Low Chol/High Fiber/2 gm Na  PPX:  Lovenox  DISPO: CHRISTINA Sanon MD  PGY1, Internal Medicine  10/06/22  1:01 PM    This patient will be staffed and discussed with Satya Gomez MD.

## 2022-10-06 NOTE — DISCHARGE INSTR - COC
Continuity of Care Form    Patient Name: Brendan Rush   :  1931  MRN:  [id number]    Admit date:  10/1/2022  Discharge date:  10/8/22    Code Status Order: DNR-CCA   Advance Directives:     Admitting Physician:  Maura Montiel MD  PCP: Piedad Meza MD    Discharging Nurse: Janett Michel 23 Unit/Room#: 2306/2511-29  Discharging Unit Phone Number: ***    Emergency Contact:   Extended Emergency Contact Information  Primary Emergency Contact: Carla Higuera [address] Phone: [phone]  Mobile Phone: [phone]  Relation: Child  Secondary Emergency Contact: Thom Cooper [address] Phone: [phone]  Mobile Phone: [phone]  Relation: Child    Past Surgical History:  Past Surgical History:   Procedure Laterality Date    BRAIN TUMOR EXCISION      benign, right frontotemporal craniotomy    CORONARY ARTERY BYPASS GRAFT      HIP FRACTURE SURGERY Left 2022    LEFT HIP OPEN REDUCTION INTERNAL FIXATION performed by Shira Bose MD at [address] [place] [address]      pins    ORIF HIP FRACTURE Left 2022    PARTIAL HIP ARTHROPLASTY      left hip       Immunization History:   Immunization History   Administered Date(s) Administered    COVID-19, PFIZER PURPLE top, DILUTE for use, (age 15 y+), 30mcg/0.3mL 2021, 2021, 10/19/2021    Influenza Vaccine, unspecified formulation 10/23/2017    Influenza, FLUAD, (age 72 y+), Adjuvanted, 0.5mL 10/05/2020, 2021    Influenza, High Dose (Fluzone 65 yrs and older) 2016, 2018    Influenza, Triv, inactivated, subunit, adjuvanted, IM (Fluad 65 yrs and older) 2019    Pneumococcal Conjugate 13-valent (Vydwydb69) 2017    Pneumococcal Polysaccharide (Niazxvojq19) 2016    Tdap (Boostrix, Adacel) 2017, 2018    Zoster Live (Zostavax) 2016       Active Problems:  Patient Active Problem List   Diagnosis Code    Type 2 diabetes mellitus with circulatory disorder, without long-term current use of insulin ([facility]) E11.59    Pure hypercholesterolemia E78.00    Dementia ([facility]) F03.90    Diabetes ([facility] E11.9    Hyperlipidemia E78.5    Hypertension I10    Osteoporosis M81.0    Meningioma ([facility] D32.9    Pelvic ring fracture, closed, initial encounter ([facility] S32.810A    Periprosthetic fracture around internal prosthetic left hip joint ([facility] M97. 02XA    DM (diabetes mellitus), type 2, uncontrolled [id number]    Heart failure ([facility] I50.9       Isolation/Infection:   Isolation            Contact          Patient Infection Status       Infection Onset Added Last Indicated Last Indicated By Review Planned Expiration Resolved Resolved By    MRSA 12/13/21 12/16/21 12/13/21 Culture, Wound        Resolved    COVID-19 (Rule Out) 10/01/22 10/01/22 10/01/22 COVID-19 & Influenza Combo (Ordered)   10/01/22 Rule-Out Test Resulted            Nurse Assessment:  Last Vital Signs: /74   Pulse 67   Temp 98.1 °F (36.7 °C) (Axillary)   Resp 16   Ht 5' (1.524 m)   Wt 114 lb 6.7 oz (51.9 kg)   SpO2 95%   BMI 22.35 kg/m²     Last documented pain score (0-10 scale): Pain Level: 0  Last Weight:   Wt Readings from Last 1 Encounters:   10/05/22 114 lb 6.7 oz (51.9 kg)     Mental Status:  disoriented, alert, and demented and hard of hearing    IV Access:  - None    Nursing Mobility/ADLs:  Walking   Dependent  Transfer  Dependent  Bathing  Dependent  Dressing  Dependent  Toileting  Dependent  Feeding  Assisted  Med Admin  Dependent  Med Delivery   crushed and prefers mixed with a.s. Wound Care Documentation and Therapy:  Wound 09/14/22 Hip Anterior; Left (Active)   Wound Etiology Surgical 10/06/22 0445   Dressing Status Clean;Dry; Intact 10/03/22 0319   Dressing/Treatment Open to air 10/06/22 0445   Dressing Change Due 09/18/22 09/16/22 0750   Wound Assessment Pink/red 10/06/22 0445   Drainage Amount None 10/06/22 0445   Odor None 10/06/22 0445   Aminata-wound Assessment Other (Comment) 09/20/22 1500   Margins Other (Comment) 09/20/22 1500   Number of days: 21       Wound Sacrum (Active)   Dressing Status Clean;Dry; Intact 09/21/22 1515   Wound Cleansed Soap and water 09/20/22 1500   Dressing/Treatment Open to air 09/20/22 1500   Wound Assessment Pink/red 09/21/22 1515   Drainage Amount None 09/21/22 1515   Odor None 09/21/22 1515   Aminata-wound Assessment Intact 09/21/22 1515   Margins Attached edges 09/21/22 0300   Number of days:        Incision 09/17/22 Thigh Anterior;Left;Proximal (Active)   Dressing Status Clean;Dry; Intact 09/21/22 1515   Dressing/Treatment Silver dressing;Tegaderm/transparent film dressing 09/21/22 1515   Closure Kermit 10/03/22 0319   Margins Other (Comment) 09/20/22 1500   Incision Assessment Other (Comment) 09/20/22 1500   Drainage Amount None 09/20/22 2300   Odor None 09/21/22 1515   Aminata-incision Assessment Other (Comment) 09/20/22 1500   Number of days: 18        Elimination:  Continence: Bowel: No  Bladder: No  Urinary Catheter: Insertion Date: 10/8, Indication for Use of Catheter: Acute urinary retention/obstruction, and patient failed bladder trial when fonseca removed    Colostomy/Ileostomy/Ileal Conduit: No       Date of Last BM: unknown (poor historian)    Intake/Output Summary (Last 24 hours) at 10/6/2022 1010  Last data filed at 10/6/2022 0103  Gross per 24 hour   Intake 240 ml   Output 1600 ml   Net -1360 ml     I/O last 3 completed shifts: In: 480 [P.O.:480]  Out: 1825 [Urine:1825]    Safety Concerns:     History of Falls (last 30 days) and At Risk for Falls    Impairments/Disabilities:      Hearing and has hearing aids    Nutrition Therapy:  Current Nutrition Therapy:   - Oral Diet:  General and carb control    Routes of Feeding: Oral  Liquids:  Thin Liquids  Daily Fluid Restriction: no  Last Modified Barium Swallow with Video (Video Swallowing Test): not done    Treatments at the Time of Hospital Discharge:   Respiratory Treatments: none  Oxygen Therapy:  is not on home oxygen therapy. Ventilator:    - No ventilator support    Heart Failure Instructions for Daily Management  Patient was treated for acute diastolic heart failure. she  will require the following:    Please weigh daily on the same scale and approximately the same time of day. Report weight gain of 3 pounds/day or 5 pounds/week to : juan SIFUENTES, Rayne Scott [phone], and [facility] / [address] [place] [address] [phone]. Please use hospital discharge weight as baseline reference. Please monitor for signs and symptoms of and report to MD:  Worsening Heart Failure: sudden weight gain, shortness of breath, lower extremity or general edema/swelling, abdominal bloating/swelling, inability to lie flat, intolerance to usual activity, or cough (especially at night). Report these finding even if no increase in weight. Dehydration:  having difficulty or a decrease in urination, dizziness, worsening fatigue, or new onset/worsening of generalized weakness. Please continue a LOW SODIUM diet and LIMIT fluid intake to 48 - 64 ounces ( 1.5 - 2 liters) per day. Call Rayne Scott [phone], juan SIFUENTES, and [facility] / [address] [place] [address] [phone] with any questions or concerns. Please continue heart failure education to patient and family/support system. See After Visit Summary for hospital follow up appointment details. Consider spiritual care referral for support and/or completion of advance directives . Consider: having the [facility] MD complete required 7 day follow up, [place] [address] telehealth program if patient agreeable and able to participate, and palliative care consult for ongoing goals of care, end of life, and/or chronic disease management discussions. Patient's primary cardiologist is Dr José Tobar.          Rehab Therapies: Physical Therapy and Occupational Therapy  Weight Bearing Status/Restrictions: No weight bearing restrictions  Other Medical Equipment (for information only, NOT a DME order):  hospital bed  Other Treatments: none    Patient's personal belongings (please select all that are sent with patient):  Hearing Aides bilateral    RN SIGNATURE:  Electronically signed by Beatriz Martinez RN on 10/8/22 at 4:48 PM EDT    CASE MANAGEMENT/SOCIAL WORK SECTION    Inpatient Status Date: 10/01/22    Readmission Risk Assessment Score:  Readmission Risk              Risk of Unplanned Readmission:  19           Discharging to Facility/ Agency   Name: [facility]  Phone: [phone]  Fax: [phone]      / signature: Electronically signed by Nancy Fong RN on 10/8/22 at 11:29 AM EDT    PHYSICIAN SECTION    Prognosis: Good    Condition at Discharge: Stable    Rehab Potential (if transferring to Rehab): Good    Recommended Labs or Other Treatments After Discharge: The following medications have been added or changed;    1. Omnicef  2. Metoprolol 25 mg ER take 1 tablet daily  3. Spironolactone (Aldactone) 25 mg take 0.5 tablets daily    The following medications have been stopped:  1. Insulin glargine  2. Dapagliflozin 10 mg  3. Janumet 100-1000 mg  4. Pioglitazone 30 mg      - If urinary retention demonstrated, we may need to leave fonseca in for few days, if ISC cannot be guaranteed. O/P TOV in next several days at facility, with F/u with Urology if needed. Physician Certification: I certify the above information and transfer of Falguni Arenas  is necessary for the continuing treatment of the diagnosis listed and that she requires Assisted Living for greater 30 days.      Update Admission H&P: No change in H&P    PHYSICIAN SIGNATURE:  Electronically signed by Lorena Malagon MD on 10/8/22 at 11:44 AM EDT  Baron Zara MD

## 2022-10-06 NOTE — CARE COORDINATION
Case Management Assessment           Daily Note                 Date/ Time of Note: 10/6/2022 10:09 AM         Note completed by: Eulalia Frazier RN    Patient Name: Willis Arizmendi  YOB: 1931    Diagnosis:Pleural effusion [J90]  Heart failure ([address] [I50.9]  Patient Admission Status: Inpatient    Date of Admission:10/1/2022  1:28 PM Length of Stay: 5 GLOS: GMLOS: 3.9    Current Plan of Care:     Disposition: From U   Echo today  Will need SNF likely in about 1-2 days; possibly tomorrow. ________________________________________________________________________________________  PT AM-PAC: 9 / 24 per last evaluation on: 10/05/2022    OT AM-PAC: 13 / 24 per last evaluation on: 10/05/2022    DME Needs for discharge: None    ________________________________________________________________________________________  Discharge Plan: Home with [address] [place] [address] Way: VS  SNF  likely      Tentative discharge date:  1-2  days      Current barriers to discharge:  ECHO  pending      Factors facilitating achievement of predicted outcomes: Family support and Caregiver support     Barriers to discharge: Confusion, Cognitive deficit, Limited safety awareness, and Communication deficit    Referrals completed: SNF: [facility]  Following      Resources/ information provided: Not indicated at this time  ________________________________________________________________________________________  Case Management Notes:    Transportation PLAN for discharge: EMS transportation      Additional Case Management Notes:     CM  following for  d/c planning  / needs :   -   Pt lives at [place] [address]      -   She had a recent stay at [facility] after at hip fracture. Peewee Renteria in admissions [phone]   can go back to [facility] for rehab before going back to their [facility] assisted living. Family in agreement.   -   Patient has no precert needed and if we call the morning of needed discharge day, they will be able to accept at [facility] as long as they have beds. -   At this time, they have beds available. Citlaly Goldman and her family were provided with choice of provider; she and her family are in agreement with the discharge plan. Care Transition Patient:  Yes    Cony Yadav RN  [facility]  Case Management Department  Ph: [phone]

## 2022-10-07 ENCOUNTER — APPOINTMENT (OUTPATIENT)
Dept: GENERAL RADIOLOGY | Age: 87
DRG: 291 | End: 2022-10-07
Payer: MEDICARE

## 2022-10-07 LAB
ANION GAP SERPL CALCULATED.3IONS-SCNC: 12 MMOL/L (ref 3–16)
BASOPHILS ABSOLUTE: 0.1 K/UL (ref 0–0.2)
BASOPHILS RELATIVE PERCENT: 1 %
BUN BLDV-MCNC: 11 MG/DL (ref 7–20)
CALCIUM SERPL-MCNC: 8.5 MG/DL (ref 8.3–10.6)
CHLORIDE BLD-SCNC: 96 MMOL/L (ref 99–110)
CO2: 27 MMOL/L (ref 21–32)
CREAT SERPL-MCNC: 0.5 MG/DL (ref 0.6–1.2)
EOSINOPHILS ABSOLUTE: 0.1 K/UL (ref 0–0.6)
EOSINOPHILS RELATIVE PERCENT: 1 %
GFR AFRICAN AMERICAN: >60
GFR NON-AFRICAN AMERICAN: >60
GLUCOSE BLD-MCNC: 143 MG/DL (ref 70–99)
GLUCOSE BLD-MCNC: 169 MG/DL (ref 70–99)
GLUCOSE BLD-MCNC: 188 MG/DL (ref 70–99)
GLUCOSE BLD-MCNC: 196 MG/DL (ref 70–99)
GLUCOSE BLD-MCNC: 260 MG/DL (ref 70–99)
GLUCOSE BLD-MCNC: 282 MG/DL (ref 70–99)
HCT VFR BLD CALC: 38.2 % (ref 36–48)
HEMOGLOBIN: 12.9 G/DL (ref 12–16)
LYMPHOCYTES ABSOLUTE: 2 K/UL (ref 1–5.1)
LYMPHOCYTES RELATIVE PERCENT: 21 %
MAGNESIUM: 1.7 MG/DL (ref 1.8–2.4)
MCH RBC QN AUTO: 29 PG (ref 26–34)
MCHC RBC AUTO-ENTMCNC: 33.7 G/DL (ref 31–36)
MCV RBC AUTO: 86.2 FL (ref 80–100)
MONOCYTES ABSOLUTE: 0.4 K/UL (ref 0–1.3)
MONOCYTES RELATIVE PERCENT: 4 %
NEUTROPHILS ABSOLUTE: 7.1 K/UL (ref 1.7–7.7)
NEUTROPHILS RELATIVE PERCENT: 73 %
PDW BLD-RTO: 17.7 % (ref 12.4–15.4)
PERFORMED ON: ABNORMAL
PLATELET # BLD: 311 K/UL (ref 135–450)
PMV BLD AUTO: 9.9 FL (ref 5–10.5)
POTASSIUM SERPL-SCNC: 4.3 MMOL/L (ref 3.5–5.1)
PRO-BNP: 9937 PG/ML (ref 0–449)
RBC # BLD: 4.44 M/UL (ref 4–5.2)
SODIUM BLD-SCNC: 135 MMOL/L (ref 136–145)
TROPONIN: 0.05 NG/ML
TROPONIN: 0.06 NG/ML
WBC # BLD: 9.7 K/UL (ref 4–11)

## 2022-10-07 PROCEDURE — 83735 ASSAY OF MAGNESIUM: CPT

## 2022-10-07 PROCEDURE — 36415 COLL VENOUS BLD VENIPUNCTURE: CPT

## 2022-10-07 PROCEDURE — 6360000002 HC RX W HCPCS

## 2022-10-07 PROCEDURE — 84484 ASSAY OF TROPONIN QUANT: CPT

## 2022-10-07 PROCEDURE — 83880 ASSAY OF NATRIURETIC PEPTIDE: CPT

## 2022-10-07 PROCEDURE — 2580000003 HC RX 258: Performed by: STUDENT IN AN ORGANIZED HEALTH CARE EDUCATION/TRAINING PROGRAM

## 2022-10-07 PROCEDURE — 71045 X-RAY EXAM CHEST 1 VIEW: CPT

## 2022-10-07 PROCEDURE — 80048 BASIC METABOLIC PNL TOTAL CA: CPT

## 2022-10-07 PROCEDURE — 85025 COMPLETE CBC W/AUTO DIFF WBC: CPT

## 2022-10-07 PROCEDURE — 1200000000 HC SEMI PRIVATE

## 2022-10-07 PROCEDURE — 99233 SBSQ HOSP IP/OBS HIGH 50: CPT | Performed by: INTERNAL MEDICINE

## 2022-10-07 PROCEDURE — 6370000000 HC RX 637 (ALT 250 FOR IP): Performed by: INTERNAL MEDICINE

## 2022-10-07 PROCEDURE — 93005 ELECTROCARDIOGRAM TRACING: CPT

## 2022-10-07 PROCEDURE — 6370000000 HC RX 637 (ALT 250 FOR IP)

## 2022-10-07 PROCEDURE — 6360000002 HC RX W HCPCS: Performed by: STUDENT IN AN ORGANIZED HEALTH CARE EDUCATION/TRAINING PROGRAM

## 2022-10-07 RX ORDER — FUROSEMIDE 40 MG/1
40 TABLET ORAL 2 TIMES DAILY
Status: DISCONTINUED | OUTPATIENT
Start: 2022-10-07 | End: 2022-10-08 | Stop reason: HOSPADM

## 2022-10-07 RX ORDER — POTASSIUM CHLORIDE 20 MEQ/1
20 TABLET, EXTENDED RELEASE ORAL 2 TIMES DAILY WITH MEALS
Status: DISCONTINUED | OUTPATIENT
Start: 2022-10-07 | End: 2022-10-07 | Stop reason: ALTCHOICE

## 2022-10-07 RX ORDER — MAGNESIUM SULFATE IN WATER 40 MG/ML
2000 INJECTION, SOLUTION INTRAVENOUS ONCE
Status: COMPLETED | OUTPATIENT
Start: 2022-10-07 | End: 2022-10-07

## 2022-10-07 RX ORDER — METOPROLOL SUCCINATE 25 MG/1
25 TABLET, EXTENDED RELEASE ORAL DAILY
Status: DISCONTINUED | OUTPATIENT
Start: 2022-10-08 | End: 2022-10-08 | Stop reason: HOSPADM

## 2022-10-07 RX ORDER — POTASSIUM CHLORIDE 7.45 MG/ML
10 INJECTION INTRAVENOUS
Status: DISCONTINUED | OUTPATIENT
Start: 2022-10-07 | End: 2022-10-07

## 2022-10-07 RX ADMIN — DONEPEZIL HYDROCHLORIDE 10 MG: 10 TABLET, FILM COATED ORAL at 20:35

## 2022-10-07 RX ADMIN — ASPIRIN 81 MG: 81 TABLET, COATED ORAL at 10:28

## 2022-10-07 RX ADMIN — ENOXAPARIN SODIUM 40 MG: 100 INJECTION SUBCUTANEOUS at 10:29

## 2022-10-07 RX ADMIN — ATORVASTATIN CALCIUM 10 MG: 10 TABLET, FILM COATED ORAL at 10:28

## 2022-10-07 RX ADMIN — FUROSEMIDE 40 MG: 40 TABLET ORAL at 10:28

## 2022-10-07 RX ADMIN — SPIRONOLACTONE 12.5 MG: 25 TABLET, FILM COATED ORAL at 10:28

## 2022-10-07 RX ADMIN — METOPROLOL SUCCINATE 12.5 MG: 25 TABLET, EXTENDED RELEASE ORAL at 10:28

## 2022-10-07 RX ADMIN — INSULIN GLARGINE 5 UNITS: 100 INJECTION, SOLUTION SUBCUTANEOUS at 00:15

## 2022-10-07 RX ADMIN — DONEPEZIL HYDROCHLORIDE 10 MG: 10 TABLET, FILM COATED ORAL at 00:13

## 2022-10-07 RX ADMIN — INSULIN LISPRO 4 UNITS: 100 INJECTION, SOLUTION INTRAVENOUS; SUBCUTANEOUS at 12:16

## 2022-10-07 RX ADMIN — CEFTRIAXONE 1000 MG: 1 INJECTION, POWDER, FOR SOLUTION INTRAMUSCULAR; INTRAVENOUS at 16:33

## 2022-10-07 RX ADMIN — INSULIN GLARGINE 5 UNITS: 100 INJECTION, SOLUTION SUBCUTANEOUS at 23:17

## 2022-10-07 RX ADMIN — POTASSIUM BICARBONATE 20 MEQ: 782 TABLET, EFFERVESCENT ORAL at 20:34

## 2022-10-07 RX ADMIN — MAGNESIUM SULFATE HEPTAHYDRATE 2000 MG: 40 INJECTION, SOLUTION INTRAVENOUS at 14:27

## 2022-10-07 NOTE — CARE COORDINATION
Case Management Assessment           Daily Note                 Date/ Time of Note: 10/7/2022 2:50 PM         Note completed by: Fabian Soliz RN    Patient Name: Evans Sarmiento  YOB: 1931    Diagnosis:Pleural effusion [J90]  Heart failure ([address] [I50.9]  Patient Admission Status: Inpatient    Date of Admission:10/1/2022  1:28 PM Length of Stay: 6 GLOS: GMLOS: 3.9    Current Plan of Care:     Disposition:    Echo  Cardiology  following  :      Anticipate  d/c  Sat. Will need SNF likely in about 1-2 days; possibly tomorrow. ________________________________________________________________________________________  PT AM-PAC: 10 / 24 per last evaluation on: 10/05/2022    OT AM-PAC: 14 / 24 per last evaluation on: 10/05/2022    DME Needs for discharge: None    ________________________________________________________________________________________  Discharge Plan: Home with [address] [place] [address] Way: VS  SNF  likely      Tentative discharge date:  1-2  days      Current barriers to discharge:  ECHO  pending      Factors facilitating achievement of predicted outcomes: Family support and Caregiver support     Barriers to discharge: Confusion, Cognitive deficit, Limited safety awareness, and Communication deficit    Referrals completed: SNF: [facility]  Following      Resources/ information provided: Not indicated at this time  ________________________________________________________________________________________  Case Management Notes:    Transportation PLAN for discharge: EMS transportation      Additional Case Management Notes:     CM  following for  d/c planning  / needs :   -   Pt lives at [address]      -   She had a recent stay at [facility] after at hip fracture. Jose Valle in admissions [phone]   can go back to [facility] for rehab before going back to their [facility] assisted living. Family in agreement.       -   Patient has no precert needed    CM  spoke with  Trista Sanchez in admissions  :  [phone]      She is aware of poss  Sat d/c  and  transport at  1400 ,      RN to call report :  [phone]  Fax  ANNIE to  [address] and her family were provided with choice of provider; she and her family are in agreement with the discharge plan. Care Transition Patient:  Yes    Tequila Hightower RN  [facility]  Case Management Department  Ph: [phone]

## 2022-10-07 NOTE — PROGRESS NOTES
Internal Medicine Progress Note    Admit Date: 10/1/2022  Day: 6   Diet: ADULT DIET; Regular; 3 carb choices (45 gm/meal); Low Fat/Low Chol/High Fiber/2 gm Na    CC: Facial Swelling    Interval history: Patient was seen at the bedside this morning. No acute events overnight. We were planning to discharge the patient back to her rehab unit from whence she came, in the morning the patient had a run of SVT with heart rates into the 180s. This spontaneously resolved. A stat EKG was obtained and a stat troponins were obtained which showed normal sinus rhythm. Troponin was slightly elevated so a repeat will be obtained. This episode was discussed with her cardiologist Palmer Flood. At this point, we cannot discharge the patient until she is cleared explicitly by cardiology after this event. Patient is currently on toprol 12.5. After speaking to Dr Apolonia Gutierrez, he recommended increasing toprol to 25. Cardiology is also OK with discharge tomorrow. HPI:  Melonie Chang is a 80 y.o. female with medical history of Brain mass thought to be meningioma, type 2 diabetes, hypertension, hyperlipidemia, and baseline dementia who presents with presented to the [facility] emergency department from the [facility] memory unit where her caretakers noticed that she has had some facial swelling. Due to the patient's underlying dementia, she was not able to give a thorough history. The emergency department attempted to contact the memory unit but was not able to get through. Per the report from the EMS, the patient was noticed to have increased facial swelling was therefore brought into the hospital for further evaluation. Of note, the patient was recently admitted to the hospital for a left hip fracture. Patient is status post ORIF for that left hip fracture. ED course: In the emergency department, the patient was alert and oriented to self. The patient's vitals were appreciated and were noticed to be stable.   Blood pressure was noted to be 109/54, heart was noted to be 88, and respirations were noted to be 16 patient is afebrile. The patient was noted to have been saturating in the high 80% and was therefore put on 2 L of oxygen in the emergency department. Patient is currently saturating  in the 90s on two liters. Of note from the patient's exam, she has bilateral lower extremity edema that was estimated to be 2+. On pulmonary auscultation, there were bilateral lower lobe crackles. Imaging studies in the emergency department notable for chest x-ray which showed some airspace consolidation and a large left pleural effusion atelectatic changes to the right lower lung were also noted. Labs in the ED notable for a slight leukocytosis of 11.8. Patient's absolute neutrophils also noted to be increased. The BMP was within normal limits largely except for a glucose of 325. Alkaline phosphatase also noted to be high at 188. Also of note, proBNP noted to be around 30,000. VBG was obtained in the ED which was within normal limits. pH was noted to be 7.350. Patient has both COVID and influenza negative. The patient was discussed with the admitting hospitalist and it was determined that she would likely benefit from inpatient admission and further work-up of what is likely an acute hypoxic respiratory failure secondary to new onset heart failure with exacerbation.     Medications:     Scheduled Meds:   [Held by provider] furosemide  40 mg Oral BID    potassium chloride  20 mEq Oral BID WC    metoprolol succinate  12.5 mg Oral Daily    spironolactone  12.5 mg Oral Daily    insulin lispro  0-8 Units SubCUTAneous TID WC    insulin lispro  0-4 Units SubCUTAneous Nightly    cefTRIAXone (ROCEPHIN) IV  1,000 mg IntraVENous Q24H    aspirin EC  81 mg Oral Daily    donepezil  10 mg Oral Nightly    enoxaparin  40 mg SubCUTAneous Daily    atorvastatin  10 mg Oral Daily    sodium chloride flush  5-40 mL IntraVENous 2 times per day    insulin glargine  5 Units SubCUTAneous Nightly     Continuous Infusions:   dextrose       PRN Meds:sodium chloride flush, ondansetron **OR** ondansetron, polyethylene glycol, acetaminophen **OR** acetaminophen, perflutren lipid microspheres, glucose, dextrose bolus **OR** dextrose bolus, glucagon (rDNA), dextrose    Objective:   Vitals:   T-max:  Patient Vitals for the past 8 hrs:   BP Temp Temp src Pulse Resp SpO2   10/07/22 1100 -- -- -- (!) 180 -- --   10/07/22 1030 126/78 97.8 °F (36.6 °C) Oral 76 16 94 %   10/07/22 0801 (!) 122/51 -- -- 74 18 94 %         Intake/Output Summary (Last 24 hours) at 10/7/2022 1147  Last data filed at 10/7/2022 0554  Gross per 24 hour   Intake 16 ml   Output 2250 ml   Net -2234 ml         Physical Exam  Constitutional:       Appearance: She is underweight. Eyes:      Extraocular Movements: Extraocular movements intact. Neck:      Vascular: No JVD. Cardiovascular:      Rate and Rhythm: Normal rate and regular rhythm. Pulses:           Radial pulses are 2+ on the right side and 2+ on the left side. Heart sounds: Normal heart sounds, S1 normal and S2 normal. No murmur heard. Pulmonary:      Effort: Pulmonary effort is normal.      Breath sounds: Normal breath sounds and air entry. Abdominal:      General: Abdomen is flat. Bowel sounds are normal.      Palpations: Abdomen is soft. Tenderness: There is no abdominal tenderness. Musculoskeletal:      Right lower le+ Pitting Edema present. Left lower le+ Pitting Edema present. Skin:     Capillary Refill: Capillary refill takes less than 2 seconds. Neurological:      Mental Status: She is lethargic and disoriented. Psychiatric:         Behavior: Behavior is uncooperative.          LABS:    CBC:   Recent Labs     10/05/22  1546 10/06/22  1028   WBC 11.2* 9.2   HGB 12.2 13.1   HCT 37.5 40.6    345   MCV 86.8 87.6       Renal:    Recent Labs     10/05/22  1546 10/06/22  1028    141 K 3.6 3.1*   CL 98* 100   CO2 29 32   BUN 11 9   CREATININE 0.6 0.5*   GLUCOSE 321* 190*   CALCIUM 7.9* 8.2*   MG 1.80 1.80   ANIONGAP 11 9       Hepatic:   No results for input(s): AST, ALT, BILITOT, BILIDIR, PROT, LABALBU, ALKPHOS in the last 72 hours. Troponin:   No results for input(s): TROPONINI in the last 72 hours. BNP: No results for input(s): BNP in the last 72 hours. Lipids:   No results for input(s): CHOL, HDL in the last 72 hours. Invalid input(s): LDLCALCU, TRIGLYCERIDE    ABGs:  No results for input(s): PHART, JVN0QGZ, PO2ART, VMQ6LNA, BEART, THGBART, M1PCAYKH, IQO8OEN in the last 72 hours. INR: No results for input(s): INR in the last 72 hours. Lactate: No results for input(s): LACTATE in the last 72 hours. Cultures:  -----------------------------------------------------------------  RAD:   XR CHEST 1 VIEW   Final Result      Moderate pulmonary edema. Left pleural effusion. Stable cardiomediastinal silhouette status post sternotomy. Old right rib fractures. XR FEMUR LEFT (MIN 2 VIEWS)   Final Result      Unchanged appearance of left hip arthroplasty and ORIF hardware. No acute fracture or complication seen. XR FEMUR LEFT (MIN 2 VIEWS)   Final Result   Impression:   Prior left total hip arthroplasty and ORIF of periprosthetic fracture. No acute abnormality. XR CHEST 1 VIEW   Final Result   1. Combined airspace consolidation and moderate to large left pleural effusion which is new   2. Likely atelectatic changes right lower lung   3. Status post CABG      XR CHEST PORTABLE    (Results Pending)         Assessment/Plan:   Melonie Chang is a 80 y.o. female with a past medical history of a brain mass that is most likely a meningioma, type 2 diabetes, hypertension, hyperlipidemia, and baseline dementia who presents with facial swelling from her nursing home. Of note, patient recently had a left hip fracture and is status post ORIF for that fracture. Hypoxic respiratory failure likely secondary to fluid overload. Patient noted to have bilateral 2+ lower extremity edema and bilateral crackles at both lung bases. As well as a proBNP of around 30,000. Patient has never been formally diagnosed with heart failure  Will obtain echocardiogram-patient refused echocardiogram yesterday. We will try again today with a bedside echocardiogram if at the echocardiography department is amenable to that plan. Cardiology Consult- recommended initiation low dose B blocker  Lasix IV 40 twice daily  Daily weights  Strict Is and Os- down ~1L since admission  Will monitor oxygen saturation  Continuous telemetry  Trend Troponins. 0.09 down from 0.11. Will order another troponin for a 3rd value. PO/OT  Combined airspace consolidation and moderate to large left pleural effusion- patient noted to have slight leukocytosis at 11.8. Radiographic findings as well as slight leukocytosis as well as bilateral crackles could represent some aspect of pneumonia. Will obtain repeat CXR for resolution of effusion  Will continue supplement with oxygen via nasal cannula  If no resolution of pulmonary symptoms, will consider consulting pulmonologist.  Will up the Lasix to 40mg BID IV  Will order procalcitonin- 0.08. Unlikely septic. Paroxsymal SVT: Recurrent episode this morning 10/7/22  STAT EKG obtained which was unchanged from previous ekg. Troponin immediately after the episode 0.05, down from previous of 0.08 on admission. Will obtain second troponin. Per Dr Eldred Dandy, patient's toprol increased from 12.5 to 25mg. Cardiology also OK with discharge tomorrow. Possible uncomplicated UTI: Patient noted to have some WBCs on UA. Ceftriaxone 1mg daily for 5 days. Will transition the patient to PO Keflex upon discharge for the remainder of 5 day course.   Chronic Medical Conditions:  Hypertension-we will continue losartan 75 mg  Dementia-we will continue donepezil 10 mg  Osteoporosis-we will hold the patient's at home alendronate 35 mg weekly while inpatient. Type 2 diabetes-we will have the patient on Lantus 5 nightly as well as low-dose sliding scale. Hypoglycemia protocols in place. Code Status:DNR-CCA  FEN: ADULT DIET; Regular; 3 carb choices (45 gm/meal);  Low Fat/Low Chol/High Fiber/2 gm Na  PPX:  Lovenox  DISPO: [facility]    Catherine Lewis MD  PGY1, Internal Medicine  10/07/22  11:47 AM    This patient will be staffed and discussed with Aurora Lawton MD.

## 2022-10-07 NOTE — PROGRESS NOTES
[address] Daily Progress Note      Admit Date:  10/1/2022    Subjective:  Ms. Antonio Castelan was seen and examined. F/U CHF. Awakens.     Objective:   /78   Pulse (S) (!) 180   Temp 97.8 °F (36.6 °C) (Oral)   Resp 16   Ht 5' (1.524 m)   Wt 110 lb 0.2 oz (49.9 kg)   SpO2 94%   BMI 21.48 kg/m²     Intake/Output Summary (Last 24 hours) at 10/7/2022 1507  Last data filed at 10/7/2022 1255  Gross per 24 hour   Intake 360 ml   Output 2250 ml   Net -1890 ml       TELEMETRY: Sinus     Physical Exam:  General:  Awakens  Skin:  Warm and dry  Neck:  JVP difficult  Chest:  decreased BS, respiration normal  Cardiovascular:  RRR S1S2  Abdomen:  Soft + BS  Extremities:  Tr L>R edema    Medications:    [Held by provider] furosemide  40 mg Oral BID    potassium bicarb-citric acid  20 mEq Oral BID    magnesium sulfate  2,000 mg IntraVENous Once    [START ON 10/8/2022] metoprolol succinate  25 mg Oral Daily    spironolactone  12.5 mg Oral Daily    insulin lispro  0-8 Units SubCUTAneous TID WC    insulin lispro  0-4 Units SubCUTAneous Nightly    cefTRIAXone (ROCEPHIN) IV  1,000 mg IntraVENous Q24H    aspirin EC  81 mg Oral Daily    donepezil  10 mg Oral Nightly    enoxaparin  40 mg SubCUTAneous Daily    atorvastatin  10 mg Oral Daily    sodium chloride flush  5-40 mL IntraVENous 2 times per day    insulin glargine  5 Units SubCUTAneous Nightly      dextrose       sodium chloride flush, ondansetron **OR** ondansetron, polyethylene glycol, acetaminophen **OR** acetaminophen, perflutren lipid microspheres, glucose, dextrose bolus **OR** dextrose bolus, glucagon (rDNA), dextrose    Lab Data:  CBC:   Recent Labs     10/05/22  1546 10/06/22  1028   WBC 11.2* 9.2   HGB 12.2 13.1   HCT 37.5 40.6   MCV 86.8 87.6    345     BMP:   Recent Labs     10/05/22  1546 10/06/22  1028    141   K 3.6 3.1*   CL 98* 100   CO2 29 32   BUN 11 9   CREATININE 0.6 0.5*     LIVER PROFILE:   No results for input(s): AST, ALT, LIPASE, BILIDIR, BILITOT, ALKPHOS in the last 72 hours. Invalid input(s): AMYLASE,  ALB    PT/INR: No results for input(s): PROTIME, INR in the last 72 hours. APTT: No results for input(s): APTT in the last 72 hours. BNP:  No results for input(s): BNP in the last 72 hours. IMAGING:     Assessment:  Patient Active Problem List    Diagnosis Date Noted    Heart failure ([facility] 10/01/2022    Periprosthetic fracture around internal prosthetic left hip joint ([facility] [address] [address] 09/19/2022    DM (diabetes mellitus), type 2, uncontrolled 09/19/2022    Pelvic ring fracture, closed, initial encounter ([facility] [address] 09/14/2022    Meningioma ([facility] 04/25/2022    Dementia ([facility]     Diabetes ([facility] [address]     Hyperlipidemia     Hypertension     Osteoporosis     Pure hypercholesterolemia 04/17/2017    Type 2 diabetes mellitus with circulatory disorder, without long-term current use of insulin ([facility] [address] 11/16/2016       Plan:  Continues confused. Reasonable diuresis. Edema resolved. On RA. Watch cr. Echo showing mild LV dysfunction. Continue  toprol/aldactone. Increase toprol in view of tachycardia. If pressure allows add ACE.         Core Measures:  Discharge instructions:   LVEF documented:   ACEI for LV dysfunction:   Smoking Cessation:    Rafi Johnston MD, MD 10/7/2022 3:07 PM

## 2022-10-07 NOTE — PROGRESS NOTES
Patient HR increased into the 180's on tele. Nurse at bedside and patient c/o of chest pain while holding her hand on her chest. Charge nurse at bedside, resident team notified, new orders for stat EKG and trops.

## 2022-10-07 NOTE — PLAN OF CARE
Problem: Skin/Tissue Integrity  Goal: Absence of new skin breakdown  Description: 1. Monitor for areas of redness and/or skin breakdown  2. Assess vascular access sites hourly  3. Every 4-6 hours minimum:  Change oxygen saturation probe site  4. Every 4-6 hours:  If on nasal continuous positive airway pressure, respiratory therapy assess nares and determine need for appliance change or resting period.   10/7/2022 4634 by Emma Santo RN  Outcome: Progressing  10/6/2022 1903 by Jordy Medina RN  Outcome: Progressing     Problem: ABCDS Injury Assessment  Goal: Absence of physical injury  10/6/2022 1903 by Jordy Medina RN  Outcome: Progressing     Problem: Discharge Planning  Goal: Discharge to home or other facility with appropriate resources  10/6/2022 1903 by Jordy Medina RN  Outcome: Progressing     Problem: Safety - Adult  Goal: Free from fall injury  10/7/2022 0822 by Emma Santo RN  Outcome: Progressing  10/6/2022 1903 by Jordy Medina RN  Outcome: Progressing     Problem: Respiratory - Adult  Goal: Achieves optimal ventilation and oxygenation  10/7/2022 0822 by Emma Santo RN  Outcome: Progressing  10/6/2022 1903 by Jordy Medina RN  Outcome: Progressing     Problem: Cardiovascular - Adult  Goal: Maintains optimal cardiac output and hemodynamic stability  10/6/2022 1903 by Jordy Medina RN  Outcome: Progressing  Goal: Absence of cardiac dysrhythmias or at baseline  10/6/2022 1903 by Jordy Medina RN  Outcome: Progressing     Problem: Metabolic/Fluid and Electrolytes - Adult  Goal: Electrolytes maintained within normal limits  10/6/2022 1903 by Jordy Medina RN  Outcome: Progressing  Goal: Hemodynamic stability and optimal renal function maintained  10/6/2022 1903 by Jordy Medina RN  Outcome: Progressing     Problem: Pain  Goal: Verbalizes/displays adequate comfort level or baseline comfort level  10/7/2022 0822 by Emma Santo RN  Outcome: Progressing  10/6/2022 1903 by Kelly Taylor, RN  Outcome: Progressing

## 2022-10-08 VITALS
HEIGHT: 60 IN | RESPIRATION RATE: 22 BRPM | WEIGHT: 110.23 LBS | HEART RATE: 64 BPM | SYSTOLIC BLOOD PRESSURE: 126 MMHG | DIASTOLIC BLOOD PRESSURE: 56 MMHG | OXYGEN SATURATION: 99 % | BODY MASS INDEX: 21.64 KG/M2 | TEMPERATURE: 97.2 F

## 2022-10-08 LAB
GLUCOSE BLD-MCNC: 144 MG/DL (ref 70–99)
GLUCOSE BLD-MCNC: 155 MG/DL (ref 70–99)
GLUCOSE BLD-MCNC: 217 MG/DL (ref 70–99)
MAGNESIUM: 2 MG/DL (ref 1.8–2.4)
PERFORMED ON: ABNORMAL

## 2022-10-08 PROCEDURE — 2580000003 HC RX 258: Performed by: STUDENT IN AN ORGANIZED HEALTH CARE EDUCATION/TRAINING PROGRAM

## 2022-10-08 PROCEDURE — 6360000002 HC RX W HCPCS: Performed by: STUDENT IN AN ORGANIZED HEALTH CARE EDUCATION/TRAINING PROGRAM

## 2022-10-08 PROCEDURE — 6370000000 HC RX 637 (ALT 250 FOR IP): Performed by: INTERNAL MEDICINE

## 2022-10-08 PROCEDURE — 6370000000 HC RX 637 (ALT 250 FOR IP)

## 2022-10-08 PROCEDURE — 6360000002 HC RX W HCPCS

## 2022-10-08 PROCEDURE — 2580000003 HC RX 258

## 2022-10-08 PROCEDURE — 83735 ASSAY OF MAGNESIUM: CPT

## 2022-10-08 PROCEDURE — 99233 SBSQ HOSP IP/OBS HIGH 50: CPT | Performed by: NURSE PRACTITIONER

## 2022-10-08 PROCEDURE — 51798 US URINE CAPACITY MEASURE: CPT

## 2022-10-08 PROCEDURE — 36415 COLL VENOUS BLD VENIPUNCTURE: CPT

## 2022-10-08 RX ORDER — LISINOPRIL 2.5 MG/1
2.5 TABLET ORAL DAILY
Status: DISCONTINUED | OUTPATIENT
Start: 2022-10-08 | End: 2022-10-08

## 2022-10-08 RX ORDER — METOPROLOL SUCCINATE 25 MG/1
25 TABLET, EXTENDED RELEASE ORAL DAILY
Qty: 30 TABLET | Refills: 3 | Status: SHIPPED | OUTPATIENT
Start: 2022-10-08

## 2022-10-08 RX ORDER — POLYETHYLENE GLYCOL 3350 17 G/17G
17 POWDER, FOR SOLUTION ORAL DAILY
Qty: 1530 G | Refills: 1 | Status: SHIPPED | OUTPATIENT
Start: 2022-10-08 | End: 2022-11-07

## 2022-10-08 RX ORDER — SPIRONOLACTONE 25 MG/1
12.5 TABLET ORAL DAILY
Qty: 30 TABLET | Refills: 3 | Status: SHIPPED | OUTPATIENT
Start: 2022-10-08

## 2022-10-08 RX ORDER — DONEPEZIL HYDROCHLORIDE 10 MG/1
10 TABLET, FILM COATED ORAL NIGHTLY
Qty: 90 TABLET | Refills: 1
Start: 2022-10-08 | End: 2022-10-25

## 2022-10-08 RX ORDER — OXYCODONE HYDROCHLORIDE 5 MG/1
5 CAPSULE ORAL EVERY 6 HOURS PRN
Qty: 8 CAPSULE | Refills: 0 | Status: SHIPPED | OUTPATIENT
Start: 2022-10-08 | End: 2022-10-11

## 2022-10-08 RX ORDER — CEFDINIR 300 MG/1
300 CAPSULE ORAL 2 TIMES DAILY
Qty: 10 CAPSULE | Refills: 0
Start: 2022-10-08 | End: 2022-10-13

## 2022-10-08 RX ORDER — LOSARTAN POTASSIUM 25 MG/1
25 TABLET ORAL DAILY
Qty: 28 TABLET | Refills: 5
Start: 2022-10-08

## 2022-10-08 RX ORDER — CEPHALEXIN 500 MG/1
500 CAPSULE ORAL 4 TIMES DAILY
Qty: 4 CAPSULE | Refills: 0 | Status: SHIPPED | OUTPATIENT
Start: 2022-10-08 | End: 2022-10-08 | Stop reason: HOSPADM

## 2022-10-08 RX ORDER — ENOXAPARIN SODIUM 100 MG/ML
40 INJECTION SUBCUTANEOUS DAILY
Qty: 4 ML | Refills: 0
Start: 2022-10-08 | End: 2022-10-18

## 2022-10-08 RX ADMIN — ATORVASTATIN CALCIUM 10 MG: 10 TABLET, FILM COATED ORAL at 08:57

## 2022-10-08 RX ADMIN — SPIRONOLACTONE 12.5 MG: 25 TABLET, FILM COATED ORAL at 08:57

## 2022-10-08 RX ADMIN — SODIUM CHLORIDE, PRESERVATIVE FREE 10 ML: 5 INJECTION INTRAVENOUS at 08:57

## 2022-10-08 RX ADMIN — CEFTRIAXONE 1000 MG: 1 INJECTION, POWDER, FOR SOLUTION INTRAMUSCULAR; INTRAVENOUS at 16:20

## 2022-10-08 RX ADMIN — ENOXAPARIN SODIUM 40 MG: 100 INJECTION SUBCUTANEOUS at 09:01

## 2022-10-08 RX ADMIN — ASPIRIN 81 MG: 81 TABLET, COATED ORAL at 08:57

## 2022-10-08 RX ADMIN — POTASSIUM BICARBONATE 20 MEQ: 782 TABLET, EFFERVESCENT ORAL at 08:57

## 2022-10-08 RX ADMIN — METOPROLOL SUCCINATE 25 MG: 25 TABLET, EXTENDED RELEASE ORAL at 08:57

## 2022-10-08 RX ADMIN — INSULIN LISPRO 2 UNITS: 100 INJECTION, SOLUTION INTRAVENOUS; SUBCUTANEOUS at 17:01

## 2022-10-08 NOTE — PROGRESS NOTES
Cardiology - PROGRESS NOTE    Admit Date: 10/1/2022     Chief Complaint: CHF     Interval History:   Patient seen and examined and notes reviewed. Patient is being followed for CHF. Patient was sent from her ECF after they noticed she had swelling in her face. BNP was greater than 9000 upon admission. Troponin max 0.11 -thought to be due to hypoxia. No complaints of chest discomfort placed on IV furosemide, transitioned to p.o. on hold. Edema has resolved. Not appear to have any respiratory distress.  -7.5 L. On Toprol-XL 25 mg daily and spironolactone 12.5 mg daily. BP has been stable.     In: 56 [P.O.:360]  Out: 175    Wt Readings from Last 2 Encounters:   10/08/22 110 lb 3.7 oz (50 kg)   09/16/22 117 lb 11.6 oz (53.4 kg)       Data:   Scheduled Meds:   Scheduled Meds:   [Held by provider] furosemide  40 mg Oral BID    potassium bicarb-citric acid  20 mEq Oral BID    metoprolol succinate  25 mg Oral Daily    spironolactone  12.5 mg Oral Daily    insulin lispro  0-8 Units SubCUTAneous TID WC    insulin lispro  0-4 Units SubCUTAneous Nightly    cefTRIAXone (ROCEPHIN) IV  1,000 mg IntraVENous Q24H    aspirin EC  81 mg Oral Daily    donepezil  10 mg Oral Nightly    enoxaparin  40 mg SubCUTAneous Daily    atorvastatin  10 mg Oral Daily    sodium chloride flush  5-40 mL IntraVENous 2 times per day    insulin glargine  5 Units SubCUTAneous Nightly     Continuous Infusions:   dextrose       PRN Meds:.sodium chloride flush, ondansetron **OR** ondansetron, polyethylene glycol, acetaminophen **OR** acetaminophen, perflutren lipid microspheres, glucose, dextrose bolus **OR** dextrose bolus, glucagon (rDNA), dextrose  Continuous Infusions:   dextrose         Intake/Output Summary (Last 24 hours) at 10/8/2022 1221  Last data filed at 10/8/2022 0523  Gross per 24 hour   Intake 490 ml   Output 175 ml   Net 315 ml       CBC:   Lab Results   Component Value Date/Time WBC 9.7 10/07/2022 03:01 PM    HGB 12.9 10/07/2022 03:01 PM     10/07/2022 03:01 PM     BMP:  Lab Results   Component Value Date/Time     10/07/2022 03:01 PM    K 4.3 10/07/2022 03:01 PM    K 4.5 10/01/2022 02:29 PM    CL 96 10/07/2022 03:01 PM    CO2 27 10/07/2022 03:01 PM    BUN 11 10/07/2022 03:01 PM    CREATININE 0.5 10/07/2022 03:01 PM    GLUCOSE 260 10/07/2022 03:01 PM     INR: No results found for: INR     CARDIAC LABS  ENZYMES:  Recent Labs     10/07/22  1123 10/07/22  1501   TROPONINI 0.05* 0.06*     FASTING LIPID PANEL:  Lab Results   Component Value Date/Time    HDL 36 10/02/2022 08:23 AM    LDLDIRECT 140 05/13/2019 11:36 AM    LDLCALC 132 10/02/2022 08:23 AM    TRIG 123 10/02/2022 08:23 AM    TSH 1.71 10/01/2022 02:14 PM     LIVER PROFILE:  Lab Results   Component Value Date/Time    AST 20 10/01/2022 02:29 PM    AST 11 04/22/2022 11:15 AM    ALT 17 10/01/2022 02:29 PM    ALT 6 04/22/2022 11:15 AM       -----------------------------------------------------------------  Telemetry: Personally reviewed NSR, occasional PVC, couplet, 2 short runs of SVT versus AT    Objective:   Vitals: /68   Pulse 73   Temp 97.4 °F (36.3 °C) (Axillary)   Resp 22   Ht 5' (1.524 m)   Wt 110 lb 3.7 oz (50 kg)   SpO2 98%   BMI 21.53 kg/m²   General appearance: alert, appears stated age and cooperative, No acute distress, confused  Eyes: Conjunctiva and pupils normal and reactive  Skin: Skin color, texture, turgor normal. No rashes or ecchymosis.   Neck: no JVD, supple, symmetrical, trachea midline   Lungs: , no accessory muscle use, no respiratory distress  Heart: RRR  Abdomen: soft, non-tender; bowel sounds normal  Extremities: No edema, DP +  Psychiatric: Confused    Patient Active Problem List:     Type 2 diabetes mellitus with circulatory disorder, without long-term current use of insulin (HCC)     Pure hypercholesterolemia     Dementia (HCC)     Diabetes (HCC)     Hyperlipidemia     Hypertension Osteoporosis     Meningioma (HCC)     Pelvic ring fracture, closed, initial encounter ([facility] [address]     Periprosthetic fracture around internal prosthetic left hip joint ([facility] [address]     DM (diabetes mellitus), type 2, uncontrolled     Heart failure ([facility] [address]        Assessment & Plan:      Acute sCHF  CMP  CAD  HTN    81 y/o woman with a h/o HTN, HLD, DM, dementia, brain mass, thought to be a meningioma, CAD, s/p CABG, recent L hip fx, s/p ORIF (9/11/22), who presented from [facility] with facial swelling, O2 sat in the 80's, BNP 9,937, trop max 0.06, echo showed EF 45-50%, placed on     CMP  - EF 45 to 50%  -   - On Toprol-XL 25 mg daily, spironolactone 12.5 mg daily  - Resuming losartan on d/c, Cr 0.5  - Keep K+ > 4.0 and Mg > 2.0     CAD  - No s/s  - On ASA, statin    HTN  - BP well controlled  - On Toprol, spironolactone      [facility] [address]      I spent a total of 35 minutes in care of the patient and greater than 50% of the time was spent counseling with Evans Romanoles and coordinating care regarding their diagnosis, treatments and plan of care.

## 2022-10-08 NOTE — FLOWSHEET NOTE
Spoke with Dr. Margot Gilford regarding patients failed void trial. Patient continues to retain with little to no output since fonseca removal. Patient bladder scanned and over 400 and new order for fonseca cath in place. Patient will go to facility with fonseca and f/u with urology. 10/08/22 1621   Urinary Catheter 10/08/22   Placement Date/Time: 10/08/22 1620   Present on Admission/Arrival: No  Inserted by: Rhonda Narayan RN  2nd Staff Assisting: Renato Del Real RN  Insertion attempts: 1  Catheter Size: 16 FR  Catheter Balloon Size: 10 mL  Insertion Procedure Practices: Insertion date plac. ..    Urine Color Yellow   Urine Appearance Clear   Output (mL) 400 mL   Urine Assessment   Bladder Scan Volume (mL) 400 mL   $ Bladder scan $ Yes

## 2022-10-08 NOTE — PROGRESS NOTES
Patient has no void since fonseca removal.  notified and straight cath performed. Patient tolerated procedure well despite noted confusion. Bed was returned to low position, siderails up x3, alarm on and safety sitter in bose. No s/s of distress noted at this time.

## 2022-10-08 NOTE — CARE COORDINATION
Discharge order noted. Pt to discharge to [facility] at 18:30pm. Message left for Damien Montalvo in admission of d/c time. Nurse aware, AVS faxed to be faxed once signed by Physician.  Report # [phone]  Fax# Dayo Price RN, BSN, [address]  Case Management Department  [phone]

## 2022-10-08 NOTE — PLAN OF CARE
Problem: Skin/Tissue Integrity  Goal: Absence of new skin breakdown  Description: 1. Monitor for areas of redness and/or skin breakdown  2. Assess vascular access sites hourly  3. Every 4-6 hours minimum:  Change oxygen saturation probe site  4. Every 4-6 hours:  If on nasal continuous positive airway pressure, respiratory therapy assess nares and determine need for appliance change or resting period.   Outcome: Progressing     Problem: ABCDS Injury Assessment  Goal: Absence of physical injury  Outcome: Progressing     Problem: Discharge Planning  Goal: Discharge to home or other facility with appropriate resources  Outcome: Progressing     Problem: Safety - Adult  Goal: Free from fall injury  Outcome: Progressing  Flowsheets (Taken 10/8/2022 0235)  Free From Fall Injury: Instruct family/caregiver on patient safety     Problem: Respiratory - Adult  Goal: Achieves optimal ventilation and oxygenation  Outcome: Progressing  Flowsheets (Taken 10/8/2022 0235)  Achieves optimal ventilation and oxygenation:   Assess for changes in respiratory status   Assess for changes in mentation and behavior   Position to facilitate oxygenation and minimize respiratory effort     Problem: Cardiovascular - Adult  Goal: Maintains optimal cardiac output and hemodynamic stability  Outcome: Progressing     Problem: Metabolic/Fluid and Electrolytes - Adult  Goal: Hemodynamic stability and optimal renal function maintained  Outcome: Progressing     Problem: Metabolic/Fluid and Electrolytes - Adult  Goal: Electrolytes maintained within normal limits  Outcome: Progressing     Problem: Pain  Goal: Verbalizes/displays adequate comfort level or baseline comfort level  Outcome: Progressing  Flowsheets (Taken 10/8/2022 0235)  Verbalizes/displays adequate comfort level or baseline comfort level: Encourage patient to monitor pain and request assistance

## 2022-10-08 NOTE — PROGRESS NOTES
Patient alert and disoriented x4 with daughter at bedside. Patient has d/c orders in place and will be going to SNF. Patient taken off tele. All IV access removed. EMS for transport with all belongings and fonseca.

## 2022-10-10 ENCOUNTER — CARE COORDINATION (OUTPATIENT)
Dept: CASE MANAGEMENT | Age: 87
End: 2022-10-10

## 2022-10-10 LAB
EKG ATRIAL RATE: 75 BPM
EKG DIAGNOSIS: NORMAL
EKG P AXIS: 85 DEGREES
EKG Q-T INTERVAL: 400 MS
EKG QRS DURATION: 100 MS
EKG QTC CALCULATION (BAZETT): 464 MS
EKG R AXIS: -13 DEGREES
EKG T AXIS: 254 DEGREES
EKG VENTRICULAR RATE: 81 BPM

## 2022-10-10 PROCEDURE — 93010 ELECTROCARDIOGRAM REPORT: CPT | Performed by: INTERNAL MEDICINE

## 2022-10-10 NOTE — CARE COORDINATION
[facility] Update Call    10/10/2022    Patient: Falguni Arenas Patient : 1931   MRN: <[id number]>  Reason for Admission: pleural effusion, s/p closed left hip fracture- ORIF Jie Albarran). Acute hypoxic respiratory failure secondary to heart failure or pneumonia, new CHF   Discharge Date: 10/8/22 RARS: Readmission Risk Score: 18.2      Secure email sent to [employer] requesting copy of orders and med list. Will continue to follow   Writer has scheduled call with Cecy tomorrow at 1130. Will get full report then    Writer received secure emailed list of orders and meds. No therapy report available at this time. Will review on our normally scheduled call tomorrow at 1130. Will inquire about aricept, omnicef and glycolax not on their med list on the call tomorrow.      Care Transitions Post Acute Facility Update    Care Transitions Interventions  Post Acute Facility: [facility]  Update  Reported Nursing Issues: New admission

## 2022-10-11 ENCOUNTER — CARE COORDINATION (OUTPATIENT)
Dept: CASE MANAGEMENT | Age: 87
End: 2022-10-11

## 2022-10-11 NOTE — CARE COORDINATION
[facility] Update Call    10/11/2022    Patient: Falguni Arenas Patient : 1931   MRN: <[id number]>  Reason for Admission:  pleural effusion, s/p closed left hip fracture- ORIF Jie Albarran). Acute hypoxic respiratory failure secondary to heart failure or pneumonia, new CHF   Discharge Date: 10/8/22 RARS: Readmission Risk Score: 18.2         Post Acute Facility Update    Care Transitions Post Acute Facility Update    Care Transitions Interventions  Post Acute Facility: [facility] Update  Reported Nursing Issues: No complaints of pain. Only 1 oxy given since admission 10/8. Eating supervised. Eating 0-25% of meals. Very confused. Follow up with ortho tomorrow- staples will come out. No SOB or edema noted. Griffiths continues     Walking with walker has not been evaluated yet. Pt with Toe Touch Weight Bearing restrictions   ADLs: Maximum Assistance   Bed Mobility: Maximum Assistance   Transfer Assistance: Maximum Assistance   Ambulation Assistance: Dependent, Maximum Assistance   How far (in feet) is the patient ambulating?: 0   Barriers to Discharge: Cognition, fall risk, weakness, max assist   Anticipated discharge services: Back to memory care unit LTC               Next IDT Planned Review: 10/18/2022    Future Appointments   Date Time Provider Atilio Carlson   10/12/2022  1:15 PM ANDIE Dick - CNP [place] [facility] [place]   10/24/2022 10:30 AM MD TRACI Poon       SN Notes:   Diet: - Oral Diet:  General and Carb Control 4 carbs/meal (1800kcals/day)  Wounds: left, upper, outer, and leg incision covered   Medications:  Other: facility  Other:    Post-acute CC Notes:     Phillip Hoskins RN

## 2022-10-20 ENCOUNTER — CARE COORDINATION (OUTPATIENT)
Dept: CASE MANAGEMENT | Age: 87
End: 2022-10-20

## 2022-10-24 NOTE — PROGRESS NOTES
Klaus Stover (:  1931) is a 80 y.o. female,Established patient, here for evaluation of the following chief complaint(s): Follow-Up from Hospital (Two recent hospitalizations - September fall w/ fx of femur, surgery /Rehab x1 week, then back to memory care unit/24h later sent back to hospital d/t CHF and SOB - pulse ox in the 80s /In hosp /8 days then sent back to rehab facility. Currently in rehab , been there since the . Currently on 2L O2 to maintain sats above 90)      ASSESSMENT/PLAN:  1. Hospital discharge follow-up  -  Will try to review and reconcile meds later when they are sent. 2. Chronic systolic (congestive) heart failure  -has FU with Cardiology tomorrow. No follow-ups on file. SUBJECTIVE/OBJECTIVE:  HPI  Visit is being done virtually due to patient having a cough. She is at [facility] in their rehab section. Her daughter is with her at this time. Her daughter does not know her medications. I attempted to reach her nurse but nobody answered the phone. I was hoping to get an updated list of her medications. Later in the day a medication list was faxed over. I discussed with the daughter that due to the difficulty of her mother coming for appointments and due to the higher intensity supervision and medication adjustment that she is requiring, that they may consider transitioning to the nursing home physician. The visit was interrupted by the patient needing to use the restroom and so her daughter had to cut it short. She states her mother was still having some trouble breathing.     Hospital issues based on chart review:  -Acute Hypoxic respiratory failure likely secondary to new onset heart failure:   -Fluid overload, possible Acute heart failure, with reduced EF 45-50%: POA  -Possible complicated UTI:   -NSVT   -Left pleural effusion:  -CAD s/p CAB:   -Dementia; pxt from [address] [place] [address] memory unit   -Recent left hip fracture s/p ORIF 2022     Reviewed discharge summary, imaging and labs from Oct hospital stay. Review of Systems    No flowsheet data found. 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 by report 20/30 mmse and aricept was started    Diabetes [facility])     type 2    Frequent falls     Hearing loss     Heart disease     bypass? cardiologist?    Hyperlipidemia     Hypertension     Mental status alteration 11/2020    [facility] consult    Osteoporosis     restarted after a drug holiday    Wrist fracture        Current Outpatient Medications   Medication Sig Dispense Refill    menthol-zinc oxide (CALMOSEPTINE) 0.44-20.6 % OINT ointment Apply topically 4 times daily as needed Max 30 ml per day. guaiFENesin-dextromethorphan (ROBITUSSIN DM) 100-10 MG/5ML syrup Take 10 mLs by mouth 3 times daily as needed for Cough      ipratropium-albuterol (DUONEB) 0.5-2.5 (3) MG/3ML SOLN nebulizer solution Inhale 1 vial into the lungs every 6 hours as needed for Shortness of Breath      ketoconazole (NIZORAL) 2 % cream Apply topically 2 times daily Apply topically daily.       Insulin Glargine Solostar 100 UNIT/ML SOPN Inject 15 Units into the skin at bedtime Inject 15 U SC @ bedtime      oxyCODONE (ROXICODONE) 5 MG immediate release tablet Take 5 mg by mouth every 6 hours as needed for Pain.      metoprolol succinate (TOPROL XL) 25 MG extended release tablet Take 1 tablet by mouth daily 30 tablet 3    spironolactone (ALDACTONE) 25 MG tablet Take 0.5 tablets by mouth daily 30 tablet 3    losartan (COZAAR) 25 MG tablet Take 1 tablet by mouth daily 28 tablet 5    insulin lispro (HUMALOG) 100 UNIT/ML injection vial Inject into the skin 3 times daily (before meals) 0-200= 0 units, 201-250=1 unit, 252-300=2 units, 301-350=3 units, 351-400 =4 units, 401-450= 5 units      senna-docusate (PERICOLACE) 8.6-50 MG per tablet Take 2 tablets by mouth daily as needed for Constipation 30 tablet 2    alendronate (FOSAMAX) 35 MG tablet TAKE 1 TABLET BY MOUTH WEEKLY 30 MINS BEFORE FOOD (EMPTY STOMACH) WITH 8 OZ WATER DO NOT LIE DOWN FOR 30MIN 4 tablet 5    LIVALO 2 MG TABS tablet TAKE ONE TABLET BY MOUTH AT BEDTIME. (Patient taking differently: 2 mg nightly) 28 tablet 0    ACCU-CHEK CARMEN PLUS strip TESTING ONCE DAILY. DX: E11.9 DIABETES MELLITUS 100 strip 3    Multiple Vitamins-Minerals (THEREMS-M) TABS TAKE 1 TABLET BY MOUTH ONCE DAILY. 30 tablet 5    Calcium Carbonate-Vitamin D (OYSTER SHELL CALCIUM/D) 500-200 MG-UNIT TABS TAKE ONE (1) TABLET BY MOUTH TWICE A DAY. 60 tablet 5    furosemide (LASIX) 40 MG tablet Take 1 tablet by mouth daily 90 tablet 3    polyethylene glycol (GLYCOLAX) 17 GM/SCOOP powder Take 17 g by mouth daily 1530 g 1    Insulin Pen Needle (PEN NEEDLES) 31G X 6 MM MISC 1 each by Does not apply route daily 100 each 1     No current facility-administered medications for this visit. Constitutional: [x] Appears well-developed and well-nourished [x] No apparent distress        Mental status  [x]  Not Oriented to person/place/time            Joss Ornelas is a 80 y.o. female being evaluated by a Virtual Visit (video visit) encounter to address concerns as mentioned above. A caregiver was present when appropriate. Due to this being a TeleHealth encounter (During EKUPD-43 public health emergency), evaluation of the following organ systems was limited: Vitals/Constitutional/EENT/Resp/CV/GI//MS/Neuro/Skin/Heme-Lymph-Imm.   Pursuant to the emergency declaration under the [facility] waiver authority and the [employer] and [employer] Act, this Virtual Visit was conducted with patient's (and/or legal guardian's) consent, to reduce the patient's risk of exposure to COVID-19 and provide necessary medical care. The patient (and/or legal guardian) has also been advised to contact this office for worsening conditions or problems, and seek emergency medical treatment and/or call 911 if deemed necessary. Patient identification was verified at the start of the visit: Yes    Services were provided through a video synchronous discussion virtually to substitute for in-person clinic visit. Patient was located at home and provider was located in office or at home. This note was generated completely or in part utilizing Dragon dictation speech recognition software. Occasionally, words are mistranscribed and despite editing, the text may contain inaccuracies due to incorrect word recognition. If further clarification is needed please contact the office at [phone]          An electronic signature was used to authenticate this note.     --Boni Smith MD

## 2022-10-25 PROBLEM — I50.22 CHRONIC SYSTOLIC (CONGESTIVE) HEART FAILURE (HCC): Status: ACTIVE | Noted: 2022-01-01

## 2022-10-25 NOTE — PROGRESS NOTES
CC CHF    Interview limited by pt's dementia    HPI:  80 y.o. 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 CAD/CABG, HTN, HLD and DM who is here for CHF hospital follow up. She had CXR 10/23/2022 which showed bilateral pleural effusion and pulmonary edema. She did not give me any clear answers to by questions. She did appear dyspneic when talking. Aid with patient states the patient is typically sedentary with little exertion.      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 by report 20/30 mmse and aricept was started    Diabetes [facility])     type 2    Frequent falls     Hearing loss     Heart disease     bypass? cardiologist?    Hyperlipidemia     Hypertension     Mental status alteration 11/2020    [facility] consult    Osteoporosis     restarted after a drug holiday    Wrist fracture      Past Surgical History:   Procedure Laterality Date    BRAIN TUMOR EXCISION      benign, right frontotemporal craniotomy    CORONARY ARTERY BYPASS GRAFT      HIP FRACTURE SURGERY Left 9/17/2022    LEFT HIP OPEN REDUCTION INTERNAL FIXATION performed by Renee Holly MD at [facility]      pins    ORIF HIP FRACTURE Left 09/2022    PARTIAL HIP ARTHROPLASTY      left hip     Family History   Problem Relation Age of Onset    Diabetes Mother     High Blood Pressure Mother     Cancer Sister         ovarian    Diabetes Sister      Social History     Tobacco Use    Smoking status: Never    Smokeless tobacco: Never   Vaping Use    Vaping Use: Never used   Substance Use Topics    Alcohol use: No    Drug use: No     Allergies:Penicillins, Statins, and Sulfa antibiotics    Review of Systems  Unable to obtain     Physical Exam:  BP (!) 124/54   Pulse 70   Ht 5' (1.524 m)   Wt 106 lb (48.1 kg)   BMI 20.70 kg/m²    General (appearance):  No acute distress  Eyes: anicteric   Neck: soft, No JVD  Ears/Nose/Mouth/Thorat: No cyanosis  CV: RRR   Respiratory:  Diminshed, bibasilar crackles  GI: soft, non-tender, non-distended  Skin: Warm, dry. No rashes  Neuro/Psych: Alert and oriented x 3. Appropriate behavior  Ext:  No c/c. No LE edema  Pulses:  2+ radial     Weight  Wt Readings from Last 3 Encounters:   10/25/22 106 lb (48.1 kg)   10/08/22 110 lb 3.7 oz (50 kg)   09/16/22 117 lb 11.6 oz (53.4 kg)          CBC:   Lab Results   Component Value Date    WBC 9.7 10/07/2022    HGB 12.9 10/07/2022    HCT 38.2 10/07/2022    MCV 86.2 10/07/2022     10/07/2022     BMP:  Lab Results   Component Value Date    CREATININE 0.5 (L) 10/07/2022    BUN 11 10/07/2022     (L) 10/07/2022    K 4.3 10/07/2022    CL 96 (L) 10/07/2022    CO2 27 10/07/2022     Estimated Creatinine Clearance: 54 mL/min (A) (based on SCr of 0.5 mg/dL (L)).   Mag:   Lab Results   Component Value Date/Time    MG 2.00 10/08/2022 08:13 AM     LIVER PROFILE:   Lab Results   Component Value Date    ALT 17 10/01/2022    AST 20 10/01/2022    ALKPHOS 188 (H) 10/01/2022    BILITOT 0.9 10/01/2022     PT/INR: No results found for: INR, PROTIME  Pro-BNP   Lab Results   Component Value Date/Time    PROBNP 9,937 10/07/2022 06:31 AM    PROBNP 24,339 10/04/2022 07:05 AM    PROBNP 30,028 10/01/2022 02:29 PM     LIPIDS:  No components found for: CHLPL  Lab Results   Component Value Date    TRIG 123 10/02/2022    TRIG 251 (H) 04/22/2022    TRIG 204 (H) 09/20/2021     Lab Results   Component Value Date    HDL 36 (L) 10/02/2022    HDL 43 04/22/2022    HDL 38 (L) 12/13/2021     Lab Results   Component Value Date    LDLCALC 132 (H) 10/02/2022    LDLCALC 254 (H) 04/22/2022    LDLCALC 188 (H) 12/13/2021     Lab Results   Component Value Date    LABVLDL 25 10/02/2022    LABVLDL 50 04/22/2022    LABVLDL 29 12/13/2021     TSH:  Lab Results   Component Value Date    TSH 1.71 10/01/2022       IMAGING:     10/5/2022 Echo   Left ventricular cavity size is normal. Normal left ventricular wall   thickness. Ejection fraction is visually estimated to be 45-50%. hypokinesis   of the basal inferolateral wall. Diastolic filling parameters suggest grade   I diastolic dysfunction. Mild tricuspid regurgitation. Mild mitral regurgitation is present. Normal right ventricular size. Right ventricular systolic function is mildly reduced . Medications:   Current Outpatient Medications   Medication Sig Dispense Refill    menthol-zinc oxide (CALMOSEPTINE) 0.44-20.6 % OINT ointment Apply topically 4 times daily as needed Max 30 ml per day. guaiFENesin-dextromethorphan (ROBITUSSIN DM) 100-10 MG/5ML syrup Take 10 mLs by mouth 3 times daily as needed for Cough      ipratropium-albuterol (DUONEB) 0.5-2.5 (3) MG/3ML SOLN nebulizer solution Inhale 1 vial into the lungs every 6 hours as needed for Shortness of Breath      ketoconazole (NIZORAL) 2 % cream Apply topically 2 times daily Apply topically daily.       Insulin Glargine Solostar 100 UNIT/ML SOPN Inject 15 Units into the skin at bedtime Inject 15 U SC @ bedtime      oxyCODONE (ROXICODONE) 5 MG immediate release tablet Take 5 mg by mouth every 6 hours as needed for Pain.      metoprolol succinate (TOPROL XL) 25 MG extended release tablet Take 1 tablet by mouth daily 30 tablet 3    spironolactone (ALDACTONE) 25 MG tablet Take 0.5 tablets by mouth daily 30 tablet 3    losartan (COZAAR) 25 MG tablet Take 1 tablet by mouth daily 28 tablet 5    polyethylene glycol (GLYCOLAX) 17 GM/SCOOP powder Take 17 g by mouth daily 1530 g 1    insulin lispro (HUMALOG) 100 UNIT/ML injection vial Inject into the skin 3 times daily (before meals) 0-200= 0 units, 201-250=1 unit, 252-300=2 units, 301-350=3 units, 351-400 =4 units, 401-450= 5 units      senna-docusate (PERICOLACE) 8.6-50 MG per tablet Take 2 tablets by mouth daily as needed for Constipation 30 tablet 2    alendronate (FOSAMAX) 35 MG tablet TAKE 1 TABLET BY MOUTH WEEKLY 30 MINS BEFORE FOOD (EMPTY STOMACH) WITH 8 OZ WATER DO NOT LIE DOWN FOR 30MIN 4 tablet 5    LIVALO 2 MG TABS tablet TAKE ONE TABLET BY MOUTH AT BEDTIME. (Patient taking differently: 2 mg nightly) 28 tablet 0    ACCU-CHEK CARMEN PLUS strip TESTING ONCE DAILY. DX: E11.9 DIABETES MELLITUS 100 strip 3    Multiple Vitamins-Minerals (THEREMS-M) TABS TAKE 1 TABLET BY MOUTH ONCE DAILY. 30 tablet 5    Calcium Carbonate-Vitamin D (OYSTER SHELL CALCIUM/D) 500-200 MG-UNIT TABS TAKE ONE (1) TABLET BY MOUTH TWICE A DAY. 60 tablet 5    Insulin Pen Needle (PEN NEEDLES) 31G X 6 MM MISC 1 each by Does not apply route daily 100 each 1     No current facility-administered medications for this visit. Assessment:  1. HFrEF (heart failure with reduced ejection fraction) ([facility] [address]    2. CAD in native artery    3. Hx of CABG    4. Primary hypertension    5. Mixed hyperlipidemia        Plan:  Acute HFmrEF   10/23/22 CXR showed pulmonary edema and plueral effusion   Start lasix 40 mg po daily  BMP in 1 week   Low salt diet, daily weights  Toprol, losartan, spironolactone  IF labs ok after starting lasix then consider SGLT2 in follow up    CAD/CABG: stable  Toprol, losartan  Livalo  HTN: stable   /54  Toprol.  Losartan  HLD: stable   Livalo    Follow up in 2 week with me          Reviewed most recent: CBC, BMP, LFT, Lipids, Mag, BNP, TSH  Reviewed most recent: ECG, Echo, CXR

## 2022-11-14 NOTE — TELEPHONE ENCOUNTER
Okay to give verbal order as below. On 10/24, I discussed with patient's daughter that her mother's care should be taken over by the physician at the nursing home since it's really not possible for me to evaluate her when I cant see her in the office and she cant do video visits without assistance which is not always available. For now, if there is a concern for wound care and need for antibiotics, then advise ER. I informed Season of this message.

## 2022-11-14 NOTE — TELEPHONE ENCOUNTER
Season w/ [facility] calling for verbal orders on pt. Patient has a pressure ulcer on left hip would like verbal for applying silver alginate & dry dressing 3-4x/week & prn. Says ulcer is red, draining & warm.  Thinks patient may need an antibiotic

## 2022-11-15 NOTE — PATIENT INSTRUCTIONS
Increase Toprol xl to 50 mg daily Hold for SBP< 100 mmHg or HR <60 bpm    Continue lasix 40 mg once a day    Check Labs (BMP and BNP)     Follow up with Dr Wade Petty in 3 months

## 2022-11-15 NOTE — PROGRESS NOTES
CC CHF    Interview limited by pt's dementia    HPI:  80 y.o. 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 CAD/CABG, HTN, HLD and DM who is here for CHF hospital follow up. Did not respond to any questions today. No sign of distress.      Past Medical History:   Diagnosis Date    Atherosclerotic vascular disease     brain w right m2 stenosis and vertebral r/l artery stenosis    Back pain     Brain mass     noted good michelle 11/20 mri    Cervical spondylolysis     CHF (congestive heart failure) ([facility] [address] 10/2022    new onset    Dementia ([facility] [address]     has not seen neurologist. by report 20/30 mmse and aricept was started    Diabetes [facility])     type 2    Frequent falls     Hearing loss     Heart disease     bypass? cardiologist?    Hyperlipidemia     Hypertension     Mental status alteration 11/2020    [facility] consult    Osteoporosis     restarted after a drug holiday    Wrist fracture      Past Surgical History:   Procedure Laterality Date    BRAIN TUMOR EXCISION      benign, right frontotemporal craniotomy    CORONARY ARTERY BYPASS GRAFT      HIP FRACTURE SURGERY Left 9/17/2022    LEFT HIP OPEN REDUCTION INTERNAL FIXATION performed by Benton Salamanca MD at [facility]      pins    ORIF HIP FRACTURE Left 09/2022    PARTIAL HIP ARTHROPLASTY      left hip     Family History   Problem Relation Age of Onset    Diabetes Mother     High Blood Pressure Mother     Cancer Sister         ovarian    Diabetes Sister      Social History     Tobacco Use    Smoking status: Never    Smokeless tobacco: Never   Vaping Use    Vaping Use: Never used   Substance Use Topics    Alcohol use: No    Drug use: No     Allergies:Penicillins, Statins, and Sulfa antibiotics    Review of Systems  Unable to obtain     Physical Exam:  Pulse 72   Wt 106 lb (48.1 kg)   SpO2 98%   BMI 20.70 kg/m²    General (appearance):  No acute distress  Eyes: anicteric   Neck: soft, No JVD  Ears/Nose/Mouth/Thorat: No cyanosis  CV: RRR   Respiratory: Clear, normal effort   GI: soft, non-tender, non-distended  Skin: Warm, dry. No rashes  Neuro/Psych: Alert, did not give an verbal answers. Ext:  No c/c. No LE edema  Pulses:  2+ radial     Weight  Wt Readings from Last 3 Encounters:   11/15/22 106 lb (48.1 kg)   10/25/22 106 lb (48.1 kg)   10/08/22 110 lb 3.7 oz (50 kg)          CBC:   Lab Results   Component Value Date    WBC 9.7 10/07/2022    HGB 12.9 10/07/2022    HCT 38.2 10/07/2022    MCV 86.2 10/07/2022     10/07/2022     BMP:  Lab Results   Component Value Date    CREATININE 0.5 (L) 10/07/2022    BUN 11 10/07/2022     (L) 10/07/2022    K 4.3 10/07/2022    CL 96 (L) 10/07/2022    CO2 27 10/07/2022     Estimated Creatinine Clearance: 53 mL/min (A) (based on SCr of 0.5 mg/dL (L)). Mag:   Lab Results   Component Value Date/Time    MG 2.00 10/08/2022 08:13 AM     LIVER PROFILE:   Lab Results   Component Value Date    ALT 17 10/01/2022    AST 20 10/01/2022    ALKPHOS 188 (H) 10/01/2022    BILITOT 0.9 10/01/2022     PT/INR: No results found for: INR, PROTIME  Pro-BNP   Lab Results   Component Value Date/Time    PROBNP 9,937 10/07/2022 06:31 AM    PROBNP 24,339 10/04/2022 07:05 AM    PROBNP 30,028 10/01/2022 02:29 PM     LIPIDS:  No components found for: CHLPL  Lab Results   Component Value Date    TRIG 123 10/02/2022    TRIG 251 (H) 04/22/2022    TRIG 204 (H) 09/20/2021     Lab Results   Component Value Date    HDL 36 (L) 10/02/2022    HDL 43 04/22/2022    HDL 38 (L) 12/13/2021     Lab Results   Component Value Date    LDLCALC 132 (H) 10/02/2022    LDLCALC 254 (H) 04/22/2022    LDLCALC 188 (H) 12/13/2021     Lab Results   Component Value Date    LABVLDL 25 10/02/2022    LABVLDL 50 04/22/2022    LABVLDL 29 12/13/2021     TSH:  Lab Results   Component Value Date    TSH 1.71 10/01/2022       IMAGING:     10/5/2022 Echo   Left ventricular cavity size is normal. Normal left ventricular wall   thickness.  Ejection fraction is visually estimated to be 45-50%. hypokinesis   of the basal inferolateral wall. Diastolic filling parameters suggest grade   I diastolic dysfunction. Mild tricuspid regurgitation. Mild mitral regurgitation is present. Normal right ventricular size. Right ventricular systolic function is mildly reduced . Medications:   Current Outpatient Medications   Medication Sig Dispense Refill    furosemide (LASIX) 40 MG tablet Take 1 tablet by mouth daily 90 tablet 3    menthol-zinc oxide (CALMOSEPTINE) 0.44-20.6 % OINT ointment Apply topically 4 times daily as needed Max 30 ml per day. guaiFENesin-dextromethorphan (ROBITUSSIN DM) 100-10 MG/5ML syrup Take 10 mLs by mouth 3 times daily as needed for Cough      ipratropium-albuterol (DUONEB) 0.5-2.5 (3) MG/3ML SOLN nebulizer solution Inhale 1 vial into the lungs every 6 hours as needed for Shortness of Breath      ketoconazole (NIZORAL) 2 % cream Apply topically 2 times daily Apply topically daily.       Insulin Glargine Solostar 100 UNIT/ML SOPN Inject 15 Units into the skin at bedtime Inject 15 U SC @ bedtime      oxyCODONE (ROXICODONE) 5 MG immediate release tablet Take 5 mg by mouth every 6 hours as needed for Pain.      metoprolol succinate (TOPROL XL) 25 MG extended release tablet Take 1 tablet by mouth daily 30 tablet 3    spironolactone (ALDACTONE) 25 MG tablet Take 0.5 tablets by mouth daily 30 tablet 3    losartan (COZAAR) 25 MG tablet Take 1 tablet by mouth daily 28 tablet 5    insulin lispro (HUMALOG) 100 UNIT/ML injection vial Inject into the skin 3 times daily (before meals) 0-200= 0 units, 201-250=1 unit, 252-300=2 units, 301-350=3 units, 351-400 =4 units, 401-450= 5 units      senna-docusate (PERICOLACE) 8.6-50 MG per tablet Take 2 tablets by mouth daily as needed for Constipation 30 tablet 2    alendronate (FOSAMAX) 35 MG tablet TAKE 1 TABLET BY MOUTH WEEKLY 30 MINS BEFORE FOOD (EMPTY STOMACH) WITH 8 OZ WATER DO NOT LIE DOWN FOR 30MIN 4 tablet 5    LIVALO 2 MG TABS tablet TAKE ONE TABLET BY MOUTH AT BEDTIME. (Patient taking differently: 2 mg nightly) 28 tablet 0    ACCU-CHEK CARMEN PLUS strip TESTING ONCE DAILY. DX: E11.9 DIABETES MELLITUS 100 strip 3    Multiple Vitamins-Minerals (THEREMS-M) TABS TAKE 1 TABLET BY MOUTH ONCE DAILY. 30 tablet 5    Calcium Carbonate-Vitamin D (OYSTER SHELL CALCIUM/D) 500-200 MG-UNIT TABS TAKE ONE (1) TABLET BY MOUTH TWICE A DAY. 60 tablet 5    Insulin Pen Needle (PEN NEEDLES) 31G X 6 MM MISC 1 each by Does not apply route daily 100 each 1     No current facility-administered medications for this visit. Assessment:  1. HFrEF (heart failure with reduced ejection fraction) ([facility] [address]    2. CAD in native artery    3. Hx of CABG    4. Primary hypertension    5. Mixed hyperlipidemia        Plan:  Chronic HFmrEF   Lungs clear, no edema   Cont lasix 40 mg po daily   BMP/BNP  Low salt diet, daily weights  Toprol 50 mg po daily Hold for SBP< 100 mmHg or HR <60 bpm  Losartan, spironolactone  IF labs ok after starting lasix then consider SGLT2 in follow up    CAD/CABG: stable  Toprol, losartan  Livalo  HTN: stable   /54  Toprol.  Losartan  HLD: stable   Livalo    Follow up in 3 months       Reviewed most recent: CBC, BMP, LFT, Lipids, Mag, BNP, TSH  Reviewed most recent: ECG, Echo, CXR

## 2022-11-17 NOTE — TELEPHONE ENCOUNTER
[facility] received signed orders back from the physician, however with all the notes made by pcp, it voids the orders. They will be faxing the orders again and request that dr signs the orders without the handwritten memos as this is for care that had already been approved and provided. Patient had virtual visit with  on 10/24, which should be used as the face to face date. [facility] was able to transition pt's care over to new a provider.

## 2022-11-21 NOTE — ED PROVIDER NOTES
[facility] ENCOUNTER          NURSE PRACTITIONER NOTE       Date of evaluation: 11/21/2022    Chief Complaint     Fall (Patient is a dementia patient that is living at [address] [place] [address]. Patient baseline is confusion. According to EMS patient was sitting up when the aide left the patient then the patient had an unwitnessed fall. When the aide came back the patient was on the ground. Patient is unable to explain what is going. )      History of Present Illness     Justyna Hightower is a 80 y.o. female with a past medical history as noted below which includes advanced dementia, nonverbal at baseline per report; who presents to the emergency department with a complaint of a fall. Patient evidently was sitting/lying in bed when an aide left her room, when they came back she was on the floor. Unsure how long she was there. Patient is nonverbal at baseline. On chart review patient sustained a left hip fracture s/p ORIF 9/17/22  Patient currently living at [address] [place] [address], paperwork not sent with patient. Attempted to call nursing home multiple times without response. Review of Systems     Review of Systems  Unable to obtain due to patient's baseline dementia    Past Medical, Surgical, Family, and Social History     She has a past medical history of Atherosclerotic vascular disease, Back pain, Brain mass, Cervical spondylolysis, CHF (congestive heart failure) ([address] Dementia ([address] Diabetes ([address] Frequent falls, Hearing loss, Heart disease, Hyperlipidemia, Hypertension, Mental status alteration, Osteoporosis, and Wrist fracture. She has a past surgical history that includes hip surgery; Partial hip arthroplasty; Brain tumor excision; Coronary artery bypass graft; ORIF hip fracture (Left, 09/2022); and Hip fracture surgery (Left, 9/17/2022). Her family history includes Cancer in her sister; Diabetes in her mother and sister; High Blood Pressure in her mother.   She reports that she has never smoked. She has never used smokeless tobacco. She reports that she does not drink alcohol and does not use drugs. Medications     Current Discharge Medication List        CONTINUE these medications which have NOT CHANGED    Details   metoprolol succinate (TOPROL XL) 50 MG extended release tablet Take 1 tablet by mouth daily  Qty: 90 tablet, Refills: 3      furosemide (LASIX) 40 MG tablet Take 1 tablet by mouth daily  Qty: 90 tablet, Refills: 3      menthol-zinc oxide (CALMOSEPTINE) 0.44-20.6 % OINT ointment Apply topically 4 times daily as needed Max 30 ml per day. guaiFENesin-dextromethorphan (ROBITUSSIN DM) 100-10 MG/5ML syrup Take 10 mLs by mouth 3 times daily as needed for Cough      ipratropium-albuterol (DUONEB) 0.5-2.5 (3) MG/3ML SOLN nebulizer solution Inhale 1 vial into the lungs every 6 hours as needed for Shortness of Breath      ketoconazole (NIZORAL) 2 % cream Apply topically 2 times daily Apply topically daily. Insulin Glargine Solostar 100 UNIT/ML SOPN Inject 15 Units into the skin at bedtime Inject 15 U SC @ bedtime      oxyCODONE (ROXICODONE) 5 MG immediate release tablet Take 5 mg by mouth every 6 hours as needed for Pain.       spironolactone (ALDACTONE) 25 MG tablet Take 0.5 tablets by mouth daily  Qty: 30 tablet, Refills: 3      losartan (COZAAR) 25 MG tablet Take 1 tablet by mouth daily  Qty: 28 tablet, Refills: 5      insulin lispro (HUMALOG) 100 UNIT/ML injection vial Inject into the skin 3 times daily (before meals) 0-200= 0 units, 201-250=1 unit, 252-300=2 units, 301-350=3 units, 351-400 =4 units, 401-450= 5 units      senna-docusate (PERICOLACE) 8.6-50 MG per tablet Take 2 tablets by mouth daily as needed for Constipation  Qty: 30 tablet, Refills: 2      alendronate (FOSAMAX) 35 MG tablet TAKE 1 TABLET BY MOUTH WEEKLY 30 MINS BEFORE FOOD (EMPTY STOMACH) WITH 8 OZ WATER DO NOT LIE DOWN FOR 30MIN  Qty: 4 tablet, Refills: 5      LIVALO 2 MG TABS tablet TAKE ONE TABLET BY MOUTH AT BEDTIME. Qty: 28 tablet, Refills: 0      ACCU-CHEK CARMEN PLUS strip TESTING ONCE DAILY. DX: E11.9 DIABETES MELLITUS  Qty: 100 strip, Refills: 3      Insulin Pen Needle (PEN NEEDLES) 31G X 6 MM MISC 1 each by Does not apply route daily  Qty: 100 each, Refills: 1      Multiple Vitamins-Minerals (THEREMS-M) TABS TAKE 1 TABLET BY MOUTH ONCE DAILY. Qty: 30 tablet, Refills: 5      Calcium Carbonate-Vitamin D (OYSTER SHELL CALCIUM/D) 500-200 MG-UNIT TABS TAKE ONE (1) TABLET BY MOUTH TWICE A DAY. Qty: 60 tablet, Refills: 5             Allergies     She is allergic to penicillins, statins, and sulfa antibiotics. Physical Exam     INITIAL VITALS: BP: (!) 140/74, Temp: 98.2 °F (36.8 °C), Heart Rate: 93, Resp: 16, SpO2: 93 %   Physical Exam  Vitals and nursing note reviewed. Constitutional:       Appearance: Normal appearance. HENT:      Head: Normocephalic and atraumatic. Eyes:      Extraocular Movements: Extraocular movements intact. Pupils: Pupils are equal, round, and reactive to light. Cardiovascular:      Rate and Rhythm: Normal rate and regular rhythm. Pulses: Normal pulses. Heart sounds: Murmur heard. Pulmonary:      Effort: Pulmonary effort is normal. No respiratory distress. Breath sounds: Normal breath sounds. Abdominal:      General: Bowel sounds are normal. There is no distension. Palpations: Abdomen is soft. There is no mass. Tenderness: There is abdominal tenderness. There is no guarding or rebound. Musculoskeletal:      Cervical back: Normal range of motion and neck supple. Neurological:      Mental Status: She is alert. Comments: Patient alert to name, follows very minimal direction. Not speaking.   Pain response during exam, otherwise no interaction with exam         Diagnostic Results     EKG   Interpreted in conjunction with emergency department physician No att. providers found  Rhythm: normal sinus   Rate: normal  Axis: normal  Ectopy: none  Conduction: normal  ST Segments: nonspecific changes  T Waves: non specific changes  Q Waves: none  Clinical Impression: non-specific EKG    RADIOLOGY:  XR ELBOW RIGHT (MIN 3 VIEWS)   Final Result   Impression:   No acute fracture. 3 mm calcification or foreign body at the volar/ulnar aspect of the distal arm, likely chronic. XR HIP 2-3 VW W PELVIS LEFT   Final Result   Impression:   No acute fracture. XR CHEST PORTABLE   Final Result      Moderate left and small right pleural effusions and bibasilar airspace disease, unchanged compared to 10/7/2022. CT CERVICAL SPINE WO CONTRAST   Final Result   1. No acute findings in the cervical spine. 2. Degenerative cervical spondylosis. 3. Partial imaging of pleural effusions. CT HEAD WO CONTRAST   Final Result      1. No acute intracranial findings.           LABS:   Results for orders placed or performed during the hospital encounter of 11/21/22   BMP w/ Reflex to MG   Result Value Ref Range    Sodium 137 136 - 145 mmol/L    Potassium reflex Magnesium 5.3 (H) 3.5 - 5.1 mmol/L    Chloride 100 99 - 110 mmol/L    CO2 25 21 - 32 mmol/L    Anion Gap 12 3 - 16    Glucose 441 (H) 70 - 99 mg/dL    BUN 21 (H) 7 - 20 mg/dL    Creatinine 0.7 0.6 - 1.2 mg/dL    Est, Glom Filt Rate >60 >60    Calcium 9.1 8.3 - 10.6 mg/dL   CBC with Auto Differential   Result Value Ref Range    WBC 9.1 4.0 - 11.0 K/uL    RBC 5.04 4.00 - 5.20 M/uL    Hemoglobin 14.7 12.0 - 16.0 g/dL    Hematocrit 43.7 36.0 - 48.0 %    MCV 86.7 80.0 - 100.0 fL    MCH 29.1 26.0 - 34.0 pg    MCHC 33.6 31.0 - 36.0 g/dL    RDW 17.4 (H) 12.4 - 15.4 %    Platelets 757 648 - 943 K/uL    MPV 10.7 (H) 5.0 - 10.5 fL    Neutrophils % 78.2 %    Lymphocytes % 15.6 %    Monocytes % 4.8 %    Eosinophils % 0.5 %    Basophils % 0.9 %    Neutrophils Absolute 7.2 1.7 - 7.7 K/uL    Lymphocytes Absolute 1.4 1.0 - 5.1 K/uL    Monocytes Absolute 0.4 0.0 - 1.3 K/uL    Eosinophils Absolute 0.0 0.0 - 0.6 K/uL    Basophils Absolute 0.1 0.0 - 0.2 K/uL   Urinalysis with Reflex to Culture    Specimen: Urine   Result Value Ref Range    Color, UA Yellow Straw/Yellow    Clarity, UA Clear Clear    Glucose, Ur 500 (A) Negative mg/dL    Bilirubin Urine Negative Negative    Ketones, Urine TRACE (A) Negative mg/dL    Specific Gravity, UA >=1.030 1.005 - 1.030    Blood, Urine Negative Negative    pH, UA 6.0 5.0 - 8.0    Protein,  (A) Negative mg/dL    Urobilinogen, Urine 0.2 <2.0 E.U./dL    Nitrite, Urine Negative Negative    Leukocyte Esterase, Urine Negative Negative    Microscopic Examination YES     Urine Type Voided     Urine Reflex to Culture Not Indicated    CK   Result Value Ref Range    Total CK 68 26 - 192 U/L   Lipase   Result Value Ref Range    Lipase 31.0 13.0 - 60.0 U/L   Hepatic Function Panel   Result Value Ref Range    Total Protein 7.0 6.4 - 8.2 g/dL    Albumin 3.5 3.4 - 5.0 g/dL    Alkaline Phosphatase 128 40 - 129 U/L    ALT 15 10 - 40 U/L    AST 25 15 - 37 U/L    Total Bilirubin 0.6 0.0 - 1.0 mg/dL    Bilirubin, Direct <0.2 0.0 - 0.3 mg/dL    Bilirubin, Indirect see below 0.0 - 1.0 mg/dL   Microscopic Urinalysis   Result Value Ref Range    WBC, UA 0-2 0 - 5 /HPF    RBC, UA 0-2 0 - 4 /HPF   Potassium   Result Value Ref Range    Potassium 4.8 3.5 - 5.1 mmol/L   EKG 12 Lead   Result Value Ref Range    Ventricular Rate 84 BPM    Atrial Rate 84 BPM    P-R Interval 144 ms    QRS Duration 98 ms    Q-T Interval 434 ms    QTc Calculation (Bazett) 512 ms    P Axis 73 degrees    R Axis 41 degrees    T Axis 1 degrees    Diagnosis       EKG performed in ER and to be interpreted by ER physician. Confirmed by MD, ER ([id number]),  Ni Garcia ([phone]) on 11/21/2022 6:26:03 PM     RECENT VITALS:  BP: (!) 144/75, Temp: 98.2 °F (36.8 °C), Heart Rate: 81, Resp: 18, SpO2: 93 %       ED Course     Nursing Notes, Past Medical Hx, Past Surgical Hx, Social Hx, Allergies, and Family Hx were reviewed.     The patient was given the following medications:  Orders Placed This Encounter   Medications    0.9 % sodium chloride infusion    DISCONTD: insulin lispro (1 Unit Dial) (HUMALOG/ADMELOG) pen 8 Units    insulin regular (HUMULIN R;NOVOLIN R) injection 8 Units              CONSULTS:  None    MEDICAL DECISION MAKING / ASSESSMENT / Duyen Marcia is a 80 y.o. female who presents with complaints as noted in HPI. Patient presents from [address] with a complaint of an unwitnessed fall. Patient nonverbal, with advanced dementia. Unable to add to history, or participate in exam.  On evaluation she initially seems somewhat upset, however calms down easily. She is alert to her name, and will follow very minimal direction, will squeeze my hands, does have a pain response to palpation of her left hip and with range of motion of her left hip, as well as to palpation of her right elbow, and her abdomen. Otherwise no pain response on my exam.  Neurovascular intact throughout extremities. No obvious head trauma noted. Hemodynamically stable. Given unwitnessed nature of the fall, head CT, C-spine CT, screening labs obtained. Imaging of the left hip/pelvis, as well as the right elbow and chest obtained. X-rays unremarkable for acute osseous abnormalities  Head CT negative for acute intracranial pathology  C-spine CT without evidence of acute osseous abnormality  Patient moving all extremities equally during evaluation. Screening labs including CBC, BMP, LFTs, lipase, urinalysis, troponin, CK  CBC without leukocytosis or anemia  LFTs and lipase unremarkable  CK within normal limits  BMP with a hemolyzed potassium of 5.3, glucose of 441 and anion gap of 12  Repeat potassium 4.8  Urinalysis with trace ketones, no evidence of infection. Patient treated with a liter of normal saline, and 8 units of regular insulin for treatment of hyperkalemia without evidence of DKA.   At this point given overall reassuring evaluation, report the patient is at her baseline, and no evidence of acute injuries or infection at this time; I do feel the patient is stable for transfer back to [address] for ongoing care. Patient was given strict return precautions as outlined in the AVS. Patient was agreeable and understanding to this plan of care. This patient was also evaluated by the attending physician. All care plans were discussed and agreed upon. Clinical Impression     1. Fall, initial encounter    2.  Hyperglycemia        Disposition     PATIENT REFERRED TO:  Buddy Hilario MD  [address] [place] [address]  [phone]      in 1-2 weeks, sooner if needed    [facility] Emergency Department  [address] [facility]  [phone]    If symptoms worsen    DISCHARGE MEDICATIONS:  Current Discharge Medication List          DISPOSITION Decision To Discharge 11/21/2022 10:06:44 PM        ANDIE Mccracken - CNP  11/21/22 [id number]

## 2022-11-23 NOTE — ED PROVIDER NOTES
ED Attending Attestation Note     Date of evaluation: 11/21/2022    This patient was seen by the advance practice provider. I have seen and examined the patient, agree with the workup, evaluation, management and diagnosis. The care plan has been discussed. I have reviewed the ECG and concur with the IMELDA's interpretation. My assessment reveals a 80-year-old female presenting from her nursing home after an unwitnessed fall in her room. No collateral information obtained from nursing home despite multiple attempts. Patient however is apparently at her baseline which is nonverbal, oriented to self, follows simple commands with encouragement. No obvious external signs of trauma. No acute injuries noted on scans. Glucose was 441, hemolyzed potassium 5.3. Patient given fluids, regular insulin, reassessed and returned to nursing home in stable condition.     [place] MD  [facility]  Emergency Medicine     [place] MD  11/23/22 [id number]

## 2022-11-23 NOTE — TELEPHONE ENCOUNTER
Submitted PA for Livalo  Via CMM  Key: [id number] STATUS: APPROVED:  LETTER ATTACHED. If this requires a response please respond to the pool. [address]). Please advise patient thank you.

## 2022-11-27 PROBLEM — R41.82 AMS (ALTERED MENTAL STATUS): Status: ACTIVE | Noted: 2022-01-01

## 2022-11-27 NOTE — ED PROVIDER NOTES
[address] [facility]          ATTENDING PHYSICIAN NOTE       Date of evaluation: 11/27/2022    Chief Complaint     Altered Mental Status (Coming from nursing home for not eating and drinking for 2 days. Non responsive )      History of Present Illness     Татьяна Burger is a 80 y.o. female with a past medical history of dementia, nonverbal at baseline, prior brain mass, heart failure, hypertension, hyperlipidemia, diabetes, multiple falls, ORIF on 9/17/2022 due to a left hip fracture who presents to the emergency department today for altered mental status. She is coming in from her nursing home for not eating and drinking for the past couple of days. Her mentation is worse than normal.  She had been found to be in the 90s on room air at the facility, when EMS arrived, she was in the 80s and requiring oxygen. She does have a Comfort Care arrest form. I discussed this with her family member Jeferson, who is listed as her emergency contact. She states that even though this would qualify her for intubation, the patient would not want to be intubated or have CPR performed. They have been in discussions with the family towards putting her in hospice. Unable to obtain any further history due to the patient's altered mentation. Review of Systems     Unable to obtain    Past Medical, Surgical, Family, and Social History     She has a past medical history of Atherosclerotic vascular disease, Back pain, Brain mass, Cervical spondylolysis, CHF (congestive heart failure) ([address] Dementia ([address] Diabetes ([address] Frequent falls, Hearing loss, Heart disease, Hyperlipidemia, Hypertension, Mental status alteration, Osteoporosis, and Wrist fracture. She has a past surgical history that includes hip surgery; Partial hip arthroplasty; Brain tumor excision; Coronary artery bypass graft; ORIF hip fracture (Left, 09/2022); and Hip fracture surgery (Left, 9/17/2022).   Her family history includes Cancer in her sister; Diabetes in her mother and sister; High Blood Pressure in her mother. She reports that she has never smoked. She has never used smokeless tobacco. She reports that she does not drink alcohol and does not use drugs. Medications     Previous Medications    ACCU-CHEK CARMEN PLUS STRIP    TESTING ONCE DAILY. DX: E11.9 DIABETES MELLITUS    ALENDRONATE (FOSAMAX) 35 MG TABLET    TAKE 1 TABLET BY MOUTH WEEKLY 30 MINS BEFORE FOOD (EMPTY STOMACH) WITH 8 OZ WATER DO NOT LIE DOWN FOR 30MIN    CALCIUM CARBONATE-VITAMIN D (OYSTER SHELL CALCIUM/D) 500-200 MG-UNIT TABS    TAKE ONE (1) TABLET BY MOUTH TWICE A DAY. FUROSEMIDE (LASIX) 40 MG TABLET    Take 1 tablet by mouth daily    GUAIFENESIN-DEXTROMETHORPHAN (ROBITUSSIN DM) 100-10 MG/5ML SYRUP    Take 10 mLs by mouth 3 times daily as needed for Cough    INSULIN GLARGINE SOLOSTAR 100 UNIT/ML SOPN    Inject 15 Units into the skin at bedtime Inject 15 U SC @ bedtime    INSULIN LISPRO (HUMALOG) 100 UNIT/ML INJECTION VIAL    Inject into the skin 3 times daily (before meals) 0-200= 0 units, 201-250=1 unit, 252-300=2 units, 301-350=3 units, 351-400 =4 units, 401-450= 5 units    INSULIN PEN NEEDLE (PEN NEEDLES) 31G X 6 MM MISC    1 each by Does not apply route daily    IPRATROPIUM-ALBUTEROL (DUONEB) 0.5-2.5 (3) MG/3ML SOLN NEBULIZER SOLUTION    Inhale 1 vial into the lungs every 6 hours as needed for Shortness of Breath    KETOCONAZOLE (NIZORAL) 2 % CREAM    Apply topically 2 times daily Apply topically daily. LIVALO 2 MG TABS TABLET    TAKE ONE TABLET BY MOUTH AT BEDTIME. LOSARTAN (COZAAR) 25 MG TABLET    Take 1 tablet by mouth daily    MENTHOL-ZINC OXIDE (CALMOSEPTINE) 0.44-20.6 % OINT OINTMENT    Apply topically 4 times daily as needed Max 30 ml per day. METOPROLOL SUCCINATE (TOPROL XL) 50 MG EXTENDED RELEASE TABLET    Take 1 tablet by mouth daily    MULTIPLE VITAMINS-MINERALS (THEREMS-M) TABS    TAKE 1 TABLET BY MOUTH ONCE DAILY.     OXYCODONE (ROXICODONE) 5 MG IMMEDIATE RELEASE TABLET    Take 5 mg by mouth every 6 hours as needed for Pain. SENNA-DOCUSATE (PERICOLACE) 8.6-50 MG PER TABLET    Take 2 tablets by mouth daily as needed for Constipation    SPIRONOLACTONE (ALDACTONE) 25 MG TABLET    Take 0.5 tablets by mouth daily       Allergies     She is allergic to penicillins, statins, and sulfa antibiotics. Physical Exam     INITIAL VITALS: BP: (!) 147/95, Temp: 98.4 °F (36.9 °C), Heart Rate: (!) 102, Resp: 13, SpO2: 96 %   General: GCS 7  HEENT: head is atraumatic, pupils equal round and reactive to light, sclera are clear  Neck: Full range of motion  Chest: Nonlabored respirations, equal chest rise bilaterally, no accessory muscle use  Cardiovascular: Tachycardic rate, regular rhythm, 2+ radial pulses bilaterally  Abdominal: Soft, nondistended, no rebound or guarding  Back: No wounds  Skin: Warm, dry, well-perfused, no rashes. Wound to the left hip. Does not appear to be infected. Musculoskeletal: No obvious deformities, no tenderness to palpation diffusely  Neurologic: Moves to localize pain. Moving all of her extremities. Psych: Unable to assess  Diagnostic Results     EKG   Twelve-lead EKG performed on November 27, 2022 at 1201. Sinus rhythm at a rate of 94. Wavy baseline, making interpretation difficult. Normal axis. Poor R wave progression. No significant Q waves noted. No obvious ST segment changes noted. No obvious T wave inversions noted. QTC prolonged at 472. QRS normal at 94. TX interval normal at 136. No STEMI. RADIOLOGY:  CT Head W/O Contrast   Final Result      Area of hypoattenuation with loss of gray-white differentiation centered in the left parietal region. This is concerning for acute infarct. MRI recommended for further evaluation.       The above findings were discussed with Chad Fraire M.D. on 11/27/2022 at 1:24 PM.         XR CHEST PORTABLE   Final Result    Bilateral pleural effusions with mid and lower lung zone airspace opacities, increased compared to 11/21/2022 suggestive of pulmonary edema, superimposed infection/atelectasis is not excluded.           LABS:   Results for orders placed or performed during the hospital encounter of 11/27/22   COVID-19, Rapid    Specimen: Nasopharyngeal Swab   Result Value Ref Range    SARS-CoV-2, NAAT Not Detected Not Detected   Rapid Flu Swab    Specimen: Nasopharyngeal   Result Value Ref Range    Rapid Influenza A Ag Negative Negative    Rapid Influenza B Ag Negative Negative   CBC with Auto Differential   Result Value Ref Range    WBC 12.9 (H) 4.0 - 11.0 K/uL    RBC 5.15 4.00 - 5.20 M/uL    Hemoglobin 14.3 12.0 - 16.0 g/dL    Hematocrit 44.3 36.0 - 48.0 %    MCV 86.0 80.0 - 100.0 fL    MCH 27.8 26.0 - 34.0 pg    MCHC 32.4 31.0 - 36.0 g/dL    RDW 17.4 (H) 12.4 - 15.4 %    Platelets 104 262 - 986 K/uL    MPV 10.4 5.0 - 10.5 fL    Neutrophils % 73.1 %    Lymphocytes % 20.4 %    Monocytes % 5.1 %    Eosinophils % 0.7 %    Basophils % 0.7 %    Neutrophils Absolute 9.4 (H) 1.7 - 7.7 K/uL    Lymphocytes Absolute 2.6 1.0 - 5.1 K/uL    Monocytes Absolute 0.7 0.0 - 1.3 K/uL    Eosinophils Absolute 0.1 0.0 - 0.6 K/uL    Basophils Absolute 0.1 0.0 - 0.2 K/uL   CMP w/ Reflex to MG   Result Value Ref Range    Sodium 144 136 - 145 mmol/L    Potassium reflex Magnesium 4.5 3.5 - 5.1 mmol/L    Chloride 111 (H) 99 - 110 mmol/L    CO2 22 21 - 32 mmol/L    Anion Gap 11 3 - 16    Glucose 100 (H) 70 - 99 mg/dL    BUN 12 7 - 20 mg/dL    Creatinine 0.6 0.6 - 1.2 mg/dL    Est, Glom Filt Rate >60 >60    Calcium 8.0 (L) 8.3 - 10.6 mg/dL    Total Protein 5.7 (L) 6.4 - 8.2 g/dL    Albumin 2.6 (L) 3.4 - 5.0 g/dL    Albumin/Globulin Ratio 0.8 (L) 1.1 - 2.2    Total Bilirubin 0.5 0.0 - 1.0 mg/dL    Alkaline Phosphatase 117 40 - 129 U/L    ALT 12 10 - 40 U/L    AST 22 15 - 37 U/L   Lactic Acid   Result Value Ref Range    Lactic Acid 1.8 0.4 - 2.0 mmol/L   Lipase   Result Value Ref Range    Lipase 15.0 13.0 - 60.0 U/L Troponin   Result Value Ref Range    Troponin 0.29 (H) <0.01 ng/mL   Brain Natriuretic Peptide   Result Value Ref Range    Pro-BNP 46,335 (H) 0 - 449 pg/mL       ED BEDSIDE ULTRASOUND:  No results found. RECENT VITALS:  BP: (!) 147/95,Temp: 98.4 °F (36.9 °C), Heart Rate: (!) 102, Resp: 13, SpO2: 96 %     Procedures     Not applicable    ED Course     Nursing Notes, Past Medical Hx, Past Surgical Hx, Social Hx,Allergies, and Family Hx were reviewed. patient was given the following medications:  Orders Placed This Encounter   Medications    cefTRIAXone (ROCEPHIN) 1,000 mg in dextrose 5 % 50 mL IVPB mini-bag     Order Specific Question:   Antimicrobial Indications     Answer:   Pneumonia (CAP)    azithromycin (ZITHROMAX) 500 mg in dextrose 5 % 250 mL IVPB     Order Specific Question:   Antimicrobial Indications     Answer:   Pneumonia (CAP)    DISCONTD: lactated ringers infusion 1,000 mL         CONSULTS:  IP CONSULT TO HOSPITALIST    MEDICAL DECISIONMAKING / ASSESSMENT / PLAN     Differential Diagnosis: ACS, arrhythmia, pneumonia, pneumothorax, UTI, pyelonephritis, intracranial hemorrhage, intracranial mass, bacteremia    Klasu Stover is a 80 y.o. female with a past medical history of dementia, nonverbal at baseline, prior brain mass, heart failure, hypertension, hyperlipidemia, diabetes, multiple falls, ORIF on 9/17/2022 due to a left hip fracture who presents to the emergency department today for altered mental status. She arrives to the emergency department requiring 10 L of oxygen. She does not have a swallow reflex 1 saline is flushed through her nose. Radial pulses 2+. On exam, she has a GCS of 7. She has a chronic wound noted on her left hip. She is moving all of her extremities in response to painful stimuli. She is requiring 10 L of oxygen for hypoxia. I had a long discussion with her daughter Jemima Martinez, who is listed as her emergency contact.   She was not aware that the DNR CCA meant that her mother could be intubated. She states her mother would not want intubation or chest compressions performed. They are actually in discussions with the family about possibly putting her into hospice care. She would like to treat the patient if anything is found today up to intubation and chest compressions. EKG had a wavy baseline due to the patient moving in bed, but no obvious STEMI noted. Chest x-ray is can Durning for pneumonia versus atelectasis. Given her new oxygen requirement, I have treated her with ceftriaxone and azithromycin. I also contacted by the radiologist regarding her CT head results. There is concern for a CVA in the left parietal region. I rediscussed her case with her daughter, the patient would not want tPA or thrombectomy. Due to this, I did not discuss her case with the stroke team.  COVID/influenza were negative. She had a leukocytosis of 12.9 thousand. No anemia or thrombocytopenia. CMP showed a hypocalcemia, elevated chloride. Lactic acid was normal.  Lipase was normal.  Troponin elevated at 0.29. BNP elevated at 46,335. She had been started on maintenance IV fluids, these were discontinued. Given her new oxygen requirement, concern for pneumonia/heart failure, likely new CVA, her case was discussed with the hospitalist who accepted her for admission. She was admitted in stable condition. Clinical Impression     1. Altered mental status, unspecified altered mental status type    2. Elevated troponin    3. Cerebrovascular accident (CVA), unspecified mechanism ([address]    4. Hypoxia        Disposition     PATIENT REFERRED TO:  No follow-up provider specified.     DISCHARGE MEDICATIONS:  New Prescriptions    No medications on file       DISPOSITION Decision To Admit 11/27/2022 02:10:53 PM         Cecy Plummer MD  11/27/22 [id number]

## 2022-11-27 NOTE — ED TRIAGE NOTES
Pt coming from nursing home for not eating or drinking for 2 days. Stated 90% RA but EMS found her at 75% RA.  Put on 10L non rebreather

## 2022-11-27 NOTE — H&P
Internal Medicine  PGY 1  History & Physical      CC: AMS    History Obtained From: Both daughters, ED, EMS    SUBJECTIVE      HISTORY OF PRESENT ILLNESS:   Gee Burger. Fran Galicia is a 70-year-old female with history of dementia, CHF, prior brain mass, falls in September s/o ORIF for Hip fracture who presents with AMS/obtundation. Per daughters, patient had reduced p.o. intake both eating and drinking. Of note recent admission for hypoxic respiratory failure with fluid overload with new onset dCHF. Both daughters at the time had discussions for possible hospice, but ultimately never made a decision. Today, spoke with both daughters ultimately given patient's age, comorbidities, prognosis decision was made by both daughters to pursue hospice and ultimately CODE STATUS was stay changed to DNR CC. Both daughters understand that hospice is for end-of-life care, no management for her current stroke will be pursued, comfort measures only will be instituted. In the ED, patient obtunded. Requiring 12L NRB, BP stable in low 100s. Morphine and Ativan PO solution started in ED given patient is now comfort care.     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 10/2022    new onset    Dementia ([facility]     has not seen neurologist. by report 20/30 mmse and aricept was started    Diabetes [facility])     type 2    Frequent falls     Hearing loss     Heart disease     bypass? cardiologist?    Hyperlipidemia     Hypertension     Mental status alteration 11/2020    [facility] consult    Osteoporosis     restarted after a drug holiday    Wrist fracture        Past Surgical History: Procedure Laterality Date    BRAIN TUMOR EXCISION      benign, right frontotemporal craniotomy    CORONARY ARTERY BYPASS GRAFT      HIP FRACTURE SURGERY Left 9/17/2022    LEFT HIP OPEN REDUCTION INTERNAL FIXATION performed by Riki Boudreaux MD at [facility]      pins    ORIF HIP FRACTURE Left 09/2022    PARTIAL HIP ARTHROPLASTY      left hip       Medications Priorto Admission:    Not in a hospital admission. Allergies:  Penicillins, Statins, and Sulfa antibiotics    Social History:   TOBACCO:   reports that she has never smoked. She has never used smokeless tobacco.  ETOH:   reports no history of alcohol use. DRUGS :   Patient currently lives [facility]    Family History:       Problem Relation Age of Onset    Diabetes Mother     High Blood Pressure Mother     Cancer Sister         ovarian    Diabetes Sister        ROS: A 10 point review of systems was conducted, significant findings as noted in HPI. Physical Exam  Constitutional:       Appearance: She is ill-appearing. Comments: Obtunded   Eyes:      Comments: Pupils small, sluggishly reactive to light. Cardiovascular:      Rate and Rhythm: Normal rate and regular rhythm. Comments: Pulses soft B/L UE/LE  Pulmonary:      Breath sounds: Stridor present. Comments: Diffuse rales, stridor in middle lobes. On 12L NRB  Abdominal:      General: There is no distension. Palpations: Abdomen is soft. Musculoskeletal:      Right lower leg: No edema. Left lower leg: No edema. Neurological:      Comments: Obtunded, not following any commands. Minimally responsive to pain in B/L UE. Non-responsive to pain in B/L LE.            Vitals:    11/27/22 1457   BP: 82/67   Pulse: 74   Resp: 23   Temp:    SpO2: 98%       OBJECTIVE    Labs:  CBC:   Recent Labs     11/27/22  1255   WBC 12.9*   HGB 14.3   HCT 44.3          BMP:   Recent Labs     11/27/22  1255      K 4.5   *   CO2 22   BUN 12 CREATININE 0.6   GLUCOSE 100*     LFT's:   Recent Labs     11/27/22  1255   AST 22   ALT 12   BILITOT 0.5   ALKPHOS 117     Troponin:   Recent Labs     11/27/22  1255   TROPONINI 0.29*     BNP:No results for input(s): BNP in the last 72 hours. ABGs: No results for input(s): PHART, OIE5QJL, PO2ART in the last 72 hours. INR: No results for input(s): INR in the last 72 hours. U/A:  Recent Labs     11/27/22  1443   COLORU Yellow   PHUR 6.0   WBCUA >100*   RBCUA >100*   BACTERIA 2+*   CLARITYU Clear   SPECGRAV 1.025   LEUKOCYTESUR LARGE*   UROBILINOGEN 1.0   BILIRUBINUR SMALL*   BLOODU LARGE*   GLUCOSEU Negative       CT Head W/O Contrast   Final Result      Area of hypoattenuation with loss of gray-white differentiation centered in the left parietal region. This is concerning for acute infarct. MRI recommended for further evaluation. The above findings were discussed with Neyda Lr M.D. on 11/27/2022 at 1:24 PM.         XR CHEST PORTABLE   Final Result    Bilateral pleural effusions with mid and lower lung zone airspace opacities, increased compared to 11/21/2022 suggestive of pulmonary edema, superimposed infection/atelectasis is not excluded. ASSESSMENT AND PLAN:    Acute L Parietal Lobe CVA  AoC Diastolic CHF  Dementia  UTI? PNA? Chronic Problems:  Hx of brain mass  Frequent Falls  T2DM  HTN  HLD  Hx of brain mass    CT head shows concern for L parietal Lobe acute infarct. Labs concerning for dCHF exac, possible UTI, Pulm edema w possible PNA.   - As above in HPI, both daughter agreed to Hospice measures and code status to [facility].  - Hospice consulted  - Palliative consulted  - Comfort Care measures in place  - will sign DNR-CC order once pt on floors    Code Status:DNR-CC  DISPO: IP, plan for Hospice (consulted)    Will discuss with attending physician Dr. Reid Barth MD, PGY-1  11/27/2022,  4:52 PM

## 2022-11-27 NOTE — ACP (ADVANCE CARE PLANNING)
Advance Care Planning     Advance Care Planning (ACP) Physician Conversation    Date of Conversation: 11/27/2022  Conducted with:  Healthcare Decision Maker: Named in Advance Directive or Healthcare Power of  (name) [place] Lisa Decision Maker:    Primary Decision Maker: Mayur Wright - Child - [phone]    Primary Decision Maker: Sha Parents - Child - [phone]  Click here to complete Healthcare Decision Makers including selection of the Healthcare Decision Maker Relationship (ie \"Primary\"). Today we documented Decision Maker(s) consistent with ACP documents on file. Care Preferences:    Hospitalization: \"If your health worsens and it becomes clear that your chance of recovery is unlikely, what would be your preference regarding hospitalization? \"  The patient would prefer comfort-focused treatment without hospitalization. Ventilation: \"If you were unable to breath on your own and your chance of recovery was unlikely, what would be your preference about the use of a ventilator (breathing machine) if it was available to you? \"  The patient would NOT desire the use of a ventilator. Resuscitation: \"In the event your heart stopped as a result of an underlying serious health condition, would you want attempts made to restart your heart, or would you prefer a natural death? \"  No, do NOT attempt to resuscitate.     benefit/burden of treatment options, hospitalization preferences, resuscitation preferences, end of life care preferences (vegetative state/imminent death), and hospice care    Conversation Outcomes / Follow-Up Plan:  ACP complete - no further action today  Reviewed DNR/DNI and patient confirms current DNR status - completed forms on file (place new order if needed)    Length of Voluntary ACP Conversation in minutes:  16 minutes    Reggie Cortes MD

## 2022-11-28 NOTE — PROGRESS NOTES
Patient SpO2 75 on 15 L NRB and 15 L high flow nasal canula. All fall precaution in place , will continue to monitor.

## 2022-11-28 NOTE — PROGRESS NOTES
Patient on 15L NRB and 15 L high flow with sat of 95% this AM but patient had coarse cough with thick sputum and had labored breathing. Repositioned patient for comfort and gave morphine to assist with comfort and respirations. Spoke with daughter this morning regarding discharge plans with hospice. Palliative care was able to speak with daughters and patient would GIP with [facility]. Janeth Cummings RN from [facility] here to assess patient and assist with transition to hospice care.      Electronically signed by Azul Saleh RN on 11/28/2022 at 1:13 PM

## 2022-11-28 NOTE — PROGRESS NOTES
[facility]:  MD agreeable to discharge/re-admission for pt as she is too unstable to transport out of the hospital and oxygen need is too high. Spoke to daughter Pepe Justin on the phone and she signed hospice consent electronically. Called to room by RN Rodrick Steinberg as pt. Was taking last breaths. MD pronounced patient. Spoke to daughter Tequila Thomason on the phone to update on her mother's passing and provide support. [facility] is HIEN Mccord  [address] [facility].  left for  to update on outcome.      Thank you,  Digna Winchester RN [facility] [phone]

## 2022-11-28 NOTE — PROGRESS NOTES
Richelle Osman with [address] notified of time of death. Patient bathed. Supervisor notified and post-mortem checklist in flowsheets. [address] contacted.      Electronically signed by Chalo Sánchez RN on 11/28/2022 at 1:18 PM

## 2022-11-28 NOTE — CARE COORDINATION
9:59 AM  Attempted to reach out to [address] for referral for hospice. Per Palliative NP Martine, this is the preference of the family. CM will follow up at a later time.      Electronically signed by Maxime Diehl RN on 11/28/2022 at 10:00 AM

## 2022-11-28 NOTE — PLAN OF CARE
Problem: Discharge Planning  Goal: Discharge to home or other facility with appropriate resources  Outcome: Progressing  Note: Plan to d/c in care of hospice. Problem: Pain  Goal: Verbalizes/displays adequate comfort level or baseline comfort level  Outcome: Progressing  Note: Patient very restless and uncomfortable. Morphine given for relief. Problem: Skin/Tissue Integrity  Goal: Absence of new skin breakdown  Description: 1. Monitor for areas of redness and/or skin breakdown  2. Assess vascular access sites hourly  3. Every 4-6 hours minimum:  Change oxygen saturation probe site  4. Every 4-6 hours:  If on nasal continuous positive airway pressure, respiratory therapy assess nares and determine need for appliance change or resting period. Note: Mepelex borders to bilateral heels. Patient cleaned and repositioned after each incontinent episode.

## 2022-11-28 NOTE — DISCHARGE SUMMARY
INTERNAL MEDICINE DEPARTMENT AT [address] [place] [address]  DISCHARGE SUMMARY    Patient ID: Trudy Elaine                                             Discharge Date: 11/28/2022   Patient's PCP: Jean Carlos Amanda MD                                          Discharge Physician: Marnette Nageotte, MD MD  Admit Date: 11/28/2022   Admitting Physician: Allana Hatchet, MD    PROBLEMS DURING HOSPITALIZATION:  Present on Admission:  Acute CVA  Acute Hypoxic Respiratory Failure  Diastolic Heart Failure Exacerbation  UTI  Dementia      DISCHARGE DIAGNOSES:  Acute Hypoxic Respiratory Failure  Acute L Parietal Lobe CVA  AoC Diastolic CHF  Dementia  UTI  Possible PNA    HPI:  Saintclair Senters. Jaylen Zapata is a 28-year-old female with history of dementia, CHF, prior brain mass, falls in September s/o ORIF for Hip fracture who presents with AMS/obtundation. Per daughters, patient had reduced p.o. intake both eating and drinking. Of note recent admission for hypoxic respiratory failure with fluid overload with new onset dCHF. Both daughters at the time had discussions for possible hospice, but ultimately never made a decision. Today, spoke with both daughters ultimately given patient's age, comorbidities, prognosis decision was made by both daughters to pursue hospice and ultimately CODE STATUS was stay changed to DNR CC. Both daughters understand that hospice is for end-of-life care, no management for her current stroke will be pursued, comfort measures only will be instituted. In the ED, patient obtunded. Requiring 12L NRB, BP stable in low 100s. CT head showed concern for acute  L parietal infartions. Morphine and Ativan PO solution started in ED given patient is now comfort care.     The following issues were addressed during hospitalization:    Acute Hypoxic Respiratory Failure  Acute L Parietal Lobe CVA  AoC Diastolic CHF  Dementia  UTI  Possible PNA  Hx of brain mass  Frequent Falls  T2DM  HTN  HLD  Hx of brain mass    Patient presented Obtunded from [facility] home. CT head showed concern for acute left parietal infarction. Labs and imaging showed likely acute exacerbation of diastolic CHF and pulmonary edema with concern for underlying PNA. Our team was informed from ED that family did not want any interventions performed including thrombectomy or fibrinolytics. Thus both daughters were contacted immediately to determine goals of care and to discuss the prognosis of this patient. Daughters previously entertained the idea of hospice care last admission but never came to to conclusion. This topic was discussed again. Ultimately, discussion concluded with the decision to change code status to [facility] and pursue comfort care measures. Thus Hospice and Palliative care were consulted. Comfort care measures were initiated in the ED and patient ultimately came up to wards floor. Discussion with Hospice and family was held regarding inpatient vs outpatient hospice. However patient's condition was deteriorating rapidly, thus hospice in-hospital was decided. Discharge-Readmission orders were placed. Ultimately patient passed away shortly after at 12:30 PM 11/28/2022. Full death note can be found in chart. Physical Exam:  Physical Exam     Constitutional:       Appearance: She is ill-appearing. Comments: Obtunded , non-responsive  Eyes:      Comments: Pupils small, significantly sluggishly reactive to light. Cardiovascular:      Rate and Rhythm: Normal rate and regular rhythm. Comments: Pulses very soft B/L  Pulmonary:      Breath sounds: Stridor present. Diffusely reduced. Comments: Diffuse rales, diffuse stridor in middle lobes. On 15L NRB and 15L NC (total 30L O2). Neurological:      Comments: Obtunded, not following any commands. Non-responsive to pain.      Consults: Palliative Care, Hospice  Significant Diagnostic Studies:  CT Head prior to hospice  Treatments: Comfort measures     Disposition:     Time Spent on discharge is more than 30 minutes    Signed:  John Sanders MD,  MD, PGY-1  2022

## 2022-11-28 NOTE — PROGRESS NOTES
11/28/22 1001   Encounter Summary   Encounter Overview/Reason  Initial Encounter   Service Provided For: Patient   Referral/Consult From: Nurse   Support System Family members   Last Encounter  11/28/22   Complexity of Encounter Low   Begin Time 0930   End Time  1002   Total Time Calculated 32 min   Encounter    Type Initial Screen/Assessment   Assessment/Intervention/Outcome   Assessment Unable to assess   Intervention Sustaining Presence/Ministry of presence   Outcome Encouraged   PT seemed to be sleeping? No family at bedside. Therefore, I also called the PT's daughter, Hawa Pink and left her a message regarding a request for a  (Fr. Pringle from [address] [place] [address])  Staff Sahil Boyd MA, [facility]

## 2022-11-28 NOTE — CONSULTS
[facility]  Palliative Medicine Consultation Note      Date Of Admission:11/27/2022  Date of consult: 11/28/22  Seen by [facility] in the past:  Yes    Recommendations:           Pt is known to the palliative care team.    Saw pt at the bedside. She is lethargic with labored breathing, unable to participate in a goals of care discussion. Called pt's daughter Gian Ba. Introduced palliative care and had a voluntary discussion about advance care planning. She confirms the goals for pt are comfort directed. She requests a referral to [address] [place] [address]. 1. Goals of Care/Advanced Care planning/Code status: [facility], goals are comfort directed. Confirmed with pt's daughter today. 2. Pain: pt unable to state  3. SOB, acute hypoxic respiratory failure: pt unable to state, on 15LHFNC and NRB. Breathing labored. She does have orders for morphine for comfort prn, d/w RN. Can wean O2 for comfort. 4. AMS 2/2 infection vs left parietal lobe CVA: pt remains obtunded, goals are comfort directed as above. 5. Disposition: Hospice IPU vs Hospice GIP    Reason for Consult:         []  Goals of Care  []  Code Status Discussion/Advanced Care Planning   [x]  Psychosocial/Family Support  []  Symptom Management  []  Other (Specify)    Requesting Physician: Dr. Hadley Half:  AMS    History Obtained From:  family member - daughter, electronic medical record    History of Present Illness:         Jannie Aguilar is a 80 y.o. female with PMH of dementia, CHF, prior brain mass, s/p ORIF for hip fracture in September who presented with altered mental status. Pt's daughters reported pt has had reduced PO intake. Of note, pt was recently admitted for hypoxic respiratory failure with new onset dCHF. This admission pt's daughters have ultimately decided to pursue comfort care and hospice.      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 by report 20/30 mmse and aricept was started    Diabetes [facility])     type 2    Frequent falls     Hearing loss     Heart disease     bypass? cardiologist?    Hyperlipidemia     Hypertension     Mental status alteration 11/2020    [facility] consult    Osteoporosis     restarted after a drug holiday    Wrist fracture        Past Surgical History:        Procedure Laterality Date    BRAIN TUMOR EXCISION      benign, right frontotemporal craniotomy    CORONARY ARTERY BYPASS GRAFT      HIP FRACTURE SURGERY Left 9/17/2022    LEFT HIP OPEN REDUCTION INTERNAL FIXATION performed by Riki Boudreaux MD at [address] [place] [address]      pins    ORIF HIP FRACTURE Left 09/2022    PARTIAL HIP ARTHROPLASTY      left hip       Current Medications:    Medications Prior to Admission: metoprolol succinate (TOPROL XL) 50 MG extended release tablet, Take 1 tablet by mouth daily  furosemide (LASIX) 40 MG tablet, Take 1 tablet by mouth daily  menthol-zinc oxide (CALMOSEPTINE) 0.44-20.6 % OINT ointment, Apply topically 4 times daily as needed Max 30 ml per day. guaiFENesin-dextromethorphan (ROBITUSSIN DM) 100-10 MG/5ML syrup, Take 10 mLs by mouth 3 times daily as needed for Cough  ipratropium-albuterol (DUONEB) 0.5-2.5 (3) MG/3ML SOLN nebulizer solution, Inhale 1 vial into the lungs every 6 hours as needed for Shortness of Breath  ketoconazole (NIZORAL) 2 % cream, Apply topically 2 times daily Apply topically daily. Insulin Glargine Solostar 100 UNIT/ML SOPN, Inject 15 Units into the skin at bedtime Inject 15 U SC @ bedtime  oxyCODONE (ROXICODONE) 5 MG immediate release tablet, Take 5 mg by mouth every 6 hours as needed for Pain.   spironolactone (ALDACTONE) 25 MG tablet, Take 0.5 tablets by mouth daily  losartan (COZAAR) 25 MG tablet, Take 1 tablet by mouth daily  insulin lispro (HUMALOG) 100 UNIT/ML injection vial, Inject into the skin 3 times daily (before meals) 0-200= 0 units, 201-250=1 unit, 252-300=2 units, 301-350=3 units, 351-400 =4 units, 401-450= 5 units  senna-docusate (PERICOLACE) 8.6-50 MG per tablet, Take 2 tablets by mouth daily as needed for Constipation  alendronate (FOSAMAX) 35 MG tablet, TAKE 1 TABLET BY MOUTH WEEKLY 30 MINS BEFORE FOOD (EMPTY STOMACH) WITH 8 OZ WATER DO NOT LIE DOWN FOR 30MIN  LIVALO 2 MG TABS tablet, TAKE ONE TABLET BY MOUTH AT BEDTIME. (Patient taking differently: 2 mg nightly)  ACCU-CHEK CARMEN PLUS strip, TESTING ONCE DAILY. DX: E11.9 DIABETES MELLITUS  Insulin Pen Needle (PEN NEEDLES) 31G X 6 MM MISC, 1 each by Does not apply route daily  Multiple Vitamins-Minerals (THEREMS-M) TABS, TAKE 1 TABLET BY MOUTH ONCE DAILY. Calcium Carbonate-Vitamin D (OYSTER SHELL CALCIUM/D) 500-200 MG-UNIT TABS, TAKE ONE (1) TABLET BY MOUTH TWICE A DAY. Allergies:  Penicillins, Statins, and Sulfa antibiotics    Social History:    TOBACCO: reports that she has never smoked. She has never used smokeless tobacco.  ETOH:   reports no history of alcohol use. Patient currently lives in a nursing home    Review of Systems -   Review of Systems   Unable to perform ROS: Mental status change     Objective:          Physical Exam  Constitutional:       General: She is in acute distress. Appearance: She is ill-appearing. Cardiovascular:      Rate and Rhythm: Tachycardia present. Heart sounds: Normal heart sounds. Pulmonary:      Effort: Respiratory distress present. Abdominal:      General: Bowel sounds are normal.      Palpations: Abdomen is soft. Musculoskeletal:      Right lower leg: No edema. Left lower leg: No edema. Skin:     General: Skin is warm and dry.    Neurological:      Comments: obtunded        Palliative Performance Scale:  [] 60% Ambulation reduced; Significant disease; Can't do hobbies/housework; intake normal or reduced; occasional assist; LOC full/confusion  [] 50% Mainly sit/lie; Extensive disease; Can't do any work; Considerable assist; intake normal  Or reduced; LOC full/confusion  [] 40% Mainly in bed; Extensive disease; Mainly assist; intake normal or reduced; occasional assist; LOC full/confusion  [] 30% Bed Bound; Extensive disease; Total care; intake reduced; LOC full/confusion  [] 20% Bed Bound; Extensive disease; Total care; intake minimal; Drowsy/coma  [x] 10% Bed Bound; Extensive disease; Total care; Mouth care only; Drowsy/coma  [] 0% Death    PPS: 10    Vitals:    /66   Pulse 70   Temp 97.8 °F (36.6 °C) (Axillary)   Resp 16   SpO2 98%     Labs:    BMP:   Recent Labs     11/27/22  1255      K 4.5   *   CO2 22   BUN 12   CREATININE 0.6   GLUCOSE 100*     CBC:   Recent Labs     11/27/22  1255   WBC 12.9*   HGB 14.3   HCT 44.3          LFT's:   Recent Labs     11/27/22  1255   AST 22   ALT 12   BILITOT 0.5   ALKPHOS 117     Troponin:   Recent Labs     11/27/22  1255   TROPONINI 0.29*     BNP: No results for input(s): BNP in the last 72 hours. ABGs: No results for input(s): PHART, THE2NNM, PO2ART in the last 72 hours. INR: No results for input(s): INR in the last 72 hours. U/A:  Recent Labs     11/27/22  1443   COLORU Yellow   PHUR 6.0   WBCUA >100*   RBCUA >100*   BACTERIA 2+*   CLARITYU Clear   SPECGRAV 1.025   LEUKOCYTESUR LARGE*   UROBILINOGEN 1.0   BILIRUBINUR SMALL*   BLOODU LARGE*   GLUCOSEU Negative       CT Head W/O Contrast   Final Result      Area of hypoattenuation with loss of gray-white differentiation centered in the left parietal region. This is concerning for acute infarct. MRI recommended for further evaluation.       The above findings were discussed with Keith Bose M.D. on 11/27/2022 at 1:24 PM.         XR CHEST PORTABLE   Final Result    Bilateral pleural effusions with mid and lower lung zone airspace opacities, increased compared to 11/21/2022 suggestive of pulmonary edema, superimposed infection/atelectasis is not excluded. Conclusion/Time spent:         Recommendations see above  900-920  Time spent with patient and/or family: 20  Time reviewing records: 10 min   Time communicating with staff: 5 min     A total of 35 minutes spent with the patient and family on unit greater than 50% in counseling regarding palliative care and in goals of care for the patient. Thank you to Dr. Santana Kanner for this consultation. We will continue to follow Ms. Stephenson's care as needed.     [address]  Inpatient Palliative Care  [phone]

## 2022-11-28 NOTE — PROGRESS NOTES
Physician Progress Note      PATIENT:               Elpidio Kamara  CSN #:                  [id number]  :                       1931  ADMIT DATE:       2022 11:48 AM  DISCH DATE:        2022 12:36 PM  RESPONDING  PROVIDER #:        Lopez CHIU          QUERY TEXT:    Pt admitted with AMS/obtundation. Pt noted to require 12L or NRB. If   possible, please document in the progress notes and discharge summary if you   are evaluating and/or treating any of the following: The medical record reflects the following:  Risk Factors: Acute L Parietal Lobe CVA  Clinical Indicators: per H/P \"Obtunded, Stridor present, minimally responsive   to pain in B/L UE. Non-responsive to pain in B/L LE\". ?  V/S flowsheet-patient on Oxygen therapy 15L via NRB to F n/c @ 15L  Treatment: CBC, CMP, CT of head, CXR, Oxygen therapy, V/S and I/O monitoring   per unit protocol  Options provided:  -- Acute respiratory failure with hypoxia  -- Acute respiratory failure with hypercapnia  -- Acute respiratory failure with hypoxia and hypercapnia  -- Other - I will add my own diagnosis  -- Disagree - Not applicable / Not valid  -- Disagree - Clinically unable to determine / Unknown  -- Refer to Clinical Documentation Reviewer    PROVIDER RESPONSE TEXT:    This patient is in acute respiratory failure with hypoxia.     Query created by: Randene Apgar on 2022 10:06 AM      Electronically signed by:  Baron Cunha 2022 1:27 PM

## 2022-11-28 NOTE — PROGRESS NOTES
Patient admitted to the floor. VSS on 15 L NRB. All fall precaution in place. Will continue to monitor.

## 2022-11-28 NOTE — CARE COORDINATION
12:52 PM  VM received from Betsy Rader, [facility] RN. Patient passed with RN at bedside. Electronically signed by Mariano Stewart RN on 11/28/2022 at 12:54 PM        1030  CM able to make contact with Betsy Rader from [facility]; she is at bedside and working on switching patient to GIP.

## 2022-11-28 NOTE — PROGRESS NOTES
Clinical Pharmacy Progress Note  Medication History     Admit Date: 11/27/2022    List of of current medications patient is taking is complete. Home Medication list in EPIC updated to reflect changes noted below. Source of information: medication list from [address] made to medication list:   Medications removed: (include reason, ex: therapy completed, patient no longer taking, etc.)  Duoneb - not on med list from facility  Medications added:   Albuterol  Medication doses adjusted:   Losartan--> 75mg daily  Metoprolol XL --> 25 mg daily   Humalog sliding scale  Senna-S --> 2 tab daily  Robitussin DM --> 10mL PO q4h PRN    Current Outpatient Medications   Medication Instructions    albuterol (PROVENTIL) 2.5 mg, Nebulization, EVERY 6 HOURS PRN    alendronate (FOSAMAX) 35 MG tablet TAKE 1 TABLET BY MOUTH WEEKLY 30 MINS BEFORE FOOD (EMPTY STOMACH) WITH 8 OZ WATER DO NOT LIE DOWN FOR 30MIN    Calcium Carbonate-Vitamin D (OYSTER SHELL CALCIUM/D) 500-200 MG-UNIT TABS TAKE ONE (1) TABLET BY MOUTH TWICE A DAY. furosemide (LASIX) 40 mg, Oral, DAILY    guaiFENesin-dextromethorphan (ROBITUSSIN DM) 100-10 MG/5ML syrup 10 mLs, Oral, EVERY 4 HOURS PRN    Insulin Glargine Solostar 15 Units, SubCUTAneous, Nightly, Inject 15 U SC @ bedtime    insulin lispro (HUMALOG) 0-10 Units, SubCUTAneous, 3 TIMES DAILY BEFORE MEALS, Inject as per sliding scale:  BG 0-199= 0 units, 200-249=2 unit, 250-299=4 units, 300-349=6 units, 350-399 =8 units, 400-500=10 units + call MD    ketoconazole (NIZORAL) 2 % cream Topical, AS NEEDED    LIVALO 2 MG TABS tablet TAKE ONE TABLET BY MOUTH AT BEDTIME. losartan (COZAAR) 75 mg, Oral, DAILY    menthol-zinc oxide (CALMOSEPTINE) 0.44-20.6 % OINT ointment Topical, 2 times daily    metoprolol succinate (TOPROL XL) 25 mg, Oral, DAILY    Multiple Vitamins-Minerals (THEREMS-M) TABS TAKE 1 TABLET BY MOUTH ONCE DAILY.     oxyCODONE (ROXICODONE) 5 mg, Oral, EVERY 6 HOURS PRN sennosides-docusate sodium (SENOKOT-S) 8.6-50 MG tablet 2 tablets, Oral, DAILY    spironolactone (ALDACTONE) 12.5 mg, Oral, DAILY       Please call with any questions.   Janki Nava, PharmD, BCPS  Wireless: [id number]   11/28/2022 11:13 AM

## 2022-11-28 NOTE — PROGRESS NOTES
4 Eyes Admission Assessment     I agree as the admission nurse that 2 RN's have performed a thorough Head to Toe Skin Assessment on the patient. ALL assessment sites listed below have been assessed on admission. Areas assessed by both nurses:   [x]   Head, Face, and Ears   [x]   Shoulders, Back, and Chest  [x]   Arms, Elbows, and Hands   [x]   Coccyx, Sacrum, and Ischium  [x]   Legs, Feet, and Heels        Does the Patient have Skin Breakdown? Yes a wound was noted on the Admission Assessment and an LDA was Initiated documentation include the Aminata-wound, Wound Assessment, Measurements, Dressing Treatment, Drainage, and Color\",   Stage 3 pressure injury on Left hip and nonblanchable redness on right hip. Blanchable redness on bottom, healing ulcer.  DTI to right heel  Alvin Prevention initiated:  Yes   Wound Care Orders initiated:  Yes      [address]  nurse consulted for Pressure Injury (Stage 3,4, Unstageable, DTI, NWPT, and Complex wounds) or Alvin score 18 or lower:  Yes      Nurse 1 eSignature: Electronically signed by Marion Ellison RN on 11/27/22 at 11:00 PM EST    **SHARE this note so that the co-signing nurse is able to place an eSignature**    Nurse 2 eSignature: Electronically signed by Elizabeth Navarrete RN on 11/27/22 at 11:40 PM EST

## 2022-11-28 NOTE — PROGRESS NOTES
Progress Note    Admit Date: 11/27/2022  Day: 1  Diet: Diet NPO    Interval history:     Pt seen at bedside. Remains lethargic, nonresponsive to pain in LE, only minimally in UE.     BP/HR stable, req 15L HFNC satting mid 90s. Comfort Care Measures  Palliative following  Awaiting Hospice recs - inpt vs outpt       HPI:  Rosemarie Her. Gilberto Hayden is a 58-year-old female with history of dementia, CHF, prior brain mass, falls in September s/o ORIF for Hip fracture who presents with AMS/obtundation. Per daughters, patient had reduced p.o. intake both eating and drinking. Of note recent admission for hypoxic respiratory failure with fluid overload with new onset dCHF. Both daughters at the time had discussions for possible hospice, but ultimately never made a decision. Today, spoke with both daughters ultimately given patient's age, comorbidities, prognosis decision was made by both daughters to pursue hospice and ultimately CODE STATUS was stay changed to DNR CC. Both daughters understand that hospice is for end-of-life care, no management for her current stroke will be pursued, comfort measures only will be instituted. In the ED, patient obtunded. Requiring 12L NRB, BP stable in low 100s. Morphine and Ativan PO solution started in ED given patient is now comfort care.      - Hospice consulted  - Palliative Consulted  - DNR-CC - will sign once arrives to floors  - CC measures in place      Medications:     Scheduled Meds:   ketoconazole   Topical BID    scopolamine  1 patch TransDERmal Q72H    sodium chloride flush  5-40 mL IntraVENous BID     Continuous Infusions:  PRN Meds:ipratropium-albuterol, menthol-zinc oxide, [PENDING] ondansetron **OR** ondansetron, morphine **OR** morphine, ketorolac, LORazepam, sodium chloride flush    Objective:   Vitals:   T-max:  Patient Vitals for the past 8 hrs:   BP Temp Temp src Pulse Resp SpO2   11/28/22 1049 118/76 97.5 °F (36.4 °C) Oral (!) 101 17 95 %   11/28/22 0832 -- -- -- -- -- 92 %   11/28/22 0315 119/66 97.8 °F (36.6 °C) Axillary 70 16 98 %       Intake/Output Summary (Last 24 hours) at 11/28/2022 1058  Last data filed at 11/28/2022 0753  Gross per 24 hour   Intake 467.47 ml   Output 0 ml   Net 467.47 ml       Physical Exam  Constitutional:       Appearance: She is ill-appearing. Comments: Obtunded   Eyes:      Comments: Pupils small, sluggishly reactive to light. Cardiovascular:      Rate and Rhythm: Normal rate and regular rhythm. Comments: Pulses soft B/L UE/LE  Pulmonary:      Breath sounds: Stridor present. Comments: Diffuse rales, stridor in middle lobes. On 12L NRB  Abdominal:      General: There is no distension. Palpations: Abdomen is soft. Musculoskeletal:      Right lower leg: No edema. Left lower leg: No edema. Neurological:      Comments: Obtunded, not following any commands. Minimally responsive to pain in B/L UE. Non-responsive to pain in B/L LE. LABS:    CBC:   Recent Labs     11/27/22  1255   WBC 12.9*   HGB 14.3   HCT 44.3      MCV 86.0     Renal:    Recent Labs     11/27/22  1255      K 4.5   *   CO2 22   BUN 12   CREATININE 0.6   GLUCOSE 100*   CALCIUM 8.0*   ANIONGAP 11     Hepatic:   Recent Labs     11/27/22  1255   AST 22   ALT 12   BILITOT 0.5   PROT 5.7*   LABALBU 2.6*   ALKPHOS 117     Troponin:   Recent Labs     11/27/22  1255   TROPONINI 0.29*     BNP: No results for input(s): BNP in the last 72 hours. Lipids: No results for input(s): CHOL, HDL in the last 72 hours. Invalid input(s): LDLCALCU, TRIGLYCERIDE  ABGs:  No results for input(s): PHART, CRS7FCT, PO2ART, NZR1IDV, BEART, THGBART, F9PRZLTK, ZSN6EKS in the last 72 hours. INR: No results for input(s): INR in the last 72 hours. Lactate: No results for input(s): LACTATE in the last 72 hours.   Cultures:  -----------------------------------------------------------------  RAD:   CT Head W/O Contrast   Final Result      Area of hypoattenuation with loss of gray-white differentiation centered in the left parietal region. This is concerning for acute infarct. MRI recommended for further evaluation. The above findings were discussed with David Portillo M.D. on 11/27/2022 at 1:24 PM.         XR CHEST PORTABLE   Final Result    Bilateral pleural effusions with mid and lower lung zone airspace opacities, increased compared to 11/21/2022 suggestive of pulmonary edema, superimposed infection/atelectasis is not excluded. Assessment/Plan:     Plan  - As above in HPI, both daughter agreed to Hospice measures and code status to [facility].  - Hospice consulted  - Palliative following  - Comfort Care measures in place  - will sign DNR-CC order once pt on floors    Assessment:    Acute Problems  Acute L Parietal Lobe CVA  AoC Diastolic CHF  Dementia  UTI? PNA? CT head - likely L parietal Lobe acute infarct. Labs/imaging - concerning for dCHF exac, UTI, pulm edema w/ possible PNA     Chronic Problems:  Hx of brain mass  Frequent Falls  T2DM  HTN  HLD  Hx of brain mass      Code Status: DNR-CC  FEN: NPO/lethargic  PPX: N/A  DISPO: IP, awaiting Carli MD Garrett, PGY-1  11/28/22  10:58 AM    This patient has been staffed and discussed with Prabha Mosher MD.  Patient seen and examined, labs and imaging studies reviewed, agree with assessment and plan as outlined above. Continue with current care and plan. Discussed case with patients nurse, discussed case with care team, discussed plan.   Full dc note to follow    Prabha Mosher MD [id number]

## 2022-11-30 NOTE — H&P
Internal Medicine  PGY 1  History & Physical      CC: AMS    History Obtained From: Both daughters, ED, EMS    SUBJECTIVE      HISTORY OF PRESENT ILLNESS:   Lani Rashid. Syed Meza is a 80-year-old female with history of dementia, CHF, prior brain mass, falls in September s/o ORIF for Hip fracture who presents with AMS/obtundation. Per daughters, patient had reduced p.o. intake both eating and drinking. Of note recent admission for hypoxic respiratory failure with fluid overload with new onset dCHF. Both daughters at the time had discussions for possible hospice, but ultimately never made a decision. Today, spoke with both daughters ultimately given patient's age, comorbidities, prognosis decision was made by both daughters to pursue hospice and ultimately CODE STATUS was stay changed to DNR CC. Both daughters understand that hospice is for end-of-life care, no management for her current stroke will be pursued, comfort measures only will be instituted. In the ED, patient obtunded. Requiring 12L NRB, BP stable in low 100s. Morphine and Ativan PO solution started in ED given patient is now comfort care.     - Hospice consulted  - Palliative Consulted  - DNR-CC - will sign once arrives to floors  - CC measures in place    Patient transitioned to inpatient hospice     Past Medical History:        Diagnosis Date    Atherosclerotic vascular disease     brain w right m2 stenosis and vertebral r/l artery stenosis    Back pain     Brain mass     noted good michelle 11/20 mri    Cervical spondylolysis     CHF (congestive heart failure) ([facility] 10/2022    new onset    Dementia ([facility]     has not seen neurologist. by report 20/30 mmse and aricept was started    [facility])     type 2    Frequent falls     Hearing loss     Heart disease     bypass? cardiologist?    Hyperlipidemia     Hypertension     Mental status alteration 11/2020    [facility] consult    Osteoporosis     restarted after a drug holiday Wrist fracture        Past Surgical History:        Procedure Laterality Date    BRAIN TUMOR EXCISION      benign, right frontotemporal craniotomy    CORONARY ARTERY BYPASS GRAFT      HIP FRACTURE SURGERY Left 9/17/2022    LEFT HIP OPEN REDUCTION INTERNAL FIXATION performed by Rukhsana Jj MD at [facility]      pins    ORIF HIP FRACTURE Left 09/2022    PARTIAL HIP ARTHROPLASTY      left hip       Medications Priorto Admission:    No medications prior to admission. Allergies:  Penicillins, Statins, and Sulfa antibiotics    Social History:   TOBACCO:   reports that she has never smoked. She has never used smokeless tobacco.  ETOH:   reports no history of alcohol use. DRUGS :   Patient currently lives [facility]    Family History:       Problem Relation Age of Onset    Diabetes Mother     High Blood Pressure Mother     Cancer Sister         ovarian    Diabetes Sister        OBJECTIVE    Labs:  CBC:   Recent Labs     11/27/22  1255   WBC 12.9*   HGB 14.3   HCT 44.3            BMP:   Recent Labs     11/27/22  1255      K 4.5   *   CO2 22   BUN 12   CREATININE 0.6   GLUCOSE 100*       LFT's:   Recent Labs     11/27/22  1255   AST 22   ALT 12   BILITOT 0.5   ALKPHOS 117       Troponin:   Recent Labs     11/27/22  1255   TROPONINI 0.29*       BNP:No results for input(s): BNP in the last 72 hours. ABGs: No results for input(s): PHART, FTC2QSN, PO2ART in the last 72 hours. INR: No results for input(s): INR in the last 72 hours. U/A:  Recent Labs     11/27/22  1443   COLORU Yellow   PHUR 6.0   WBCUA >100*   RBCUA >100*   BACTERIA 2+*   CLARITYU Clear   SPECGRAV 1.025   LEUKOCYTESUR LARGE*   UROBILINOGEN 1.0   BILIRUBINUR SMALL*   BLOODU LARGE*   GLUCOSEU Negative         No orders to display     ASSESSMENT AND PLAN:    Acute L Parietal Lobe CVA  AoC Diastolic CHF  Dementia  UTI? PNA?     Chronic Problems:  Hx of brain mass  Frequent Falls  T2DM  HTN  HLD  Hx of brain mass    CT head shows concern for L parietal Lobe acute infarct. Labs concerning for dCHF exac, possible UTI, Pulm edema w possible PNA.   - As above in HPI, both daughter agreed to Hospice measures and code status to [facility].  - Hospice consulted  - Palliative consulted  - Comfort Care measures in place  - will sign DNR-CC order once pt on floors    Code Status:Prior  DISPO: IP, plan for Hospice (consulted)    Allana Hatchet, MD, 11/30/2022,  9:51 AM    Pt transitioned to inpatient hospice this is the h and p from admission

## 2022-12-01 LAB
BLOOD CULTURE, ROUTINE: NORMAL
CULTURE, BLOOD 2: NORMAL

## 2022-12-09 NOTE — DISCHARGE SUMMARY
INTERNAL MEDICINE DEPARTMENT AT [address] [place] [address]  DISCHARGE SUMMARY    Patient ID: Joss Rom                                             Discharge Date: 12/9/2022   Patient's PCP: Raysa Marr MD                                          Discharge Physician: George Bell MD MD  Admit Date: 11/27/2022   Admitting Physician: Molina Aguirre MD    PROBLEMS DURING HOSPITALIZATION:  Present on Admission:  Acute CVA  Acute Hypoxic Respiratory Failure  Diastolic Heart Failure Exacerbation  UTI  Dementia      DISCHARGE DIAGNOSES:  Acute Hypoxic Respiratory Failure  Acute L Parietal Lobe CVA  AoC Diastolic CHF  Dementia  UTI  Possible PNA    HPI:  Escotod Phalen. José Antonio Hicks is a 79-year-old female with history of dementia, CHF, prior brain mass, falls in September s/o ORIF for Hip fracture who presents with AMS/obtundation. Per daughters, patient had reduced p.o. intake both eating and drinking. Of note recent admission for hypoxic respiratory failure with fluid overload with new onset dCHF. Both daughters at the time had discussions for possible hospice, but ultimately never made a decision. Today, spoke with both daughters ultimately given patient's age, comorbidities, prognosis decision was made by both daughters to pursue hospice and ultimately CODE STATUS was stay changed to DNR CC. Both daughters understand that hospice is for end-of-life care, no management for her current stroke will be pursued, comfort measures only will be instituted. In the ED, patient obtunded. Requiring 12L NRB, BP stable in low 100s. CT head showed concern for acute  L parietal infartions. Morphine and Ativan PO solution started in ED given patient is now comfort care.     The following issues were addressed during hospitalization:    Acute Hypoxic Respiratory Failure  Acute L Parietal Lobe CVA  AoC Diastolic CHF  Dementia  UTI  Possible PNA  Hx of brain mass  Frequent Falls  T2DM  HTN  HLD  Hx of brain mass    Patient presented Obtunded from [facility] home. CT head showed concern for acute left parietal infarction. Labs and imaging showed likely acute exacerbation of diastolic CHF and pulmonary edema with concern for underlying PNA. Our team was informed from ED that family did not want any interventions performed including thrombectomy or fibrinolytics. Thus both daughters were contacted immediately to determine goals of care and to discuss the prognosis of this patient. Daughters previously entertained the idea of hospice care last admission but never came to to conclusion. This topic was discussed again. Ultimately, discussion concluded with the decision to change code status to [facility] and pursue comfort care measures. Thus Hospice and Palliative care were consulted. Comfort care measures were initiated in the ED and patient ultimately came up to wards floor. Discussion with Hospice and family was held regarding inpatient vs outpatient hospice. However patient's condition was deteriorating rapidly, thus hospice in-hospital was decided. Discharge-Readmission orders were placed. Ultimately patient passed away shortly after at 12:30 PM 11/28/2022. Full death note can be found in chart. Physical Exam:  Physical Exam     Constitutional:       Appearance: She is ill-appearing. Comments: Obtunded , non-responsive  Eyes:      Comments: Pupils small, significantly sluggishly reactive to light. Cardiovascular:      Rate and Rhythm: Normal rate and regular rhythm. Comments: Pulses very soft B/L  Pulmonary:      Breath sounds: Stridor present. Diffusely reduced. Comments: Diffuse rales, diffuse stridor in middle lobes. On 15L NRB and 15L NC (total 30L O2). Neurological:      Comments: Obtunded, not following any commands. Non-responsive to pain.      Consults: Palliative Care, Hospice  Significant Diagnostic Studies:  CT Head prior to hospice  Treatments: Comfort measures     Disposition:     Time Spent on discharge is more than 30 minutes    Signed:  Yelitza Pinedo MD,  MD, PGY-1  2022

## 2023-01-13 NOTE — PROGRESS NOTES
Physical Therapy  Facility/Department: [address] [place] [address]  Physical Therapy Initial Assessment    Name: Foster Santana  : 1931  MRN: [id number]  Date of Service: 2022    Discharge Recommendations: Foster Santana scored a  on the AM-PAC short mobility form. Current research shows that an AM-PAC score of 17 or less is typically not associated with a discharge to the patient's home setting. Based on the patient's AM-PAC score and their current functional mobility deficits, it is recommended that the patient have 3-5 sessions per week of Physical Therapy at d/c to increase the patient's independence. Please see assessment section for further patient specific details. If patient discharges prior to next session this note will serve as a discharge summary. Please see below for the latest assessment towards goals. PT Equipment Recommendations  Equipment Needed:  (defer to facility)      Patient Diagnosis(es): The primary encounter diagnosis was Closed fracture of sacrum, unspecified fracture morphology, [facility] A diagnosis of Closed pelvic ring fracture, initial encounter [facility]) was also pertinent to this visit. Past Medical History:  has a past medical history of Atherosclerotic vascular disease, Back pain, Brain mass, Cervical spondylolysis, Dementia ([facility] [address] Diabetes ([facility] Frequent falls, Hearing loss, Heart disease, Hyperlipidemia, Hypertension, Mental status alteration, Osteoporosis, and Wrist fracture. Past Surgical History:  has a past surgical history that includes hip surgery; Partial hip arthroplasty; Brain tumor excision; and Coronary artery bypass graft. Assessment   Assessment: 79 yo adm from memory care unit. Multiple attempts to sit pt on side of bed/mobilize in bed but pt yelling out with any attempts & grabbing at L thigh. Could not tolerate sitting EOB. 2 person A to scoot up in bed.   Discussed with MD & reports he will order imaging for L thigh. Will follow up per POC & attempt OOB/ambulation at next visit. Anticipate pt needing 24 hr physical A upon return to nursing facility. If continues in this much pain, doubtful she could tolerate PT. Treatment Diagnosis: impaired mobility  Therapy Prognosis: Guarded  Decision Making: Medium Complexity  Requires PT Follow-Up: Yes  Activity Tolerance  Activity Tolerance: Patient limited by pain     Plan   Plan  Plan:  (2-5)  Current Treatment Recommendations: Balance training, Functional mobility training, Transfer training, Gait training, Safety education & training  Safety Devices  Type of Devices: Left in bed, Nurse notified     Restrictions  Position Activity Restriction  Other position/activity restrictions: per ortho note:OK to mobilize with assistive device and WBAT     Subjective   General  Chart Reviewed: Yes  Additional Pertinent Hx: Adm 9/14 to ED with R hip pain after possible fall. Pt with dementia. 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 CT cervical spine (-). L knee Xray (-). Pelvis CT:Subacute, healing fractures of the right obturator ring. There is cortical irregularity along the anterior cortex of the sacrum, which could reflect nondisplaced fractures, age indeterminate. Ortho consulted, ok for WBAT. Family / Caregiver Present: No  Referring Practitioner: Lakisha Stein MD  Follows Commands: Impaired  Subjective  Subjective: Found in bed, moaning & writhing in pain. \"If I could just swing my legs over. \"  With attempts to get up, pt then yelling louder. ... \"Just let me lay here. \"  \"Cover me up. \"         Social/Functional History  Social/Functional History  Type of Home: Facility (memory care unit possibly)  Additional Comments: No prior information available at this time & pt unable to report. MD reports talking to daughter & states pt could stand unassisted.        Cognition   Orientation  Overall Orientation Status: Impaired  Orientation Level: Disoriented X4     Objective           AROM RLE (degrees)  RLE AROM: WFL  AROM LLE (degrees)  LLE General AROM: Impaired- pt not wanting to move LLE at all- keeping it in fetal position. Attempted numerous times to gently straighten but pt yelling in pain & grabbing at her L thigh           Bed mobility  Supine to Sit: Dependent/Total;2 Person assistance (failed attempts to sit up- tried multiple times to swing LEs over side of bed, but pt yelling out in pain & asking to be left to lie there; few min later pt yelling & asking to sit up but was unable due to pain- MD made aware of constant c/o pain L thigh)  Scooting: Dependent/Total;2 Person assistance (to scoot up in bed)                                                                       AM-PAC Score  AM-PAC Inpatient Mobility Raw Score : 6 (09/14/22 1540)  AM-PAC Inpatient T-Scale Score : 23.55 (09/14/22 1540)  Mobility Inpatient CMS 0-100% Score: 100 (09/14/22 1540)  Mobility Inpatient CMS G-Code Modifier : CN (09/14/22 1540)             Goals  Short Term Goals  Time Frame for Short term goals: dc  Short term goal 1: Tolerate supine>sit with max A of 2. Short term goal 2: Rolling side to side max A of 1.   Patient Goals   Patient goals : unable to state due to dementia       Education  Patient Education  Education Given To: Patient  Education Provided: Role of Therapy;Plan of Care  Education Provided Comments: importance of OOB  Barriers to Learning: Cognition  Education Outcome: Unable to demonstrate understanding      Therapy Time   Individual Concurrent Group Co-treatment   Time In 1345         Time Out 1403         Minutes 18          Timed Code Treatment Minutes:   0    Total Treatment Minutes:  Quan, [facility] [place] MOLEC[facility] PCR

## 2023-09-06 NOTE — DEATH NOTES
Death Pronouncement Note  Patient's Name: Esther Garrett   Patient's YOB: 1931  MRN Number: [id number]    Admitting Provider: Nika Brunson MD  Attending Provider: No att. providers found    Patient was examined and the following were absent: Pulses, Blood Pressure, and Respiratory effort    I declared the patient dead on 11/28/2022 at 12:30 PM    Preliminary Cause of Death: Cardiac arrest     Electronically signed by Nika Brunson MD on 11/28/22 at 4:36 PM EST eyeglasses

## (undated) DEVICE — GLOVE ORTHO 7 1/2   MSG9475

## (undated) DEVICE — SUTURE VCRL SZ 2-0 L18IN ABSRB UD CT-1 L36MM 1/2 CIR J839D

## (undated) DEVICE — SPONGE GZ W4XL8IN COT WVN 12 PLY

## (undated) DEVICE — PROTECTOR ULN NRV PUR FOAM HK LOOP STRP ANATOMICALLY

## (undated) DEVICE — 6617 IOBAN II PATIENT ISOLATION DRAPE 5/BX,4BX/CS: Brand: STERI-DRAPE™ IOBAN™ 2

## (undated) DEVICE — 3M(TM) MEDIPORE(TM) H SOFT CLOTH TAPE 2866: Brand: 3M™ MEDIPORE™

## (undated) DEVICE — SOLUTION IV 1000ML 0.9% SOD CHL

## (undated) DEVICE — NCB CABLE BUTTON
Type: IMPLANTABLE DEVICE | Site: HIP | Status: NON-FUNCTIONAL
Brand: NCB®

## (undated) DEVICE — SUTURE VCRL SZ 0 L18IN ABSRB UD L36MM CT-1 1/2 CIR J840D

## (undated) DEVICE — DRESSING PETRO W3XL8IN N ADH KNIT CELOS ACETT ADPTC

## (undated) DEVICE — TOWEL,STOP FLAG GOLD N-W: Brand: MEDLINE

## (undated) DEVICE — 3M™ STERI-DRAPE™ U-DRAPE 1015: Brand: STERI-DRAPE™

## (undated) DEVICE — INTENDED FOR TISSUE SEPARATION, AND OTHER PROCEDURES THAT REQUIRE A SHARP SURGICAL BLADE TO PUNCTURE OR CUT.: Brand: BARD-PARKER ® CARBON RIB-BACK BLADES

## (undated) DEVICE — Device: Brand: NCB®-DF

## (undated) DEVICE — UNDERGLOVE SURG SZ 8 BLU LTX FREE SYN POLYISOPRENE POLYMER

## (undated) DEVICE — BIT DRL L205MM DIA2.7MM STD QUIK CONN W/O STP N RADLUC

## (undated) DEVICE — HIP PINNING: Brand: MEDLINE INDUSTRIES, INC.

## (undated) DEVICE — PAD,ABDOMINAL,5"X9",ST,LF,25/BX: Brand: MEDLINE INDUSTRIES, INC.

## (undated) DEVICE — SHOULDER STABILIZATION KIT,                                    DISPOSABLE 12 PER BOX